# Patient Record
Sex: MALE | Race: WHITE | Employment: FULL TIME | URBAN - METROPOLITAN AREA
[De-identification: names, ages, dates, MRNs, and addresses within clinical notes are randomized per-mention and may not be internally consistent; named-entity substitution may affect disease eponyms.]

---

## 2017-01-27 ENCOUNTER — TELEPHONE (OUTPATIENT)
Dept: FAMILY MEDICINE | Facility: CLINIC | Age: 62
End: 2017-01-27

## 2017-02-07 ENCOUNTER — OFFICE VISIT (OUTPATIENT)
Dept: FAMILY MEDICINE | Facility: CLINIC | Age: 62
End: 2017-02-07
Payer: COMMERCIAL

## 2017-02-07 ENCOUNTER — RADIANT APPOINTMENT (OUTPATIENT)
Dept: GENERAL RADIOLOGY | Facility: CLINIC | Age: 62
End: 2017-02-07
Attending: FAMILY MEDICINE
Payer: COMMERCIAL

## 2017-02-07 VITALS
DIASTOLIC BLOOD PRESSURE: 73 MMHG | BODY MASS INDEX: 32.91 KG/M2 | OXYGEN SATURATION: 96 % | WEIGHT: 256.4 LBS | HEIGHT: 74 IN | SYSTOLIC BLOOD PRESSURE: 127 MMHG | TEMPERATURE: 99 F | HEART RATE: 98 BPM

## 2017-02-07 DIAGNOSIS — M25.562 CHRONIC PAIN OF LEFT KNEE: ICD-10-CM

## 2017-02-07 DIAGNOSIS — M54.50 BILATERAL LOW BACK PAIN WITHOUT SCIATICA, UNSPECIFIED CHRONICITY: Primary | ICD-10-CM

## 2017-02-07 DIAGNOSIS — G89.29 CHRONIC PAIN OF LEFT KNEE: ICD-10-CM

## 2017-02-07 DIAGNOSIS — R05.9 COUGH: ICD-10-CM

## 2017-02-07 PROCEDURE — 99214 OFFICE O/P EST MOD 30 MIN: CPT | Performed by: FAMILY MEDICINE

## 2017-02-07 PROCEDURE — 72100 X-RAY EXAM L-S SPINE 2/3 VWS: CPT

## 2017-02-07 RX ORDER — CYCLOBENZAPRINE HCL 10 MG
5-10 TABLET ORAL 3 TIMES DAILY PRN
Qty: 30 TABLET | Refills: 1 | Status: SHIPPED | OUTPATIENT
Start: 2017-02-07 | End: 2017-02-20

## 2017-02-07 RX ORDER — ALBUTEROL SULFATE 90 UG/1
2 AEROSOL, METERED RESPIRATORY (INHALATION) EVERY 6 HOURS PRN
Qty: 1 INHALER | Refills: 0 | Status: SHIPPED | OUTPATIENT
Start: 2017-02-07 | End: 2017-06-27

## 2017-02-07 RX ORDER — OXYCODONE AND ACETAMINOPHEN 5; 325 MG/1; MG/1
1 TABLET ORAL EVERY 8 HOURS PRN
Qty: 30 TABLET | Refills: 0 | Status: SHIPPED | OUTPATIENT
Start: 2017-02-07 | End: 2017-02-22

## 2017-02-07 ASSESSMENT — ANXIETY QUESTIONNAIRES
GAD7 TOTAL SCORE: 4
1. FEELING NERVOUS, ANXIOUS, OR ON EDGE: NOT AT ALL
5. BEING SO RESTLESS THAT IT IS HARD TO SIT STILL: NOT AT ALL
IF YOU CHECKED OFF ANY PROBLEMS ON THIS QUESTIONNAIRE, HOW DIFFICULT HAVE THESE PROBLEMS MADE IT FOR YOU TO DO YOUR WORK, TAKE CARE OF THINGS AT HOME, OR GET ALONG WITH OTHER PEOPLE: SOMEWHAT DIFFICULT
3. WORRYING TOO MUCH ABOUT DIFFERENT THINGS: NOT AT ALL
2. NOT BEING ABLE TO STOP OR CONTROL WORRYING: SEVERAL DAYS
6. BECOMING EASILY ANNOYED OR IRRITABLE: SEVERAL DAYS
7. FEELING AFRAID AS IF SOMETHING AWFUL MIGHT HAPPEN: MORE THAN HALF THE DAYS

## 2017-02-07 ASSESSMENT — PATIENT HEALTH QUESTIONNAIRE - PHQ9: 5. POOR APPETITE OR OVEREATING: NOT AT ALL

## 2017-02-07 NOTE — PROGRESS NOTES
SUBJECTIVE:                                                    Cedric Figueroa is a 61 year old male who presents to clinic today for the following health issues:      Back Pain      Duration: x1 month        Specific cause: none    Description:   Location of pain: low back - mid  Character of pain: tightness, constant ache   Pain radiation:lower spin into glut  New numbness or weakness in legs, not attributed to pain:  no     Intensity: Currently 5-6/10, At its worst 8-9/10    History:   Pain interferes with job: YES- needs to walk around  History of back problems: no prior back problems  Any previous MRI or X-rays: None  Sees a specialist for back pain:  No  Therapies tried without relief: acetaminophen (Tylenol), cold and heat    Alleviating factors:   Improved by: stretching it, walking around     Precipitating factors:    Worsened by: Sitting long periods     Functional and Psychosocial Screen (Flower STarT Back):      Most recent score:    FLOWER START BACK TOTAL SCORE 2/7/2017   Total Score (all 9) 4            Accompanying Signs & Symptoms:  Risk of Fracture:  None  Risk of Cauda Equina:  None  Risk of Infection:  None  Risk of Cancer:  None  Risk of Ankylosing Spondylitis:  Onset at age <35, male, AND morning back stiffness. no                      Requesting a refill on Albuterol. States that he uses it for an occasional cough at night that seems to work well for him. Denies having any underlying lung disease. Admits that he is a Cigar smoker.     Also requesting for a refill on Oxycodone. Takes it as needed for chronic knee pain.       Problem list and histories reviewed & adjusted, as indicated.  Additional history: as documented    Current Outpatient Prescriptions   Medication Sig Dispense Refill     cyclobenzaprine (FLEXERIL) 10 MG tablet Take 0.5-1 tablets (5-10 mg) by mouth 3 times daily as needed for muscle spasms 30 tablet 1     albuterol (PROAIR HFA/PROVENTIL HFA/VENTOLIN HFA) 108 (90 BASE) MCG/ACT  Inhaler Inhale 2 puffs into the lungs every 6 hours as needed for shortness of breath / dyspnea or wheezing 1 Inhaler 0     oxyCODONE-acetaminophen (PERCOCET) 5-325 MG per tablet Take 1 tablet by mouth every 8 hours as needed for moderate to severe pain 30 tablet 0     glipiZIDE (GLUCOTROL) 5 MG tablet Take 1 tablet (5 mg) by mouth 2 times daily (before meals) (Patient taking differently: Take 5 mg by mouth daily (with breakfast) ) 90 tablet 3     lisinopril-hydrochlorothiazide (PRINZIDE,ZESTORETIC) 20-12.5 MG per tablet Take 2 tablets by mouth daily 90 tablet 3     metFORMIN (GLUCOPHAGE) 1000 MG tablet Take 1 tablet (1,000mg) by mouth twice daily. 180 tablet 3     pramipexole (MIRAPEX) 0.25 MG tablet Take 1 tablet (0.25mg) in morning, 2 tablets (0.5mg) in the afternoon, and take 1 tablet before bed 240 tablet 3     PARoxetine (PAXIL) 20 MG tablet Take 1 tablet (20 mg) by mouth every morning 90 tablet 3     simvastatin (ZOCOR) 40 MG tablet Take 1 tablet (40 mg) by mouth At Bedtime 90 tablet 3     insulin glargine (LANTUS) 100 UNIT/ML vial 36 units SC qhs 1 Month 11     MELATONIN PO Take 10 mg by mouth At Bedtime        blood glucose monitoring (ACCU-CHEK PELON PLUS) meter device kit Use to test blood sugar 3 times daily or as directed. 1 kit 0     blood glucose (ACCU-CHEK PELON PLUS) test strip Use to test blood sugar 3 times daily or as directed. 3 Month 3     blood glucose monitoring (SOFTCLIX) lancets Use to test blood sugar 3 times daily or as directed. 3 Box 33     insulin pen needle (BD ULTRA-FINE) 29G X 12.7MM Use once daily or as directed. 100 each prn     Pediatric Multiple Vit-C-FA (ANIMAL CHEWABLE MULTIVITAMIN PO) Take by mouth 2 times daily       aspirin 81 MG tablet Take 1 tablet by mouth daily.       [DISCONTINUED] albuterol (PROAIR HFA, PROVENTIL HFA, VENTOLIN HFA) 108 (90 BASE) MCG/ACT inhaler Inhale 2 puffs into the lungs every 6 hours as needed for shortness of breath / dyspnea or wheezing 1  "Inhaler 0     No Known Allergies  Problem list, Medication list, Allergies, and Medical/Social/Surgical histories reviewed in [facility] and updated as appropriate.    ROS:  Constitutional, HEENT, cardiovascular, pulmonary, gi and gu systems are negative, except as otherwise noted.    OBJECTIVE:                                                    /73 mmHg  Pulse 98  Temp(Src) 99  F (37.2  C) (Tympanic)  Ht 6' 2\" (1.88 m)  Wt 256 lb 6.4 oz (116.302 kg)  BMI 32.91 kg/m2  SpO2 96%  Body mass index is 32.91 kg/(m^2).  GENERAL: healthy, alert and no distress  MS: no gross musculoskeletal defects noted, no edema  BACK EXAM: Bilateral paraspinal tenderness to palpation in the lumbar sacral area. Negative straight leg raising test. Normal strength in the bilateral lower extremities. DTR are present and equal bilaterally.     Diagnostic Test Results:  See orders below     ASSESSMENT/PLAN:                                                    Cedric was seen today for back pain.    Diagnoses and all orders for this visit:    Bilateral low back pain without sciatica, unspecified chronicity  -     XR Lumbar Spine 2/3 Views  -     cyclobenzaprine (FLEXERIL) 10 MG tablet; Take 0.5-1 tablets (5-10 mg) by mouth 3 times daily as needed for muscle spasms  -     PHYSICAL THERAPY REFERRAL    Cough  -     Refill: albuterol (PROAIR HFA/PROVENTIL HFA/VENTOLIN HFA) 108 (90 BASE) MCG/ACT Inhaler; Inhale 2 puffs into the lungs every 6 hours as needed for shortness of breath / dyspnea or wheezing    Chronic pain of left knee  -     Refill: oxyCODONE-acetaminophen (PERCOCET) 5-325 MG per tablet; Take 1 tablet by mouth every 8 hours as needed for moderate to severe pain  Consider signing a narcotic contract at the next office visit      Other orders  -     DEPRESSION ACTION PLAN (DAP)      Follow up if symptoms fail to improve or as previously scheduled. Patient verbalized understanding.      Molly Leiva MD  [facility]    "

## 2017-02-07 NOTE — PATIENT INSTRUCTIONS
Back Basics: A Healthy Spine  A healthy spine supports the body while letting it move freely. It does this with the help of three natural curves. Strong, flexible muscles help, too. They support the spine by keeping its curves properly aligned. The disks that cushion the bones of your spine also play a role in back fitness.    Three natural curves  The spine is made of bones (vertebrae) and pads of soft tissue (disks). These parts are arranged in three curves: cervical, thoracic, and lumbar. When properly aligned, these curves keep your body balanced. They also support your body when you move. By distributing your weight throughout your spine, the curves make back injuries less likely.  Strong, flexible muscles  Strong, flexible back muscles help support the three curves of the spine. They do so by holding the vertebrae and disks in proper alignment. Strong, flexible abdominal, hip, and leg muscles also reduce strain on the back.  The lumbar curve  The lumbar curve is the hardest-working part of the spine. It carries more weight and moves the most. Aligning this curve helps prevent damage to vertebrae, disks, and other parts of the spine.  Cushioning disks  Disks are the soft pads of tissue between the vertebrae. The disks absorb shock caused by movement. Each disk has a spongy center (nucleus) and a tougher outer ring (annulus). Movement within the nucleus allows the vertebrae to rock back and forth on the disks. This provides the flexibility needed to bend and move.         5640-7622 [employer]. [address] [place] [address] 7[address]. All rights reserved. This information is not intended as a substitute for professional medical care. Always follow your healthcare professional's instructions.        Back Care Tips    Caring for your back  These are things you can do to prevent a recurrence of acute back pain and to reduce symptoms from chronic back pain:    Maintain a healthy weight. If you are  overweight, losing weight will help most types of back pain.    Exercise is an important part of recovery from most types of back pain. The back is supported by the muscles behind and in front of the spine. This means both the back muscles and the abdominal muscles must be strengthened to provide better support for your spine.     Swimming and brisk walking are good overall exercises to improve your fitness level.    Practice safe lifting methods (below).    Practice good posture when sitting, standing and walking. Avoid prolonged sitting. This puts more stress on the lower back than standing or walking.    Wear quality shoes with sufficient arch support. Foot and ankle alignment can affect back symptoms. Women should avoid high heels.    Therapeutic massage can help  relax the back muscles without stretching them.    During the first 24 to 72 hours after an acute injury or flare-up of chronic back pain, apply an ice pack to the painful area for 20 minutes and then remove it for 20 minutes over a period of 60 to 90 minutes or several times a day. As a safety precaution, do not use a heating pad at bedtime. Sleeping on a heating pad can lead to skin burns or tissue damage.    Ice and heat therapies can be alternated.  Medications  Talk to your doctor before using medications, especially if you have other medical problems or are taking other medicines.    You may use acetaminophen or ibuprofen to control pain, unless other pain medicine was prescribed. If you have chronic conditions like diabetes, liver or kidney disease, stomach ulcers or gastrointestinal bleeding, or are taking blood thinners, talk with your doctor before taking any meidcations.    Be careful if you are given prescription pain medicines, narcotics, or medication for muscle spasm. They can cause drowsiness, affect your coordination, reflexes and judgment. Do not drive or operate heavy machinery.  Lumbar stretch  Here is a simple stretching exercise  that will help relax muscle spasm and keep your back more limber. If exercise makes your back pain worse, don t do it.    Lie on your back with your knees bent and both feet on the ground.    Slowly raise your left knee to your chest as you flatten your lower back against the floor. Hold for 5 seconds.    Relax and repeat the exercise with your right knee.    Do 10 of these exercises for each leg.  Safe lifting method    Don t bend over at the waist to lift an object off the floor.  Instead, bend your knees and hips in a squat.     Keep your back and head upright    Hold the object close to your body, directly in front of you.    Straighten your legs to lift the object.     Lower the object to the floor in the reverse fashion.    If you must slide something across the floor, push it.  Posture tips  Sitting  Sit in chairs with straight backs or low-back support. rKeep your k nees lower than your hip, with your feet flat on the floor.  When driving, sit up straight. Adjust the seat forward so you are not leaning toward the steering wheel.  A small pillow or rolled towel behind your lower back may help if you are driving long distances.   Standing  When standing for long periods, shift most of your weight to one leg at a time. Alternate legs every few minutes.   Sleeping  The best way to sleep is on your side with your knees bent. Put a low pillow under your head to support your neck in a neutral spine position. Avoid thick pillows that bend your neck to one side. Put a pillow between your legs to further relax your lower back. If you sleep on your back, put pillows under your knees to support your legs in a slightly flexed position. Use a firm mattress. If your mattress sags, replace it, or use a 1/2-inch plywood board under the mattress to add support.  Follow-up care  Follow up with your health care provider or as directed by our staff.  If X-rays, a CT scan or an MRI scan were taken, they will be reviewed by a  radiologist. You will be notified of any new findings that may affect your care.  Call 911  Seek emergency medical care if any of the following occur:    Trouble breathing    Confusion    Very drowsy or trouble breathing    Fainting or loss of consciousness    Rapid or very slow heart rate    Loss of  bwel or bladder control  When to seek medical care  Call your health care provider if any of the following occur:    Pain becomes worse or spreads to your arms or legs    Weakness or numbness in one or both arms or legs    Numbness in the groin area    6914-2459 [employer]. [address] [place] [id number]. All rights reserved. This information is not intended as a substitute for professional medical care. Always follow your healthcare professional's instructions.

## 2017-02-07 NOTE — Clinical Note
My Depression Action Plan  Name: Cedric Figueroa   Date of Birth 1955  Date: 2/7/2017    My doctor: Molly Leiva   My clinic: [place] [id number] [facility]  [place] [id number] [phone]          GREEN    ZONE   Good Control    What it looks like:     Things are going generally well. You have normal up s and down s. You may even feel depressed from time to time, but bad moods usually last less than a day.   What you need to do:  1. Continue to care for yourself (see self care plan)  2. Check your depression survival kit and update it as needed  3. Follow your physician s recommendations including any medication.  4. Do not stop taking medication unless you consult with your physician first.           YELLOW         ZONE Getting Worse    What it looks like:     Depression is starting to interfere with your life.     It may be hard to get out of bed; you may be starting to isolate yourself from others.    Symptoms of depression are starting to last most all day and this has happened for several days.     You may have suicidal thoughts but they are not constant.   What you need to do:     1. Call your care team, your response to treatment will improve if you keep your care team informed of your progress. Yellow periods are signs an adjustment may need to be made.     2. Continue your self-care, even if you have to fake it!    3. Talk to someone in your support network    4. Open up your depression survival kit           RED    ZONE Medical Alert - Get Help    What it looks like:     Depression is seriously interfering with your life.     You may experience these or other symptoms: You can t get out of bed most days, can t work or engage in other necessary activities, you have trouble taking care of basic hygiene, or basic responsibilities, thoughts of suicide or death that will not go away, self-injurious behavior.     What you need to do:  1. Call your care team  and request a same-day appointment. If they are not available (weekends or after hours) call your local crisis line, emergency room or 911.      Electronically signed by: Sandrita Zazueta, February 7, 2017    Depression Self Care Plan / Survival Kit    Self-Care for Depression  Here s the deal. Your body and mind are really not as separate as most people think.  What you do and think affects how you feel and how you feel influences what you do and think. This means if you do things that people who feel good do, it will help you feel better.  Sometimes this is all it takes.  There is also a place for medication and therapy depending on how severe your depression is, so be sure to consult with your medical provider and/ or Behavioral Health Consultant if your symptoms are worsening or not improving.     In order to better manage my stress, I will:    Exercise  Get some form of exercise, every day. This will help reduce pain and release endorphins, the  feel good  chemicals in your brain. This is almost as good as taking antidepressants!  This is not the same as joining a gym and then never going! (they count on that by the way ) It can be as simple as just going for a walk or doing some gardening, anything that will get you moving.      Hygiene   Maintain good hygiene (Get out of bed in the morning, Make your bed, Brush your teeth, Take a shower, and Get dressed like you were going to work, even if you are unemployed).  If your clothes don't fit try to get ones that do.    Diet  I will strive to eat foods that are good for me, drink plenty of water, and avoid excessive sugar, caffeine, alcohol, and other mood-altering substances.  Some foods that are helpful in depression are: complex carbohydrates, B vitamins, flaxseed, fish or fish oil, fresh fruits and vegetables.    Psychotherapy  I agree to participate in Individual Therapy (if recommended).    Medication  If prescribed medications, I agree to take them.   Missing doses can result in serious side effects.  I understand that drinking alcohol, or other illicit drug use, may cause potential side effects.  I will not stop my medication abruptly without first discussing it with my provider.    Staying Connected With Others  I will stay in touch with my friends, family members, and my primary care provider/team.    Use your imagination  Be creative.  We all have a creative side; it doesn t matter if it s oil painting, sand castles, or mud pies! This will also kick up the endorphins.    Witness Beauty  (AKA stop and smell the roses) Take a look outside, even in mid-winter. Notice colors, textures. Watch the squirrels and birds.     Service to others  Be of service to others.  There is always someone else in need.  By helping others we can  get out of ourselves  and remember the really important things.  This also provides opportunities for practicing all the other parts of the program.    Humor  Laugh and be silly!  Adjust your TV habits for less news and crime-drama and more comedy.    Control your stress  Try breathing deep, massage therapy, biofeedback, and meditation. Find time to relax each day.     My support system    Clinic Contact:  Phone number:    Contact 1:  Phone number:    Contact 2:  Phone number:    Evangel[employer]/:  Phone number:    Therapist:  Phone number:    Local crisis center:    Phone number:    Other community support:  Phone number:

## 2017-02-07 NOTE — MR AVS SNAPSHOT
After Visit Summary   2/7/2017    Cedric Figueroa    MRN: [id number]           Patient Information     Date Of Birth          1955        Visit Information        Provider Department      2/7/2017 2:00 PM Molly Leiva MD [facility] [place]        Today's Diagnoses     Bilateral low back pain without sciatica, unspecified chronicity    -  1     Cough         Chronic pain of left knee            Follow-ups after your visit        Additional Services     PHYSICAL THERAPY REFERRAL       *This therapy referral will be filtered to a centralized scheduling office at [facility] and the patient will receive a call to schedule an appointment at a [place] location most convenient for them. *     [facility] provides Physical Therapy evaluation and treatment and many specialty services across the [place] system.  If requesting a specialty program, please choose from the list below.    If you have not heard from the scheduling office within 2 business days, please call [phone] for all locations, with the exception of [place], please call [phone].  Treatment: Evaluation & Treatment  Special Instructions/Modalities: None  Special Programs: None    Please be aware that coverage of these services is subject to the terms and limitations of your health insurance plan.  Call member services at your health plan with any benefit or coverage questions.      **Note to Provider:  If you are referring outside of [place] for the therapy appointment, please list the name of the location in the  special instructions  above, print the referral and give to the patient to schedule the appointment.                  Your next 10 appointments already scheduled     Feb 20, 2017  3:30 PM   SHORT with Molly Leiva MD   [facility] [place] ([facility] [place])    [address]  [place] [address] [phone]              Who to contact      "Normal or non-critical lab and imaging results will be communicated to you by MyChart, letter or phone within 4 business days after the clinic has received the results. If you do not hear from us within 7 days, please contact the clinic through Theran[employer] or phone. If you have a critical or abnormal lab result, we will notify you by phone as soon as possible.  Submit refill requests through [employer] or call your pharmacy and they will forward the refill request to us. Please allow 3 business days for your refill to be completed.          If you need to speak with a  for additional information , please call: [phone]             Additional Information About Your Visit        Byliner Information     [employer] gives you secure access to your electronic health record. If you see a primary care provider, you can also send messages to your care team and make appointments. If you have questions, please call your primary care clinic.  If you do not have a primary care provider, please call [phone] and they will assist you.        Care EveryWhere ID     This is your Care EveryWhere ID. This could be used by other organizations to access your [facility] medical records  [id number]        Your Vitals Were     Pulse Temperature Height BMI (Body Mass Index) Pulse Oximetry       98 99  F (37.2  C) (Tympanic) 6' 2\" (1.88 m) 32.91 kg/m2 96%        Blood Pressure from Last 3 Encounters:   02/07/17 127/73   11/18/16 136/82   10/05/16 148/82    Weight from Last 3 Encounters:   02/07/17 256 lb 6.4 oz (116.302 kg)   11/18/16 264 lb (119.75 kg)   10/05/16 263 lb 3.2 oz (119.387 kg)              We Performed the Following     DEPRESSION ACTION PLAN (DAP)     PHYSICAL THERAPY REFERRAL     XR Lumbar Spine 2/3 Views          Today's Medication Changes          These changes are accurate as of: 2/7/17  2:25 PM.  If you have any questions, ask your nurse or doctor.               Start taking these medicines.        " Dose/Directions    cyclobenzaprine 10 MG tablet   Commonly known as:  FLEXERIL   Used for:  Bilateral low back pain without sciatica, unspecified chronicity   Started by:  Molly Leiva MD        Dose:  5-10 mg   Take 0.5-1 tablets (5-10 mg) by mouth 3 times daily as needed for muscle spasms   Quantity:  30 tablet   Refills:  1         These medicines have changed or have updated prescriptions.        Dose/Directions    glipiZIDE 5 MG tablet   Commonly known as:  GLUCOTROL   This may have changed:  when to take this   Used for:  Type 2 diabetes mellitus with hyperglycemia, with long-term current use of insulin (H)        Dose:  5 mg   Take 1 tablet (5 mg) by mouth 2 times daily (before meals)   Quantity:  90 tablet   Refills:  3            Where to get your medicines      These medications were sent to [facility] - [id number] [facility] [place] [address]     Phone:  [phone]    - albuterol 108 (90 BASE) MCG/ACT Inhaler  - cyclobenzaprine 10 MG tablet      Some of these will need a paper prescription and others can be bought over the counter.  Ask your nurse if you have questions.     Bring a paper prescription for each of these medications    - oxyCODONE-acetaminophen 5-325 MG per tablet             Primary Care Provider Office Phone # Fax #    Molly Leiva [phone]       [facility] [address]  [place] [address]        Thank you!     Thank you for choosing [facility]  for your care. Our goal is always to provide you with excellent care. Hearing back from our patients is one way we can continue to improve our services. Please take a few minutes to complete the written survey that you may receive in the mail after your visit with us. Thank you!             Your Updated Medication List - Protect others around you: Learn how to safely use, store and throw away your medicines at www.disposemymeds.org.           This list is accurate as of: 2/7/17  2:25 PM.  Always use your most recent med list.                   Brand Name Dispense Instructions for use    albuterol 108 (90 BASE) MCG/ACT Inhaler    PROAIR HFA/PROVENTIL HFA/VENTOLIN HFA    1 Inhaler    Inhale 2 puffs into the lungs every 6 hours as needed for shortness of breath / dyspnea or wheezing       ANIMAL CHEWABLE MULTIVITAMIN PO      Take by mouth 2 times daily       aspirin 81 MG tablet      Take 1 tablet by mouth daily.       blood glucose monitoring lancets     3 Box    Use to test blood sugar 3 times daily or as directed.       blood glucose monitoring meter device kit     1 kit    Use to test blood sugar 3 times daily or as directed.       blood glucose monitoring test strip    ACCU-CHEK PELON PLUS    3 Month    Use to test blood sugar 3 times daily or as directed.       cyclobenzaprine 10 MG tablet    FLEXERIL    30 tablet    Take 0.5-1 tablets (5-10 mg) by mouth 3 times daily as needed for muscle spasms       glipiZIDE 5 MG tablet    GLUCOTROL    90 tablet    Take 1 tablet (5 mg) by mouth 2 times daily (before meals)       insulin glargine 100 UNIT/ML injection    LANTUS    1 Month    36 units SC qhs       insulin pen needle 29G X 12.7MM    BD ULTRA-FINE    100 each    Use once daily or as directed.       lisinopril-hydrochlorothiazide 20-12.5 MG per tablet    PRINZIDE/ZESTORETIC    90 tablet    Take 2 tablets by mouth daily       MELATONIN PO      Take 10 mg by mouth At Bedtime       metFORMIN 1000 MG tablet    GLUCOPHAGE    180 tablet    Take 1 tablet (1,000mg) by mouth twice daily.       oxyCODONE-acetaminophen 5-325 MG per tablet    PERCOCET    30 tablet    Take 1 tablet by mouth every 8 hours as needed for moderate to severe pain       PARoxetine 20 MG tablet    PAXIL    90 tablet    Take 1 tablet (20 mg) by mouth every morning       pramipexole 0.25 MG tablet    MIRAPEX    240 tablet    Take 1 tablet (0.25mg) in morning, 2 tablets (0.5mg) in  the afternoon, and take 1 tablet before bed       simvastatin 40 MG tablet    ZOCOR    90 tablet    Take 1 tablet (40 mg) by mouth At Bedtime

## 2017-02-08 ASSESSMENT — PATIENT HEALTH QUESTIONNAIRE - PHQ9: SUM OF ALL RESPONSES TO PHQ QUESTIONS 1-9: 4

## 2017-02-08 ASSESSMENT — ANXIETY QUESTIONNAIRES: GAD7 TOTAL SCORE: 4

## 2017-02-20 ENCOUNTER — THERAPY VISIT (OUTPATIENT)
Dept: PHYSICAL THERAPY | Facility: CLINIC | Age: 62
End: 2017-02-20
Payer: COMMERCIAL

## 2017-02-20 ENCOUNTER — OFFICE VISIT (OUTPATIENT)
Dept: FAMILY MEDICINE | Facility: CLINIC | Age: 62
End: 2017-02-20
Payer: COMMERCIAL

## 2017-02-20 VITALS
SYSTOLIC BLOOD PRESSURE: 115 MMHG | HEART RATE: 76 BPM | WEIGHT: 256.8 LBS | HEIGHT: 74 IN | OXYGEN SATURATION: 97 % | TEMPERATURE: 97.1 F | BODY MASS INDEX: 32.96 KG/M2 | DIASTOLIC BLOOD PRESSURE: 69 MMHG

## 2017-02-20 DIAGNOSIS — G89.29 OTHER CHRONIC PAIN: ICD-10-CM

## 2017-02-20 DIAGNOSIS — M54.50 CHRONIC BILATERAL LOW BACK PAIN WITHOUT SCIATICA: Primary | ICD-10-CM

## 2017-02-20 DIAGNOSIS — Z79.4 TYPE 2 DIABETES MELLITUS WITH HYPERGLYCEMIA, WITH LONG-TERM CURRENT USE OF INSULIN (H): Primary | ICD-10-CM

## 2017-02-20 DIAGNOSIS — Z02.9 ENCOUNTER FOR NARCOTIC CONTRACT DISCUSSION: ICD-10-CM

## 2017-02-20 DIAGNOSIS — E11.65 TYPE 2 DIABETES MELLITUS WITH HYPERGLYCEMIA, WITH LONG-TERM CURRENT USE OF INSULIN (H): Primary | ICD-10-CM

## 2017-02-20 DIAGNOSIS — M54.50 BILATERAL LOW BACK PAIN WITHOUT SCIATICA, UNSPECIFIED CHRONICITY: ICD-10-CM

## 2017-02-20 DIAGNOSIS — G89.29 CHRONIC BILATERAL LOW BACK PAIN WITHOUT SCIATICA: Primary | ICD-10-CM

## 2017-02-20 LAB
HBA1C MFR BLD: 7.5 % (ref 4.3–6)
TSH SERPL DL<=0.005 MIU/L-ACNC: 2.23 MU/L (ref 0.4–4)

## 2017-02-20 PROCEDURE — 99214 OFFICE O/P EST MOD 30 MIN: CPT | Performed by: FAMILY MEDICINE

## 2017-02-20 PROCEDURE — 97110 THERAPEUTIC EXERCISES: CPT | Mod: GP | Performed by: PHYSICAL THERAPIST

## 2017-02-20 PROCEDURE — 97161 PT EVAL LOW COMPLEX 20 MIN: CPT | Mod: GP | Performed by: PHYSICAL THERAPIST

## 2017-02-20 PROCEDURE — 84443 ASSAY THYROID STIM HORMONE: CPT | Performed by: FAMILY MEDICINE

## 2017-02-20 PROCEDURE — 80307 DRUG TEST PRSMV CHEM ANLYZR: CPT | Performed by: FAMILY MEDICINE

## 2017-02-20 PROCEDURE — 40000358 ZZHCL STATISTIC DRUG SCREEN MULTIPLE (METRO): Performed by: FAMILY MEDICINE

## 2017-02-20 PROCEDURE — 99207 C FOOT EXAM  NO CHARGE: CPT | Performed by: FAMILY MEDICINE

## 2017-02-20 PROCEDURE — 36415 COLL VENOUS BLD VENIPUNCTURE: CPT | Performed by: FAMILY MEDICINE

## 2017-02-20 PROCEDURE — 83036 HEMOGLOBIN GLYCOSYLATED A1C: CPT | Performed by: FAMILY MEDICINE

## 2017-02-20 RX ORDER — INSULIN GLARGINE 100 [IU]/ML
38 INJECTION, SOLUTION SUBCUTANEOUS DAILY
Qty: 3 ML | Refills: 11 | Status: SHIPPED | OUTPATIENT
Start: 2017-02-20 | End: 2017-05-08

## 2017-02-20 RX ORDER — GLIPIZIDE 5 MG/1
5 TABLET ORAL
Qty: 90 TABLET | Refills: 3 | Status: SHIPPED | OUTPATIENT
Start: 2017-02-20 | End: 2017-06-27

## 2017-02-20 RX ORDER — CYCLOBENZAPRINE HCL 10 MG
5-10 TABLET ORAL 3 TIMES DAILY PRN
Qty: 30 TABLET | Refills: 1 | Status: SHIPPED | OUTPATIENT
Start: 2017-02-20 | End: 2017-06-27

## 2017-02-20 NOTE — LETTER
[facility]    02/20/17    Patient: Cedric Figueroa  YOB: 1955  Medical Record Number: [id number]                                                                  Controlled Substance Agreement  I understand that my care provider has prescribed controlled substances (narcotics, tranquilizers, and/or stimulants) to help manage my condition(s).  I am taking this medicine to help me function or work.  I know that this is strong medicine.  It could have serious side effects and even cause a dependency on the drug.  If I stop these medicines suddenly, I could have severe withdrawal symptoms.    The risks, benefits, and side effects of these medication(s) were explained to me.  I agree that:  1. I will take part in other treatments as advised by my provider.  This may be psychiatry or counseling, physical therapy, behavioral therapy, group treatment, or a referral to a pain clinic.  I will reduce or stop my medicine when my provider tells me to do so.   2. I will take my medicines as prescribed.  I will not change the dose or schedule unless my provider tells me to.  There will be no refills if I  run out early.   I may be contacted at any time without warning and asked to complete a drug test or pill count.   3. I will keep all my appointments at the clinic.  If I miss appointments or fail to follow instructions, my provider may stop my medicine.  4. I will not ask other providers to prescribe controlled substances. And I will not accept controlled substances from other people. If I need another prescribed controlled substance for a new reason, I will notify my provider within one business day.  5. If I enroll in the [place] Medical Marijuana program, I will tell my provider.  I will also sign an agreement to share my medical records with my provider.  6. I will use one pharmacy to fill all of my controlled substance prescriptions.  If my prescription is mailed to my pharmacy, it may take 5 to  7 days for my medicine to be ready.  7. I understand that my provider, clinic care team, and pharmacy can track controlled substance prescriptions from other providers through a central database (prescription monitoring program).  8. I will bring in my list of medications (or my medicine bottles) each time I come to the clinic.  REV-  04/2016                                                                                                                                            Page 1 of 2      [facility]    02/20/17    Patient: Cedric Figueroa  YOB: 1955  Medical Record Number: [id number]    9. Refills of controlled substances will be made only during office hours.  It is up to me to make sure that I do not run out of my medicines on weekends or holidays.    10. I am responsible for my prescriptions.  If the medicine is lost or stolen, it will not be replaced.   I also agree not to share these medicines with anyone.  11. I agree to not use ANY illegal or recreational drugs.  This includes marijuana, cocaine, bath salts or other drugs.  I agree not to use alcohol unless my provider says I may.  I agree to give urine samples whenever asked.  If I fail to give a urine sample, the provider may stop my medicine.     12. I will tell my nurse or provider right away if I become pregnant or have a new medical problem treated outside of [facility].  13. I understand that this medicine can affect my thinking and judgment.  It may be unsafe for me to drive, use machinery and do dangerous tasks.  I will not do any of these things until I know how the medicine affects me.  If my dose changes, I will wait to see how it affects me.  I will contact my provider if I have concerns about medicine side effects.  I understand that if I do not follow any of the conditions above, my prescriptions or treatment may be stopped.    I agree that my provider, clinic care team, and pharmacy may work with any city,  state or federal law enforcement agency that investigates the misuse, sale, or other diversion of my controlled medicine. I will allow my provider to discuss my care with or share a copy of this agreement with any other treating provider, pharmacy or emergency room where I receive care.  I agree to give up (waive) any right of privacy or confidentiality with respect to these authorizations.   I have read this agreement and have asked questions about anything I did not understand.   ___________________________________    ___________________________  Patient Signature                                                           Date and Time  ___________________________________     ____________________________  Witness                                                                            Date and Time  ___________________________________  Molly Leiva MD  REV-  04/2016                                                                                                                                                                 Page 2 of 2  Opioid Pain Medicines (also known as Narcotics)  What You Need to Know      What are opioids?   Opioids are pain medicines that must be prescribed by a doctor. Examples are:     morphine (MS Contin, Ruth)    oxycodone (Oxycontin)    oxycodone and acetaminophen (Percocet)    hydrocodone and acetaminophen (Vicodin, Norco)     fentanyl patch (Duragesic)     hydromorphone (Dilaudid)     methadone     What do opioids do well?   Opioids are best for short-term pain after a surgery or injury. They also work well for cancer pain. Unlike other pain medicines, they do not cause liver or kidney failure or ulcers. They may help some people with long-lasting (chronic) pain.     What do opioids NOT do well?   Opioids never get rid of pain entirely, and they do not work well for most patients with chronic pain. Opioids do not reduce swelling, one of the causes of pain. They also don t work  well for nerve pain.     Side effects  Talk to your doctor before you start or decide to keep taking one of these medicines. Side effects include:    Lowers your breathing rate enough that it could cause death    Death due to taking more than the prescribed dose    Serious lifelong opioid use      Dependence is not the same as addiction. Addiction is when people keep using a substance that harms their body, their mind or their relations with others. If you have a history of drug or alcohol abuse, taking opioids can cause a relapse.  Over time, opioids don t work as well. Most people will need higher and higher doses. The higher the dose, the more serious the side effects. We don t know the long-term effects of opioids.   People who have used opioids for a long time have a lower quality of life, worse depression, higher levels of pain and more visits to doctors.  Overdose from prescription drugs is the second leading cause of death in the [place] The risk of overdose rises when opioids are taken with other drugs such as:    Medicines used for anxiety and panic attacks (such as lorazepam, alprazolam, clonazepam    Other sedatives    Alcohol    Illegal drugs such as heroin  Never share your opioids with others. Be sure to store opioids in a secure place, locked if possible.Young children can easily swallow them and overdose.     Are there other ways to manage pain?  Ways to help reduce pain:    Exercise every day.    Treat health problems that may be causing pain.    Treat mental health problems like depression and anxiety.     Worse depression symptoms; Less pleasure in things you usually enjoy    Feeling tired or sluggish    Slower thoughts or cloudy thinking    Being more sensitive to pain over time; Pain is harder to control.    Trouble sleeping or restless sleep    Changes in hormone levels (for example, less testosterone).     Changes in sex drive or ability to have sex    Long lasting nausea and  constipation    Trouble breathing while asleep; This is worse with lung problems like COPD or sleep apnea.    Unsafe driving    Getting sick more often    Itching    Feeling dizzy    Dry mouth    Sweating    Trouble emptying the bladder (peeing). This is worse if you have an enlarged prostate or get urinary tract infections (UTIs).    What else should I know about opioids?  When someone takes opioids for too long or too often, they become dependent. This means that if you stop or reduce the medicine too quickly, you will have withdrawal symptoms.          Practice good sleep habits.  Try to go to bed and get up at the same time every day.    Stop smoking.  Tobacco use can make pain worse.    Do things that you enjoy.    Find a way to work through pain without drugs.  Try deep breathing, meditation, visual imagery and aromatherapy.    Ask your doctor to help you create a plan to manage your pain.

## 2017-02-20 NOTE — NURSING NOTE
"Chief Complaint   Patient presents with     Diabetes       Initial /69  Pulse 76  Temp 97.1  F (36.2  C) (Tympanic)  Ht 6' 2\" (1.88 m)  Wt 256 lb 12.8 oz (116.5 kg)  SpO2 97%  BMI 32.97 kg/m2 Estimated body mass index is 32.97 kg/(m^2) as calculated from the following:    Height as of this encounter: 6' 2\" (1.88 m).    Weight as of this encounter: 256 lb 12.8 oz (116.5 kg).  Medication Reconciliation: complete     Sandrita Zazueta MA      "

## 2017-02-20 NOTE — MR AVS SNAPSHOT
After Visit Summary   2/20/2017    Cedric Figueroa    MRN: [id number]           Patient Information     Date Of Birth          1955        Visit Information        Provider Department      2/20/2017 3:30 PM [facility] Leiva MD [facility]        Today's Diagnoses     Type 2 diabetes mellitus with hyperglycemia, with long-term current use of insulin (H)    -  1    Bilateral low back pain without sciatica, unspecified chronicity        Encounter for narcotic contract discussion          Care Instructions    Your target glucose levels are:     Pre-meal :      Post-meal:  < 180    Bedtime:     90 -150      Schedule Eye Exam        Follow-ups after your visit        Follow-up notes from your care team     Return for Diabetes follow Up every 3 months.      Your next 10 appointments already scheduled     Feb 20, 2017  4:30 PM CST   YOSI Spine with Arthur Walden PT   [facility] [place] [facility] ([facility] [place])    [address]  [address]  [facility] [id number] [phone]              Who to contact     Normal or non-critical lab and imaging results will be communicated to you by [employer], letter or phone within 4 business days after the clinic has received the results. If you do not hear from us within 7 days, please contact the clinic through [employer] or phone. If you have a critical or abnormal lab result, we will notify you by phone as soon as possible.  Submit refill requests through [employer] or call your pharmacy and they will forward the refill request to us. Please allow 3 business days for your refill to be completed.          If you need to speak with a  for additional information , please call: [phone]             Additional Information About Your Visit        [employer] Information     [employer] gives you secure access to your electronic health record. If you see a primary care provider, you can also send messages to your care  "team and make appointments. If you have questions, please call your primary care clinic.  If you do not have a primary care provider, please call [phone] and they will assist you.        Care EveryWhere ID     This is your Care EveryWhere ID. This could be used by other organizations to access your [facility] medical records  [id number]        Your Vitals Were     Pulse Temperature Height Pulse Oximetry BMI (Body Mass Index)       76 97.1  F (36.2  C) (Tympanic) 6' 2\" (1.88 m) 97% 32.97 kg/m2        Blood Pressure from Last 3 Encounters:   02/20/17 115/69   02/07/17 127/73   11/18/16 136/82    Weight from Last 3 Encounters:   02/20/17 256 lb 12.8 oz (116.5 kg)   02/07/17 256 lb 6.4 oz (116.3 kg)   11/18/16 264 lb (119.7 kg)              We Performed the Following     Drug screen urine     FOOT EXAM     Hemoglobin A1c     JUST IN CASE     TSH with free T4 reflex          Today's Medication Changes          These changes are accurate as of: 2/20/17  4:04 PM.  If you have any questions, ask your nurse or doctor.               Start taking these medicines.        Dose/Directions    insulin glargine 100 UNIT/ML injection   Used for:  Type 2 diabetes mellitus with hyperglycemia, with long-term current use of insulin (H)   Replaces:  insulin glargine 100 UNIT/ML injection   Started by:  Molly Leiva MD        Dose:  38 Units   Inject 38 Units Subcutaneous daily   Quantity:  3 mL   Refills:  11         These medicines have changed or have updated prescriptions.        Dose/Directions    glipiZIDE 5 MG tablet   Commonly known as:  GLUCOTROL   This may have changed:  when to take this   Used for:  Type 2 diabetes mellitus with hyperglycemia, with long-term current use of insulin (H)        Dose:  5 mg   Take 1 tablet (5 mg) by mouth 2 times daily (before meals)   Quantity:  90 tablet   Refills:  3         Stop taking these medicines if you haven't already. Please contact your care team if you have questions.     " insulin glargine 100 UNIT/ML injection   Commonly known as:  LANTUS   Replaced by:  insulin glargine 100 UNIT/ML injection   Stopped by:  Molly Leiva MD                Where to get your medicines      These medications were sent to [facility] - [address] [address] [facility]  [address] [facility] [place] [address]     Phone:  [phone]     cyclobenzaprine 10 MG tablet    glipiZIDE 5 MG tablet    insulin glargine 100 UNIT/ML injection                Primary Care Provider Office Phone # Fax #    Molly Leiva [phone]       [facility] [address]  [place] [address]        Thank you!     Thank you for choosing [facility]  for your care. Our goal is always to provide you with excellent care. Hearing back from our patients is one way we can continue to improve our services. Please take a few minutes to complete the written survey that you may receive in the mail after your visit with us. Thank you!             Your Updated Medication List - Protect others around you: Learn how to safely use, store and throw away your medicines at www.disposemymeds.org.          This list is accurate as of: 2/20/17  4:04 PM.  Always use your most recent med list.                   Brand Name Dispense Instructions for use    albuterol 108 (90 BASE) MCG/ACT Inhaler    PROAIR HFA/PROVENTIL HFA/VENTOLIN HFA    1 Inhaler    Inhale 2 puffs into the lungs every 6 hours as needed for shortness of breath / dyspnea or wheezing       ANIMAL CHEWABLE MULTIVITAMIN PO      Take by mouth 2 times daily       aspirin 81 MG tablet      Take 1 tablet by mouth daily.       blood glucose monitoring lancets     3 Box    Use to test blood sugar 3 times daily or as directed.       blood glucose monitoring meter device kit     1 kit    Use to test blood sugar 3 times daily or as directed.       blood glucose monitoring test strip    ACCU-CHEK PELON PLUS    3 Month    Use to  test blood sugar 3 times daily or as directed.       cyclobenzaprine 10 MG tablet    FLEXERIL    30 tablet    Take 0.5-1 tablets (5-10 mg) by mouth 3 times daily as needed for muscle spasms       glipiZIDE 5 MG tablet    GLUCOTROL    90 tablet    Take 1 tablet (5 mg) by mouth 2 times daily (before meals)       insulin glargine 100 UNIT/ML injection     3 mL    Inject 38 Units Subcutaneous daily       insulin pen needle 29G X 12.7MM    BD ULTRA-FINE    100 each    Use once daily or as directed.       lisinopril-hydrochlorothiazide 20-12.5 MG per tablet    PRINZIDE/ZESTORETIC    90 tablet    Take 2 tablets by mouth daily       MELATONIN PO      Take 10 mg by mouth At Bedtime       metFORMIN 1000 MG tablet    GLUCOPHAGE    180 tablet    Take 1 tablet (1,000mg) by mouth twice daily.       oxyCODONE-acetaminophen 5-325 MG per tablet    PERCOCET    30 tablet    Take 1 tablet by mouth every 8 hours as needed for moderate to severe pain       PARoxetine 20 MG tablet    PAXIL    90 tablet    Take 1 tablet (20 mg) by mouth every morning       pramipexole 0.25 MG tablet    MIRAPEX    240 tablet    Take 1 tablet (0.25mg) in morning, 2 tablets (0.5mg) in the afternoon, and take 1 tablet before bed       simvastatin 40 MG tablet    ZOCOR    90 tablet    Take 1 tablet (40 mg) by mouth At Bedtime

## 2017-02-20 NOTE — PROGRESS NOTES
Diabetes Follow-up        Patient is checking blood sugars: Rarely.  Results range from 80's while fasting and after lunch 80's     Diabetic concerns: None     Symptoms of hypoglycemia (low blood sugar): none     Paresthesias (numbness or burning in feet) or sores: No     Date of last diabetic eye exam (annually):  December 2015        Date of last Urine micro albumin screen, (annually):     Component      Latest Ref Rng & Units 5/23/2016   Creatinine Urine      mg/dL 99   Albumin Urine mg/L      mg/L 136   Albumin Urine mg/g Cr      0 - 17 mg/g Cr 137.37 (H)         Pneumococcal Vaccine:  Yes: 5/7/08    Influenza Vaccine (annually):   Yes: 9/6/16    Tobacco free:  Yes    Aspirin use:  Yes: 81 mg     Amount of exercise or physical activity: None    Problems taking medications regularly: No    Medication side effects: none    Diet: regular (no restrictions)      Patient states that he does not check his blood sugar very often.  Patient was last seen in the clinic with back pain. X-rays revealed multilevel degenerative changes.  States that the Flexeril helped, would like a refill. Planning to go for physical therapy today.   States that he did take a percocet for pain refill. Patient has a known history of chronic left knee pain despite previous knee replacement. Needs to take a percocet from time to time as needed for pain relief. Uses sparingly on average # 30 pills/month. Does not need any refills today.   Medications updated and reviewed.  Current Outpatient Prescriptions   Medication     glipiZIDE (GLUCOTROL) 5 MG tablet     insulin glargine (BASAGLAR KWIKPEN) 100 UNIT/ML injection     cyclobenzaprine (FLEXERIL) 10 MG tablet     albuterol (PROAIR HFA/PROVENTIL HFA/VENTOLIN HFA) 108 (90 BASE) MCG/ACT Inhaler     lisinopril-hydrochlorothiazide (PRINZIDE,ZESTORETIC) 20-12.5 MG per tablet     metFORMIN (GLUCOPHAGE) 1000 MG tablet     pramipexole (MIRAPEX) 0.25 MG tablet     PARoxetine (PAXIL) 20 MG tablet      simvastatin (ZOCOR) 40 MG tablet     MELATONIN PO     blood glucose monitoring (ACCU-CHEK PELON PLUS) meter device kit     blood glucose (ACCU-CHEK PELON PLUS) test strip     blood glucose monitoring (SOFTCLIX) lancets     insulin pen needle (BD ULTRA-FINE) 29G X 12.7MM     Pediatric Multiple Vit-C-FA (ANIMAL CHEWABLE MULTIVITAMIN PO)     aspirin 81 MG tablet     oxyCODONE-acetaminophen (PERCOCET) 5-325 MG per tablet     [DISCONTINUED] glipiZIDE (GLUCOTROL) 5 MG tablet     [DISCONTINUED] insulin glargine (LANTUS) 100 UNIT/ML vial     No current facility-administered medications for this visit.        Past, family and surgical history is updated and reviewed in the record.  ROS:  Other than noted above, general, HEENT, respiratory, cardiac and gastrointestinal systems are negative.  OBJECTIVE:  GENERAL:  Alert, no acute distress  EYES:  PERRL, EOM normal, conjunctiva and lids normal  HEENT:  Ears and TMs normal, oral mucosa and posterior oropharynx normal  RESP:  Lungs clear to auscultation.  CV: normal rate, regular rhythm, no murmur or gallop.  Diabetic foot exam: normal DP and PT pulses, no trophic changes or ulcerative lesions and sensation intact by monofilament bilaterally.        DATA  Reviewed and discussed with patient prior to discharge.  Results for orders placed or performed in visit on 02/20/17   Hemoglobin A1c   Result Value Ref Range    Hemoglobin A1C 7.5 (H) 4.3 - 6.0 %       Assessment/Plan:   Cedric was seen today for diabetes.    Diagnoses and all orders for this visit:    Type 2 diabetes mellitus with hyperglycemia, with long-term current use of insulin (H), goal A1C 7.0, improving  -     Hemoglobin A1c  -     TSH with free T4 reflex  -     FOOT EXAM  -     glipiZIDE (GLUCOTROL) 5 MG tablet; Take 1 tablet (5 mg) by mouth 2 times daily (before meals)  States that his insurance will no longer cover Lantus but will cover Basaglar  -     Increase: insulin glargine (BASAGLAR KWIKPEN) 100 UNIT/ML  injection; Inject 38 Units Subcutaneous daily  -     Encouraged to schedule an Eye exam.    Bilateral low back pain without sciatica, unspecified chronicity  -     Refill: cyclobenzaprine (FLEXERIL) 10 MG tablet; Take 0.5-1 tablets (5-10 mg) by mouth 3 times daily as needed for muscle spasms  Due for physical therapy today.    Encounter for narcotic contract discussion  -     Drug screen urine  Contract discussed and signed. See epic for details. Patient given a copy.      Diabetes and pain management follow up in 3 months.     Molly Leiva M.D    [facility]3]

## 2017-02-20 NOTE — MR AVS SNAPSHOT
After Visit Summary   2/20/2017    Cedric Figueroa    MRN: [id number]           Patient Information     Date Of Birth          1955        Visit Information        Provider Department      2/20/2017 4:30 PM Arthur Walden, PT [facility] [place] [facility]        Today's Diagnoses     Chronic bilateral low back pain without sciatica    -  1       Follow-ups after your visit        Your next 10 appointments already scheduled     Mar 02, 2017  4:10 PM CST   YOSI Extremity with Arthur Walden PT   [facility] [place] [facility] ([facility] [place])    [address]  [address]  [place] [address]   [phone]            Mar 09, 2017  4:50 PM CST   YOSI Extremity with Arthur Walden PT   [facility] [place] [facility] ([facility] [place])    [address]  [address]  [place] [address]   [phone]            Mar 16, 2017  3:30 PM CDT   YOSI Extremity with Arthur Walden PT   [facility] [place] [facility] ([facility] [place])    [address]  [address]  [place] [address]   [phone]              Who to contact     If you have questions or need follow up information about today's clinic visit or your schedule please contact [facility] [place] [facility] directly at [phone].  Normal or non-critical lab and imaging results will be communicated to you by [employer], letter or phone within 4 business days after the clinic has received the results. If you do not hear from us within 7 days, please contact the clinic through [employer] or phone. If you have a critical or abnormal lab result, we will notify you by phone as soon as possible.  Submit refill requests through [employer] or call your pharmacy and they will forward the refill request to us. Please allow 3 business days for your refill to be completed.          Additional Information About Your Visit        [employer] Information     [employer] gives you secure access  to your electronic health record. If you see a primary care provider, you can also send messages to your care team and make appointments. If you have questions, please call your primary care clinic.  If you do not have a primary care provider, please call [phone] and they will assist you.        Care EveryWhere ID     This is your Care EveryWhere ID. This could be used by other organizations to access your [facility] medical records  [id number]         Blood Pressure from Last 3 Encounters:   02/20/17 115/69   02/07/17 127/73   11/18/16 136/82    Weight from Last 3 Encounters:   02/20/17 116.5 kg (256 lb 12.8 oz)   02/07/17 116.3 kg (256 lb 6.4 oz)   11/18/16 119.7 kg (264 lb)              We Performed the Following     HC PT EVAL, LOW COMPLEXITY     YOSI INITIAL EVAL REPORT     THERAPEUTIC EXERCISES          Today's Medication Changes          These changes are accurate as of: 2/20/17  6:59 PM.  If you have any questions, ask your nurse or doctor.               Start taking these medicines.        Dose/Directions    insulin glargine 100 UNIT/ML injection   Used for:  Type 2 diabetes mellitus with hyperglycemia, with long-term current use of insulin (H)   Replaces:  insulin glargine 100 UNIT/ML injection   Started by:  Molly Leiva MD        Dose:  38 Units   Inject 38 Units Subcutaneous daily   Quantity:  3 mL   Refills:  11         These medicines have changed or have updated prescriptions.        Dose/Directions    glipiZIDE 5 MG tablet   Commonly known as:  GLUCOTROL   This may have changed:  when to take this   Used for:  Type 2 diabetes mellitus with hyperglycemia, with long-term current use of insulin (H)        Dose:  5 mg   Take 1 tablet (5 mg) by mouth 2 times daily (before meals)   Quantity:  90 tablet   Refills:  3         Stop taking these medicines if you haven't already. Please contact your care team if you have questions.     insulin glargine 100 UNIT/ML injection   Commonly known as:   LANTUS   Replaced by:  insulin glargine 100 UNIT/ML injection   Stopped by:  Molly Leiva MD                Where to get your medicines      These medications were sent to [facility] - [address] [facility] [address] [facility]  [address] [facility] [address]     Phone:  [phone]     cyclobenzaprine 10 MG tablet    glipiZIDE 5 MG tablet    insulin glargine 100 UNIT/ML injection                Primary Care Provider Office Phone # Fax #    Molly Leiva [phone]       [facility] [place] [address]  [place] [address]        Thank you!     Thank you for choosing [facility] [place] [facility]  for your care. Our goal is always to provide you with excellent care. Hearing back from our patients is one way we can continue to improve our services. Please take a few minutes to complete the written survey that you may receive in the mail after your visit with us. Thank you!             Your Updated Medication List - Protect others around you: Learn how to safely use, store and throw away your medicines at www.disposemymeds.org.          This list is accurate as of: 2/20/17  6:59 PM.  Always use your most recent med list.                   Brand Name Dispense Instructions for use    albuterol 108 (90 BASE) MCG/ACT Inhaler    PROAIR HFA/PROVENTIL HFA/VENTOLIN HFA    1 Inhaler    Inhale 2 puffs into the lungs every 6 hours as needed for shortness of breath / dyspnea or wheezing       ANIMAL CHEWABLE MULTIVITAMIN PO      Take by mouth 2 times daily       aspirin 81 MG tablet      Take 1 tablet by mouth daily.       blood glucose monitoring lancets     3 Box    Use to test blood sugar 3 times daily or as directed.       blood glucose monitoring meter device kit     1 kit    Use to test blood sugar 3 times daily or as directed.       blood glucose monitoring test strip    ACCU-CHEK PELON PLUS    3 Month    Use to test blood sugar 3 times daily or as  directed.       cyclobenzaprine 10 MG tablet    FLEXERIL    30 tablet    Take 0.5-1 tablets (5-10 mg) by mouth 3 times daily as needed for muscle spasms       glipiZIDE 5 MG tablet    GLUCOTROL    90 tablet    Take 1 tablet (5 mg) by mouth 2 times daily (before meals)       insulin glargine 100 UNIT/ML injection     3 mL    Inject 38 Units Subcutaneous daily       insulin pen needle 29G X 12.7MM    BD ULTRA-FINE    100 each    Use once daily or as directed.       lisinopril-hydrochlorothiazide 20-12.5 MG per tablet    PRINZIDE/ZESTORETIC    90 tablet    Take 2 tablets by mouth daily       MELATONIN PO      Take 10 mg by mouth At Bedtime       metFORMIN 1000 MG tablet    GLUCOPHAGE    180 tablet    Take 1 tablet (1,000mg) by mouth twice daily.       oxyCODONE-acetaminophen 5-325 MG per tablet    PERCOCET    30 tablet    Take 1 tablet by mouth every 8 hours as needed for moderate to severe pain       PARoxetine 20 MG tablet    PAXIL    90 tablet    Take 1 tablet (20 mg) by mouth every morning       pramipexole 0.25 MG tablet    MIRAPEX    240 tablet    Take 1 tablet (0.25mg) in morning, 2 tablets (0.5mg) in the afternoon, and take 1 tablet before bed       simvastatin 40 MG tablet    ZOCOR    90 tablet    Take 1 tablet (40 mg) by mouth At Bedtime

## 2017-02-20 NOTE — PROGRESS NOTES
[facility] Initial Evaluation    Subjective:    [facility] Initial Evaluation -- Lumbar    Date: February 20, 2017  Cedric Figueroa is a 61 year old male with a lumbar condition.   Referral: Dr. Palomo  Work mechanical stresses:   - desk work  Employment status:  Full time  Leisure mechanical stresses: canoeing  Functional disability score (MAHNAZ/STarT Back):  0 (low)  VAS score (0-10): 4/10    Patient's goal: know if he's got a chronic condition and alleviate pain if possible.     HISTORY:    Present symptoms: central low back pain   Pain quality (sharp/shooting/stabbing/aching/burning/cramping):  Aching   Paresthesia (yes/no):  no    Present since: December 2016.   Symptoms (improving/unchanging/worsening):  Worsening then slightly better since taking flexeril.   Symptoms commenced as a result of: fall down the stairs   Condition occurred in the following environment:   home     Symptoms at onset (back/thigh/leg): low back    Constant symptoms (back/thigh/leg): low back  Intermittent symptoms (back/thigh/leg): low back and bilateral buttocks    Symptoms are made worse with the following: Always sitting, Sometimes rising and Sometimes standing (prolonged)  Symptoms are made better with the following: Sometimes walking and Sometimes on the move, always laying down, always flexeril, sometimes percocet for the knee    Disturbed sleep (yes/no):  Sometimes - takes sleep medication for restless legs Sleeping postures (prone/sup/side R/L): sides    Previous episodes (0/1-5/6-10/11+): 0 Year of first episode: NA    Previous history: none  Previous treatments: none      Specific Questions:  Cough/Sneeze/Strain (pos/neg): neg  Bowel/Bladder (normal/abnormal): normal  Gait (normal/abnormal): normal except for stiff at first when he gets up  Medications (nil/NSAIDS/analg/steroids/anticoag/other):  Muscle relaxants and Other - High blood pressure, diabetes,  sleep  Medical allergies:  none  General health (excellent/good/fair/poor):  Good to excellent  Pertinent medical history:  Osteoarthritis, High blood pressure and Implanted device  Imaging (NA/Xray/MRI):  X-ray: multi-level degenerative changes  Recent or major surgery (yes/no):  no  Night pain (yes/no): no  Accidents (yes/no): yes - fall  Unexplained weight loss (yes/no): no  Barriers at home: none  Other red flags: none    EXAMINATION    Posture:   Sitting (good/fair/poor): fair  Standing (good/fair/poor):fair  Lordosis (red/acc/normal): red  Correction of posture (better/worse/no effect): better    Lateral Shift (right/left/nil): nil  Relevant (yes/no):  NA   Other Observations: none    Neurological:    Motor deficit:  none  Reflexes:  Normal  Sensory deficit:  none  Dural signs:  normal    Movement Loss:   Talib Mod Min Nil Pain   Flexion    X yes   Extension  X   no   Side Gliding R   X  yes   Side Gliding L   X  yes     Test Movements:   During: produces, abolishes, increases, decreases, no effect, centralizing, peripheralizing   After: better, worse, no better, no worse, no effect, centralized, peripheralized    Pretest symptoms standing: central LBP   Symptoms During Symptoms After ROM increased ROM decreased No Effect   FIS Increases No Worse   X   Rep FIS Increases Worse   X   EIS No Effect No Effect   X   Rep EIS No Effect and   No Effect   X   Pretest symptoms lying: central LBP    Symptoms During Symptoms After ROM increased ROM decreased No Effect   CARLITO        Rep CARLITO        EIL No Effect No Effect      Rep EIL No Effect No Worse X     If required, pretest symptoms: NT   Symptoms During Symptoms After ROM increased ROM decreased No Effect   SGIS - R        Rep SGIS - R        SGIS - L        Rep SGIS - L          Static Tests:  Sitting slouched:  NT  Sitting erect:  NT  Standing slouched NT  Standing erect:  NT  Lying prone in extension:  NE Long sitting:  NT    Other Tests: decreased lumbar spine  mobility, particularly L3-S1    Provisional Classification:  Derangement - Bilateral, symmetrical, symptoms above knee    Principle of Management:  Education:  Posture, avoidance of prolonged sitting   Equipment provided:  none  Mechanical therapy (Y/N):  yes   Extension principle:  yes  Lateral Principle:  no  Flexion principle:  no  Other:                                Objective:    System    Physical Exam    General     ROS    Assessment/Plan:      Patient is a 61 year old male with lumbar complaints.    Patient has the following significant findings with corresponding treatment plan.                Diagnosis 1:  Low back pain    Pain -  manual therapy, education, directional preference exercise and home program  Decreased ROM/flexibility - manual therapy, therapeutic exercise and home program  Decreased joint mobility - manual therapy, therapeutic exercise and home program  Impaired muscle performance - neuro re-education and home program  Impaired posture - neuro re-education and home program    Therapy Evaluation Codes:   1) History comprised of:   Personal factors that impact the plan of care:      None.    Comorbidity factors that impact the plan of care are:      None.     Medications impacting care: Muscle relaxant and Pain.  2) Examination of Body Systems comprised of:   Body structures and functions that impact the plan of care:      Lumbar spine.   Activity limitations that impact the plan of care are:      Sitting and Standing.  3) Clinical presentation characteristics are:   Stable/Uncomplicated.  4) Decision-Making    Low complexity using standardized patient assessment instrument and/or measureable assessment of functional outcome.  Cumulative Therapy Evaluation is: Low complexity.    Previous and current functional limitations:  (See Goal Flow Sheet for this information)    Short term and Long term goals: (See Goal Flow Sheet for this information)     Communication ability:  Patient appears to be  able to clearly communicate and understand verbal and written communication and follow directions correctly.  Treatment Explanation - The following has been discussed with the patient:   RX ordered/plan of care  Anticipated outcomes  Possible risks and side effects  This patient would benefit from PT intervention to resume normal activities.   Rehab potential is good.    Frequency:  1 X week, once daily  Duration:  for 4-6 weeks  Discharge Plan:  Achieve all LTG.  Independent in home treatment program.  Reach maximal therapeutic benefit.    Please refer to the daily flowsheet for treatment today, total treatment time and time spent performing 1:1 timed codes.

## 2017-02-20 NOTE — PATIENT INSTRUCTIONS
Your target glucose levels are:     Pre-meal :      Post-meal:  < 180    Bedtime:     90 -150      Schedule Eye Exam

## 2017-02-22 DIAGNOSIS — M25.562 CHRONIC PAIN OF LEFT KNEE: ICD-10-CM

## 2017-02-22 DIAGNOSIS — G89.29 CHRONIC PAIN OF LEFT KNEE: ICD-10-CM

## 2017-02-22 LAB
ACETAMINOPHEN QUAL: NEGATIVE
AMANTADINE: NEGATIVE
AMITRIPTYLINE QUAL: NEGATIVE
AMOXAPINE: NEGATIVE
AMPHETAMINES QUAL: NEGATIVE
ATROPINE: NEGATIVE
BENZODIAZ UR QL: ABNORMAL
CAFFEINE QUAL: POSITIVE
CANNABINOIDS UR QL SCN: ABNORMAL
CARBAMAZEPINE QUAL: NEGATIVE
CHLORPHENIRAMINE: NEGATIVE
CHLORPROMAZINE: NEGATIVE
CITALOPRAM QUAL: NEGATIVE
CLOMIPRAMINE QUAL: NEGATIVE
COCAINE QUAL: NEGATIVE
COCAINE UR QL: ABNORMAL
CODEINE QUAL: ABNORMAL
DESIPRAMINE QUAL: NEGATIVE
DEXTROMETHORPHAN: NEGATIVE
DIPHENHYDRAMINE: NEGATIVE
DOXEPIN/METABOLITE: NEGATIVE
DOXYLAMINE: NEGATIVE
EPHEDRINE OR PSEUDO: NEGATIVE
FENTANYL QUAL: NEGATIVE
FLUOXETINE AND METAB: NEGATIVE
HYDROCODONE QUAL: NEGATIVE
HYDROMORPHONE QUAL: NEGATIVE
IBUPROFEN QUAL: NEGATIVE
IMIPRAMINE QUAL: NEGATIVE
LAMOTRIGINE QUAL: NEGATIVE
LOXAPINE: NEGATIVE
MAPROTYLINE: NEGATIVE
MDMA QUAL: NEGATIVE
MEPERIDINE QUAL: NEGATIVE
METHAMPHETAMINE: NEGATIVE
METHODONE QUAL: NEGATIVE
MORPHINE QUAL: NEGATIVE
NICOTINE: POSITIVE
NORTRIPTYLINE QUAL: NEGATIVE
OLANZAPINE QUAL: NEGATIVE
OPIATES UR QL SCN: ABNORMAL
OXYCODONE QUAL: NEGATIVE
PENTAZOCINE: NEGATIVE
PHENCYCLIDINE QUAL: NEGATIVE
PHENMETRAZINE: NEGATIVE
PHENTERMINE: NEGATIVE
PHENYLBUTAZONE: NEGATIVE
PHENYLPROPANOLAMINE: NEGATIVE
PROPOXPHENE QUAL: NEGATIVE
PROPRANOLOL QUAL: NEGATIVE
PYRILAMINE: NEGATIVE
SALICYLATE QUAL: NEGATIVE
THEOBROMINE: POSITIVE
TOPIRAMATE QUAL: NEGATIVE
TRIMIPRAMINE QUAL: NEGATIVE
VENLAFAXINE QUAL: ABNORMAL

## 2017-02-22 RX ORDER — OXYCODONE AND ACETAMINOPHEN 5; 325 MG/1; MG/1
1 TABLET ORAL EVERY 8 HOURS PRN
Qty: 30 TABLET | Refills: 0 | Status: SHIPPED | OUTPATIENT
Start: 2017-02-22 | End: 2017-03-02

## 2017-02-22 NOTE — TELEPHONE ENCOUNTER
Patient uses [facility] pharmacy, hard copy of script brought to [facility] pharmacy

## 2017-02-22 NOTE — TELEPHONE ENCOUNTER
Reason for Call:  Medication or medication refill:    Do you use a [facility]?  Name of the pharmacy and phone number for the current request:  [facility] [phone]    Name of the medication requested: oxyCODONE-acetaminophen (PERCOCET) 5-325 MG per tablet    Other request: pt states needing refill please advise and contact pt in regards once completed or with any questions.    Can we leave a detailed message on this number? YES    Phone number patient can be reached at: Cell number on file:    Telephone Information:   Mobile [phone]       Best Time: ANY      Call taken on 2/22/2017 at 9:50 AM by Batsheva Jurado

## 2017-03-02 ENCOUNTER — THERAPY VISIT (OUTPATIENT)
Dept: PHYSICAL THERAPY | Facility: CLINIC | Age: 62
End: 2017-03-02
Payer: COMMERCIAL

## 2017-03-02 DIAGNOSIS — G89.29 CHRONIC BILATERAL LOW BACK PAIN WITHOUT SCIATICA: ICD-10-CM

## 2017-03-02 DIAGNOSIS — M54.50 CHRONIC BILATERAL LOW BACK PAIN WITHOUT SCIATICA: ICD-10-CM

## 2017-03-02 DIAGNOSIS — G89.29 CHRONIC PAIN OF LEFT KNEE: ICD-10-CM

## 2017-03-02 DIAGNOSIS — M25.562 CHRONIC PAIN OF LEFT KNEE: ICD-10-CM

## 2017-03-02 PROCEDURE — 97110 THERAPEUTIC EXERCISES: CPT | Mod: GP | Performed by: PHYSICAL THERAPIST

## 2017-03-02 NOTE — TELEPHONE ENCOUNTER
Patient requesting a refill for oxyCODONE-acetaminophen (PERCOCET) 5-325 MG per tablet  Thank you

## 2017-03-02 NOTE — MR AVS SNAPSHOT
After Visit Summary   3/2/2017    Cedric Figueroa    MRN: [id number]           Patient Information     Date Of Birth          1955        Visit Information        Provider Department      3/2/2017 4:10 PM Arthur Walden, PT [facility] [place] [facility]        Today's Diagnoses     Chronic bilateral low back pain without sciatica           Follow-ups after your visit        Your next 10 appointments already scheduled     Mar 16, 2017  3:30 PM CDT   YOSI Extremity with Arthur Walden PT   [facility] [place] [facility] ([facility] [place])    [id number]  [address]  [place] [id number] [phone]              Who to contact     If you have questions or need follow up information about today's clinic visit or your schedule please contact [facility] [place] [facility] directly at [phone].  Normal or non-critical lab and imaging results will be communicated to you by [employer], letter or phone within 4 business days after the clinic has received the results. If you do not hear from us within 7 days, please contact the clinic through [employer] or phone. If you have a critical or abnormal lab result, we will notify you by phone as soon as possible.  Submit refill requests through [employer] or call your pharmacy and they will forward the refill request to us. Please allow 3 business days for your refill to be completed.          Additional Information About Your Visit        MyChart Information     [employer] gives you secure access to your electronic health record. If you see a primary care provider, you can also send messages to your care team and make appointments. If you have questions, please call your primary care clinic.  If you do not have a primary care provider, please call [phone] and they will assist you.        Care EveryWhere ID     This is your Care EveryWhere ID. This could be used by other organizations to access your [facility]  records  [id number]         Blood Pressure from Last 3 Encounters:   02/20/17 115/69   02/07/17 127/73   11/18/16 136/82    Weight from Last 3 Encounters:   02/20/17 116.5 kg (256 lb 12.8 oz)   02/07/17 116.3 kg (256 lb 6.4 oz)   11/18/16 119.7 kg (264 lb)              We Performed the Following     THERAPEUTIC EXERCISES        Primary Care Provider Office Phone # Fax #    Molly Leiva [phone]       [facility] [place] [address]  [place] [address]        Thank you!     Thank you for choosing [facility] [place] [facility]  for your care. Our goal is always to provide you with excellent care. Hearing back from our patients is one way we can continue to improve our services. Please take a few minutes to complete the written survey that you may receive in the mail after your visit with us. Thank you!             Your Updated Medication List - Protect others around you: Learn how to safely use, store and throw away your medicines at www.disposemymeds.org.          This list is accurate as of: 3/2/17  5:03 PM.  Always use your most recent med list.                   Brand Name Dispense Instructions for use    albuterol 108 (90 BASE) MCG/ACT Inhaler    PROAIR HFA/PROVENTIL HFA/VENTOLIN HFA    1 Inhaler    Inhale 2 puffs into the lungs every 6 hours as needed for shortness of breath / dyspnea or wheezing       ANIMAL CHEWABLE MULTIVITAMIN PO      Take by mouth 2 times daily       aspirin 81 MG tablet      Take 1 tablet by mouth daily.       blood glucose monitoring lancets     3 Box    Use to test blood sugar 3 times daily or as directed.       blood glucose monitoring meter device kit     1 kit    Use to test blood sugar 3 times daily or as directed.       blood glucose monitoring test strip    ACCU-CHEK PELON PLUS    3 Month    Use to test blood sugar 3 times daily or as directed.       cyclobenzaprine 10 MG tablet    FLEXERIL    30 tablet    Take 0.5-1 tablets (5-10  mg) by mouth 3 times daily as needed for muscle spasms       glipiZIDE 5 MG tablet    GLUCOTROL    90 tablet    Take 1 tablet (5 mg) by mouth 2 times daily (before meals)       insulin glargine 100 UNIT/ML injection     3 mL    Inject 38 Units Subcutaneous daily       insulin pen needle 29G X 12.7MM    BD ULTRA-FINE    100 each    Use once daily or as directed.       lisinopril-hydrochlorothiazide 20-12.5 MG per tablet    PRINZIDE/ZESTORETIC    90 tablet    Take 2 tablets by mouth daily       MELATONIN PO      Take 10 mg by mouth At Bedtime       metFORMIN 1000 MG tablet    GLUCOPHAGE    180 tablet    Take 1 tablet (1,000mg) by mouth twice daily.       oxyCODONE-acetaminophen 5-325 MG per tablet    PERCOCET    30 tablet    Take 1 tablet by mouth every 8 hours as needed for moderate to severe pain       PARoxetine 20 MG tablet    PAXIL    90 tablet    Take 1 tablet (20 mg) by mouth every morning       pramipexole 0.25 MG tablet    MIRAPEX    240 tablet    Take 1 tablet (0.25mg) in morning, 2 tablets (0.5mg) in the afternoon, and take 1 tablet before bed       simvastatin 40 MG tablet    ZOCOR    90 tablet    Take 1 tablet (40 mg) by mouth At Bedtime

## 2017-03-02 NOTE — PROGRESS NOTES
Subjective:    HPI  Oswestry Score: 2 %                 Objective:    System    Physical Exam    General     ROS    Assessment/Plan:      SUBJECTIVE  Subjective changes as noted by pt: Patient reports that he has been feeling pretty good lately. He felt better after last visit and has been doing his exercises regularly - those feel good to do.    Current pain level: 0/10   Changes in function:  Yes (See Goal flowsheet attached for changes in current functional level)     Adverse reaction to treatment or activity:  None    OBJECTIVE  Changes in objective findings: Lumbar AROM flexion to toes, extension WNL, SG WNL bilat - no motion creates increased pain today. Patient tolerated return to function testing without increased pain.      ASSESSMENT  Cedric continues to require intervention to meet STG and LTG's: PT  Patient's symptoms are resolving.  Patient is progressing as expected.  Response to therapy has shown an improvement in  pain level and ROM   Progress made towards STG/LTG?  Yes (See Goal flowsheet attached for updates on achievement of STG and LTG)    PLAN  Current treatment program is being advanced to more complex exercises.  Frequency and/or duration have been changed to: 2 X month - patient will return in 2 weeks for further advancement of treatment     PTA/ATC plan:  N/A    Please refer to the daily flowsheet for treatment today, total treatment time and time spent performing 1:1 timed codes.

## 2017-03-03 RX ORDER — OXYCODONE AND ACETAMINOPHEN 5; 325 MG/1; MG/1
1 TABLET ORAL EVERY 8 HOURS PRN
Qty: 30 TABLET | Refills: 0 | Status: SHIPPED | OUTPATIENT
Start: 2017-03-03 | End: 2017-03-14

## 2017-03-03 NOTE — TELEPHONE ENCOUNTER
Patient is calling regarding refill request, he is out and would like to have this today.  Please call, okay to leave message.

## 2017-03-10 ENCOUNTER — TELEPHONE (OUTPATIENT)
Dept: FAMILY MEDICINE | Facility: CLINIC | Age: 62
End: 2017-03-10

## 2017-03-10 NOTE — TELEPHONE ENCOUNTER
Spoke with patient and he reports since last time he tried to have script refilled it took longer than he expected, he is calling earlier for this refill.  He reports he did  the #30 of percocet on 3/3/17.  He reports he takes 3 tabs daily and has now 8 tabs left.  Please review and advise on refill request.  Naty Kumar RN

## 2017-03-10 NOTE — TELEPHONE ENCOUNTER
Patient calling states is running very low on his Percocet would like to get script today please. Please call to advise.

## 2017-03-10 NOTE — TELEPHONE ENCOUNTER
Too early to refill.   Received narcotic refill a week ago. # 30  Needs to schedule a visit to re-address issue to avoid multiple narcotic refill requests/month.

## 2017-03-13 NOTE — TELEPHONE ENCOUNTER
Pt advised 3/10/17- walked into clinic to receive refill because he will run out by Monday and provider is out of office until Tuesday.  Was denied by provider.  Pt SXL'd appointment for 3/14 with Cali.

## 2017-03-14 ENCOUNTER — OFFICE VISIT (OUTPATIENT)
Dept: FAMILY MEDICINE | Facility: CLINIC | Age: 62
End: 2017-03-14
Payer: COMMERCIAL

## 2017-03-14 VITALS
SYSTOLIC BLOOD PRESSURE: 132 MMHG | HEIGHT: 74 IN | OXYGEN SATURATION: 97 % | TEMPERATURE: 98.1 F | WEIGHT: 251.6 LBS | HEART RATE: 118 BPM | DIASTOLIC BLOOD PRESSURE: 78 MMHG | BODY MASS INDEX: 32.29 KG/M2

## 2017-03-14 DIAGNOSIS — Z96.652 STATUS POST TOTAL LEFT KNEE REPLACEMENT: ICD-10-CM

## 2017-03-14 DIAGNOSIS — G89.29 CHRONIC PAIN OF LEFT KNEE: Primary | ICD-10-CM

## 2017-03-14 DIAGNOSIS — M25.562 CHRONIC PAIN OF LEFT KNEE: Primary | ICD-10-CM

## 2017-03-14 PROCEDURE — 99213 OFFICE O/P EST LOW 20 MIN: CPT | Performed by: FAMILY MEDICINE

## 2017-03-14 RX ORDER — OXYCODONE AND ACETAMINOPHEN 5; 325 MG/1; MG/1
1 TABLET ORAL 2 TIMES DAILY
Qty: 60 TABLET | Refills: 0 | Status: SHIPPED | OUTPATIENT
Start: 2017-03-14 | End: 2017-04-11

## 2017-03-14 NOTE — NURSING NOTE
"Chief Complaint   Patient presents with     Medication Request       Initial /82  Pulse 118  Temp 98.1  F (36.7  C) (Tympanic)  Ht 6' 2\" (1.88 m)  Wt 251 lb 9.6 oz (114.1 kg)  SpO2 97%  BMI 32.3 kg/m2 Estimated body mass index is 32.3 kg/(m^2) as calculated from the following:    Height as of this encounter: 6' 2\" (1.88 m).    Weight as of this encounter: 251 lb 9.6 oz (114.1 kg).  Medication Reconciliation: complete     Sandrita Zazueta MA      "

## 2017-03-14 NOTE — PROGRESS NOTES
SUBJECTIVE:                                                    Cedric Figueroa is a 61 year old male who presents to clinic today for the following health issues:      Medication Followup of Percocet 5-325 mg    Taking Medication as prescribed: yes    Side Effects:  None    Medication Helping Symptoms:  yes       Patient reports a history of chronic left knee pain despite previous total knee replacement in 4/2005.  States that he was following with [facility] and no further options have been made available to him.   Currently taking Percocet up to 2-3 x /day to enable him to do his routine activities. States that he does not take it during the week when planning to go to work.   Requesting a refill.     Apologizes for the misdemeanor last week about pressuring us with an early refill.     Signed a narcotic contract. Recent urine drug screen was appropriate.    Problem list and histories reviewed & adjusted, as indicated.  Additional history: as documented    Current Outpatient Prescriptions   Medication Sig Dispense Refill     oxyCODONE-acetaminophen (PERCOCET) 5-325 MG per tablet Take 1 tablet by mouth 2 times daily For chronic left knee pain. Max: 2 x day. Should last 1 month 60 tablet 0     diclofenac (VOLTAREN) 1 % GEL topical gel Apply 4 grams to knees  daily using enclosed dosing card. 100 g 1     glipiZIDE (GLUCOTROL) 5 MG tablet Take 1 tablet (5 mg) by mouth 2 times daily (before meals) 90 tablet 3     insulin glargine (BASAGLAR KWIKPEN) 100 UNIT/ML injection Inject 38 Units Subcutaneous daily 3 mL 11     albuterol (PROAIR HFA/PROVENTIL HFA/VENTOLIN HFA) 108 (90 BASE) MCG/ACT Inhaler Inhale 2 puffs into the lungs every 6 hours as needed for shortness of breath / dyspnea or wheezing 1 Inhaler 0     lisinopril-hydrochlorothiazide (PRINZIDE,ZESTORETIC) 20-12.5 MG per tablet Take 2 tablets by mouth daily 90 tablet 3     metFORMIN (GLUCOPHAGE) 1000 MG tablet Take 1 tablet (1,000mg) by mouth twice daily. 180  "tablet 3     pramipexole (MIRAPEX) 0.25 MG tablet Take 1 tablet (0.25mg) in morning, 2 tablets (0.5mg) in the afternoon, and take 1 tablet before bed 240 tablet 3     PARoxetine (PAXIL) 20 MG tablet Take 1 tablet (20 mg) by mouth every morning 90 tablet 3     simvastatin (ZOCOR) 40 MG tablet Take 1 tablet (40 mg) by mouth At Bedtime 90 tablet 3     MELATONIN PO Take 10 mg by mouth At Bedtime        blood glucose monitoring (ACCU-CHEK PELON PLUS) meter device kit Use to test blood sugar 3 times daily or as directed. 1 kit 0     blood glucose (ACCU-CHEK PELON PLUS) test strip Use to test blood sugar 3 times daily or as directed. 3 Month 3     blood glucose monitoring (SOFTCLIX) lancets Use to test blood sugar 3 times daily or as directed. 3 Box 33     insulin pen needle (BD ULTRA-FINE) 29G X 12.7MM Use once daily or as directed. 100 each prn     Pediatric Multiple Vit-C-FA (ANIMAL CHEWABLE MULTIVITAMIN PO) Take by mouth 2 times daily       aspirin 81 MG tablet Take 1 tablet by mouth daily.       cyclobenzaprine (FLEXERIL) 10 MG tablet Take 0.5-1 tablets (5-10 mg) by mouth 3 times daily as needed for muscle spasms (Patient not taking: Reported on 3/14/2017) 30 tablet 1     No Known Allergies    Reviewed and updated as needed this visit by clinical staff       Reviewed and updated as needed this visit by Provider         ROS:  Constitutional, HEENT, cardiovascular, pulmonary, gi and gu systems are negative, except as otherwise noted.    OBJECTIVE:                                                    /78  Pulse 118  Temp 98.1  F (36.7  C) (Tympanic)  Ht 6' 2\" (1.88 m)  Wt 251 lb 9.6 oz (114.1 kg)  SpO2 97%  BMI 32.3 kg/m2  Body mass index is 32.3 kg/(m^2).  GENERAL: healthy, alert and no distress  PSYCH: mentation appears normal, affect normal/bright    Diagnostic Test Results:  none      ASSESSMENT/PLAN:                                                    Cedric was seen today for medication request.    Diagnoses and " all orders for this visit:    Chronic pain of left knee  -     oxyCODONE-acetaminophen (PERCOCET) 5-325 MG per tablet; Take 1 tablet by mouth 2 times daily For chronic left knee pain. Max: 2 x day. # 60. Should last 1 month  -     diclofenac (VOLTAREN) 1 % GEL topical gel; Apply 4 grams to knees  daily using enclosed dosing card.    Status post total left knee replacement  Following with Ortho    Narcotic contract rules discussed with patient.   No early refills. No replacement for stolen/misplaced Rx.  Use one pharmacy. Expect random UDS.    Due for diabetes follow up in 2 months.    Molly Leiva MD  [facility]

## 2017-03-14 NOTE — MR AVS SNAPSHOT
After Visit Summary   3/14/2017    Cedric Figueroa    MRN: [id number]           Patient Information     Date Of Birth          1955        Visit Information        Provider Department      3/14/2017 2:30 PM Molly Leiva MD [facility] [place]        Today's Diagnoses     Chronic pain of left knee    -  1    Status post total left knee replacement           Follow-ups after your visit        Follow-up notes from your care team     Return in about 2 months (around 5/14/2017).      Your next 10 appointments already scheduled     Mar 16, 2017  3:30 PM CDT   YOSI Extremity with Arthur Walden PT   [facility] [place] [facility] ([facility] [place])    [address]  [address]  [facility] [address] [phone]              Who to contact     Normal or non-critical lab and imaging results will be communicated to you by MyChart, letter or phone within 4 business days after the clinic has received the results. If you do not hear from us within 7 days, please contact the clinic through MyChart or phone. If you have a critical or abnormal lab result, we will notify you by phone as soon as possible.  Submit refill requests through [employer] or call your pharmacy and they will forward the refill request to us. Please allow 3 business days for your refill to be completed.          If you need to speak with a  for additional information , please call: [phone]             Additional Information About Your Visit        CBA PHARMAhart Information     [employer] gives you secure access to your electronic health record. If you see a primary care provider, you can also send messages to your care team and make appointments. If you have questions, please call your primary care clinic.  If you do not have a primary care provider, please call [phone] and they will assist you.        Care EveryWhere ID     This is your Care EveryWhere ID. This could be used by other  "organizations to access your [place] medical records  [id number]        Your Vitals Were     Pulse Temperature Height Pulse Oximetry BMI (Body Mass Index)       118 98.1  F (36.7  C) (Tympanic) 6' 2\" (1.88 m) 97% 32.3 kg/m2        Blood Pressure from Last 3 Encounters:   03/14/17 155/82   02/20/17 115/69   02/07/17 127/73    Weight from Last 3 Encounters:   03/14/17 251 lb 9.6 oz (114.1 kg)   02/20/17 256 lb 12.8 oz (116.5 kg)   02/07/17 256 lb 6.4 oz (116.3 kg)              Today, you had the following     No orders found for display         Today's Medication Changes          These changes are accurate as of: 3/14/17  3:11 PM.  If you have any questions, ask your nurse or doctor.               Start taking these medicines.        Dose/Directions    diclofenac 1 % Gel topical gel   Commonly known as:  VOLTAREN   Used for:  Chronic pain of left knee   Started by:  Molly Leiva MD        Apply 4 grams to knees  daily using enclosed dosing card.   Quantity:  100 g   Refills:  1         These medicines have changed or have updated prescriptions.        Dose/Directions    oxyCODONE-acetaminophen 5-325 MG per tablet   Commonly known as:  PERCOCET   This may have changed:    - when to take this  - reasons to take this  - additional instructions   Used for:  Chronic pain of left knee   Changed by:  Molly Leiva MD        Dose:  1 tablet   Take 1 tablet by mouth 2 times daily For chronic left knee pain. Max: 2 x day. Should last 1 month   Quantity:  60 tablet   Refills:  0            Where to get your medicines      These medications were sent to [facility] - [address] [facility] [address] [facility]  [address] [facility] [place] [address]     Phone:  [phone]     diclofenac 1 % Gel topical gel         Some of these will need a paper prescription and others can be bought over the counter.  Ask your nurse if you have questions.     Bring a paper prescription for each of these medications     " oxyCODONE-acetaminophen 5-325 MG per tablet                Primary Care Provider Office Phone # Fax #    Molly Leiva [phone]       [facility]] [place] [address]  [place] [address]        Thank you!     [facility]. Our goal is always to provide you with excellent care. Hearing back from our patients is one way we can continue to improve our services. Please take a few minutes to complete the written survey that you may receive in the mail after your visit with us. Thank you!             Your Updated Medication List - Protect others around you: Learn how to safely use, store and throw away your medicines at www.disposemymeds.org.          This list is accurate as of: 3/14/17  3:11 PM.  Always use your most recent med list.                   Brand Name Dispense Instructions for use    albuterol 108 (90 BASE) MCG/ACT Inhaler    PROAIR HFA/PROVENTIL HFA/VENTOLIN HFA    1 Inhaler    Inhale 2 puffs into the lungs every 6 hours as needed for shortness of breath / dyspnea or wheezing       ANIMAL CHEWABLE MULTIVITAMIN PO      Take by mouth 2 times daily       aspirin 81 MG tablet      Take 1 tablet by mouth daily.       blood glucose monitoring lancets     3 Box    Use to test blood sugar 3 times daily or as directed.       blood glucose monitoring meter device kit     1 kit    Use to test blood sugar 3 times daily or as directed.       blood glucose monitoring test strip    ACCU-CHEK PELON PLUS    3 Month    Use to test blood sugar 3 times daily or as directed.       cyclobenzaprine 10 MG tablet    FLEXERIL    30 tablet    Take 0.5-1 tablets (5-10 mg) by mouth 3 times daily as needed for muscle spasms       diclofenac 1 % Gel topical gel    VOLTAREN    100 g    Apply 4 grams to knees  daily using enclosed dosing card.       glipiZIDE 5 MG tablet    GLUCOTROL    90 tablet    Take 1 tablet (5 mg) by mouth 2 times daily (before meals)        insulin glargine 100 UNIT/ML injection     3 mL    Inject 38 Units Subcutaneous daily       insulin pen needle 29G X 12.7MM    BD ULTRA-FINE    100 each    Use once daily or as directed.       lisinopril-hydrochlorothiazide 20-12.5 MG per tablet    PRINZIDE/ZESTORETIC    90 tablet    Take 2 tablets by mouth daily       MELATONIN PO      Take 10 mg by mouth At Bedtime       metFORMIN 1000 MG tablet    GLUCOPHAGE    180 tablet    Take 1 tablet (1,000mg) by mouth twice daily.       oxyCODONE-acetaminophen 5-325 MG per tablet    PERCOCET    60 tablet    Take 1 tablet by mouth 2 times daily For chronic left knee pain. Max: 2 x day. Should last 1 month       PARoxetine 20 MG tablet    PAXIL    90 tablet    Take 1 tablet (20 mg) by mouth every morning       pramipexole 0.25 MG tablet    MIRAPEX    240 tablet    Take 1 tablet (0.25mg) in morning, 2 tablets (0.5mg) in the afternoon, and take 1 tablet before bed       simvastatin 40 MG tablet    ZOCOR    90 tablet    Take 1 tablet (40 mg) by mouth At Bedtime

## 2017-03-31 ENCOUNTER — TRANSFERRED RECORDS (OUTPATIENT)
Dept: HEALTH INFORMATION MANAGEMENT | Facility: CLINIC | Age: 62
End: 2017-03-31

## 2017-04-11 DIAGNOSIS — G89.29 CHRONIC PAIN OF LEFT KNEE: ICD-10-CM

## 2017-04-11 DIAGNOSIS — M25.562 CHRONIC PAIN OF LEFT KNEE: ICD-10-CM

## 2017-04-12 RX ORDER — OXYCODONE AND ACETAMINOPHEN 5; 325 MG/1; MG/1
1 TABLET ORAL 2 TIMES DAILY
Qty: 60 TABLET | Refills: 0 | Status: SHIPPED | OUTPATIENT
Start: 2017-04-12 | End: 2017-05-08

## 2017-04-26 DIAGNOSIS — G25.81 RESTLESS LEG SYNDROME: ICD-10-CM

## 2017-04-26 RX ORDER — PRAMIPEXOLE DIHYDROCHLORIDE 0.25 MG/1
TABLET ORAL
Qty: 240 TABLET | Refills: 3 | Status: SHIPPED | OUTPATIENT
Start: 2017-04-26 | End: 2018-06-01

## 2017-04-26 NOTE — TELEPHONE ENCOUNTER
Drug: Pramiprexole 0.25mg  Last Fill Date: 1-27-17  Last Fill Quantity: 240  Last Office Visit: 3-14-17    Connie Melton  [facility] [place] [id number]  Phone :[phone]  Fax: [phone]

## 2017-04-27 ENCOUNTER — ALLIED HEALTH/NURSE VISIT (OUTPATIENT)
Dept: NURSING | Facility: CLINIC | Age: 62
End: 2017-04-27
Payer: COMMERCIAL

## 2017-04-27 DIAGNOSIS — Z23 ENCOUNTER FOR IMMUNIZATION: Primary | ICD-10-CM

## 2017-04-27 PROCEDURE — 90472 IMMUNIZATION ADMIN EACH ADD: CPT

## 2017-04-27 PROCEDURE — 90736 HZV VACCINE LIVE SUBQ: CPT

## 2017-04-27 PROCEDURE — 90471 IMMUNIZATION ADMIN: CPT

## 2017-04-27 PROCEDURE — 90670 PCV13 VACCINE IM: CPT

## 2017-04-27 PROCEDURE — 99207 ZZC NO CHARGE NURSE ONLY: CPT

## 2017-04-27 NOTE — NURSING NOTE
Screening Questionnaire for Adult Immunization    Are you sick today?   No   Do you have allergies to medications, food, a vaccine component or latex?   No   Have you ever had a serious reaction after receiving a vaccination?   No   Do you have a long-term health problem with heart disease, lung disease, asthma, kidney disease, metabolic disease (e.g. diabetes), anemia, or other blood disorder?   No   Do you have cancer, leukemia, HIV/AIDS, or any other immune system problem?   No   In the past 3 months, have you taken medications that affect  your immune system, such as prednisone, other steroids, or anticancer drugs; drugs for the treatment of rheumatoid arthritis, Crohn s disease, or psoriasis; or have you had radiation treatments?   No   Have you had a seizure, or a brain or other nervous system problem?   No   During the past year, have you received a transfusion of blood or blood     products, or been given immune (gamma) globulin or antiviral drug?   No   For women: Are you pregnant or is there a chance you could become        pregnant during the next month?   No   Have you received any vaccinations in the past 4 weeks?   No     Immunization questionnaire answers were all negative.      MNVFC doesn't apply on this patient    Per orders of Dr. Leiva, injection of PCV13 and Zostavax given by Verenice Jimenez. Patient instructed to remain in clinic for 20 minutes afterwards, and to report any adverse reaction to me immediately.       Screening performed by Verenice Jimenez on 4/27/2017 at 3:22 PM.

## 2017-04-27 NOTE — MR AVS SNAPSHOT
After Visit Summary   4/27/2017    Cedric Figueroa    MRN: [id number]           Patient Information     Date Of Birth          1955        Visit Information        Provider Department      4/27/2017 3:00 PM [facility] [place] [facility]        Today's Diagnoses     Encounter for immunization    -  1       Follow-ups after your visit        Who to contact     If you have questions or need follow up information about today's clinic visit or your schedule please contact [facility] [place] directly at [phone].  Normal or non-critical lab and imaging results will be communicated to you by MyChart, letter or phone within 4 business days after the clinic has received the results. If you do not hear from us within 7 days, please contact the clinic through Bl[employer] for iPhone and Androidhart or phone. If you have a critical or abnormal lab result, we will notify you by phone as soon as possible.  Submit refill requests through [employer] or call your pharmacy and they will forward the refill request to us. Please allow 3 business days for your refill to be completed.          Additional Information About Your Visit        MyChart Information     [employer] gives you secure access to your electronic health record. If you see a primary care provider, you can also send messages to your care team and make appointments. If you have questions, please call your primary care clinic.  If you do not have a primary care provider, please call [phone] and they will assist you.        Care EveryWhere ID     This is your Care EveryWhere ID. This could be used by other organizations to access your [place] medical records  [id number]         Blood Pressure from Last 3 Encounters:   03/14/17 132/78   02/20/17 115/69   02/07/17 127/73    Weight from Last 3 Encounters:   03/14/17 251 lb 9.6 oz (114.1 kg)   02/20/17 256 lb 12.8 oz (116.5 kg)   02/07/17 256 lb 6.4 oz (116.3 kg)              We Performed the Following     ADMIN 1st  VACCINE     EA ADD'L VACCINE     PNEUMOCOCCAL CONJ VACCINE 13 VALENT IM     ZOSTER VACC LIVE SUBQ NJX        Primary Care Provider Office Phone # Fax #    Molly Leiva [phone]       [facility] [address]  [place] [address]        Thank you!     Thank you for choosing [facility]  for your care. Our goal is always to provide you with excellent care. Hearing back from our patients is one way we can continue to improve our services. Please take a few minutes to complete the written survey that you may receive in the mail after your visit with us. Thank you!             Your Updated Medication List - Protect others around you: Learn how to safely use, store and throw away your medicines at www.disposemymeds.org.          This list is accurate as of: 4/27/17  3:25 PM.  Always use your most recent med list.                   Brand Name Dispense Instructions for use    albuterol 108 (90 BASE) MCG/ACT Inhaler    PROAIR HFA/PROVENTIL HFA/VENTOLIN HFA    1 Inhaler    Inhale 2 puffs into the lungs every 6 hours as needed for shortness of breath / dyspnea or wheezing       ANIMAL CHEWABLE MULTIVITAMIN PO      Take by mouth 2 times daily       aspirin 81 MG tablet      Take 1 tablet by mouth daily.       blood glucose monitoring lancets     3 Box    Use to test blood sugar 3 times daily or as directed.       blood glucose monitoring meter device kit     1 kit    Use to test blood sugar 3 times daily or as directed.       blood glucose monitoring test strip    ACCU-CHEK PELON PLUS    3 Month    Use to test blood sugar 3 times daily or as directed.       cyclobenzaprine 10 MG tablet    FLEXERIL    30 tablet    Take 0.5-1 tablets (5-10 mg) by mouth 3 times daily as needed for muscle spasms       diclofenac 1 % Gel topical gel    VOLTAREN    100 g    Apply 4 grams to knees  daily using enclosed dosing card.       glipiZIDE 5 MG tablet    GLUCOTROL    90 tablet    Take 1  tablet (5 mg) by mouth 2 times daily (before meals)       insulin glargine 100 UNIT/ML injection     3 mL    Inject 38 Units Subcutaneous daily       insulin pen needle 29G X 12.7MM    BD ULTRA-FINE    100 each    Use once daily or as directed.       lisinopril-hydrochlorothiazide 20-12.5 MG per tablet    PRINZIDE/ZESTORETIC    90 tablet    Take 2 tablets by mouth daily       MELATONIN PO      Take 10 mg by mouth At Bedtime       metFORMIN 1000 MG tablet    GLUCOPHAGE    180 tablet    Take 1 tablet (1,000mg) by mouth twice daily.       oxyCODONE-acetaminophen 5-325 MG per tablet    PERCOCET    60 tablet    Take 1 tablet by mouth 2 times daily For chronic left knee pain. Max: 2 x day. Should last 1 month       PARoxetine 20 MG tablet    PAXIL    90 tablet    Take 1 tablet (20 mg) by mouth every morning       pramipexole 0.25 MG tablet    MIRAPEX    240 tablet    Take 1 tablet (0.25mg) in morning, 2 tablets (0.5mg) in the afternoon, and take 1 tablet before bed       simvastatin 40 MG tablet    ZOCOR    90 tablet    Take 1 tablet (40 mg) by mouth At Bedtime

## 2017-05-05 DIAGNOSIS — E53.8 VITAMIN B 12 DEFICIENCY: ICD-10-CM

## 2017-05-05 RX ORDER — CYANOCOBALAMIN 1000 UG/ML
INJECTION, SOLUTION INTRAMUSCULAR; SUBCUTANEOUS
Qty: 3 ML | Refills: 6 | Status: SHIPPED | OUTPATIENT
Start: 2017-05-05 | End: 2018-07-05

## 2017-05-05 NOTE — TELEPHONE ENCOUNTER
cyanocobalamin        Last Written Prescription Date: 2/3/17  Last Fill Quantity: 3ml,    # refills: 6  Last Office Visit with G, UMP or  Health prescribing provider:  3/14/17   Next 5 appointments (look out 90 days)     May 08, 2017  2:30 PM CDT   [facility] Visit with Molly Leiva MD   [facility]    [address]  [place] [address]   [phone]                   Lab Results   Component Value Date    WBC 4.9 12/31/2014     Lab Results   Component Value Date    RBC 5.04 12/31/2014     Lab Results   Component Value Date    HGB 14.0 12/31/2014     Lab Results   Component Value Date    HCT 43.1 12/31/2014     No components found for: MCT  Lab Results   Component Value Date    MCV 86 12/31/2014     Lab Results   Component Value Date    MCH 27.8 12/31/2014     Lab Results   Component Value Date    MCHC 32.5 12/31/2014     Lab Results   Component Value Date    RDW 19.1 12/31/2014     Lab Results   Component Value Date     12/31/2014     Lab Results   Component Value Date    AST 22 03/24/2015     Lab Results   Component Value Date    ALT 34 03/24/2015     Creatinine   Date Value Ref Range Status   05/23/2016 0.80 0.66 - 1.25 mg/dL Final

## 2017-05-05 NOTE — TELEPHONE ENCOUNTER
Routing refill request to provider for review/approval because:  Labs not current:  HGB    Medication discontinued 8/3/16

## 2017-05-08 ENCOUNTER — OFFICE VISIT (OUTPATIENT)
Dept: FAMILY MEDICINE | Facility: CLINIC | Age: 62
End: 2017-05-08
Payer: COMMERCIAL

## 2017-05-08 VITALS
TEMPERATURE: 98.1 F | WEIGHT: 255 LBS | SYSTOLIC BLOOD PRESSURE: 132 MMHG | HEART RATE: 95 BPM | BODY MASS INDEX: 32.73 KG/M2 | HEIGHT: 74 IN | DIASTOLIC BLOOD PRESSURE: 71 MMHG | OXYGEN SATURATION: 96 %

## 2017-05-08 DIAGNOSIS — F33.0 MILD RECURRENT MAJOR DEPRESSION (H): ICD-10-CM

## 2017-05-08 DIAGNOSIS — Z79.4 TYPE 2 DIABETES MELLITUS WITH MICROALBUMINURIA, WITH LONG-TERM CURRENT USE OF INSULIN (H): Primary | ICD-10-CM

## 2017-05-08 DIAGNOSIS — R80.9 TYPE 2 DIABETES MELLITUS WITH MICROALBUMINURIA, WITH LONG-TERM CURRENT USE OF INSULIN (H): Primary | ICD-10-CM

## 2017-05-08 DIAGNOSIS — G89.29 CHRONIC PAIN OF LEFT KNEE: ICD-10-CM

## 2017-05-08 DIAGNOSIS — E78.5 HYPERLIPIDEMIA LDL GOAL <100: ICD-10-CM

## 2017-05-08 DIAGNOSIS — M25.562 CHRONIC PAIN OF LEFT KNEE: ICD-10-CM

## 2017-05-08 DIAGNOSIS — E11.29 TYPE 2 DIABETES MELLITUS WITH MICROALBUMINURIA, WITH LONG-TERM CURRENT USE OF INSULIN (H): Primary | ICD-10-CM

## 2017-05-08 LAB
ANION GAP SERPL CALCULATED.3IONS-SCNC: 11 MMOL/L (ref 3–14)
BUN SERPL-MCNC: 23 MG/DL (ref 7–30)
CALCIUM SERPL-MCNC: 8.7 MG/DL (ref 8.5–10.1)
CHLORIDE SERPL-SCNC: 110 MMOL/L (ref 94–109)
CO2 SERPL-SCNC: 21 MMOL/L (ref 20–32)
CREAT SERPL-MCNC: 1.19 MG/DL (ref 0.66–1.25)
CREAT UR-MCNC: 106 MG/DL
GFR SERPL CREATININE-BSD FRML MDRD: 62 ML/MIN/1.7M2
GLUCOSE SERPL-MCNC: 151 MG/DL (ref 70–99)
HBA1C MFR BLD: 6.9 % (ref 4.3–6)
MICROALBUMIN UR-MCNC: 23 MG/L
MICROALBUMIN/CREAT UR: 21.79 MG/G CR (ref 0–17)
POTASSIUM SERPL-SCNC: 4.2 MMOL/L (ref 3.4–5.3)
SODIUM SERPL-SCNC: 142 MMOL/L (ref 133–144)

## 2017-05-08 PROCEDURE — 40000358 ZZHCL STATISTIC DRUG SCREEN MULTIPLE (METRO): Performed by: FAMILY MEDICINE

## 2017-05-08 PROCEDURE — 83036 HEMOGLOBIN GLYCOSYLATED A1C: CPT | Performed by: FAMILY MEDICINE

## 2017-05-08 PROCEDURE — 82043 UR ALBUMIN QUANTITATIVE: CPT | Performed by: FAMILY MEDICINE

## 2017-05-08 PROCEDURE — 80307 DRUG TEST PRSMV CHEM ANLYZR: CPT | Performed by: FAMILY MEDICINE

## 2017-05-08 PROCEDURE — 36415 COLL VENOUS BLD VENIPUNCTURE: CPT | Performed by: FAMILY MEDICINE

## 2017-05-08 PROCEDURE — 99214 OFFICE O/P EST MOD 30 MIN: CPT | Performed by: FAMILY MEDICINE

## 2017-05-08 PROCEDURE — 80048 BASIC METABOLIC PNL TOTAL CA: CPT | Performed by: FAMILY MEDICINE

## 2017-05-08 RX ORDER — INSULIN GLARGINE 100 [IU]/ML
40 INJECTION, SOLUTION SUBCUTANEOUS DAILY
Qty: 3 ML | Refills: 11 | Status: SHIPPED | OUTPATIENT
Start: 2017-05-08 | End: 2018-02-14

## 2017-05-08 RX ORDER — PAROXETINE 20 MG/1
20 TABLET, FILM COATED ORAL EVERY MORNING
Qty: 90 TABLET | Refills: 3 | Status: SHIPPED | OUTPATIENT
Start: 2017-05-08 | End: 2018-07-16

## 2017-05-08 RX ORDER — OXYCODONE AND ACETAMINOPHEN 5; 325 MG/1; MG/1
1 TABLET ORAL 2 TIMES DAILY
Qty: 80 TABLET | Refills: 0 | Status: SHIPPED | OUTPATIENT
Start: 2017-05-08 | End: 2017-06-05

## 2017-05-08 RX ORDER — SIMVASTATIN 40 MG
40 TABLET ORAL AT BEDTIME
Qty: 90 TABLET | Refills: 3 | Status: SHIPPED | OUTPATIENT
Start: 2017-05-08 | End: 2018-05-21

## 2017-05-08 NOTE — PROGRESS NOTES
Diabetes Follow-up      Patient is checking blood sugars: twice daily.    Blood sugar testing frequency justification: routine   Results are as follows:         am -          lunchtime - 111-170    Diabetic concerns: None     Symptoms of hypoglycemia (low blood sugar): Yes, when he misses meals like breakfast     Paresthesias (numbness or burning in feet) or sores: No     Date of last diabetic eye exam (annually):  3/31/17      Date of last Urine micro albumin screen, (annually):     Component      Latest Ref Rng & Units 5/23/2016   Creatinine Urine      mg/dL 99   Albumin Urine mg/L      mg/L 136   Albumin Urine mg/g Cr      0 - 17 mg/g Cr 137.37 (H)       Pneumococcal Vaccine:  Yes: 4/27/17, 5/7/08    Influenza Vaccine (annually):   Yes: 9/6/16    Tobacco free:  No    Aspirin use:  Yes: 81 mg     Amount of exercise or physical activity: walking 1-2 miles 4x/ week    Problems taking medications regularly: No    Medication side effects: none    Diet: regular (no restrictions)      Medication Request:  Requesting refill on Percocet- will be going out of town 5/23/17 for x1 week.     Medications updated and reviewed.  Current Outpatient Prescriptions   Medication     insulin glargine (BASAGLAR KWIKPEN) 100 UNIT/ML injection     oxyCODONE-acetaminophen (PERCOCET) 5-325 MG per tablet     simvastatin (ZOCOR) 40 MG tablet     PARoxetine (PAXIL) 20 MG tablet     cyanocobalamin (VITAMIN B12) 1000 MCG/ML injection     pramipexole (MIRAPEX) 0.25 MG tablet     diclofenac (VOLTAREN) 1 % GEL topical gel     glipiZIDE (GLUCOTROL) 5 MG tablet     cyclobenzaprine (FLEXERIL) 10 MG tablet     lisinopril-hydrochlorothiazide (PRINZIDE,ZESTORETIC) 20-12.5 MG per tablet     metFORMIN (GLUCOPHAGE) 1000 MG tablet     MELATONIN PO     blood glucose monitoring (ACCU-CHEK PELON PLUS) meter device kit     blood glucose (ACCU-CHEK PELON PLUS) test strip     blood glucose monitoring (SOFTCLIX) lancets     insulin pen needle (BD  "ULTRA-FINE) 29G X 12.7MM     Pediatric Multiple Vit-C-FA (ANIMAL CHEWABLE MULTIVITAMIN PO)     aspirin 81 MG tablet     albuterol (PROAIR HFA/PROVENTIL HFA/VENTOLIN HFA) 108 (90 BASE) MCG/ACT Inhaler     No current facility-administered medications for this visit.        Past, family and surgical history is updated and reviewed in the record.    ROS:  Other than noted above, general, HEENT, respiratory, cardiac and gastrointestinal systems are negative.    OBJECTIVE:  /71  Pulse 95  Temp 98.1  F (36.7  C) (Tympanic)  Ht 6' 2\" (1.88 m)  Wt 255 lb (115.7 kg)  SpO2 96%  BMI 32.74 kg/m2  GENERAL:  Alert, no acute distress  EYES:  PERRL, EOM normal, conjunctiva and lids normal  HEENT:  Ears and TMs normal, oral mucosa and posterior oropharynx normal  RESP:  Lungs clear to auscultation.  CV: normal rate, regular rhythm, no murmur or gallop.    DATA  Reviewed and discussed with patient prior to discharge.  Results for orders placed or performed in visit on 05/08/17   Hemoglobin A1c   Result Value Ref Range    Hemoglobin A1C 6.9 (H) 4.3 - 6.0 %   Basic metabolic panel  (Ca, Cl, CO2, Creat, Gluc, K, Na, BUN)   Result Value Ref Range    Sodium 142 133 - 144 mmol/L    Potassium 4.2 3.4 - 5.3 mmol/L    Chloride 110 (H) 94 - 109 mmol/L    Carbon Dioxide 21 20 - 32 mmol/L    Anion Gap 11 3 - 14 mmol/L    Glucose 151 (H) 70 - 99 mg/dL    Urea Nitrogen 23 7 - 30 mg/dL    Creatinine 1.19 0.66 - 1.25 mg/dL    GFR Estimate 62 >60 mL/min/1.7m2    GFR Estimate If Black 75 >60 mL/min/1.7m2    Calcium 8.7 8.5 - 10.1 mg/dL   Albumin Random Urine Quantitative   Result Value Ref Range    Creatinine Urine 106 mg/dL    Albumin Urine mg/L 23 mg/L    Albumin Urine mg/g Cr 21.79 (H) 0 - 17 mg/g Cr   Drug screen urine   Result Value Ref Range    Benzodiazepine Qual Urine  NEG     Negative   Cutoff for a negative benzodiazepine is 200 ng/mL or less.      Cannabinoids Qual Urine  NEG     Negative   Cutoff for a negative cannabinoid is 50 " ng/mL or less.      Cocaine Qual Urine  NEG     Negative   Cutoff for a negative cocaine is 300 ng/mL or less.      Opiates Qualitative Urine  NEG     Negative   Cutoff for a negative opiate is 300 ng/mL or less.      Acetaminophen Qual Positive (A) NEG    Amantadine Qual Negative NEG    Amitriptyline Qual Negative NEG    Amoxapine Qual Negative NEG    Amphetamines Qual Negative NEG    Atropine Qual Negative NEG    Caffeine Qual Positive (A) NEG    Carbamazepine Qual Negative NEG    Chlorpheniramine Qual Negative NEG    Chlorpromazine Qual Negative NEG    Citalopram Qual Negative NEG    Clomipramine Qual Negative NEG    Cocaine Qual Negative NEG    Codeine Qual Negative NEG    Desipramine Qual Negative NEG    Dextromethorphan Qual Negative NEG    Diphenhydramine Qual Negative NEG    Doxepin/metabolite Qual Negative NEG    Doxylamine Qual Negative NEG    Ephedrine or pseudo Qual Negative NEG    Fentanyl Qual Negative NEG    Fluoxetine and metab Qual Negative NEG    Hydrocodone Qual Negative NEG    Hydromorphone Qual Negative NEG    Ibuprofen Qual Negative NEG    Imipramine Qual Negative NEG    Lamotrigine Qual Negative NEG    Loxapine Qual Negative NEG    Maprotiline Qual Negative NEG    MDMA Qual Negative NEG    Meperidine Qual Negative NEG    Methadone Qual Negative NEG    Methamphetamine Qual Negative NEG    Morphine Qual Negative NEG    Nicotine Qual Positive (A) NEG    Nortriptyline Qual Negative NEG    Olanzapine Qual Negative NEG    Oxycodone Qual Negative NEG    Pentazocine Qual Negative NEG    Phencyclidine Qual Negative NEG    Phenmetrazine Qual Negative NEG    Phentermine Qual Negative NEG    Phenylbutazone Qual Negative NEG    Phenylpropanolamine Qual Negative NEG    Propoxyphene Qual Negative NEG    Propranolol Qual Negative NEG    Pyrilamine Qual Negative NEG    Salicylate Qual Negative NEG    Theobromine Qual Positive (A) NEG    Trimipramine Qual Negative NEG    Topiramate Qual Negative NEG     Venlafaxine Qual  NEG     Negative   This test was developed and its performance characteristics determined by the   [facility],  Special Chemistry Laboratory. It has   not been cleared or approved by the FDA. The laboratory is regulated under CLIA   as qualified to perform high-complexity testing. This test is used for clinical   purposes. It should not be regarded as investigational or for research.         Assessment/Plan:    Cedric was seen today for diabetes and medication request.    Diagnoses and all orders for this visit:    Type 2 diabetes mellitus with microalbuminuria, with long-term current use of insulin (H), improving. HgbA1C 6.9 today  -     Hemoglobin A1c  -     Increase:  insulin glargine (BASAGLAR KWIKPEN) 100 UNIT/ML injection; Inject 40 Units Subcutaneous daily  -     Basic metabolic panel  (Ca, Cl, CO2, Creat, Gluc, K, Na, BUN)  -     Albumin Random Urine Quantitative    Chronic pain of left knee  Patient is planning to go on vacation, would like a temporary increase in amount of Oxycodone to avoid pain limiting increase in activity.  -     Drug screen urine  -     Refill: oxyCODONE-acetaminophen (PERCOCET) 5-325 MG per tablet; Take 1 tablet by mouth 2 times daily For chronic left knee pain. Max: 2 x day. Should last 1 month    Hyperlipidemia LDL goal <100        -     Patient to schedule Lipid panel at his earliest convenience.   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     Molly Leiva M.D    [facility]

## 2017-05-08 NOTE — MR AVS SNAPSHOT
After Visit Summary   5/8/2017    Cedric Figueroa    MRN: [id number]           Patient Information     Date Of Birth          1955        Visit Information        Provider Department      5/8/2017 2:30 PM Molly Leiva MD [facility]        Today's Diagnoses     Type 2 diabetes mellitus with hyperglycemia, with long-term current use of insulin (H)    -  1    Chronic pain of left knee        Hyperlipidemia LDL goal <100        Mild recurrent major depression (H)          Care Instructions    Your target glucose levels are:     Pre-meal :      Post-meal ( 1-2 hours after) :  < 180    Bedtime:     90 -150            Follow-ups after your visit        Follow-up notes from your care team     Return for Diabetes follow Up every 3 months.      Who to contact     Normal or non-critical lab and imaging results will be communicated to you by [employer], letter or phone within 4 business days after the clinic has received the results. If you do not hear from us within 7 days, please contact the clinic through [employer] or phone. If you have a critical or abnormal lab result, we will notify you by phone as soon as possible.  Submit refill requests through [employer]Way or call your pharmacy and they will forward the refill request to us. Please allow 3 business days for your refill to be completed.          If you need to speak with a  for additional information , please call: [phone]             Additional Information About Your Visit        MilkyWay Information     MilkyWay gives you secure access to your electronic health record. If you see a primary care provider, you can also send messages to your care team and make appointments. If you have questions, please call your primary care clinic.  If you do not have a primary care provider, please call [phone] and they will assist you.        Care EveryWhere ID     This is your Care EveryWhere ID. This could be used by other  "organizations to access your [facility] medical records  [id number]        Your Vitals Were     Pulse Temperature Height Pulse Oximetry BMI (Body Mass Index)       95 98.1  F (36.7  C) (Tympanic) 6' 2\" (1.88 m) 96% 32.74 kg/m2        Blood Pressure from Last 3 Encounters:   05/08/17 132/71   03/14/17 132/78   02/20/17 115/69    Weight from Last 3 Encounters:   05/08/17 255 lb (115.7 kg)   03/14/17 251 lb 9.6 oz (114.1 kg)   02/20/17 256 lb 12.8 oz (116.5 kg)              We Performed the Following     Albumin Random Urine Quantitative     Basic metabolic panel  (Ca, Cl, CO2, Creat, Gluc, K, Na, BUN)     Drug screen urine     Hemoglobin A1c     JUST IN CASE          Today's Medication Changes          These changes are accurate as of: 5/8/17  3:11 PM.  If you have any questions, ask your nurse or doctor.               These medicines have changed or have updated prescriptions.        Dose/Directions    insulin glargine 100 UNIT/ML injection   This may have changed:  how much to take   Used for:  Type 2 diabetes mellitus with hyperglycemia, with long-term current use of insulin (H)   Changed by:  Molly Leiva MD        Dose:  40 Units   Inject 40 Units Subcutaneous daily   Quantity:  3 mL   Refills:  11            Where to get your medicines      These medications were sent to [facility] [place] [address] [address] [address]     Phone:  [phone]     insulin glargine 100 UNIT/ML injection    PARoxetine 20 MG tablet    simvastatin 40 MG tablet         Some of these will need a paper prescription and others can be bought over the counter.  Ask your nurse if you have questions.     Bring a paper prescription for each of these medications     oxyCODONE-acetaminophen 5-325 MG per tablet                Primary Care Provider Office Phone # Fax #    Molly Leiva [phone]       [facility] [address] [place] [id number]        Thank you!     Thank you for choosing [facility]  for your care. Our goal is always to provide you with excellent care. Hearing back from our patients is one way we can continue to improve our services. Please take a few minutes to complete the written survey that you may receive in the mail after your visit with us. Thank you!             Your Updated Medication List - Protect others around you: Learn how to safely use, store and throw away your medicines at www.disposemymeds.org.          This list is accurate as of: 5/8/17  3:11 PM.  Always use your most recent med list.                   Brand Name Dispense Instructions for use    albuterol 108 (90 BASE) MCG/ACT Inhaler    PROAIR HFA/PROVENTIL HFA/VENTOLIN HFA    1 Inhaler    Inhale 2 puffs into the lungs every 6 hours as needed for shortness of breath / dyspnea or wheezing       ANIMAL CHEWABLE MULTIVITAMIN PO      Take by mouth 2 times daily       aspirin 81 MG tablet      Take 1 tablet by mouth daily.       blood glucose monitoring lancets     3 Box    Use to test blood sugar 3 times daily or as directed.       blood glucose monitoring meter device kit     1 kit    Use to test blood sugar 3 times daily or as directed.       blood glucose monitoring test strip    ACCU-CHEK PELON PLUS    3 Month    Use to test blood sugar 3 times daily or as directed.       cyanocobalamin 1000 MCG/ML injection    VITAMIN B12    3 mL    INJECT 1ML INTO THE MUSCLE EVERY 3O DAYS       cyclobenzaprine 10 MG tablet    FLEXERIL    30 tablet    Take 0.5-1 tablets (5-10 mg) by mouth 3 times daily as needed for muscle spasms       diclofenac 1 % Gel topical gel    VOLTAREN    100 g    Apply 4 grams to knees  daily using enclosed dosing card.       glipiZIDE 5 MG tablet    GLUCOTROL    90 tablet    Take 1 tablet (5 mg) by mouth 2 times daily (before meals)       insulin glargine 100 UNIT/ML injection     3 mL    Inject 40 Units Subcutaneous daily       insulin  pen needle 29G X 12.7MM    BD ULTRA-FINE    100 each    Use once daily or as directed.       lisinopril-hydrochlorothiazide 20-12.5 MG per tablet    PRINZIDE/ZESTORETIC    90 tablet    Take 2 tablets by mouth daily       MELATONIN PO      Take 10 mg by mouth At Bedtime       metFORMIN 1000 MG tablet    GLUCOPHAGE    180 tablet    Take 1 tablet (1,000mg) by mouth twice daily.       oxyCODONE-acetaminophen 5-325 MG per tablet    PERCOCET    80 tablet    Take 1 tablet by mouth 2 times daily For chronic left knee pain. Max: 2 x day. Should last 1 month       PARoxetine 20 MG tablet    PAXIL    90 tablet    Take 1 tablet (20 mg) by mouth every morning       pramipexole 0.25 MG tablet    MIRAPEX    240 tablet    Take 1 tablet (0.25mg) in morning, 2 tablets (0.5mg) in the afternoon, and take 1 tablet before bed       simvastatin 40 MG tablet    ZOCOR    90 tablet    Take 1 tablet (40 mg) by mouth At Bedtime

## 2017-05-08 NOTE — PATIENT INSTRUCTIONS
Your target glucose levels are:     Pre-meal :      Post-meal ( 1-2 hours after) :  < 180    Bedtime:     90 -150

## 2017-05-08 NOTE — NURSING NOTE
"Chief Complaint   Patient presents with     Diabetes     Medication Request       Initial /71  Pulse 95  Temp 98.1  F (36.7  C) (Tympanic)  Ht 6' 2\" (1.88 m)  Wt 255 lb (115.7 kg)  SpO2 96%  BMI 32.74 kg/m2 Estimated body mass index is 32.74 kg/(m^2) as calculated from the following:    Height as of this encounter: 6' 2\" (1.88 m).    Weight as of this encounter: 255 lb (115.7 kg).  Medication Reconciliation: complete       Sandrita Zazueta MA      "

## 2017-05-09 LAB
ACETAMINOPHEN QUAL: POSITIVE
AMANTADINE: NEGATIVE
AMITRIPTYLINE QUAL: NEGATIVE
AMOXAPINE: NEGATIVE
AMPHETAMINES QUAL: NEGATIVE
ATROPINE: NEGATIVE
BENZODIAZ UR QL: ABNORMAL
CAFFEINE QUAL: POSITIVE
CANNABINOIDS UR QL SCN: ABNORMAL
CARBAMAZEPINE QUAL: NEGATIVE
CHLORPHENIRAMINE: NEGATIVE
CHLORPROMAZINE: NEGATIVE
CITALOPRAM QUAL: NEGATIVE
CLOMIPRAMINE QUAL: NEGATIVE
COCAINE QUAL: NEGATIVE
COCAINE UR QL: ABNORMAL
CODEINE QUAL: NEGATIVE
DESIPRAMINE QUAL: NEGATIVE
DEXTROMETHORPHAN: NEGATIVE
DIPHENHYDRAMINE: NEGATIVE
DOXEPIN/METABOLITE: NEGATIVE
DOXYLAMINE: NEGATIVE
EPHEDRINE OR PSEUDO: NEGATIVE
FENTANYL QUAL: NEGATIVE
FLUOXETINE AND METAB: NEGATIVE
HYDROCODONE QUAL: NEGATIVE
HYDROMORPHONE QUAL: NEGATIVE
IBUPROFEN QUAL: NEGATIVE
IMIPRAMINE QUAL: NEGATIVE
LAMOTRIGINE QUAL: NEGATIVE
LOXAPINE: NEGATIVE
MAPROTYLINE: NEGATIVE
MDMA QUAL: NEGATIVE
MEPERIDINE QUAL: NEGATIVE
METHAMPHETAMINE: NEGATIVE
METHODONE QUAL: NEGATIVE
MORPHINE QUAL: NEGATIVE
NICOTINE: POSITIVE
NORTRIPTYLINE QUAL: NEGATIVE
OLANZAPINE QUAL: NEGATIVE
OPIATES UR QL SCN: ABNORMAL
OXYCODONE QUAL: NEGATIVE
PENTAZOCINE: NEGATIVE
PHENCYCLIDINE QUAL: NEGATIVE
PHENMETRAZINE: NEGATIVE
PHENTERMINE: NEGATIVE
PHENYLBUTAZONE: NEGATIVE
PHENYLPROPANOLAMINE: NEGATIVE
PROPOXPHENE QUAL: NEGATIVE
PROPRANOLOL QUAL: NEGATIVE
PYRILAMINE: NEGATIVE
SALICYLATE QUAL: NEGATIVE
THEOBROMINE: POSITIVE
TOPIRAMATE QUAL: NEGATIVE
TRIMIPRAMINE QUAL: NEGATIVE
VENLAFAXINE QUAL: ABNORMAL

## 2017-05-10 PROBLEM — R80.9 TYPE 2 DIABETES MELLITUS WITH MICROALBUMINURIA, WITH LONG-TERM CURRENT USE OF INSULIN (H): Status: ACTIVE | Noted: 2017-05-10

## 2017-05-10 PROBLEM — E11.29 TYPE 2 DIABETES MELLITUS WITH MICROALBUMINURIA, WITH LONG-TERM CURRENT USE OF INSULIN (H): Status: ACTIVE | Noted: 2017-05-10

## 2017-05-10 PROBLEM — Z79.4 TYPE 2 DIABETES MELLITUS WITH MICROALBUMINURIA, WITH LONG-TERM CURRENT USE OF INSULIN (H): Status: ACTIVE | Noted: 2017-05-10

## 2017-05-23 DIAGNOSIS — E78.5 HYPERLIPIDEMIA LDL GOAL <100: ICD-10-CM

## 2017-05-23 DIAGNOSIS — G25.81 RESTLESS LEG SYNDROME: ICD-10-CM

## 2017-05-23 LAB
CHOLEST SERPL-MCNC: 149 MG/DL
FERRITIN SERPL-MCNC: 22 NG/ML (ref 26–388)
HDLC SERPL-MCNC: 50 MG/DL
LDLC SERPL CALC-MCNC: 70 MG/DL
NONHDLC SERPL-MCNC: 99 MG/DL
TRIGL SERPL-MCNC: 143 MG/DL

## 2017-05-23 PROCEDURE — 82728 ASSAY OF FERRITIN: CPT | Performed by: FAMILY MEDICINE

## 2017-05-23 PROCEDURE — 36415 COLL VENOUS BLD VENIPUNCTURE: CPT | Performed by: FAMILY MEDICINE

## 2017-05-23 PROCEDURE — 80061 LIPID PANEL: CPT | Performed by: FAMILY MEDICINE

## 2017-05-30 NOTE — PROGRESS NOTES
This patient did not return to Physical Therapy to complete their plan of care, thus, full DC status is unknown. Please refer to the SOAP note dated 3/2/17 for discharge information.   This bout of care ranged from 2/20/17 to 3/2/17.  Discharge patient from PT at this time.

## 2017-06-01 DIAGNOSIS — I10 ESSENTIAL HYPERTENSION WITH GOAL BLOOD PRESSURE LESS THAN 140/90: ICD-10-CM

## 2017-06-01 RX ORDER — LISINOPRIL AND HYDROCHLOROTHIAZIDE 12.5; 2 MG/1; MG/1
TABLET ORAL
Qty: 90 TABLET | Refills: 3 | Status: SHIPPED | OUTPATIENT
Start: 2017-06-01 | End: 2017-06-27

## 2017-06-01 NOTE — TELEPHONE ENCOUNTER
Lisinopril-hctz      Last Written Prescription Date: 4/26/17  Last Fill Quantity: 90, # refills: 3  Last Office Visit with G, P or [facility] prescribing provider: 5/8/17  Next 5 appointments (look out 90 days)     Aug 07, 2017  2:30 PM CDT   SHORT with Molly Leiva MD   [facility] [place] ([facility] [place])    [address]  [place] [id number] [phone]                   Potassium   Date Value Ref Range Status   05/08/2017 4.2 3.4 - 5.3 mmol/L Final     Creatinine   Date Value Ref Range Status   05/08/2017 1.19 0.66 - 1.25 mg/dL Final     BP Readings from Last 3 Encounters:   05/08/17 132/71   03/14/17 132/78   02/20/17 115/69

## 2017-06-05 ENCOUNTER — TELEPHONE (OUTPATIENT)
Dept: FAMILY MEDICINE | Facility: CLINIC | Age: 62
End: 2017-06-05

## 2017-06-05 DIAGNOSIS — M25.562 CHRONIC PAIN OF LEFT KNEE: ICD-10-CM

## 2017-06-05 DIAGNOSIS — G89.29 CHRONIC PAIN OF LEFT KNEE: ICD-10-CM

## 2017-06-05 NOTE — TELEPHONE ENCOUNTER
Patient states he would like a refill on his Percocet.  Please call when ready to  the the [facility] pharmacy.    Thank you.

## 2017-06-06 ENCOUNTER — TELEPHONE (OUTPATIENT)
Dept: FAMILY MEDICINE | Facility: CLINIC | Age: 62
End: 2017-06-06

## 2017-06-06 RX ORDER — OXYCODONE AND ACETAMINOPHEN 5; 325 MG/1; MG/1
1 TABLET ORAL 2 TIMES DAILY PRN
Qty: 60 TABLET | Refills: 0 | Status: SHIPPED | OUTPATIENT
Start: 2017-06-06 | End: 2017-06-30

## 2017-06-06 NOTE — TELEPHONE ENCOUNTER
Patient states his refill is too soon to be filled and would like Dr. Leiva to contact him so he can speak with her about this.    Thank you.

## 2017-06-06 NOTE — TELEPHONE ENCOUNTER
Patient stating he could not  new RX for pain med.  Per patient he was told he was given the extra 20 pills for additional 2 weeks on vacation.  Patient states he used more due to travel and activities.  Spoke with our pharmacy and per them they said insurance may not cover due to last quantity increase and also he is 2 days early for just original monthly supply.  Will send to PCP.  Okay to go ahead and let patient know he may have to pay more if insurance does not cover?        oxyCODONE-acetaminophen (PERCOCET) 5-325 MG per tablet (Discontinued) 80 tablet 0 5/8/2017 6/5/2017 No      Sig: Take 1 tablet by mouth 2 times daily For chronic left knee pain. Max: 2 x day. Should last 1 month     Class: Local Print     Notes to Pharmacy: Patient is on vacation x 2 weeks, added # 20 to current regimen. Please dispense # 80 this month only.     Route: Oral     Reason for Discontinue: Reorder     Order: [id number]

## 2017-06-06 NOTE — TELEPHONE ENCOUNTER
Per Dr Leiva, I gave patient an additional  20 pills of pain med last month due to his traveling and being more active.  Okay to refill pain med as ordered today and inform patient of possible insurance charges.

## 2017-06-06 NOTE — TELEPHONE ENCOUNTER
Hard copy of script for Percocet walked to [facility]

## 2017-06-16 ENCOUNTER — OFFICE VISIT (OUTPATIENT)
Dept: FAMILY MEDICINE | Facility: CLINIC | Age: 62
End: 2017-06-16
Payer: COMMERCIAL

## 2017-06-16 ENCOUNTER — RADIANT APPOINTMENT (OUTPATIENT)
Dept: GENERAL RADIOLOGY | Facility: CLINIC | Age: 62
End: 2017-06-16
Attending: FAMILY MEDICINE
Payer: COMMERCIAL

## 2017-06-16 VITALS
OXYGEN SATURATION: 97 % | SYSTOLIC BLOOD PRESSURE: 108 MMHG | HEART RATE: 92 BPM | DIASTOLIC BLOOD PRESSURE: 61 MMHG | BODY MASS INDEX: 30.72 KG/M2 | HEIGHT: 74 IN | TEMPERATURE: 98.5 F | WEIGHT: 239.4 LBS

## 2017-06-16 DIAGNOSIS — R53.83 FATIGUE, UNSPECIFIED TYPE: ICD-10-CM

## 2017-06-16 DIAGNOSIS — R07.89 ATYPICAL CHEST PAIN: Primary | ICD-10-CM

## 2017-06-16 DIAGNOSIS — R63.4 WEIGHT LOSS: ICD-10-CM

## 2017-06-16 DIAGNOSIS — R13.10 DYSPHAGIA, UNSPECIFIED TYPE: ICD-10-CM

## 2017-06-16 DIAGNOSIS — Z63.79 STRESSFUL LIFE EVENTS AFFECTING FAMILY AND HOUSEHOLD: ICD-10-CM

## 2017-06-16 DIAGNOSIS — D72.829 LEUKOCYTOSIS, UNSPECIFIED TYPE: ICD-10-CM

## 2017-06-16 DIAGNOSIS — K59.00 CONSTIPATION, UNSPECIFIED CONSTIPATION TYPE: ICD-10-CM

## 2017-06-16 LAB
ALBUMIN SERPL-MCNC: 3.7 G/DL (ref 3.4–5)
ALP SERPL-CCNC: 121 U/L (ref 40–150)
ALT SERPL W P-5'-P-CCNC: 35 U/L (ref 0–70)
ANION GAP SERPL CALCULATED.3IONS-SCNC: 9 MMOL/L (ref 3–14)
AST SERPL W P-5'-P-CCNC: 24 U/L (ref 0–45)
BASOPHILS # BLD AUTO: 0 10E9/L (ref 0–0.2)
BASOPHILS NFR BLD AUTO: 0.2 %
BILIRUB SERPL-MCNC: 0.3 MG/DL (ref 0.2–1.3)
BUN SERPL-MCNC: 53 MG/DL (ref 7–30)
CALCIUM SERPL-MCNC: 9.2 MG/DL (ref 8.5–10.1)
CHLORIDE SERPL-SCNC: 112 MMOL/L (ref 94–109)
CO2 SERPL-SCNC: 20 MMOL/L (ref 20–32)
CREAT SERPL-MCNC: 2.59 MG/DL (ref 0.66–1.25)
DIFFERENTIAL METHOD BLD: ABNORMAL
EOSINOPHIL # BLD AUTO: 0.3 10E9/L (ref 0–0.7)
EOSINOPHIL NFR BLD AUTO: 2.2 %
ERYTHROCYTE [DISTWIDTH] IN BLOOD BY AUTOMATED COUNT: 12.1 % (ref 10–15)
GFR SERPL CREATININE-BSD FRML MDRD: 25 ML/MIN/1.7M2
GLUCOSE BLD-MCNC: 81 MG/DL (ref 70–99)
GLUCOSE SERPL-MCNC: 71 MG/DL (ref 70–99)
HCT VFR BLD AUTO: 38.7 % (ref 40–53)
HGB BLD-MCNC: 12.6 G/DL (ref 13.3–17.7)
LYMPHOCYTES # BLD AUTO: 2.4 10E9/L (ref 0.8–5.3)
LYMPHOCYTES NFR BLD AUTO: 19.7 %
MCH RBC QN AUTO: 31.6 PG (ref 26.5–33)
MCHC RBC AUTO-ENTMCNC: 32.6 G/DL (ref 31.5–36.5)
MCV RBC AUTO: 97 FL (ref 78–100)
MONOCYTES # BLD AUTO: 0.8 10E9/L (ref 0–1.3)
MONOCYTES NFR BLD AUTO: 6.8 %
NEUTROPHILS # BLD AUTO: 8.6 10E9/L (ref 1.6–8.3)
NEUTROPHILS NFR BLD AUTO: 71.1 %
PLATELET # BLD AUTO: 259 10E9/L (ref 150–450)
POTASSIUM SERPL-SCNC: 5.6 MMOL/L (ref 3.4–5.3)
PROT SERPL-MCNC: 7.7 G/DL (ref 6.8–8.8)
RBC # BLD AUTO: 3.99 10E12/L (ref 4.4–5.9)
SODIUM SERPL-SCNC: 141 MMOL/L (ref 133–144)
TROPONIN I SERPL-MCNC: NORMAL UG/L (ref 0–0.04)
TSH SERPL DL<=0.005 MIU/L-ACNC: 1.86 MU/L (ref 0.4–4)
WBC # BLD AUTO: 12.1 10E9/L (ref 4–11)

## 2017-06-16 PROCEDURE — 80050 GENERAL HEALTH PANEL: CPT | Performed by: FAMILY MEDICINE

## 2017-06-16 PROCEDURE — 93000 ELECTROCARDIOGRAM COMPLETE: CPT | Performed by: FAMILY MEDICINE

## 2017-06-16 PROCEDURE — 71020 XR CHEST 2 VW: CPT

## 2017-06-16 PROCEDURE — 82947 ASSAY GLUCOSE BLOOD QUANT: CPT | Performed by: FAMILY MEDICINE

## 2017-06-16 PROCEDURE — 84484 ASSAY OF TROPONIN QUANT: CPT | Performed by: FAMILY MEDICINE

## 2017-06-16 PROCEDURE — 99214 OFFICE O/P EST MOD 30 MIN: CPT | Performed by: FAMILY MEDICINE

## 2017-06-16 PROCEDURE — 36415 COLL VENOUS BLD VENIPUNCTURE: CPT | Performed by: FAMILY MEDICINE

## 2017-06-16 RX ORDER — OMEPRAZOLE 40 MG/1
40 CAPSULE, DELAYED RELEASE ORAL DAILY
Qty: 30 CAPSULE | Refills: 0 | Status: SHIPPED | OUTPATIENT
Start: 2017-06-16 | End: 2017-07-25

## 2017-06-16 RX ORDER — POLYETHYLENE GLYCOL 3350 17 G/17G
1 POWDER, FOR SOLUTION ORAL DAILY
Qty: 510 G | Refills: 0 | Status: SHIPPED | OUTPATIENT
Start: 2017-06-16 | End: 2017-07-25

## 2017-06-16 NOTE — MR AVS SNAPSHOT
After Visit Summary   6/16/2017    Cedric Figueroa    MRN: [id number]           Patient Information     Date Of Birth          1955        Visit Information        Provider Department      6/16/2017 3:00 PM Molly Leiva MD [facility] [place]        Today's Diagnoses     Atypical chest pain    -  1    Dysphagia, unspecified type        Fatigue, unspecified type        Weight loss        Leukocytosis, unspecified type        Constipation, unspecified constipation type        Stressful life events affecting family and household           Follow-ups after your visit        Additional Services     GASTROENTEROLOGY ADULT REF PROCEDURE ONLY       Last Lab Result: Creatinine (mg/dL)       Date                     Value                 05/08/2017               1.19             ----------  Body mass index is 30.74 kg/(m^2).     Needed:  No  Language:  English    Patient will be contacted to schedule procedure.     Please be aware that coverage of these services is subject to the terms and limitations of your health insurance plan.  Call member services at your health plan with any benefit or coverage questions.  Any procedures must be performed at a [facility] facility OR coordinated by your clinic's referral office.    Please bring the following with you to your appointment:    (1) Any X-Rays, CTs or MRIs which have been performed.  Contact the facility where they were done to arrange for  prior to your scheduled appointment.    (2) List of current medications   (3) This referral request   (4) Any documents/labs given to you for this referral            MENTAL HEALTH REFERRAL       Your provider has referred you to: [facility]: [facility] - Counseling (Individual/Couples/Family) - [facility] [place] [phone]   [email or web address]   *Patient will be contacted by [facility]'s scheduling partner, [facility]), to schedule an appointment.  Patients may also call [facility] to schedule.    All scheduling is subject to the client's specific insurance plan & benefits, provider/location availability, and provider clinical specialities.  Please arrive 15 minutes early for your first appointment and bring your completed paperwork.    Please be aware that coverage of these services is subject to the terms and limitations of your health insurance plan.  Call member services at your health plan with any benefit or coverage questions.                  Follow-up notes from your care team     Return in about 2 weeks (around 6/30/2017) for Follow up.      Your next 10 appointments already scheduled     Aug 07, 2017  2:30 PM CDT   [facility] MD   [facility]    [address] [address] [phone]              Future tests that were ordered for you today     Open Future Orders        Priority Expected Expires Ordered    Exercise Stress Echocardiogram Routine  6/16/2018 6/16/2017    H Pylori antigen stool Routine  7/16/2017 6/16/2017            Who to contact     Normal or non-critical lab and imaging results will be communicated to you by [employer], letter or phone within 4 business days after the clinic has received the results. If you do not hear from us within 7 days, please contact the clinic through [employer] or phone. If you have a critical or abnormal lab result, we will notify you by phone as soon as possible.  Submit refill requests through That[employer]company or call your pharmacy and they will forward the refill request to us. Please allow 3 business days for your refill to be completed.          If you need to speak with a  for additional information , please call: [phone]             Additional Information About Your Visit        Thatgamecompany Information     [employer] gives you secure access to your electronic health  "record. If you see a primary care provider, you can also send messages to your care team and make appointments. If you have questions, please call your primary care clinic.  If you do not have a primary care provider, please call [phone] and they will assist you.        Care EveryWhere ID     This is your Care EveryWhere ID. This could be used by other organizations to access your [facility] medical records  [id number]        Your Vitals Were     Pulse Temperature Height Pulse Oximetry BMI (Body Mass Index)       92 98.5  F (36.9  C) (Tympanic) 6' 2\" (1.88 m) 97% 30.74 kg/m2        Blood Pressure from Last 3 Encounters:   06/16/17 108/61   05/08/17 132/71   03/14/17 132/78    Weight from Last 3 Encounters:   06/16/17 239 lb 6.4 oz (108.6 kg)   05/08/17 255 lb (115.7 kg)   03/14/17 251 lb 9.6 oz (114.1 kg)              We Performed the Following     CBC with platelets and differential     Comprehensive metabolic panel     EKG 12-lead complete w/read - Clinics     GASTROENTEROLOGY ADULT REF PROCEDURE ONLY     Glucose, whole blood     MENTAL HEALTH REFERRAL     Troponin I     TSH with free T4 reflex     XR Chest 2 Views          Today's Medication Changes          These changes are accurate as of: 6/16/17  4:30 PM.  If you have any questions, ask your nurse or doctor.               Start taking these medicines.        Dose/Directions    omeprazole 40 MG capsule   Commonly known as:  priLOSEC   Used for:  Dysphagia, unspecified type   Started by:  Molly Leiva MD        Dose:  40 mg   Take 1 capsule (40 mg) by mouth daily Take 30-60 minutes before a meal.   Quantity:  30 capsule   Refills:  0       polyethylene glycol powder   Commonly known as:  MIRALAX   Used for:  Constipation, unspecified constipation type   Started by:  Molly Leiva MD        Dose:  1 capful   Take 17 g (1 capful) by mouth daily   Quantity:  510 g   Refills:  0            Where to get your medicines      These medications were sent " to [facility] [place] - [address] [address]  [address] [address]     Phone:  [phone]     omeprazole 40 MG capsule    polyethylene glycol powder                Primary Care Provider Office Phone # Fax #    Molly Leiva [phone]       [facility] [address]  [place] [address]        Thank you!     Thank you for choosing [facility]  for your care. Our goal is always to provide you with excellent care. Hearing back from our patients is one way we can continue to improve our services. Please take a few minutes to complete the written survey that you may receive in the mail after your visit with us. Thank you!             Your Updated Medication List - Protect others around you: Learn how to safely use, store and throw away your medicines at www.disposemymeds.org.          This list is accurate as of: 6/16/17  4:30 PM.  Always use your most recent med list.                   Brand Name Dispense Instructions for use    albuterol 108 (90 BASE) MCG/ACT Inhaler    PROAIR HFA/PROVENTIL HFA/VENTOLIN HFA    1 Inhaler    Inhale 2 puffs into the lungs every 6 hours as needed for shortness of breath / dyspnea or wheezing       ANIMAL CHEWABLE MULTIVITAMIN PO      Take by mouth 2 times daily       aspirin 81 MG tablet      Take 1 tablet by mouth daily.       blood glucose monitoring lancets     3 Box    Use to test blood sugar 3 times daily or as directed.       blood glucose monitoring meter device kit     1 kit    Use to test blood sugar 3 times daily or as directed.       blood glucose monitoring test strip    ACCU-CHEK PELON PLUS    3 Month    Use to test blood sugar 3 times daily or as directed.       cyanocobalamin 1000 MCG/ML injection    VITAMIN B12    3 mL    INJECT 1ML INTO THE MUSCLE EVERY 3O DAYS       cyclobenzaprine 10 MG tablet    FLEXERIL    30 tablet    Take 0.5-1 tablets (5-10 mg) by mouth 3 times daily as  needed for muscle spasms       diclofenac 1 % Gel topical gel    VOLTAREN    100 g    Apply 4 grams to knees  daily using enclosed dosing card.       glipiZIDE 5 MG tablet    GLUCOTROL    90 tablet    Take 1 tablet (5 mg) by mouth 2 times daily (before meals)       insulin glargine 100 UNIT/ML injection     3 mL    Inject 40 Units Subcutaneous daily       insulin pen needle 29G X 12.7MM    BD ULTRA-FINE    100 each    Use once daily or as directed.       lisinopril-hydrochlorothiazide 20-12.5 MG per tablet    PRINZIDE/ZESTORETIC    90 tablet    TAKE TWO TABLETS BY MOUTH EVERY DAY       MELATONIN PO      Take 10 mg by mouth At Bedtime       metFORMIN 1000 MG tablet    GLUCOPHAGE    180 tablet    Take 1 tablet (1,000mg) by mouth twice daily.       omeprazole 40 MG capsule    priLOSEC    30 capsule    Take 1 capsule (40 mg) by mouth daily Take 30-60 minutes before a meal.       oxyCODONE-acetaminophen 5-325 MG per tablet    PERCOCET    60 tablet    Take 1 tablet by mouth 2 times daily as needed for moderate to severe pain For chronic left knee pain. Max: 2 x day. Should last 1 month       PARoxetine 20 MG tablet    PAXIL    90 tablet    Take 1 tablet (20 mg) by mouth every morning       polyethylene glycol powder    MIRALAX    510 g    Take 17 g (1 capful) by mouth daily       pramipexole 0.25 MG tablet    MIRAPEX    240 tablet    Take 1 tablet (0.25mg) in morning, 2 tablets (0.5mg) in the afternoon, and take 1 tablet before bed       simvastatin 40 MG tablet    ZOCOR    90 tablet    Take 1 tablet (40 mg) by mouth At Bedtime
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
## 2017-06-16 NOTE — NURSING NOTE
"Chief Complaint   Patient presents with     Abdominal Pain       Initial /61  Pulse 92  Temp 98.5  F (36.9  C) (Tympanic)  Ht 6' 2\" (1.88 m)  Wt 239 lb 6.4 oz (108.6 kg)  SpO2 97%  BMI 30.74 kg/m2 Estimated body mass index is 30.74 kg/(m^2) as calculated from the following:    Height as of this encounter: 6' 2\" (1.88 m).    Weight as of this encounter: 239 lb 6.4 oz (108.6 kg).  Medication Reconciliation: complete       Sandrita Zazueta MA      "

## 2017-06-19 DIAGNOSIS — N17.9 ACUTE RENAL FAILURE, UNSPECIFIED ACUTE RENAL FAILURE TYPE (H): ICD-10-CM

## 2017-06-19 DIAGNOSIS — E87.5 HYPERKALEMIA: ICD-10-CM

## 2017-06-19 DIAGNOSIS — N17.9 ACUTE RENAL FAILURE, UNSPECIFIED ACUTE RENAL FAILURE TYPE (H): Primary | ICD-10-CM

## 2017-06-19 LAB
ANION GAP SERPL CALCULATED.3IONS-SCNC: 8 MMOL/L (ref 3–14)
BUN SERPL-MCNC: 61 MG/DL (ref 7–30)
CALCIUM SERPL-MCNC: 8.6 MG/DL (ref 8.5–10.1)
CHLORIDE SERPL-SCNC: 112 MMOL/L (ref 94–109)
CO2 SERPL-SCNC: 19 MMOL/L (ref 20–32)
CREAT SERPL-MCNC: 2.83 MG/DL (ref 0.66–1.25)
GFR SERPL CREATININE-BSD FRML MDRD: 23 ML/MIN/1.7M2
GLUCOSE SERPL-MCNC: 130 MG/DL (ref 70–99)
POTASSIUM SERPL-SCNC: 5.4 MMOL/L (ref 3.4–5.3)
SODIUM SERPL-SCNC: 139 MMOL/L (ref 133–144)

## 2017-06-19 PROCEDURE — 36415 COLL VENOUS BLD VENIPUNCTURE: CPT | Performed by: FAMILY MEDICINE

## 2017-06-19 PROCEDURE — 80048 BASIC METABOLIC PNL TOTAL CA: CPT | Performed by: FAMILY MEDICINE

## 2017-06-20 ENCOUNTER — TRANSFERRED RECORDS (OUTPATIENT)
Dept: HEALTH INFORMATION MANAGEMENT | Facility: CLINIC | Age: 62
End: 2017-06-20

## 2017-06-20 LAB
ALT SERPL-CCNC: 26 IU/L (ref 8–45)
AST SERPL-CCNC: 23 IU/L (ref 2–40)
CREAT SERPL-MCNC: 3.04 MG/DL (ref 0.72–1.25)
GFR SERPL CREATININE-BSD FRML MDRD: 21 ML/MIN/1.73M2
GLUCOSE SERPL-MCNC: 59 MG/DL (ref 65–100)
POTASSIUM SERPL-SCNC: 5.2 MMOL/L (ref 3.5–5)

## 2017-06-27 ENCOUNTER — OFFICE VISIT (OUTPATIENT)
Dept: FAMILY MEDICINE | Facility: CLINIC | Age: 62
End: 2017-06-27
Payer: COMMERCIAL

## 2017-06-27 VITALS
OXYGEN SATURATION: 96 % | BODY MASS INDEX: 31.71 KG/M2 | RESPIRATION RATE: 18 BRPM | DIASTOLIC BLOOD PRESSURE: 69 MMHG | WEIGHT: 247 LBS | SYSTOLIC BLOOD PRESSURE: 130 MMHG | TEMPERATURE: 98 F | HEART RATE: 75 BPM

## 2017-06-27 DIAGNOSIS — I10 BENIGN ESSENTIAL HYPERTENSION: ICD-10-CM

## 2017-06-27 DIAGNOSIS — E11.29 TYPE 2 DIABETES MELLITUS WITH MICROALBUMINURIA, WITH LONG-TERM CURRENT USE OF INSULIN (H): Primary | ICD-10-CM

## 2017-06-27 DIAGNOSIS — N17.9 ACUTE RENAL FAILURE, UNSPECIFIED ACUTE RENAL FAILURE TYPE (H): ICD-10-CM

## 2017-06-27 DIAGNOSIS — Z79.4 TYPE 2 DIABETES MELLITUS WITH MICROALBUMINURIA, WITH LONG-TERM CURRENT USE OF INSULIN (H): Primary | ICD-10-CM

## 2017-06-27 DIAGNOSIS — R80.9 TYPE 2 DIABETES MELLITUS WITH MICROALBUMINURIA, WITH LONG-TERM CURRENT USE OF INSULIN (H): Primary | ICD-10-CM

## 2017-06-27 LAB
ANION GAP SERPL CALCULATED.3IONS-SCNC: 7 MMOL/L (ref 3–14)
BUN SERPL-MCNC: 31 MG/DL (ref 7–30)
CALCIUM SERPL-MCNC: 8.5 MG/DL (ref 8.5–10.1)
CHLORIDE SERPL-SCNC: 110 MMOL/L (ref 94–109)
CK SERPL-CCNC: 97 U/L (ref 30–300)
CO2 SERPL-SCNC: 24 MMOL/L (ref 20–32)
CREAT SERPL-MCNC: 2.14 MG/DL (ref 0.66–1.25)
GFR SERPL CREATININE-BSD FRML MDRD: 32 ML/MIN/1.7M2
GLUCOSE SERPL-MCNC: 84 MG/DL (ref 70–99)
POTASSIUM SERPL-SCNC: 4.7 MMOL/L (ref 3.4–5.3)
SODIUM SERPL-SCNC: 141 MMOL/L (ref 133–144)

## 2017-06-27 PROCEDURE — 99214 OFFICE O/P EST MOD 30 MIN: CPT | Performed by: PHYSICIAN ASSISTANT

## 2017-06-27 PROCEDURE — 82550 ASSAY OF CK (CPK): CPT | Performed by: PHYSICIAN ASSISTANT

## 2017-06-27 PROCEDURE — 80048 BASIC METABOLIC PNL TOTAL CA: CPT | Performed by: PHYSICIAN ASSISTANT

## 2017-06-27 PROCEDURE — 36415 COLL VENOUS BLD VENIPUNCTURE: CPT | Performed by: PHYSICIAN ASSISTANT

## 2017-06-27 NOTE — PROGRESS NOTES
SUBJECTIVE:                                                    Cedric Figueroa is a 61 year old male who presents to clinic today for the following health issues:          Hospital Follow-up Visit:    [facility]Nursing [facility]  Date of Admission: 06/20/17  Date of Discharge: 06/23/17  Reason(s) for Admission: acute renal failure            Problems taking medications regularly:  None       Medication changes since discharge: noted in patient's chart       Problems adhering to non-medication therapy:  None  Still noting some fatigue, but feeling some better.   Summary of hospitalization:  [facility] information obtained and reviewed  Diagnostic Tests/Treatments reviewed.  Follow up needed:   Other Healthcare Providers Involved in Patient s Care:         kidney specialist in august  Update since discharge: improved.     Post Discharge Medication Reconciliation: discharge medications reconciled and changed, per note/orders (see AVS).  Plan of care communicated with patient     Coding guidelines for this visit:  Type of Medical   Decision Making Face-to-Face Visit       within 7 Days of discharge Face-to-Face Visit        within 14 days of discharge   Moderate Complexity [id number]   High Complexity [id number]                Problem list and histories reviewed & adjusted, as indicated.  Additional history: as documented        Reviewed and updated as needed this visit by clinical staff  Tobacco  Allergies  Meds       Reviewed and updated as needed this visit by Provider         All other systems negative except as outline above  OBJECTIVE:  Eye exam - right eye normal lid, conjunctiva, cornea, pupil and fundus, left eye normal lid, conjunctiva, cornea, pupil and fundus.  Thyroid not palpable, not enlarged, no nodules detected.  CHEST:chest clear to IPPA, no tachypnea, retractions or cyanosis and S1, S2 normal, no murmur, no gallop, rate regular.  The abdomen is soft without tenderness,  guarding, mass, rebound or organomegaly. Bowel sounds are normal. No CVA tenderness or inguinal adenopathy noted.  No edema  Cedric was seen today for hospital f/u.    Diagnoses and all orders for this visit:    Hypertension goal BP (blood pressure) < 130/80    Type 2 diabetes mellitus with microalbuminuria, with long-term current use of insulin (H)    Acute renal failure, unspecified acute renal failure type (H)  -     Basic metabolic panel  (Ca, Cl, CO2, Creat, Gluc, K, Na, BUN)  -     CK total      Remain off of metformin and glipizide and prinzide for now. F/u with nephrology per referral.   May consider restarting low dose ace-i and lower dose metformin as/if creatinine returns to normal.

## 2017-06-27 NOTE — MR AVS SNAPSHOT
After Visit Summary   6/27/2017    Cedric Figueroa    MRN: [id number]           Patient Information     Date Of Birth          1955        Visit Information        Provider Department      6/27/2017 10:20 AM Jeffery Laguerre PA-C [facility]        Today's Diagnoses     Type 2 diabetes mellitus with microalbuminuria, with long-term current use of insulin (H)    -  1    Acute renal failure, unspecified acute renal failure type (H)        Benign essential hypertension           Follow-ups after your visit        Your next 10 appointments already scheduled     Aug 07, 2017  2:30 PM CDT   SHORT with Molly Leiva MD   [facility]    [address] [address] [phone]              Who to contact     Normal or non-critical lab and imaging results will be communicated to you by [employer], letter or phone within 4 business days after the clinic has received the results. If you do not hear from us within 7 days, please contact the clinic through MyChart or phone. If you have a critical or abnormal lab result, we will notify you by phone as soon as possible.  Submit refill requests through [employer] or call your pharmacy and they will forward the refill request to us. Please allow 3 business days for your refill to be completed.          If you need to speak with a  for additional information , please call: [phone]             Additional Information About Your Visit        [employer] Information     [employer] gives you secure access to your electronic health record. If you see a primary care provider, you can also send messages to your care team and make appointments. If you have questions, please call your primary care clinic.  If you do not have a primary care provider, please call [phone] and they will assist you.        Care EveryWhere ID     This is your Care EveryWhere ID. This could be used by other  organizations to access your [place] medical records  [id number]        Your Vitals Were     Pulse Temperature Respirations Pulse Oximetry BMI (Body Mass Index)       75 98  F (36.7  C) (Tympanic) 18 96% 31.71 kg/m2        Blood Pressure from Last 3 Encounters:   06/27/17 130/69   06/16/17 108/61   05/08/17 132/71    Weight from Last 3 Encounters:   06/27/17 247 lb (112 kg)   06/16/17 239 lb 6.4 oz (108.6 kg)   05/08/17 255 lb (115.7 kg)              We Performed the Following     Basic metabolic panel  (Ca, Cl, CO2, Creat, Gluc, K, Na, BUN)     CK total          Today's Medication Changes          These changes are accurate as of: 6/27/17 11:02 AM.  If you have any questions, ask your nurse or doctor.               Stop taking these medicines if you haven't already. Please contact your care team if you have questions.     albuterol 108 (90 BASE) MCG/ACT Inhaler   Commonly known as:  PROAIR HFA/PROVENTIL HFA/VENTOLIN HFA   Stopped by:  Jeffery Laguerre PA-C           cyclobenzaprine 10 MG tablet   Commonly known as:  FLEXERIL   Stopped by:  Jeffery Laguerre PA-C           glipiZIDE 5 MG tablet   Commonly known as:  GLUCOTROL   Stopped by:  Jeffery Laguerre PA-C           lisinopril-hydrochlorothiazide 20-12.5 MG per tablet   Commonly known as:  PRINZIDE/ZESTORETIC   Stopped by:  Jeffery Laguerre PA-C           metFORMIN 1000 MG tablet   Commonly known as:  GLUCOPHAGE   Stopped by:  Jeffery Laguerre PA-C                    Primary Care Provider Office Phone # Fax #    Molly Leiva [phone]       [facility] [place] [address] [place] [address]        Equal Access to Services     SOPHIA BRANDT : Abelardo sainio Sosean, waaxda luqadaha, qaybta kaalmada adefabien, khushbu rivera. [facility] [phone].    ATENCIÓN: Si habla español, tiene a kim disposición servicios gratuitos de asistencia lingüística. Llame al [phone].    We comply  with applicable federal civil rights laws and [place] laws. We do not discriminate on the basis of race, color, national origin, age, disability sex, sexual orientation or gender identity.            Thank you!     Thank you for choosing [facility]  for your care. Our goal is always to provide you with excellent care. Hearing back from our patients is one way we can continue to improve our services. Please take a few minutes to complete the written survey that you may receive in the mail after your visit with us. Thank you!             Your Updated Medication List - Protect others around you: Learn how to safely use, store and throw away your medicines at www.disposemymeds.org.          This list is accurate as of: 6/27/17 11:02 AM.  Always use your most recent med list.                   Brand Name Dispense Instructions for use Diagnosis    ANIMAL CHEWABLE MULTIVITAMIN PO      Take by mouth 2 times daily        aspirin 81 MG tablet      Take 1 tablet by mouth daily.        blood glucose monitoring lancets     3 Box    Use to test blood sugar 3 times daily or as directed.    Type 2 diabetes, HbA1c goal < 7% (H)       blood glucose monitoring meter device kit     1 kit    Use to test blood sugar 3 times daily or as directed.    Type 2 diabetes, HbA1c goal < 7% (H)       blood glucose monitoring test strip    ACCU-CHEK PELON PLUS    3 Month    Use to test blood sugar 3 times daily or as directed.    Type 2 diabetes, HbA1c goal < 7% (H)       cyanocobalamin 1000 MCG/ML injection    VITAMIN B12    3 mL    INJECT 1ML INTO THE MUSCLE EVERY 3O DAYS    Vitamin B 12 deficiency       diclofenac 1 % Gel topical gel    VOLTAREN    100 g    Apply 4 grams to knees  daily using enclosed dosing card.    Chronic pain of left knee       insulin glargine 100 UNIT/ML injection     3 mL    Inject 40 Units Subcutaneous daily    Type 2 diabetes mellitus with microalbuminuria, with long-term current use of insulin (H)        insulin pen needle 29G X 12.7MM    BD ULTRA-FINE    100 each    Use once daily or as directed.    Type 2 diabetes, HbA1c goal < 7% (H)       MELATONIN PO      Take 10 mg by mouth At Bedtime        omeprazole 40 MG capsule    priLOSEC    30 capsule    Take 1 capsule (40 mg) by mouth daily Take 30-60 minutes before a meal.    Dysphagia, unspecified type       oxyCODONE-acetaminophen 5-325 MG per tablet    PERCOCET    60 tablet    Take 1 tablet by mouth 2 times daily as needed for moderate to severe pain For chronic left knee pain. Max: 2 x day. Should last 1 month    Chronic pain of left knee       PARoxetine 20 MG tablet    PAXIL    90 tablet    Take 1 tablet (20 mg) by mouth every morning    Mild recurrent major depression (H)       polyethylene glycol powder    MIRALAX    510 g    Take 17 g (1 capful) by mouth daily    Constipation, unspecified constipation type       pramipexole 0.25 MG tablet    MIRAPEX    240 tablet    Take 1 tablet (0.25mg) in morning, 2 tablets (0.5mg) in the afternoon, and take 1 tablet before bed    Restless leg syndrome       simvastatin 40 MG tablet    ZOCOR    90 tablet    Take 1 tablet (40 mg) by mouth At Bedtime    Hyperlipidemia LDL goal <100

## 2017-06-29 ENCOUNTER — MYC MEDICAL ADVICE (OUTPATIENT)
Dept: FAMILY MEDICINE | Facility: CLINIC | Age: 62
End: 2017-06-29

## 2017-06-29 ENCOUNTER — TELEPHONE (OUTPATIENT)
Dept: PHARMACY | Facility: CLINIC | Age: 62
End: 2017-06-29

## 2017-06-29 NOTE — TELEPHONE ENCOUNTER
We have been unable to reach this patient for MTM follow-up after several attempts. We will stop reaching out to the patient at this time. Please let us know if we can assist in this patient's care in the future.    Routing to PCP as JAC Douglass, Pharm.D., BCACP  Medication Therapy Management Pharmacist  [phone]

## 2017-06-29 NOTE — TELEPHONE ENCOUNTER
Noted.  Recent hospital f/u with Jeffery.    Will forward to address lab results and hospital med changes.

## 2017-06-29 NOTE — TELEPHONE ENCOUNTER
Noted - will reach out again early July.    Marcia Douglass, Pharm.D., [facility]  Medication Therapy Management Pharmacist  [phone]

## 2017-06-29 NOTE — TELEPHONE ENCOUNTER
Please call and touch base with patient. Recent hospitalization.   Needs a hospital f/u visit.   Ok to use up 2 same day slots tomorrow.

## 2017-06-30 ENCOUNTER — TELEPHONE (OUTPATIENT)
Dept: FAMILY MEDICINE | Facility: CLINIC | Age: 62
End: 2017-06-30

## 2017-06-30 DIAGNOSIS — G89.29 CHRONIC PAIN OF LEFT KNEE: ICD-10-CM

## 2017-06-30 DIAGNOSIS — M25.562 CHRONIC PAIN OF LEFT KNEE: ICD-10-CM

## 2017-06-30 RX ORDER — OXYCODONE AND ACETAMINOPHEN 5; 325 MG/1; MG/1
1 TABLET ORAL 2 TIMES DAILY PRN
Qty: 60 TABLET | Refills: 0 | Status: SHIPPED | OUTPATIENT
Start: 2017-06-30 | End: 2017-08-02

## 2017-06-30 NOTE — TELEPHONE ENCOUNTER
Reason for Call:  Medication or medication refill:    Do you use a [facility]?  Name of the pharmacy and phone number for the current request:  [facility] [phone]    Name of the medication requested: oxyCODONE-acetaminophen (PERCOCET) 5-325 MG per tablet    Other request:     Can we leave a detailed message on this number? YES    Phone number patient can be reached at: Home number on file [phone] (home) or Cell number on file:    Telephone Information:   Mobile [phone]       Best Time: anytime    Call taken on 6/30/2017 at 11:24 AM by Enedina Mcdaniels

## 2017-07-03 ENCOUNTER — TELEPHONE (OUTPATIENT)
Dept: FAMILY MEDICINE | Facility: CLINIC | Age: 62
End: 2017-07-03

## 2017-07-03 NOTE — TELEPHONE ENCOUNTER
Patient had to take some extra pain meds due to knee pain he was having. He would like to pick his pain med up from the pharmacy today as he is out. Please call to notify. Ok to leave a message.

## 2017-07-03 NOTE — TELEPHONE ENCOUNTER
Noted.   Narcotic contact on file. Agreed to monthly prescriptions without any early refill requests.   Will keep as is.   In the future if there is need to take more narcotic pain meds than prescribed for any reason, need to inform me then.

## 2017-07-14 NOTE — TELEPHONE ENCOUNTER
Appointment scheduled 7/25/2017.    Marcia Douglass, Pharm.D., [facility]  Medication Therapy Management Pharmacist  [phone]

## 2017-07-25 ENCOUNTER — ALLIED HEALTH/NURSE VISIT (OUTPATIENT)
Dept: PHARMACY | Facility: CLINIC | Age: 62
End: 2017-07-25
Payer: COMMERCIAL

## 2017-07-25 DIAGNOSIS — I10 ESSENTIAL HYPERTENSION: ICD-10-CM

## 2017-07-25 DIAGNOSIS — G47.00 INSOMNIA, UNSPECIFIED TYPE: ICD-10-CM

## 2017-07-25 DIAGNOSIS — M17.0 PRIMARY OSTEOARTHRITIS OF BOTH KNEES: ICD-10-CM

## 2017-07-25 DIAGNOSIS — E11.29 TYPE 2 DIABETES MELLITUS WITH MICROALBUMINURIA, WITH LONG-TERM CURRENT USE OF INSULIN (H): Primary | ICD-10-CM

## 2017-07-25 DIAGNOSIS — E56.9 VITAMIN DEFICIENCY: ICD-10-CM

## 2017-07-25 DIAGNOSIS — G25.81 RESTLESS LEG SYNDROME: ICD-10-CM

## 2017-07-25 DIAGNOSIS — R80.9 TYPE 2 DIABETES MELLITUS WITH MICROALBUMINURIA, WITH LONG-TERM CURRENT USE OF INSULIN (H): Primary | ICD-10-CM

## 2017-07-25 DIAGNOSIS — F32.0 MILD MAJOR DEPRESSION (H): ICD-10-CM

## 2017-07-25 DIAGNOSIS — Z79.4 TYPE 2 DIABETES MELLITUS WITH MICROALBUMINURIA, WITH LONG-TERM CURRENT USE OF INSULIN (H): Primary | ICD-10-CM

## 2017-07-25 PROCEDURE — 99607 MTMS BY PHARM ADDL 15 MIN: CPT | Mod: U4 | Performed by: PHARMACIST

## 2017-07-25 PROCEDURE — 99605 MTMS BY PHARM NP 15 MIN: CPT | Mod: U4 | Performed by: PHARMACIST

## 2017-07-25 NOTE — MR AVS SNAPSHOT
After Visit Summary   7/25/2017    Cedric Figueroa    MRN: [id number]           Patient Information     Date Of Birth          1955        Visit Information        Provider Department      7/25/2017 3:30 PM Marcia Douglass [facility]        Today's Diagnoses     Type 2 diabetes mellitus with microalbuminuria, with long-term current use of insulin (H)    -  1    Primary osteoarthritis of both knees        Restless leg syndrome        Mild major depression (H)        Essential hypertension        Insomnia, unspecified type        Vitamin deficiency          Care Instructions    See MyChart Message          Follow-ups after your visit        Your next 10 appointments already scheduled     Aug 07, 2017  2:30 PM CDT   SHORT with Molly Leiva MD   [facility]    [address] [facility] [address] [phone]              Future tests that were ordered for you today     Open Future Orders        Priority Expected Expires Ordered    Hemoglobin A1c Routine 8/7/2017 7/25/2018 7/25/2017    Basic metabolic panel Routine 8/7/2017 7/25/2018 7/25/2017    Vitamin D Deficiency Routine 8/7/2017 7/25/2018 7/25/2017    Vitamin B12 Routine 8/7/2017 7/25/2018 7/25/2017    CBC with platelets differential Routine 8/7/2017 7/25/2018 7/25/2017    Ferritin Routine 8/7/2017 7/25/2018 7/25/2017            Who to contact     If you have questions or need follow up information about today's clinic visit or your schedule please contact [facility] directly at [phone].  Normal or non-critical lab and imaging results will be communicated to you by MyChart, letter or phone within 4 business days after the clinic has received the results. If you do not hear from us within 7 days, please contact the clinic through MyChart or phone. If you have a critical or abnormal lab result, we will notify you by phone as soon as  possible.  Submit refill requests through [email or web address] or call your pharmacy and they will forward the refill request to us. Please allow 3 business days for your refill to be completed.          Additional Information About Your Visit        MetatomixharYadio Information     [email or web address] gives you secure access to your electronic health record. If you see a primary care provider, you can also send messages to your care team and make appointments. If you have questions, please call your primary care clinic.  If you do not have a primary care provider, please call [phone] and they will assist you.        Care EveryWhere ID     This is your Care EveryWhere ID. This could be used by other organizations to access your [place] medical records  [id number]         Blood Pressure from Last 3 Encounters:   06/27/17 130/69   06/16/17 108/61   05/08/17 132/71    Weight from Last 3 Encounters:   06/27/17 247 lb (112 kg)   06/16/17 239 lb 6.4 oz (108.6 kg)   05/08/17 255 lb (115.7 kg)               Primary Care Provider Office Phone # Fax #    Molly Leiva [phone]       [facility] [place] [address]  [place] [address]        Equal Access to Services     SOPHIA BRANDT : Hadii aad ku hadasho Soomaali, waaxda luqadaha, qaybta kaalmada adeegyada, waxay idiin hayaan carlitoeg joanna lacarson rivera. So [facility] [phone].    ATENCIÓN: Si habla español, tiene a kim disposición servicios gratuitos de asistencia lingüística. Llame al [phone].    We comply with applicable federal civil rights laws and [place] laws. We do not discriminate on the basis of race, color, national origin, age, disability sex, sexual orientation or gender identity.            Thank you!     Thank you for choosing [facility] [place]  for your care. Our goal is always to provide you with excellent care. Hearing back from our patients is one way we can continue to improve our services. Please take a few minutes to complete the  written survey that you may receive in the mail after your visit with us. Thank you!             Your Updated Medication List - Protect others around you: Learn how to safely use, store and throw away your medicines at www.disposemymeds.org.          This list is accurate as of: 7/25/17  5:01 PM.  Always use your most recent med list.                   Brand Name Dispense Instructions for use Diagnosis    ANIMAL CHEWABLE MULTIVITAMIN PO      Take by mouth 2 times daily        aspirin 81 MG tablet      Take 1 tablet by mouth daily.        blood glucose monitoring lancets     3 Box    Use to test blood sugar 3 times daily or as directed.    Type 2 diabetes, HbA1c goal < 7% (H)       blood glucose monitoring meter device kit     1 kit    Use to test blood sugar 3 times daily or as directed.    Type 2 diabetes, HbA1c goal < 7% (H)       blood glucose monitoring test strip    ACCU-CHEK PELON PLUS    3 Month    Use to test blood sugar 3 times daily or as directed.    Type 2 diabetes, HbA1c goal < 7% (H)       cyanocobalamin 1000 MCG/ML injection    VITAMIN B12    3 mL    INJECT 1ML INTO THE MUSCLE EVERY 3O DAYS    Vitamin B 12 deficiency       diclofenac 1 % Gel topical gel    VOLTAREN    100 g    Apply 4 grams to knees  daily using enclosed dosing card.    Chronic pain of left knee       insulin glargine 100 UNIT/ML injection     3 mL    Inject 40 Units Subcutaneous daily    Type 2 diabetes mellitus with microalbuminuria, with long-term current use of insulin (H)       insulin pen needle 29G X 12.7MM    BD ULTRA-FINE    100 each    Use once daily or as directed.    Type 2 diabetes, HbA1c goal < 7% (H)       MELATONIN PO      Take 10 mg by mouth At Bedtime        oxyCODONE-acetaminophen 5-325 MG per tablet    PERCOCET    60 tablet    Take 1 tablet by mouth 2 times daily as needed for moderate to severe pain For chronic left knee pain. Max: 2 x day. Should last 1 month    Chronic pain of left knee       PARoxetine 20 MG  tablet    PAXIL    90 tablet    Take 1 tablet (20 mg) by mouth every morning    Mild recurrent major depression (H)       pramipexole 0.25 MG tablet    MIRAPEX    240 tablet    Take 1 tablet (0.25mg) in morning, 2 tablets (0.5mg) in the afternoon, and take 1 tablet before bed    Restless leg syndrome       simvastatin 40 MG tablet    ZOCOR    90 tablet    Take 1 tablet (40 mg) by mouth At Bedtime    Hyperlipidemia LDL goal <100

## 2017-07-25 NOTE — PROGRESS NOTES
SUBJECTIVE/OBJECTIVE:                           Cedric Figueroa is a 61 year old male called for an initial visit for Medication Therapy Management.  He was referred to me from his insurance with [employer].       Chief Complaint: Med Review. Hospitalized in June for CONSTANTINE.    Allergies/ADRs: None  Tobacco: No tobacco use   Alcohol: none  Caffeine: 2 cups/day of coffee  PMH: Reviewed in Epic    Medication Adherence: no issues reported    Diabetes:  Pt currently taking Basaglar 38units QPM. Metformin and glipizide on hold since hospitalization d/t CONSTANTINE, labs at hospital follow-up visit had not yet returned to normal. He admits he has taken metformin on 2 occasions when he had a high carb meal.  SMBG Ranges (patient reported): FBG 70-80's; 160-180mg/dL later in the day.  Symptoms of low blood sugar? None.   Symptoms of high blood sugar? None.  Eye exam: up to date  Foot exam: up to date  ACEi/ARB: No. On hold.  Microalbumin is < 30 mg/g.   Aspirin: Taking 81mg daily and denies side effects  Statin: Yes: simvastatin 40mg QHS and denies side effects  Diet/Exercise: Has cut out pop - just sticking to water and coffee.  Breakfast: Eggs  Lunch: Leftovers - chicken today  Dinner: varies - protein    Hypertension: Current medications include none - lisinopril/HCTZ stopped inpatient.  Patient does self-monitor BP. Home BP monitoring in range of 140-150's systolic over 70's diastolic.    Knee pain:  Current medications include: Percocet 5/325mg 2 tabs QHS, Voltaren gel PRN and feels it is helpful when used. How is your pain? Comfortably manageable.  Are you able to do your normal activities? Can do most things, but pain gets in the way some. Notes some mornings he is in more pain and wishes he could take a percocet at that time. Are you able to sleep? Normal sleep. Patient reports the following side effects:  Denies Side Effects.     RLS: Takes pramipexole 0.25mg TID (and often skips AM dose). He feels this works well for  him, even on days he takes only 2 tablets. He had his ferritin level checked earlier this year and it was low - he started on ferrous sulfate 325mg BID, but stopped at some point. Isn't sure if he should be taking it?    Insomnia: Current medications include: melatonin 10mg QHS. Pt reports trouble falling asleep. He feels the melatonin works really well for him.    Depression:  Current medications include: Paroxetine 20mg once daily. Pt reports that depression symptoms are stable.  PHQ-9 SCORE 12/28/2015 8/3/2016 2/7/2017   Total Score - - -   Total Score 4 2 4       Supplements: Hx of Neda-en-Y gastric bypass about 15 yrs ago. Continues on chewable multivitamin with iron without problems, monthly B12 injection. He is not taking any other supplements at this time.        Current labs include:  BP Readings from Last 3 Encounters:   06/27/17 130/69   06/16/17 108/61   05/08/17 132/71     Today's Vitals: There were no vitals taken for this visit.  Phone Visit.    Lab Results   Component Value Date    A1C 6.9 05/08/2017    A1C 7.5 02/20/2017    A1C 7.7 11/18/2016    A1C 9.7 05/23/2016    A1C 7.4 03/24/2015       Lab Results   Component Value Date    CHOL 149 05/23/2017     Lab Results   Component Value Date    TRIG 143 05/23/2017     Lab Results   Component Value Date    HDL 50 05/23/2017     Lab Results   Component Value Date    LDL 70 05/23/2017       Liver Function Studies -   Recent Labs   Lab Test 06/20/17 06/16/17   1552  07/14/10   PROTTOTAL   --   7.7   < >  6.8   ALBUMIN   --   3.7   < >  4.1   BILITOTAL   --   0.3   < >  0.6   ALKPHOS   --   121   < >  178   AST  23  24   < >  33   ALT  26  35   < >  36   BILIDIRECT   --    --    --   0.3    < > = values in this interval not displayed.       Lab Results   Component Value Date    UCRR 106 05/08/2017    MICROL 23 05/08/2017    UMALCR 21.79 (H) 05/08/2017     Vitamin D Deficiency Screening Results:  Lab Results   Component Value Date    VITDT 23 (L) 03/24/2015          CBC RESULTS:   Recent Labs   Lab Test  06/16/17   1552   WBC  12.1*   RBC  3.99*   HGB  12.6*   HCT  38.7*   MCV  97   MCH  31.6   MCHC  32.6   RDW  12.1   PLT  259         Last Basic Metabolic Panel:  Lab Results   Component Value Date     06/27/2017      Lab Results   Component Value Date    POTASSIUM 4.7 06/27/2017     Lab Results   Component Value Date    CHLORIDE 110 06/27/2017     Lab Results   Component Value Date    BUN 31 06/27/2017     Lab Results   Component Value Date    CR 2.14 06/27/2017     GFR Estimate   Date Value Ref Range Status   06/27/2017 32 (L) >60 mL/min/1.7m2 Final     Comment:     Non  GFR Calc   06/20/2017 21 >60 ml/min/1.73m2 Final   06/19/2017 23 (L) >60 mL/min/1.7m2 Final     Comment:     Non  GFR Calc     GFR Estimate If Black   Date Value Ref Range Status   06/27/2017 38 (L) >60 mL/min/1.7m2 Final     Comment:      GFR Calc   06/20/2017 26 >60 ml/min/1.73m2 Final   06/19/2017 28 (L) >60 mL/min/1.7m2 Final     Comment:      GFR Calc     TSH   Date Value Ref Range Status   06/16/2017 1.86 0.40 - 4.00 mU/L Final   ]    Most Recent Immunizations   Administered Date(s) Administered     Influenza (H1N1) 12/15/2009     Influenza (IIV3) 11/22/2012     Influenza Vaccine IM 3yrs+ 4 Valent IIV4 09/06/2016     Pneumococcal (PCV 13) 04/27/2017     Pneumococcal 23 valent 05/07/2008     TD (ADULT, 7+) 02/01/2002     TDAP Vaccine (Adacel) 02/25/2009     Zoster vaccine, live 04/27/2017       ASSESSMENT:                             Current medications were reviewed today.     Medication Adherence: no issues identified    Diabetes: Needs further monitoring. Patient is meeting A1c goal of < 7% - due for updated lab in next 2 weeks. Self monitoring of blood glucose is at goal of fasting  mg/dL and post prandial < 180 mg/dL per patient report. Needs updated BMP/SCr to see if kidney function has returned to normal and if  metformin and/or glipizide safe to restart. Given recent SMBG, if oral meds are restarted, he may need to reduce dose of insulin.     Hypertension: Needs further monitoring. Patient is meeting BP goal of < 140/90mmHg at most recent clinic visit, but home readings have been above goal. As above, if kidney function has returned to WNL, could restart lisinopril -- continue off of HCTZ at this time.     Knee Pain: Needs improvement. He may benefit from using the Voltaren gel more often in the AM's when wakes up with more pain/stiffness.    RLS: Needs further monitoring. Symptoms appear well controlled at this time, he is not needing full dose of pramipexole. However, he may benefit from restarting ferrous sulfate d/t low ferritin level and Hgb earlier this year and recheck of labs.    Insomnia: Stable.     Depression: Stable.    Supplements: Needs further monitoring. Generally after Neda-en-Y Vitamin D, B12, Iron (as above), and calcium citrate should be continued to avoid deficiency and labs should be monitored every 6-12 months. He would benefit from further lab monitoring to check current status. Most recent B12 level was WNL, Vitamin D was low.    PLAN:                            1. Future A1c, BMP ordered. May consider restarting oral diabetes meds and ACE-i if kidney function back to normal. May need to reduce insulin to prevent hypoglycemia.  2. Pt will use Voltaren gel when needed in the AM.  3. Future CBC, ferritin, B12, Vitamin D ordered. Patient to restart ferrous sulfate 325mg BID. May need to consider calcium citrate+Vitamin D supplement and B12 supplement depending on labs.      I spent 60 minutes with this patient today.  All changes were made via collaborative practice agreement with Molly Leiva. A copy of the visit note was provided to the patient's primary care provider.    Will follow up in 6 months, sooner if needed. He is seeing PCP in 2 weeks.    The patient was sent via [employer] a summary of  these recommendations as an after visit summary.     Marcia Douglass, Pharm.D., [facility]  Medication Therapy Management Pharmacist  [phone]

## 2017-08-02 ENCOUNTER — TELEPHONE (OUTPATIENT)
Dept: FAMILY MEDICINE | Facility: CLINIC | Age: 62
End: 2017-08-02

## 2017-08-02 DIAGNOSIS — G89.29 CHRONIC PAIN OF LEFT KNEE: ICD-10-CM

## 2017-08-02 DIAGNOSIS — M25.562 CHRONIC PAIN OF LEFT KNEE: ICD-10-CM

## 2017-08-02 RX ORDER — OXYCODONE AND ACETAMINOPHEN 5; 325 MG/1; MG/1
1 TABLET ORAL 2 TIMES DAILY PRN
Qty: 60 TABLET | Refills: 0 | Status: SHIPPED | OUTPATIENT
Start: 2017-08-02 | End: 2017-08-29

## 2017-08-02 NOTE — TELEPHONE ENCOUNTER
Patient calling to request a refill on his percocet would like to  script to fill on Friday as pharmacy is closed on Saturday and Sunday and will be out of meds on Saturday.

## 2017-08-02 NOTE — TELEPHONE ENCOUNTER
Routing refill request to provider for review/approval because:  Drug not on the [facility] refill protocol.  Last filled 6/30/17, LOV 6/27.  Naty Kumar RN

## 2017-08-07 ENCOUNTER — OFFICE VISIT (OUTPATIENT)
Dept: FAMILY MEDICINE | Facility: CLINIC | Age: 62
End: 2017-08-07
Payer: COMMERCIAL

## 2017-08-07 VITALS
SYSTOLIC BLOOD PRESSURE: 154 MMHG | HEIGHT: 74 IN | TEMPERATURE: 98.8 F | DIASTOLIC BLOOD PRESSURE: 74 MMHG | WEIGHT: 251 LBS | OXYGEN SATURATION: 96 % | HEART RATE: 88 BPM | BODY MASS INDEX: 32.21 KG/M2

## 2017-08-07 DIAGNOSIS — I10 HYPERTENSION GOAL BP (BLOOD PRESSURE) < 140/90: ICD-10-CM

## 2017-08-07 DIAGNOSIS — N17.9 ACUTE RENAL FAILURE, UNSPECIFIED ACUTE RENAL FAILURE TYPE (H): ICD-10-CM

## 2017-08-07 DIAGNOSIS — F17.290 CIGAR SMOKER MOTIVATED TO QUIT: ICD-10-CM

## 2017-08-07 DIAGNOSIS — D50.9 IRON DEFICIENCY ANEMIA, UNSPECIFIED IRON DEFICIENCY ANEMIA TYPE: ICD-10-CM

## 2017-08-07 DIAGNOSIS — R80.9 TYPE 2 DIABETES MELLITUS WITH MICROALBUMINURIA, WITH LONG-TERM CURRENT USE OF INSULIN (H): Primary | ICD-10-CM

## 2017-08-07 DIAGNOSIS — E11.29 TYPE 2 DIABETES MELLITUS WITH MICROALBUMINURIA, WITH LONG-TERM CURRENT USE OF INSULIN (H): Primary | ICD-10-CM

## 2017-08-07 DIAGNOSIS — Z98.84 S/P GASTRIC BYPASS: ICD-10-CM

## 2017-08-07 DIAGNOSIS — E53.8 VITAMIN B 12 DEFICIENCY: ICD-10-CM

## 2017-08-07 DIAGNOSIS — Z79.4 TYPE 2 DIABETES MELLITUS WITH MICROALBUMINURIA, WITH LONG-TERM CURRENT USE OF INSULIN (H): Primary | ICD-10-CM

## 2017-08-07 LAB
ANION GAP SERPL CALCULATED.3IONS-SCNC: 7 MMOL/L (ref 3–14)
BASOPHILS # BLD AUTO: 0.1 10E9/L (ref 0–0.2)
BASOPHILS NFR BLD AUTO: 0.8 %
BUN SERPL-MCNC: 30 MG/DL (ref 7–30)
CALCIUM SERPL-MCNC: 8.5 MG/DL (ref 8.5–10.1)
CHLORIDE SERPL-SCNC: 111 MMOL/L (ref 94–109)
CO2 SERPL-SCNC: 20 MMOL/L (ref 20–32)
CREAT SERPL-MCNC: 1.65 MG/DL (ref 0.66–1.25)
DIFFERENTIAL METHOD BLD: ABNORMAL
EOSINOPHIL # BLD AUTO: 0.6 10E9/L (ref 0–0.7)
EOSINOPHIL NFR BLD AUTO: 7.2 %
ERYTHROCYTE [DISTWIDTH] IN BLOOD BY AUTOMATED COUNT: 12.8 % (ref 10–15)
FERRITIN SERPL-MCNC: 42 NG/ML (ref 26–388)
GFR SERPL CREATININE-BSD FRML MDRD: 43 ML/MIN/1.7M2
GLUCOSE SERPL-MCNC: 134 MG/DL (ref 70–99)
HBA1C MFR BLD: 6.2 % (ref 4.3–6)
HCT VFR BLD AUTO: 33.1 % (ref 40–53)
HGB BLD-MCNC: 11 G/DL (ref 13.3–17.7)
LYMPHOCYTES # BLD AUTO: 1.7 10E9/L (ref 0.8–5.3)
LYMPHOCYTES NFR BLD AUTO: 21.6 %
MCH RBC QN AUTO: 31.9 PG (ref 26.5–33)
MCHC RBC AUTO-ENTMCNC: 33.2 G/DL (ref 31.5–36.5)
MCV RBC AUTO: 96 FL (ref 78–100)
MONOCYTES # BLD AUTO: 0.6 10E9/L (ref 0–1.3)
MONOCYTES NFR BLD AUTO: 7.5 %
NEUTROPHILS # BLD AUTO: 5 10E9/L (ref 1.6–8.3)
NEUTROPHILS NFR BLD AUTO: 62.9 %
PLATELET # BLD AUTO: 186 10E9/L (ref 150–450)
POTASSIUM SERPL-SCNC: 4 MMOL/L (ref 3.4–5.3)
RBC # BLD AUTO: 3.45 10E12/L (ref 4.4–5.9)
SODIUM SERPL-SCNC: 138 MMOL/L (ref 133–144)
VIT B12 SERPL-MCNC: 432 PG/ML (ref 193–986)
WBC # BLD AUTO: 8 10E9/L (ref 4–11)

## 2017-08-07 PROCEDURE — 99207 C FOOT EXAM  NO CHARGE: CPT | Performed by: FAMILY MEDICINE

## 2017-08-07 PROCEDURE — 82306 VITAMIN D 25 HYDROXY: CPT | Performed by: FAMILY MEDICINE

## 2017-08-07 PROCEDURE — 83036 HEMOGLOBIN GLYCOSYLATED A1C: CPT | Performed by: FAMILY MEDICINE

## 2017-08-07 PROCEDURE — 82607 VITAMIN B-12: CPT | Performed by: FAMILY MEDICINE

## 2017-08-07 PROCEDURE — 85025 COMPLETE CBC W/AUTO DIFF WBC: CPT | Performed by: FAMILY MEDICINE

## 2017-08-07 PROCEDURE — 36415 COLL VENOUS BLD VENIPUNCTURE: CPT | Performed by: FAMILY MEDICINE

## 2017-08-07 PROCEDURE — 80048 BASIC METABOLIC PNL TOTAL CA: CPT | Performed by: FAMILY MEDICINE

## 2017-08-07 PROCEDURE — 82728 ASSAY OF FERRITIN: CPT | Performed by: FAMILY MEDICINE

## 2017-08-07 PROCEDURE — 99214 OFFICE O/P EST MOD 30 MIN: CPT | Performed by: FAMILY MEDICINE

## 2017-08-07 RX ORDER — LOSARTAN POTASSIUM 50 MG/1
50 TABLET ORAL DAILY
Qty: 90 TABLET | Refills: 1 | Status: SHIPPED | OUTPATIENT
Start: 2017-08-07 | End: 2017-09-13

## 2017-08-07 NOTE — NURSING NOTE
"Chief Complaint   Patient presents with     Diabetes       Initial /74  Pulse 88  Temp 98.8  F (37.1  C) (Tympanic)  Ht 6' 2\" (1.88 m)  Wt 251 lb (113.9 kg)  SpO2 96%  BMI 32.23 kg/m2 Estimated body mass index is 32.23 kg/(m^2) as calculated from the following:    Height as of this encounter: 6' 2\" (1.88 m).    Weight as of this encounter: 251 lb (113.9 kg).  Medication Reconciliation: complete       Sandrita Zazueta MA      "

## 2017-08-07 NOTE — MR AVS SNAPSHOT
After Visit Summary   8/7/2017    Cedric Figueroa    MRN: [id number]           Patient Information     Date Of Birth          1955        Visit Information        Provider Department      8/7/2017 2:30 PM Molly Leiva MD [facility]        Today's Diagnoses     Type 2 diabetes mellitus with microalbuminuria, with long-term current use of insulin (H)    -  1    Acute renal insufficiency        Hypertension goal BP (blood pressure) < 140/90        Iron deficiency anemia, unspecified iron deficiency anemia type        Vitamin B 12 deficiency        S/P gastric bypass        Tobacco use          Care Instructions    Your target glucose levels are:     Pre-meal :      Post-meal:  < 180    Bedtime:     90 -150              Follow-ups after your visit        Follow-up notes from your care team     Return in about 3 months (around 11/7/2017) for Diabetes follow Up every 3 months.      Who to contact     Normal or non-critical lab and imaging results will be communicated to you by Gertrudehart, letter or phone within 4 business days after the clinic has received the results. If you do not hear from us within 7 days, please contact the clinic through Gertrudehart or phone. If you have a critical or abnormal lab result, we will notify you by phone as soon as possible.  Submit refill requests through [email or web address] or call your pharmacy and they will forward the refill request to us. Please allow 3 business days for your refill to be completed.          If you need to speak with a  for additional information , please call: [phone]             Additional Information About Your Visit        [email or web address] Information     [email or web address] gives you secure access to your electronic health record. If you see a primary care provider, you can also send messages to your care team and make appointments. If you have questions, please call your primary care clinic.  If you do not have a primary care  "provider, please call [phone] and they will assist you.        Care EveryWhere ID     This is your Care EveryWhere ID. This could be used by other organizations to access your [place] medical records  [id number]        Your Vitals Were     Pulse Temperature Height Pulse Oximetry BMI (Body Mass Index)       88 98.8  F (37.1  C) (Tympanic) 6' 2\" (1.88 m) 96% 32.23 kg/m2        Blood Pressure from Last 3 Encounters:   08/07/17 154/74   06/27/17 130/69   06/16/17 108/61    Weight from Last 3 Encounters:   08/07/17 251 lb (113.9 kg)   06/27/17 247 lb (112 kg)   06/16/17 239 lb 6.4 oz (108.6 kg)              We Performed the Following     Basic metabolic panel     CBC with platelets differential     Ferritin     FOOT EXAM     Hemoglobin A1c     JUST IN CASE     Vitamin B12     Vitamin D Deficiency          Today's Medication Changes          These changes are accurate as of: 8/7/17  3:27 PM.  If you have any questions, ask your nurse or doctor.               Start taking these medicines.        Dose/Directions    losartan 50 MG tablet   Commonly known as:  COZAAR   Used for:  Hypertension goal BP (blood pressure) < 140/90   Started by:  Molly Leiva MD        Dose:  50 mg   Take 1 tablet (50 mg) by mouth daily   Quantity:  90 tablet   Refills:  1            Where to get your medicines      These medications were sent to [facility] [address] [address] [address]     Phone:  [phone]     losartan 50 MG tablet                Primary Care Provider Office Phone # Fax #    Molly Leiva [phone]       [facility] [address]  [place] [address]        Equal Access to Services     SOPHIA BRANDT AH: Abelardo Luong, waaxda luqadaha, qaybta kaalmada tam, khushbu rivera. So [facility] [phone].    ATENCIÓN: Si habla español, tiene a kim disposición servicios gratuitos " de asistencia lingüística. Sarah braun [phone].    We comply with applicable federal civil rights laws and [place] laws. We do not discriminate on the basis of race, color, national origin, age, disability sex, sexual orientation or gender identity.            Thank you!     Thank you for choosing [facility]  for your care. Our goal is always to provide you with excellent care. Hearing back from our patients is one way we can continue to improve our services. Please take a few minutes to complete the written survey that you may receive in the mail after your visit with us. Thank you!             Your Updated Medication List - Protect others around you: Learn how to safely use, store and throw away your medicines at www.disposemymeds.org.          This list is accurate as of: 8/7/17  3:27 PM.  Always use your most recent med list.                   Brand Name Dispense Instructions for use Diagnosis    ANIMAL CHEWABLE MULTIVITAMIN PO      Take by mouth 2 times daily        aspirin 81 MG tablet      Take 1 tablet by mouth daily.        blood glucose monitoring lancets     3 Box    Use to test blood sugar 3 times daily or as directed.    Type 2 diabetes, HbA1c goal < 7% (H)       blood glucose monitoring meter device kit     1 kit    Use to test blood sugar 3 times daily or as directed.    Type 2 diabetes, HbA1c goal < 7% (H)       blood glucose monitoring test strip    ACCU-CHEK PELON PLUS    3 Month    Use to test blood sugar 3 times daily or as directed.    Type 2 diabetes, HbA1c goal < 7% (H)       cyanocobalamin 1000 MCG/ML injection    VITAMIN B12    3 mL    INJECT 1ML INTO THE MUSCLE EVERY 3O DAYS    Vitamin B 12 deficiency       diclofenac 1 % Gel topical gel    VOLTAREN    100 g    Apply 4 grams to knees  daily using enclosed dosing card.    Chronic pain of left knee       insulin glargine 100 UNIT/ML injection     3 mL    Inject 40 Units Subcutaneous daily    Type 2 diabetes mellitus with  microalbuminuria, with long-term current use of insulin (H)       insulin pen needle 29G X 12.7MM    BD ULTRA-FINE    100 each    Use once daily or as directed.    Type 2 diabetes, HbA1c goal < 7% (H)       losartan 50 MG tablet    COZAAR    90 tablet    Take 1 tablet (50 mg) by mouth daily    Hypertension goal BP (blood pressure) < 140/90       MELATONIN PO      Take 10 mg by mouth At Bedtime        oxyCODONE-acetaminophen 5-325 MG per tablet    PERCOCET    60 tablet    Take 1 tablet by mouth 2 times daily as needed for moderate to severe pain For chronic left knee pain. Max: 2 x day. Should last 1 month    Chronic pain of left knee       PARoxetine 20 MG tablet    PAXIL    90 tablet    Take 1 tablet (20 mg) by mouth every morning    Mild recurrent major depression (H)       pramipexole 0.25 MG tablet    MIRAPEX    240 tablet    Take 1 tablet (0.25mg) in morning, 2 tablets (0.5mg) in the afternoon, and take 1 tablet before bed    Restless leg syndrome       simvastatin 40 MG tablet    ZOCOR    90 tablet    Take 1 tablet (40 mg) by mouth At Bedtime    Hyperlipidemia LDL goal <100

## 2017-08-07 NOTE — PROGRESS NOTES
SUBJECTIVE:                                                    Cedric Figueroa is a 61 year old male who presents to clinic today for the following health issues:        Diabetes Follow-up      Patient is checking blood sugars: twice daily.    Blood sugar testing frequency justification: Risk of hypoglycemia with medication(s)  Results are as follows:       am -        lunchtime - 180    Diabetic concerns: None     Symptoms of hypoglycemia (low blood sugar): none     Paresthesias (numbness or burning in feet) or sores: No     Date of last diabetic eye exam (annually):  3/31/17      Date of last Urine micro albumin screen, (annually):     Component      Latest Ref Rng & Units 5/8/2017   Creatinine Urine      mg/dL 106   Albumin Urine mg/L      mg/L 23   Albumin Urine mg/g Cr      0 - 17 mg/g Cr 21.79 (H)         Pneumococcal Vaccine:  Yes: 4/27/17    Influenza Vaccine (annually):   Yes: 9/6/17    Tobacco free:  YES    Aspirin use:  Yes: 81 mg     Amount of exercise or physical activity: walking 1-2 miles 3-4x/ week    Problems taking medications regularly: No    Medication side effects: none  Diet: regular (no restrictions)      Recent hospitalization with acute renal failure. Had his Lisinopril-Hydrochlorothiazide, Metformin and Glipizide discontinued.   Has an upcoming appointment with a Nephrologist at the end of the month.   Has been off BP meds, BP now elevated.     BP Readings from Last 3 Encounters:   08/07/17 154/74   06/27/17 130/69   06/16/17 108/61         Problem list and histories reviewed & adjusted, as indicated.  Additional history: as documented    Patient Active Problem List   Diagnosis     Hyperlipidemia LDL goal <100     Mild major depression (H)     Restless leg syndrome     Sleep apnea     Knee pain     Vitamin B 12 deficiency     Advanced directives, counseling/discussion     Iron deficiency anemia     OA (osteoarthritis) of knee     Perforated bowel (H)     Chronic pain     Bilateral  low back pain without sciatica     Type 2 diabetes mellitus with microalbuminuria, with long-term current use of insulin (H)     Benign essential hypertension     Past Surgical History:   Procedure Laterality Date     ABDOMEN SURGERY  2015    peritonitis, bowel perforation, bowel resection     AS TOTAL KNEE ARTHROPLASTY      Right and Left knee x3( 2 partial knee replacement and 1 total Left knee replacement     BARIATRIC SURGERY      at age 45     COLONOSCOPY  2/1/2006    [facility]     COLONOSCOPY WITH CO2 INSUFFLATION N/A 6/7/2016    Procedure: COLONOSCOPY WITH CO2 INSUFFLATION;  Surgeon: Cedric Schneider MD;  Location:  OR       Social History   Substance Use Topics     Smoking status: Former Smoker     Types: Cigarettes     Quit date: 1/1/2012     Smokeless tobacco: Never Used     Alcohol use No     Family History   Problem Relation Age of Onset     DIABETES Mother      DIABETES Paternal Grandmother      Coronary Artery Disease Paternal Grandmother      CEREBROVASCULAR DISEASE Paternal Grandmother          Current Outpatient Prescriptions   Medication Sig Dispense Refill     losartan (COZAAR) 50 MG tablet Take 1 tablet (50 mg) by mouth daily 90 tablet 1     oxyCODONE-acetaminophen (PERCOCET) 5-325 MG per tablet Take 1 tablet by mouth 2 times daily as needed for moderate to severe pain For chronic left knee pain. Max: 2 x day. Should last 1 month 60 tablet 0     insulin glargine (BASAGLAR KWIKPEN) 100 UNIT/ML injection Inject 40 Units Subcutaneous daily 3 mL 11     simvastatin (ZOCOR) 40 MG tablet Take 1 tablet (40 mg) by mouth At Bedtime 90 tablet 3     PARoxetine (PAXIL) 20 MG tablet Take 1 tablet (20 mg) by mouth every morning 90 tablet 3     cyanocobalamin (VITAMIN B12) 1000 MCG/ML injection INJECT 1ML INTO THE MUSCLE EVERY 3O DAYS 3 mL 6     pramipexole (MIRAPEX) 0.25 MG tablet Take 1 tablet (0.25mg) in morning, 2 tablets (0.5mg) in the afternoon, and take 1 tablet before bed 240 tablet 3      "diclofenac (VOLTAREN) 1 % GEL topical gel Apply 4 grams to knees  daily using enclosed dosing card. 100 g 1     MELATONIN PO Take 10 mg by mouth At Bedtime        blood glucose monitoring (ACCU-CHEK PELON PLUS) meter device kit Use to test blood sugar 3 times daily or as directed. 1 kit 0     blood glucose (ACCU-CHEK PELON PLUS) test strip Use to test blood sugar 3 times daily or as directed. 3 Month 3     blood glucose monitoring (SOFTCLIX) lancets Use to test blood sugar 3 times daily or as directed. 3 Box 33     insulin pen needle (BD ULTRA-FINE) 29G X 12.7MM Use once daily or as directed. 100 each prn     Pediatric Multiple Vit-C-FA (ANIMAL CHEWABLE MULTIVITAMIN PO) Take by mouth 2 times daily       aspirin 81 MG tablet Take 1 tablet by mouth daily.       No Known Allergies      Reviewed and updated as needed this visit by clinical staff  Tobacco  Allergies  Meds       Reviewed and updated as needed this visit by Provider         ROS:  Constitutional, HEENT, cardiovascular, pulmonary, gi and gu systems are negative, except as otherwise noted.      OBJECTIVE:   /74  Pulse 88  Temp 98.8  F (37.1  C) (Tympanic)  Ht 6' 2\" (1.88 m)  Wt 251 lb (113.9 kg)  SpO2 96%  BMI 32.23 kg/m2  Body mass index is 32.23 kg/(m^2).  GENERAL: healthy, alert and no distress  NECK: no adenopathy, no asymmetry, masses, or scars and thyroid normal to palpation  RESP: lungs clear to auscultation - no rales, rhonchi or wheezes  CV: regular rate and rhythm, normal S1 S2, no S3 or S4, no murmur, click or rub, no peripheral edema and peripheral pulses strong  Diabetic foot exam: normal DP and PT pulses, no trophic changes or ulcerative lesions, normal sensory exam and normal monofilament exam    Diagnostic Test Results:  Reviewed and discussed with patient prior to discharge.  Results for orders placed or performed in visit on 08/07/17   Hemoglobin A1c   Result Value Ref Range    Hemoglobin A1C 6.2 (H) 4.3 - 6.0 %   CBC with " platelets differential   Result Value Ref Range    WBC 8.0 4.0 - 11.0 10e9/L    RBC Count 3.45 (L) 4.4 - 5.9 10e12/L    Hemoglobin 11.0 (L) 13.3 - 17.7 g/dL    Hematocrit 33.1 (L) 40.0 - 53.0 %    MCV 96 78 - 100 fl    MCH 31.9 26.5 - 33.0 pg    MCHC 33.2 31.5 - 36.5 g/dL    RDW 12.8 10.0 - 15.0 %    Platelet Count 186 150 - 450 10e9/L    Diff Method Automated Method     % Neutrophils 62.9 %    % Lymphocytes 21.6 %    % Monocytes 7.5 %    % Eosinophils 7.2 %    % Basophils 0.8 %    Absolute Neutrophil 5.0 1.6 - 8.3 10e9/L    Absolute Lymphocytes 1.7 0.8 - 5.3 10e9/L    Absolute Monocytes 0.6 0.0 - 1.3 10e9/L    Absolute Eosinophils 0.6 0.0 - 0.7 10e9/L    Absolute Basophils 0.1 0.0 - 0.2 10e9/L         ASSESSMENT/PLAN:     Cedric was seen today for diabetes.    Diagnoses and all orders for this visit:    Type 2 diabetes mellitus with microalbuminuria, with long-term current use of insulin (H), controlled. HgbA1C 6.2 today  -     Hemoglobin A1c  -     FOOT EXAM  -     Continue current management.      Acute renal failure, unspecified acute renal failure type (H)  -     Basic metabolic panel  Follow up with Nephrology    Hypertension goal BP (blood pressure) < 140/90, uncontrolled. Off meds  -     Start: losartan (COZAAR) 50 MG tablet; Take 1 tablet (50 mg) by mouth daily    Iron deficiency anemia, unspecified iron deficiency anemia type  -     Ferritin  -     CBC with platelets differential    Vitamin B 12 deficiency, on B12 shots   -     Vitamin B12    S/P gastric bypass  -     Vitamin D Deficiency    Cigar smoker motivated to quit  Encouraged to set a quit date.   Consider Nicotine replacement at that time      BP recheck in 2 weeks (ancillary visit)    Follow up with me in 3 months, Diabetes follow up with an A1C prior to OV.    Molly Leiva MD  [facility]

## 2017-08-08 LAB — DEPRECATED CALCIDIOL+CALCIFEROL SERPL-MC: 19 UG/L (ref 20–75)

## 2017-08-08 ASSESSMENT — ANXIETY QUESTIONNAIRES
6. BECOMING EASILY ANNOYED OR IRRITABLE: SEVERAL DAYS
5. BEING SO RESTLESS THAT IT IS HARD TO SIT STILL: NOT AT ALL
GAD7 TOTAL SCORE: 3
IF YOU CHECKED OFF ANY PROBLEMS ON THIS QUESTIONNAIRE, HOW DIFFICULT HAVE THESE PROBLEMS MADE IT FOR YOU TO DO YOUR WORK, TAKE CARE OF THINGS AT HOME, OR GET ALONG WITH OTHER PEOPLE: SOMEWHAT DIFFICULT
2. NOT BEING ABLE TO STOP OR CONTROL WORRYING: SEVERAL DAYS
1. FEELING NERVOUS, ANXIOUS, OR ON EDGE: NOT AT ALL
7. FEELING AFRAID AS IF SOMETHING AWFUL MIGHT HAPPEN: NOT AT ALL
3. WORRYING TOO MUCH ABOUT DIFFERENT THINGS: NOT AT ALL

## 2017-08-08 ASSESSMENT — PATIENT HEALTH QUESTIONNAIRE - PHQ9
SUM OF ALL RESPONSES TO PHQ QUESTIONS 1-9: 8
5. POOR APPETITE OR OVEREATING: SEVERAL DAYS

## 2017-08-09 ASSESSMENT — ANXIETY QUESTIONNAIRES: GAD7 TOTAL SCORE: 3

## 2017-08-15 DIAGNOSIS — E55.9 VITAMIN D DEFICIENCY: Primary | ICD-10-CM

## 2017-08-15 RX ORDER — ERGOCALCIFEROL 1.25 MG/1
50000 CAPSULE, LIQUID FILLED ORAL
Qty: 8 CAPSULE | Refills: 0 | Status: SHIPPED | OUTPATIENT
Start: 2017-08-15 | End: 2017-10-04

## 2017-08-29 ENCOUNTER — TELEPHONE (OUTPATIENT)
Dept: FAMILY MEDICINE | Facility: CLINIC | Age: 62
End: 2017-08-29

## 2017-08-29 DIAGNOSIS — G89.29 CHRONIC PAIN OF LEFT KNEE: ICD-10-CM

## 2017-08-29 DIAGNOSIS — M25.562 CHRONIC PAIN OF LEFT KNEE: ICD-10-CM

## 2017-08-29 RX ORDER — OXYCODONE AND ACETAMINOPHEN 5; 325 MG/1; MG/1
1 TABLET ORAL 2 TIMES DAILY PRN
Qty: 60 TABLET | Refills: 0 | Status: SHIPPED | OUTPATIENT
Start: 2017-08-29 | End: 2017-09-13

## 2017-08-29 NOTE — TELEPHONE ENCOUNTER
Patient states that he would like a 31 day refill of his Percocet and it is due on Saturday but he would like to pick it up on Friday.  Please call when ready for .    Thank you.

## 2017-09-10 ENCOUNTER — TRANSFERRED RECORDS (OUTPATIENT)
Dept: HEALTH INFORMATION MANAGEMENT | Facility: CLINIC | Age: 62
End: 2017-09-10

## 2017-09-11 ENCOUNTER — NURSE TRIAGE (OUTPATIENT)
Dept: NURSING | Facility: CLINIC | Age: 62
End: 2017-09-11

## 2017-09-11 ENCOUNTER — TELEPHONE (OUTPATIENT)
Dept: FAMILY MEDICINE | Facility: CLINIC | Age: 62
End: 2017-09-11

## 2017-09-11 ENCOUNTER — TRANSFERRED RECORDS (OUTPATIENT)
Dept: HEALTH INFORMATION MANAGEMENT | Facility: CLINIC | Age: 62
End: 2017-09-11

## 2017-09-11 NOTE — TELEPHONE ENCOUNTER
Reason for Call:  Medication or medication refill:    Do you use a [facility]?  Name of the pharmacy and phone number for the current request:  written script    Name of the medication requested: pain medication for the knee injury    Other request:     Can we leave a detailed message on this number? YES    Phone number patient can be reached at: Cell number on file:    Telephone Information:   Mobile [phone]       Best Time:     Call taken on 9/11/2017 at 10:41 AM by Adrianna Richardson

## 2017-09-11 NOTE — TELEPHONE ENCOUNTER
Cedric had 2  falls this weekend and landed on left knee and is currently on pain medication.  Cedric went to [facility] yesterday and a ct was scheduled and was given   A prescription for more percocet.  Pharmacy cannot fill as he is on a pain management program.  Cedric is requesting MD Leiva to ok medication with pharmacy or to issue a   Prescription for percocet.  Please phone Cedric at [phone].   Cedric states that there is possible fragmentation in knee.

## 2017-09-11 NOTE — TELEPHONE ENCOUNTER
"Call to pt who states he fell on knee 2 times this past weekend , he states knee is swollen up size of grapefruit , states he was in so much pain he went to [facility] they gave him rx for percocet to take 2 q 4 hours , however he cant get it filled because it is not from you and you have a pain contract. He would like a new rx . Explained that his last rx was to last him a month , he states he has to use more due to his situation .\"I need to sleep and be somewhat mobile,I am in a lot of pain and cant do that with just 2 tabs \"   "

## 2017-09-11 NOTE — TELEPHONE ENCOUNTER
Clinic Action Needed:  Yes, callback  [facility] Triage Call  Presenting Problem:  Cedric had 2  falls this weekend and landed on left knee and is currently on pain medication.  Cedric went to [facility] yesterday and a ct was scheduled and was given   A prescription for more percocet.  Pharmacy cannot fill as he is on a pain management program.  Cedric is requesting MD Leiva to ok medication with pharmacy or to issue a   Prescription for percocet.  Please phone Cedric at [phone].   Cedric states that there is possible fragmentation in knee.      Routed to:  RN Pool  Please be sure to close this encounter once this patient's issue/question has been addressed.    Iza Caceres RN/[facility]

## 2017-09-11 NOTE — TELEPHONE ENCOUNTER
Noted.   Do we have records from [facility] about the visit?   Please have them fax records ASAP and will consider refill. Thanks

## 2017-09-13 ENCOUNTER — OFFICE VISIT (OUTPATIENT)
Dept: FAMILY MEDICINE | Facility: CLINIC | Age: 62
End: 2017-09-13
Payer: COMMERCIAL

## 2017-09-13 VITALS
DIASTOLIC BLOOD PRESSURE: 82 MMHG | BODY MASS INDEX: 31.85 KG/M2 | HEIGHT: 74 IN | TEMPERATURE: 97.8 F | SYSTOLIC BLOOD PRESSURE: 140 MMHG | WEIGHT: 248.2 LBS | OXYGEN SATURATION: 97 % | HEART RATE: 92 BPM

## 2017-09-13 DIAGNOSIS — M25.562 PAIN OF LEFT KNEE AFTER INJURY: Primary | ICD-10-CM

## 2017-09-13 DIAGNOSIS — I10 HYPERTENSION GOAL BP (BLOOD PRESSURE) < 140/90: ICD-10-CM

## 2017-09-13 DIAGNOSIS — G89.29 CHRONIC PAIN OF LEFT KNEE: ICD-10-CM

## 2017-09-13 DIAGNOSIS — M25.562 CHRONIC PAIN OF LEFT KNEE: ICD-10-CM

## 2017-09-13 DIAGNOSIS — Z96.652 S/P TOTAL KNEE REPLACEMENT, LEFT: ICD-10-CM

## 2017-09-13 LAB
ANION GAP SERPL CALCULATED.3IONS-SCNC: 8 MMOL/L (ref 3–14)
BUN SERPL-MCNC: 28 MG/DL (ref 7–30)
CALCIUM SERPL-MCNC: 8.8 MG/DL (ref 8.5–10.1)
CHLORIDE SERPL-SCNC: 111 MMOL/L (ref 94–109)
CO2 SERPL-SCNC: 21 MMOL/L (ref 20–32)
CREAT SERPL-MCNC: 1.64 MG/DL (ref 0.66–1.25)
GFR SERPL CREATININE-BSD FRML MDRD: 43 ML/MIN/1.7M2
GLUCOSE SERPL-MCNC: 105 MG/DL (ref 70–99)
POTASSIUM SERPL-SCNC: 4.5 MMOL/L (ref 3.4–5.3)
SODIUM SERPL-SCNC: 140 MMOL/L (ref 133–144)

## 2017-09-13 PROCEDURE — 36415 COLL VENOUS BLD VENIPUNCTURE: CPT | Performed by: FAMILY MEDICINE

## 2017-09-13 PROCEDURE — 99214 OFFICE O/P EST MOD 30 MIN: CPT | Performed by: FAMILY MEDICINE

## 2017-09-13 PROCEDURE — 80048 BASIC METABOLIC PNL TOTAL CA: CPT | Performed by: FAMILY MEDICINE

## 2017-09-13 RX ORDER — LOSARTAN POTASSIUM 100 MG/1
100 TABLET ORAL DAILY
Qty: 90 TABLET | Refills: 3 | Status: SHIPPED | OUTPATIENT
Start: 2017-09-13 | End: 2017-11-08

## 2017-09-13 RX ORDER — OXYCODONE AND ACETAMINOPHEN 5; 325 MG/1; MG/1
1 TABLET ORAL EVERY 6 HOURS PRN
Qty: 120 TABLET | Refills: 0 | Status: SHIPPED | OUTPATIENT
Start: 2017-09-13 | End: 2017-10-17

## 2017-09-13 NOTE — PATIENT INSTRUCTIONS
RICE     Rest an injury, elevate it, and use ice and compression as directed.   RICE stands for rest, ice, compression, and elevation. These can limit pain and swelling after an injury. RICE may be recommended to help treat fractures, sprains, strains, and bruises or bumps.   Home care  The following explain the details of RICE:    Rest. Limit the use of the injured body part. This helps prevent further damage to the body part and gives it time to heal. In some cases, you may need a sling, brace, splint, or cast to help keep the body part still until it has healed.    Ice. Applying ice right after an injury helps relieve pain and swelling. Wrap a bag of ice in a thin towel. Then, place it over the injured area. Do this for 10 to 15 minutes every 3 to 4 hours. Continue for the next 1 to 3 days or until your symptoms improve. Never put ice directly on your skin or ice an area longer than 15 minutes at a time.    Compression. Putting pressure on an injury helps reduce swelling and provides support. Wrap the injured area firmly with an elastic bandage/wrap. Make sure not to wrap the bandage too tightly or you will cut off blood flow to the injured area. If your bandage loosens, rewrap it.    Elevation. Keeping an injury raised above the level of your heart reduces swelling, pain, and throbbing. For instance, if you have a broken leg, it may help to rest your leg on several pillows when sitting or lying down. Try to keep the injured area elevated for at least 2 to 3 hours per day.  Follow-up care  Follow up with your healthcare provider, or as advised.  When to seek medical advice  Call your healthcare provider right away if any of these occur:    Fever of 100.4 F (38 C) or higher, or as directed by your healthcare provider    Increased pain or swelling in the injured body part    Injured body part becomes cold, blue, numb, or tingly    Signs of infection. These include warmth in the skin, redness, drainage, or bad  smell coming from the injured body part.  Date Last Reviewed: 1/18/2016 2000-2017 [employer]. [address] [place] [address], [place] [address]. All rights reserved. This information is not intended as a substitute for professional medical care. Always follow your healthcare professional's instructions.

## 2017-09-13 NOTE — PROGRESS NOTES
SUBJECTIVE:   Cedric Figueroa is a 61 year old male who presents to clinic today for the following health issues:      Joint Pain; Left Knee    Onset: x 4 days ago.     Description:   Location: left knee  Character: constant ache    Intensity: severe    Progression of Symptoms: same    Accompanying Signs & Symptoms:  Other symptoms: swelling    History:   Previous similar pain: YES- pt already had chronic pain in left knee prior to falls over the weekend       Precipitating factors:   Trauma or overuse: YES- fell and landed on left knee 2x within the same day    Alleviating factors:  Improved by: Percocet; but has been taking 4-5/ day since incident but is only prescribed to take 2 tab/ day     Therapies Tried and outcome: Was seen at [facility] 9/10/17.  CT Scan was done x3 days ago through [facility].       Patient has a known history of chronic left knee pain s/p Left TKA  in the past. States that knee gave out and fell on it twice.   Was seen and evaluated by [facility] on 9/10.     Records available for review.     Was given Rx for Oxycodone but did not refill because he has a contract. He is currently taking up 4-5/day to relief the pain.     Has a follow up Ortho appt tomorrow.    Blood pressure is elevated in the clinic today, 171/80.   Patient has a known history of hypertension.   Has been measuring BP at home and is concerned that his BP has been elevated consistently at > 140/90 despite taking Losartan 50 mg daily    Problem list and histories reviewed & adjusted, as indicated.  Additional history: as documented    Patient Active Problem List   Diagnosis     Hyperlipidemia LDL goal <100     Mild major depression (H)     Restless leg syndrome     Sleep apnea     Knee pain     Vitamin B 12 deficiency     Advanced directives, counseling/discussion     Iron deficiency anemia     OA (osteoarthritis) of knee     Perforated bowel (H)     Chronic pain     Bilateral low back pain without  sciatica     Type 2 diabetes mellitus with microalbuminuria, with long-term current use of insulin (H)     Benign essential hypertension     Past Surgical History:   Procedure Laterality Date     ABDOMEN SURGERY  2015    peritonitis, bowel perforation, bowel resection     AS TOTAL KNEE ARTHROPLASTY      Right and Left knee x3( 2 partial knee replacement and 1 total Left knee replacement     BARIATRIC SURGERY      at age 45     COLONOSCOPY  2/1/2006    [facility]     COLONOSCOPY WITH CO2 INSUFFLATION N/A 6/7/2016    Procedure: COLONOSCOPY WITH CO2 INSUFFLATION;  Surgeon: Cedric Schneider MD;  Location:  OR       Social History   Substance Use Topics     Smoking status: Former Smoker     Types: Cigarettes     Quit date: 1/1/2012     Smokeless tobacco: Never Used     Alcohol use No     Family History   Problem Relation Age of Onset     DIABETES Mother      DIABETES Paternal Grandmother      Coronary Artery Disease Paternal Grandmother      CEREBROVASCULAR DISEASE Paternal Grandmother          Current Outpatient Prescriptions   Medication Sig Dispense Refill     losartan (COZAAR) 100 MG tablet Take 1 tablet (100 mg) by mouth daily 90 tablet 3     oxyCODONE-acetaminophen (PERCOCET) 5-325 MG per tablet Take 1 tablet by mouth every 6 hours as needed for moderate to severe pain Max: 4/day 120 tablet 0     vitamin D (ERGOCALCIFEROL) 18169 UNIT capsule Take 1 capsule (50,000 Units) by mouth every 7 days for 8 doses .Then take OTC Vit D 1000 units/day thereafter for maintenance. 8 capsule 0     insulin glargine (BASAGLAR KWIKPEN) 100 UNIT/ML injection Inject 40 Units Subcutaneous daily 3 mL 11     simvastatin (ZOCOR) 40 MG tablet Take 1 tablet (40 mg) by mouth At Bedtime 90 tablet 3     PARoxetine (PAXIL) 20 MG tablet Take 1 tablet (20 mg) by mouth every morning 90 tablet 3     cyanocobalamin (VITAMIN B12) 1000 MCG/ML injection INJECT 1ML INTO THE MUSCLE EVERY 3O DAYS 3 mL 6     pramipexole (MIRAPEX) 0.25 MG  "tablet Take 1 tablet (0.25mg) in morning, 2 tablets (0.5mg) in the afternoon, and take 1 tablet before bed 240 tablet 3     diclofenac (VOLTAREN) 1 % GEL topical gel Apply 4 grams to knees  daily using enclosed dosing card. 100 g 1     MELATONIN PO Take 10 mg by mouth At Bedtime        blood glucose monitoring (ACCU-CHEK PELON PLUS) meter device kit Use to test blood sugar 3 times daily or as directed. 1 kit 0     blood glucose (ACCU-CHEK PELON PLUS) test strip Use to test blood sugar 3 times daily or as directed. 3 Month 3     blood glucose monitoring (SOFTCLIX) lancets Use to test blood sugar 3 times daily or as directed. 3 Box 33     insulin pen needle (BD ULTRA-FINE) 29G X 12.7MM Use once daily or as directed. 100 each prn     Pediatric Multiple Vit-C-FA (ANIMAL CHEWABLE MULTIVITAMIN PO) Take by mouth 2 times daily       aspirin 81 MG tablet Take 1 tablet by mouth daily.       [DISCONTINUED] losartan (COZAAR) 50 MG tablet Take 1 tablet (50 mg) by mouth daily 90 tablet 1     No Known Allergies      Reviewed and updated as needed this visit by clinical staffMeds       Reviewed and updated as needed this visit by Provider         ROS:  Constitutional, HEENT, cardiovascular, pulmonary, gi and gu systems are negative, except as otherwise noted.      OBJECTIVE:   /82  Pulse 92  Temp 97.8  F (36.6  C) (Tympanic)  Ht 6' 2\" (1.88 m)  Wt 248 lb 3.2 oz (112.6 kg)  SpO2 97%  BMI 31.87 kg/m2  Body mass index is 31.87 kg/(m^2).  GENERAL: healthy, alert and no distress  MS: LLE exam shows no overlying skin changes of the left knee;  swelling with effusion. TTP over the anterior knee and medial joint line. Decreased ROM due to painnormal strength and muscle mass, No evidence of joint instability  GAIT: Antalgic with use of an assistant device (    Diagnostic Test Results:  CT knee left w/o contrast done on 9/11/17 reviewed.  Revealed TKA with patella resurfacing  Sequela from prior injury adjacent to the medial " femoral condyle  Moderate-sized knee joint effusion    ASSESSMENT/PLAN:     Cedric was seen today for knee pain.    Diagnoses and all orders for this visit:    Pain of left knee after injury  -     oxyCODONE-acetaminophen (PERCOCET) 5-325 MG per tablet; Take 1 tablet by mouth every 6 hours as needed for moderate to severe pain Max: 4/day  Comment: Will allow early refill this time; reassess quantity after 10/20/2017. Patient still has a week worth of pills left   - Has a follow up appointment with Ortho, tomorrow    Chronic pain of left knee S/P total knee replacement, left  -     oxyCODONE-acetaminophen (PERCOCET) 5-325 MG per tablet; Take 1 tablet by mouth every 6 hours as needed for moderate to severe pain Max: 4/day  Comment: Will allow early refill this time; reassess quantity after 10/20/2017.  - Has a follow up appointment with Ortho, tomorrow    Hypertension goal BP (blood pressure) < 140/90, uncontrolled  -     Increase dose: losartan (COZAAR) 100 MG tablet; Take 1 tablet (100 mg) by mouth daily  -     Basic metabolic panel  (Ca, Cl, CO2, Creat, Gluc, K, Na, BUN)    Follow up in a month BP recheck; in 2 months for Diabetes follow up, sooner if needed    Molly Leiva MD  [facility]

## 2017-09-13 NOTE — MR AVS SNAPSHOT
After Visit Summary   9/13/2017    Cedric Figueroa    MRN: [id number]           Patient Information     Date Of Birth          1955        Visit Information        Provider Department      9/13/2017 2:30 PM Molly Leiva MD [facility]        Today's Diagnoses     Pain of left knee after injury    -  1    Chronic pain of left knee        S/P total knee replacement, left        Hypertension goal BP (blood pressure) < 140/90          Care Instructions      RICE     Rest an injury, elevate it, and use ice and compression as directed.   RICE stands for rest, ice, compression, and elevation. These can limit pain and swelling after an injury. RICE may be recommended to help treat fractures, sprains, strains, and bruises or bumps.   Home care  The following explain the details of RICE:    Rest. Limit the use of the injured body part. This helps prevent further damage to the body part and gives it time to heal. In some cases, you may need a sling, brace, splint, or cast to help keep the body part still until it has healed.    Ice. Applying ice right after an injury helps relieve pain and swelling. Wrap a bag of ice in a thin towel. Then, place it over the injured area. Do this for 10 to 15 minutes every 3 to 4 hours. Continue for the next 1 to 3 days or until your symptoms improve. Never put ice directly on your skin or ice an area longer than 15 minutes at a time.    Compression. Putting pressure on an injury helps reduce swelling and provides support. Wrap the injured area firmly with an elastic bandage/wrap. Make sure not to wrap the bandage too tightly or you will cut off blood flow to the injured area. If your bandage loosens, rewrap it.    Elevation. Keeping an injury raised above the level of your heart reduces swelling, pain, and throbbing. For instance, if you have a broken leg, it may help to rest your leg on several pillows when sitting or lying down. Try to keep the injured  area elevated for at least 2 to 3 hours per day.  Follow-up care  Follow up with your healthcare provider, or as advised.  When to seek medical advice  Call your healthcare provider right away if any of these occur:    Fever of 100.4 F (38 C) or higher, or as directed by your healthcare provider    Increased pain or swelling in the injured body part    Injured body part becomes cold, blue, numb, or tingly    Signs of infection. These include warmth in the skin, redness, drainage, or bad smell coming from the injured body part.  Date Last Reviewed: 1/18/2016 2000-2017 [employer]. [address] [place], [place] [address]. All rights reserved. This information is not intended as a substitute for professional medical care. Always follow your healthcare professional's instructions.                Follow-ups after your visit        Follow-up notes from your care team     Return in about 2 months (around 11/13/2017) for Follow up- Diabetes.      Your next 10 appointments already scheduled     Nov 08, 2017  2:30 PM CST   [facility]    [address] [address]   [phone]            Feb 16, 2018  1:00 PM CST   LAB with [facility] LAB   [facility]    [address] [address]   [phone]           Patient must bring picture ID. Patient should be prepared to give a urine specimen  Please do not eat 10-12 hours before your appointment if you are coming in fasting for labs on lipids, cholesterol, or glucose (sugar). Pregnant women should follow their Care Team instructions. Water with medications is okay. Do not drink coffee or other fluids. If you have concerns about taking  your medications, please ask at office or if scheduling via [employer], send a message by clicking on Secure Messaging, Message Your Care Team.              Who to contact     Normal or  "non-critical lab and imaging results will be communicated to you by MyChart, letter or phone within 4 business days after the clinic has received the results. If you do not hear from us within 7 days, please contact the clinic through [employer] or phone. If you have a critical or abnormal lab result, we will notify you by phone as soon as possible.  Submit refill requests through [employer] or call your pharmacy and they will forward the refill request to us. Please allow 3 business days for your refill to be completed.          If you need to speak with a  for additional information , please call: [phone]             Additional Information About Your Visit        [employer] Information     [employer] gives you secure access to your electronic health record. If you see a primary care provider, you can also send messages to your care team and make appointments. If you have questions, please call your primary care clinic.  If you do not have a primary care provider, please call [phone] and they will assist you.        Care EveryWhere ID     This is your Care EveryWhere ID. This could be used by other organizations to access your [place] medical records  [id number]        Your Vitals Were     Pulse Temperature Height Pulse Oximetry BMI (Body Mass Index)       92 97.8  F (36.6  C) (Tympanic) 6' 2\" (1.88 m) 97% 31.87 kg/m2        Blood Pressure from Last 3 Encounters:   09/13/17 140/82   08/07/17 154/74   06/27/17 130/69    Weight from Last 3 Encounters:   09/13/17 248 lb 3.2 oz (112.6 kg)   08/07/17 251 lb (113.9 kg)   06/27/17 247 lb (112 kg)              We Performed the Following     Basic metabolic panel  (Ca, Cl, CO2, Creat, Gluc, K, Na, BUN)          Today's Medication Changes          These changes are accurate as of: 9/13/17  3:09 PM.  If you have any questions, ask your nurse or doctor.               These medicines have changed or have updated prescriptions.        Dose/Directions    " losartan 100 MG tablet   Commonly known as:  COZAAR   This may have changed:    - medication strength  - how much to take   Used for:  Hypertension goal BP (blood pressure) < 140/90        Dose:  100 mg   Take 1 tablet (100 mg) by mouth daily   Quantity:  90 tablet   Refills:  3       oxyCODONE-acetaminophen 5-325 MG per tablet   Commonly known as:  PERCOCET   This may have changed:    - when to take this  - additional instructions   Used for:  Chronic pain of left knee        Dose:  1 tablet   Take 1 tablet by mouth every 6 hours as needed for moderate to severe pain Max: 4/day   Quantity:  120 tablet   Refills:  0            Where to get your medicines      These medications were sent to [facility] - [address]  [address] [facility] [address]     Phone:  [phone]     losartan 100 MG tablet         Some of these will need a paper prescription and others can be bought over the counter.  Ask your nurse if you have questions.     Bring a paper prescription for each of these medications     oxyCODONE-acetaminophen 5-325 MG per tablet                Primary Care Provider Office Phone # Fax #    Molly Leiva [phone] [address]  [place] [address]        Equal Access to Services     ABNER BRANDT AH: Hadii kiran montgomery hadasho Soomaali, waaxda luqadaha, qaybta kaalmada adeegyada, khushbu rivera. So [facility] [phone].    ATENCIÓN: Si habla español, tiene a kim disposición servicios gratuitos de asistencia lingüística. Llame al [phone].    We comply with applicable federal civil rights laws and [place] laws. We do not discriminate on the basis of race, color, national origin, age, disability sex, sexual orientation or gender identity.            Thank you!     Thank you for choosing [facility] [place]  for your care. Our goal is always to provide you with excellent care. Hearing back from our patients is  one way we can continue to improve our services. Please take a few minutes to complete the written survey that you may receive in the mail after your visit with us. Thank you!             Your Updated Medication List - Protect others around you: Learn how to safely use, store and throw away your medicines at www.disposemymeds.org.          This list is accurate as of: 9/13/17  3:09 PM.  Always use your most recent med list.                   Brand Name Dispense Instructions for use Diagnosis    ANIMAL CHEWABLE MULTIVITAMIN PO      Take by mouth 2 times daily        aspirin 81 MG tablet      Take 1 tablet by mouth daily.        blood glucose monitoring lancets     3 Box    Use to test blood sugar 3 times daily or as directed.    Type 2 diabetes, HbA1c goal < 7% (H)       blood glucose monitoring meter device kit     1 kit    Use to test blood sugar 3 times daily or as directed.    Type 2 diabetes, HbA1c goal < 7% (H)       blood glucose monitoring test strip    ACCU-CHEK PELON PLUS    3 Month    Use to test blood sugar 3 times daily or as directed.    Type 2 diabetes, HbA1c goal < 7% (H)       cyanocobalamin 1000 MCG/ML injection    VITAMIN B12    3 mL    INJECT 1ML INTO THE MUSCLE EVERY 3O DAYS    Vitamin B 12 deficiency       diclofenac 1 % Gel topical gel    VOLTAREN    100 g    Apply 4 grams to knees  daily using enclosed dosing card.    Chronic pain of left knee       insulin glargine 100 UNIT/ML injection     3 mL    Inject 40 Units Subcutaneous daily    Type 2 diabetes mellitus with microalbuminuria, with long-term current use of insulin (H)       insulin pen needle 29G X 12.7MM    BD ULTRA-FINE    100 each    Use once daily or as directed.    Type 2 diabetes, HbA1c goal < 7% (H)       losartan 100 MG tablet    COZAAR    90 tablet    Take 1 tablet (100 mg) by mouth daily    Hypertension goal BP (blood pressure) < 140/90       MELATONIN PO      Take 10 mg by mouth At Bedtime        oxyCODONE-acetaminophen 5-325  MG per tablet    PERCOCET    120 tablet    Take 1 tablet by mouth every 6 hours as needed for moderate to severe pain Max: 4/day    Chronic pain of left knee       PARoxetine 20 MG tablet    PAXIL    90 tablet    Take 1 tablet (20 mg) by mouth every morning    Mild recurrent major depression (H)       pramipexole 0.25 MG tablet    MIRAPEX    240 tablet    Take 1 tablet (0.25mg) in morning, 2 tablets (0.5mg) in the afternoon, and take 1 tablet before bed    Restless leg syndrome       simvastatin 40 MG tablet    ZOCOR    90 tablet    Take 1 tablet (40 mg) by mouth At Bedtime    Hyperlipidemia LDL goal <100       vitamin D 59277 UNIT capsule    ERGOCALCIFEROL    8 capsule    Take 1 capsule (50,000 Units) by mouth every 7 days for 8 doses .Then take OTC Vit D 1000 units/day thereafter for maintenance.    Vitamin D deficiency

## 2017-09-13 NOTE — NURSING NOTE
"Chief Complaint   Patient presents with     Knee Pain       Initial /80  Pulse 92  Temp 97.8  F (36.6  C) (Tympanic)  Ht 6' 2\" (1.88 m)  Wt 248 lb 3.2 oz (112.6 kg)  SpO2 97%  BMI 31.87 kg/m2 Estimated body mass index is 31.87 kg/(m^2) as calculated from the following:    Height as of this encounter: 6' 2\" (1.88 m).    Weight as of this encounter: 248 lb 3.2 oz (112.6 kg).  Medication Reconciliation: complete       Sandrita Zazueta MA      "

## 2017-09-14 ENCOUNTER — TRANSFERRED RECORDS (OUTPATIENT)
Dept: HEALTH INFORMATION MANAGEMENT | Facility: CLINIC | Age: 62
End: 2017-09-14

## 2017-10-17 ENCOUNTER — MYC MEDICAL ADVICE (OUTPATIENT)
Dept: FAMILY MEDICINE | Facility: CLINIC | Age: 62
End: 2017-10-17

## 2017-10-17 DIAGNOSIS — M25.562 CHRONIC PAIN OF LEFT KNEE: ICD-10-CM

## 2017-10-17 DIAGNOSIS — G89.29 CHRONIC PAIN OF LEFT KNEE: ICD-10-CM

## 2017-10-17 DIAGNOSIS — M25.562 PAIN OF LEFT KNEE AFTER INJURY: ICD-10-CM

## 2017-10-18 RX ORDER — OXYCODONE AND ACETAMINOPHEN 5; 325 MG/1; MG/1
1 TABLET ORAL EVERY 6 HOURS PRN
Qty: 120 TABLET | Refills: 0 | Status: SHIPPED | OUTPATIENT
Start: 2017-10-18 | End: 2017-11-15

## 2017-10-19 DIAGNOSIS — M25.562 CHRONIC PAIN OF LEFT KNEE: ICD-10-CM

## 2017-10-19 DIAGNOSIS — G89.29 CHRONIC PAIN OF LEFT KNEE: ICD-10-CM

## 2017-10-20 NOTE — TELEPHONE ENCOUNTER
Routing refill request to provider for review/approval because:  Labs out of range:  alex Kumar RN    Next 5 appointments (look out 90 days)     Nov 08, 2017  2:30 PM CST   SHORT with Molly Leiva MD   [facility] [place] [facility]    [address]  [place] [address]   [phone]                   Creatinine   Date Value Ref Range Status   09/13/2017 1.64 (H) 0.66 - 1.25 mg/dL Final     Lab Results   Component Value Date    AST 23 06/20/2017     Lab Results   Component Value Date    ALT 26 06/20/2017     BP Readings from Last 3 Encounters:   09/13/17 140/82   08/07/17 154/74   06/27/17 130/69

## 2017-11-08 ENCOUNTER — OFFICE VISIT (OUTPATIENT)
Dept: FAMILY MEDICINE | Facility: CLINIC | Age: 62
End: 2017-11-08
Payer: COMMERCIAL

## 2017-11-08 VITALS
HEIGHT: 74 IN | HEART RATE: 84 BPM | BODY MASS INDEX: 31.6 KG/M2 | SYSTOLIC BLOOD PRESSURE: 170 MMHG | DIASTOLIC BLOOD PRESSURE: 77 MMHG | TEMPERATURE: 97.7 F | WEIGHT: 246.2 LBS

## 2017-11-08 DIAGNOSIS — N18.30 TYPE 2 DIABETES MELLITUS WITH STAGE 3 CHRONIC KIDNEY DISEASE, WITH LONG-TERM CURRENT USE OF INSULIN (H): Primary | ICD-10-CM

## 2017-11-08 DIAGNOSIS — Z79.4 TYPE 2 DIABETES MELLITUS WITH STAGE 3 CHRONIC KIDNEY DISEASE, WITH LONG-TERM CURRENT USE OF INSULIN (H): Primary | ICD-10-CM

## 2017-11-08 DIAGNOSIS — E11.22 TYPE 2 DIABETES MELLITUS WITH STAGE 3 CHRONIC KIDNEY DISEASE, WITH LONG-TERM CURRENT USE OF INSULIN (H): Primary | ICD-10-CM

## 2017-11-08 DIAGNOSIS — I10 HYPERTENSION GOAL BP (BLOOD PRESSURE) < 140/90: ICD-10-CM

## 2017-11-08 DIAGNOSIS — F11.90 CHRONIC NARCOTIC USE: ICD-10-CM

## 2017-11-08 LAB
AMPHETAMINES UR QL: NOT DETECTED NG/ML
BARBITURATES UR QL SCN: NOT DETECTED NG/ML
BENZODIAZ UR QL SCN: NOT DETECTED NG/ML
BUPRENORPHINE UR QL: NOT DETECTED NG/ML
CANNABINOIDS UR QL: NOT DETECTED NG/ML
COCAINE UR QL SCN: NOT DETECTED NG/ML
CREAT SERPL-MCNC: 1.49 MG/DL (ref 0.66–1.25)
D-METHAMPHET UR QL: NOT DETECTED NG/ML
GFR SERPL CREATININE-BSD FRML MDRD: 48 ML/MIN/1.7M2
HBA1C MFR BLD: 6.1 % (ref 4.3–6)
METHADONE UR QL SCN: NOT DETECTED NG/ML
OPIATES UR QL SCN: NOT DETECTED NG/ML
OXYCODONE UR QL SCN: ABNORMAL NG/ML
PCP UR QL SCN: NOT DETECTED NG/ML
PROPOXYPH UR QL: NOT DETECTED NG/ML
TRICYCLICS UR QL SCN: NOT DETECTED NG/ML

## 2017-11-08 PROCEDURE — 80306 DRUG TEST PRSMV INSTRMNT: CPT | Performed by: FAMILY MEDICINE

## 2017-11-08 PROCEDURE — 83036 HEMOGLOBIN GLYCOSYLATED A1C: CPT | Performed by: FAMILY MEDICINE

## 2017-11-08 PROCEDURE — 36415 COLL VENOUS BLD VENIPUNCTURE: CPT | Performed by: FAMILY MEDICINE

## 2017-11-08 PROCEDURE — 99214 OFFICE O/P EST MOD 30 MIN: CPT | Performed by: FAMILY MEDICINE

## 2017-11-08 PROCEDURE — 82565 ASSAY OF CREATININE: CPT | Performed by: FAMILY MEDICINE

## 2017-11-08 RX ORDER — LOSARTAN POTASSIUM AND HYDROCHLOROTHIAZIDE 25; 100 MG/1; MG/1
1 TABLET ORAL DAILY
Qty: 90 TABLET | Refills: 3 | Status: SHIPPED | OUTPATIENT
Start: 2017-11-08 | End: 2018-09-21

## 2017-11-08 NOTE — MR AVS SNAPSHOT
After Visit Summary   11/8/2017    Cedric Figueroa    MRN: [id number]           Patient Information     Date Of Birth          1955        Visit Information        Provider Department      11/8/2017 2:30 PM Molly Leiva MD [facility]        Today's Diagnoses     Type 2 diabetes mellitus with stage 3 chronic kidney disease, with long-term current use of insulin (H)    -  1    Hypertension goal BP (blood pressure) < 140/90        Chronic narcotic use           Follow-ups after your visit        Follow-up notes from your care team     Return in about 3 months (around 2/8/2018) for Diabetes Follow Up with a HgbA1C prior to visit.      Your next 10 appointments already scheduled     Feb 16, 2018  1:00 PM CST   LAB with BE LAB   [facility] [place] [facility] [place]    [address] [address] [phone]           Please do not eat 10-12 hours before your appointment if you are coming in fasting for labs on lipids, cholesterol, or glucose (sugar). This does not apply to pregnant women. Water, hot tea and black coffee (with nothing added) are okay. Do not drink other fluids, diet soda or chew gum.              Who to contact     Normal or non-critical lab and imaging results will be communicated to you by MyChart, letter or phone within 4 business days after the clinic has received the results. If you do not hear from us within 7 days, please contact the clinic through [employer] or phone. If you have a critical or abnormal lab result, we will notify you by phone as soon as possible.  Submit refill requests through [employer] or call your pharmacy and they will forward the refill request to us. Please allow 3 business days for your refill to be completed.          If you need to speak with a  for additional information , please call: [phone]             Additional Information About Your Visit        MyChart Information     [employer]  "gives you secure access to your electronic health record. If you see a primary care provider, you can also send messages to your care team and make appointments. If you have questions, please call your primary care clinic.  If you do not have a primary care provider, please call [phone] and they will assist you.        Care EveryWhere ID     This is your Care EveryWhere ID. This could be used by other organizations to access your [place] medical records  [id number]        Your Vitals Were     Pulse Temperature Height BMI (Body Mass Index)          84 97.7  F (36.5  C) (Tympanic) 6' 2\" (1.88 m) 31.61 kg/m2         Blood Pressure from Last 3 Encounters:   11/08/17 170/77   09/13/17 140/82   08/07/17 154/74    Weight from Last 3 Encounters:   11/08/17 246 lb 3.2 oz (111.7 kg)   09/13/17 248 lb 3.2 oz (112.6 kg)   08/07/17 251 lb (113.9 kg)              We Performed the Following     Creatinine     Drug Abuse Screen Panel 13, Urine (Pain Care Package)     Hemoglobin A1c     JUST IN CASE          Today's Medication Changes          These changes are accurate as of: 11/8/17  3:07 PM.  If you have any questions, ask your nurse or doctor.               Start taking these medicines.        Dose/Directions    losartan-hydrochlorothiazide 100-25 MG per tablet   Commonly known as:  HYZAAR   Used for:  Hypertension goal BP (blood pressure) < 140/90   Started by:  Molly Leiva MD        Dose:  1 tablet   Take 1 tablet by mouth daily   Quantity:  90 tablet   Refills:  3         Stop taking these medicines if you haven't already. Please contact your care team if you have questions.     losartan 100 MG tablet   Commonly known as:  COZAAR   Stopped by:  Molly Leiva MD                Where to get your medicines      These medications were sent to [facility] - [address]  [address] [place] [address]     Phone:  [phone]     losartan-hydrochlorothiazide 100-25 " MG per tablet                Primary Care Provider Office Phone # Fax #    Molly Leiva [phone]       [address]  [place] [address]        Equal Access to Services     SOPHIA BRANDT : Hadii kiran ku hadsandrao Sosowmyaali, waaxda luqadaha, qaybta kaalmada adesarahyada, khushbu marshall laMineravedison rivera. So [facility] [phone].    ATENCIÓN: Si habla español, tiene a kim disposición servicios gratuitos de asistencia lingüística. Llame al [phone].    We comply with applicable federal civil rights laws and [place] laws. We do not discriminate on the basis of race, color, national origin, age, disability, sex, sexual orientation, or gender identity.            Thank you!     Thank you for choosing [facility]  for your care. Our goal is always to provide you with excellent care. Hearing back from our patients is one way we can continue to improve our services. Please take a few minutes to complete the written survey that you may receive in the mail after your visit with us. Thank you!             Your Updated Medication List - Protect others around you: Learn how to safely use, store and throw away your medicines at www.disposemymeds.org.          This list is accurate as of: 11/8/17  3:07 PM.  Always use your most recent med list.                   Brand Name Dispense Instructions for use Diagnosis    ANIMAL CHEWABLE MULTIVITAMIN PO      Take by mouth 2 times daily        aspirin 81 MG tablet      Take 1 tablet by mouth daily.        BASAGLAR 100 UNIT/ML injection     3 mL    Inject 40 Units Subcutaneous daily    Type 2 diabetes mellitus with microalbuminuria, with long-term current use of insulin (H)       blood glucose monitoring lancets     3 Box    Use to test blood sugar 3 times daily or as directed.    Type 2 diabetes, HbA1c goal < 7% (H)       blood glucose monitoring meter device kit     1 kit    Use to test blood sugar 3 times daily or as directed.    Type 2  diabetes, HbA1c goal < 7% (H)       blood glucose monitoring test strip    ACCU-CHEK PELON PLUS    3 Month    Use to test blood sugar 3 times daily or as directed.    Type 2 diabetes, HbA1c goal < 7% (H)       cyanocobalamin 1000 MCG/ML injection    VITAMIN B12    3 mL    INJECT 1ML INTO THE MUSCLE EVERY 3O DAYS    Vitamin B 12 deficiency       diclofenac 1 % Gel topical gel    VOLTAREN    100 g    APPLY 4 GRAMS TO KNEES DAILY USING THE ENCLOSED DOSING CARD.    Chronic pain of left knee       insulin pen needle 29G X 12.7MM    BD ULTRA-FINE    100 each    Use once daily or as directed.    Type 2 diabetes, HbA1c goal < 7% (H)       losartan-hydrochlorothiazide 100-25 MG per tablet    HYZAAR    90 tablet    Take 1 tablet by mouth daily    Hypertension goal BP (blood pressure) < 140/90       MELATONIN PO      Take 10 mg by mouth At Bedtime        oxyCODONE-acetaminophen 5-325 MG per tablet    PERCOCET    120 tablet    Take 1 tablet by mouth every 6 hours as needed for moderate to severe pain Max: 4/day    Chronic pain of left knee, Pain of left knee after injury       PARoxetine 20 MG tablet    PAXIL    90 tablet    Take 1 tablet (20 mg) by mouth every morning    Mild recurrent major depression (H)       pramipexole 0.25 MG tablet    MIRAPEX    240 tablet    Take 1 tablet (0.25mg) in morning, 2 tablets (0.5mg) in the afternoon, and take 1 tablet before bed    Restless leg syndrome       simvastatin 40 MG tablet    ZOCOR    90 tablet    Take 1 tablet (40 mg) by mouth At Bedtime    Hyperlipidemia LDL goal <100

## 2017-11-08 NOTE — PROGRESS NOTES
SUBJECTIVE:   Cedric Figueroa is a 61 year old male who presents to clinic today for the following health issues:      Hypertension Follow-up      Outpatient blood pressures are being checked at home.  Results are 155-165.    Low Salt Diet: low salt      Diabetes Follow-up        Patient is checking blood sugars: not at all    Diabetic concerns: None     Symptoms of hypoglycemia (low blood sugar): none     Paresthesias (numbness or burning in feet) or sores: No     Date of last diabetic eye exam (annually):  3/31/17      Date of last Urine micro albumin screen, (annually):     Component      Latest Ref Rng & Units 5/8/2017   Creatinine Urine      mg/dL 106   Albumin Urine mg/L      mg/L 23   Albumin Urine mg/g Cr      0 - 17 mg/g Cr 21.79 (H)         Pneumococcal Vaccine:  Yes: 5/7/08, 4/27/17    Influenza Vaccine (annually):   Yes: 8/29/17    Tobacco free:  No    Aspirin use:  Yes: 81 mg       Amount of exercise or physical activity: walking 2-3x/ week.  1-2 miles / time    Problems taking medications regularly: No    Medication side effects: none    Diet: regular (no restrictions)        Chronic Kidney disease. Due to follow with nephrology in a couple of months.    Creatinine   Date Value Ref Range Status   11/08/2017 1.49 (H) 0.66 - 1.25 mg/dL Final   ]      Problem list and histories reviewed & adjusted, as indicated.  Additional history: as documented    Patient Active Problem List   Diagnosis     Hyperlipidemia LDL goal <100     Mild major depression (H)     Restless leg syndrome     Sleep apnea     Knee pain     Vitamin B 12 deficiency     Advanced directives, counseling/discussion     Iron deficiency anemia     OA (osteoarthritis) of knee     Perforated bowel (H)     Chronic pain     Bilateral low back pain without sciatica     Type 2 diabetes mellitus with microalbuminuria, with long-term current use of insulin (H)     Hypertension goal BP (blood pressure) < 140/90     Past Surgical History:   Procedure  Laterality Date     ABDOMEN SURGERY  2015    peritonitis, bowel perforation, bowel resection     AS TOTAL KNEE ARTHROPLASTY      Right and Left knee x3( 2 partial knee replacement and 1 total Left knee replacement     BARIATRIC SURGERY      at age 45     COLONOSCOPY  2/1/2006    [facility]     COLONOSCOPY WITH CO2 INSUFFLATION N/A 6/7/2016    Procedure: COLONOSCOPY WITH CO2 INSUFFLATION;  Surgeon: Cedric Schneider MD;  Location:  OR       Social History   Substance Use Topics     Smoking status: Former Smoker     Types: Cigarettes     Quit date: 1/1/2012     Smokeless tobacco: Never Used     Alcohol use No     Family History   Problem Relation Age of Onset     DIABETES Mother      DIABETES Paternal Grandmother      Coronary Artery Disease Paternal Grandmother      CEREBROVASCULAR DISEASE Paternal Grandmother          Current Outpatient Prescriptions   Medication Sig Dispense Refill     losartan-hydrochlorothiazide (HYZAAR) 100-25 MG per tablet Take 1 tablet by mouth daily 90 tablet 3     diclofenac (VOLTAREN) 1 % GEL topical gel APPLY 4 GRAMS TO KNEES DAILY USING THE ENCLOSED DOSING CARD. 100 g 1     oxyCODONE-acetaminophen (PERCOCET) 5-325 MG per tablet Take 1 tablet by mouth every 6 hours as needed for moderate to severe pain Max: 4/day 120 tablet 0     insulin glargine (BASAGLAR KWIKPEN) 100 UNIT/ML injection Inject 40 Units Subcutaneous daily 3 mL 11     simvastatin (ZOCOR) 40 MG tablet Take 1 tablet (40 mg) by mouth At Bedtime 90 tablet 3     PARoxetine (PAXIL) 20 MG tablet Take 1 tablet (20 mg) by mouth every morning 90 tablet 3     cyanocobalamin (VITAMIN B12) 1000 MCG/ML injection INJECT 1ML INTO THE MUSCLE EVERY 3O DAYS 3 mL 6     pramipexole (MIRAPEX) 0.25 MG tablet Take 1 tablet (0.25mg) in morning, 2 tablets (0.5mg) in the afternoon, and take 1 tablet before bed 240 tablet 3     MELATONIN PO Take 10 mg by mouth At Bedtime        blood glucose monitoring (ACCU-CHEK PELON PLUS) meter device  "kit Use to test blood sugar 3 times daily or as directed. 1 kit 0     blood glucose (ACCU-CHEK PELON PLUS) test strip Use to test blood sugar 3 times daily or as directed. 3 Month 3     blood glucose monitoring (SOFTCLIX) lancets Use to test blood sugar 3 times daily or as directed. 3 Box 33     insulin pen needle (BD ULTRA-FINE) 29G X 12.7MM Use once daily or as directed. 100 each prn     Pediatric Multiple Vit-C-FA (ANIMAL CHEWABLE MULTIVITAMIN PO) Take by mouth 2 times daily       aspirin 81 MG tablet Take 1 tablet by mouth daily.       No Known Allergies      Reviewed and updated as needed this visit by clinical staffTobacco  Allergies  Meds       Reviewed and updated as needed this visit by Provider         ROS:  Constitutional, HEENT, cardiovascular, pulmonary, gi and gu systems are negative, except as otherwise noted.      OBJECTIVE:   /77  Pulse 84  Temp 97.7  F (36.5  C) (Tympanic)  Ht 6' 2\" (1.88 m)  Wt 246 lb 3.2 oz (111.7 kg)  BMI 31.61 kg/m2  Body mass index is 31.61 kg/(m^2).  GENERAL: healthy, alert and no distress  RESP: lungs clear to auscultation - no rales, rhonchi or wheezes  CV: regular rate and rhythm, normal S1 S2, no S3 or S4, no murmur, click or rub, no peripheral edema and peripheral pulses strong  Diabetic foot exam: normal DP and PT pulses, no trophic changes or ulcerative lesions, normal sensory exam and normal monofilament exam    Diagnostic Test Results:  Reviewed and discussed with patient prior to discharge.  Results for orders placed or performed in visit on 11/08/17   Hemoglobin A1c   Result Value Ref Range    Hemoglobin A1C 6.1 (H) 4.3 - 6.0 %   Drug Abuse Screen Panel 13, Urine (Pain Care Package)   Result Value Ref Range    Cannabinoids (55-bbr-4-carboxy-9-THC) Not Detected NDET^Not Detected ng/mL    Phencyclidine (Phencyclidine) Not Detected NDET^Not Detected ng/mL    Cocaine (Benzoylecgonine) Not Detected NDET^Not Detected ng/mL    Methamphetamine " (d-Methamphetamine) Not Detected NDET^Not Detected ng/mL    Opiates (Morphine) Not Detected NDET^Not Detected ng/mL    Amphetamine (d-Amphetamine) Not Detected NDET^Not Detected ng/mL    Benzodiazepines (Nordiazepam) Not Detected NDET^Not Detected ng/mL    Tricyclic Antidepressants (Desipramine) Not Detected NDET^Not Detected ng/mL    Methadone (Methadone) Not Detected NDET^Not Detected ng/mL    Barbiturates (Butalbital) Not Detected NDET^Not Detected ng/mL    Oxycodone (Oxycodone) Detected, Abnormal Result (A) NDET^Not Detected ng/mL    Propoxyphene (Norpropoxyphene) Not Detected NDET^Not Detected ng/mL    Buprenorphine (Buprenorphine) Not Detected NDET^Not Detected ng/mL   Creatinine   Result Value Ref Range    Creatinine 1.49 (H) 0.66 - 1.25 mg/dL    GFR Estimate 48 (L) >60 mL/min/1.7m2    GFR Estimate If Black 58 (L) >60 mL/min/1.7m2         ASSESSMENT/PLAN:   Cedric was seen today for hypertension and diabetes.    Diagnoses and all orders for this visit:    Type 2 diabetes mellitus with stage 3 chronic kidney disease, with long-term current use of insulin (H), controlled  -     Hemoglobin A1c  -     Creatinine  Due to follow with Neurology at the beginning of next year    Hypertension goal BP (blood pressure) < 140/90, uncontrolled        -     Discontinue Losartan  -     Start: losartan-hydrochlorothiazide (HYZAAR) 100-25 MG per tablet; Take 1 tablet by mouth daily  -    Weekly BP checks. Patient will communicate BP readings via [employer]    Chronic narcotic use  -     Drug Abuse Screen Panel 13, Urine (Pain Care Package)  [place] reviewed. No concerns.   DIRE score:20      Diabetes follow up in 3 months.         Molly Leiva MD  [facility]

## 2017-11-14 ENCOUNTER — MYC MEDICAL ADVICE (OUTPATIENT)
Dept: FAMILY MEDICINE | Facility: CLINIC | Age: 62
End: 2017-11-14

## 2017-11-14 DIAGNOSIS — M25.562 PAIN OF LEFT KNEE AFTER INJURY: ICD-10-CM

## 2017-11-14 DIAGNOSIS — G89.29 CHRONIC PAIN OF LEFT KNEE: ICD-10-CM

## 2017-11-14 DIAGNOSIS — M25.562 CHRONIC PAIN OF LEFT KNEE: ICD-10-CM

## 2017-11-15 RX ORDER — OXYCODONE AND ACETAMINOPHEN 5; 325 MG/1; MG/1
1 TABLET ORAL EVERY 6 HOURS PRN
Qty: 120 TABLET | Refills: 0 | Status: SHIPPED | OUTPATIENT
Start: 2017-11-15 | End: 2017-12-13

## 2017-12-06 DIAGNOSIS — I10 ESSENTIAL (PRIMARY) HYPERTENSION: ICD-10-CM

## 2017-12-06 DIAGNOSIS — E87.20 METABOLIC ACIDOSIS: ICD-10-CM

## 2017-12-06 DIAGNOSIS — E11.9 DIABETES MELLITUS (H): ICD-10-CM

## 2017-12-06 DIAGNOSIS — N17.9 ACUTE KIDNEY INJURY (H): ICD-10-CM

## 2017-12-06 DIAGNOSIS — N17.9 ACUTE KIDNEY INJURY (H): Primary | ICD-10-CM

## 2017-12-06 LAB
ANION GAP SERPL CALCULATED.3IONS-SCNC: 10 MMOL/L (ref 3–14)
BUN SERPL-MCNC: 35 MG/DL (ref 7–30)
CALCIUM SERPL-MCNC: 8.6 MG/DL (ref 8.5–10.1)
CHLORIDE SERPL-SCNC: 107 MMOL/L (ref 94–109)
CO2 SERPL-SCNC: 21 MMOL/L (ref 20–32)
CREAT SERPL-MCNC: 1.73 MG/DL (ref 0.66–1.25)
GFR SERPL CREATININE-BSD FRML MDRD: 40 ML/MIN/1.7M2
GLUCOSE SERPL-MCNC: 195 MG/DL (ref 70–99)
POTASSIUM SERPL-SCNC: 3.7 MMOL/L (ref 3.4–5.3)
SODIUM SERPL-SCNC: 138 MMOL/L (ref 133–144)

## 2017-12-06 PROCEDURE — 36415 COLL VENOUS BLD VENIPUNCTURE: CPT | Performed by: INTERNAL MEDICINE

## 2017-12-06 PROCEDURE — 80048 BASIC METABOLIC PNL TOTAL CA: CPT | Performed by: INTERNAL MEDICINE

## 2017-12-12 ENCOUNTER — MYC MEDICAL ADVICE (OUTPATIENT)
Dept: FAMILY MEDICINE | Facility: CLINIC | Age: 62
End: 2017-12-12

## 2017-12-12 DIAGNOSIS — M25.562 PAIN OF LEFT KNEE AFTER INJURY: ICD-10-CM

## 2017-12-12 DIAGNOSIS — G89.29 CHRONIC PAIN OF LEFT KNEE: ICD-10-CM

## 2017-12-12 DIAGNOSIS — M25.562 CHRONIC PAIN OF LEFT KNEE: ICD-10-CM

## 2017-12-13 RX ORDER — OXYCODONE AND ACETAMINOPHEN 5; 325 MG/1; MG/1
1 TABLET ORAL EVERY 6 HOURS PRN
Qty: 120 TABLET | Refills: 0 | Status: SHIPPED | OUTPATIENT
Start: 2017-12-13 | End: 2018-01-10

## 2017-12-14 ENCOUNTER — TRANSFERRED RECORDS (OUTPATIENT)
Dept: HEALTH INFORMATION MANAGEMENT | Facility: CLINIC | Age: 62
End: 2017-12-14

## 2018-01-09 ENCOUNTER — MYC MEDICAL ADVICE (OUTPATIENT)
Dept: FAMILY MEDICINE | Facility: CLINIC | Age: 63
End: 2018-01-09

## 2018-01-09 DIAGNOSIS — M25.562 PAIN OF LEFT KNEE AFTER INJURY: ICD-10-CM

## 2018-01-09 DIAGNOSIS — M25.562 CHRONIC PAIN OF LEFT KNEE: ICD-10-CM

## 2018-01-09 DIAGNOSIS — G89.29 CHRONIC PAIN OF LEFT KNEE: ICD-10-CM

## 2018-01-10 RX ORDER — OXYCODONE AND ACETAMINOPHEN 5; 325 MG/1; MG/1
1 TABLET ORAL EVERY 6 HOURS PRN
Qty: 120 TABLET | Refills: 0 | Status: SHIPPED | OUTPATIENT
Start: 2018-01-10 | End: 2018-02-06

## 2018-02-06 ENCOUNTER — MYC MEDICAL ADVICE (OUTPATIENT)
Dept: FAMILY MEDICINE | Facility: CLINIC | Age: 63
End: 2018-02-06

## 2018-02-06 DIAGNOSIS — G89.29 CHRONIC PAIN OF LEFT KNEE: ICD-10-CM

## 2018-02-06 DIAGNOSIS — M25.562 CHRONIC PAIN OF LEFT KNEE: ICD-10-CM

## 2018-02-06 DIAGNOSIS — M25.562 PAIN OF LEFT KNEE AFTER INJURY: ICD-10-CM

## 2018-02-08 RX ORDER — OXYCODONE AND ACETAMINOPHEN 5; 325 MG/1; MG/1
1 TABLET ORAL EVERY 6 HOURS PRN
Qty: 120 TABLET | Refills: 0 | Status: SHIPPED | OUTPATIENT
Start: 2018-02-08 | End: 2018-03-06

## 2018-02-14 ENCOUNTER — OFFICE VISIT (OUTPATIENT)
Dept: FAMILY MEDICINE | Facility: CLINIC | Age: 63
End: 2018-02-14
Payer: COMMERCIAL

## 2018-02-14 VITALS
WEIGHT: 246.8 LBS | TEMPERATURE: 97.4 F | HEIGHT: 74 IN | DIASTOLIC BLOOD PRESSURE: 74 MMHG | SYSTOLIC BLOOD PRESSURE: 134 MMHG | BODY MASS INDEX: 31.67 KG/M2 | HEART RATE: 91 BPM

## 2018-02-14 DIAGNOSIS — I10 HYPERTENSION GOAL BP (BLOOD PRESSURE) < 130/80: ICD-10-CM

## 2018-02-14 DIAGNOSIS — N18.30 CKD (CHRONIC KIDNEY DISEASE) STAGE 3, GFR 30-59 ML/MIN (H): ICD-10-CM

## 2018-02-14 DIAGNOSIS — F32.0 MILD MAJOR DEPRESSION (H): ICD-10-CM

## 2018-02-14 DIAGNOSIS — R80.9 TYPE 2 DIABETES MELLITUS WITH MICROALBUMINURIA, WITH LONG-TERM CURRENT USE OF INSULIN (H): Primary | ICD-10-CM

## 2018-02-14 DIAGNOSIS — E55.9 VITAMIN D DEFICIENCY: ICD-10-CM

## 2018-02-14 DIAGNOSIS — E11.29 TYPE 2 DIABETES MELLITUS WITH MICROALBUMINURIA, WITH LONG-TERM CURRENT USE OF INSULIN (H): Primary | ICD-10-CM

## 2018-02-14 DIAGNOSIS — Z79.4 TYPE 2 DIABETES MELLITUS WITH MICROALBUMINURIA, WITH LONG-TERM CURRENT USE OF INSULIN (H): Primary | ICD-10-CM

## 2018-02-14 PROBLEM — Z98.84 BARIATRIC SURGERY STATUS: Status: ACTIVE | Noted: 2018-02-14

## 2018-02-14 LAB
CREAT SERPL-MCNC: 1.51 MG/DL (ref 0.66–1.25)
GFR SERPL CREATININE-BSD FRML MDRD: 47 ML/MIN/1.7M2
HBA1C MFR BLD: 6.6 % (ref 4.3–6)

## 2018-02-14 PROCEDURE — 82306 VITAMIN D 25 HYDROXY: CPT | Performed by: FAMILY MEDICINE

## 2018-02-14 PROCEDURE — 83036 HEMOGLOBIN GLYCOSYLATED A1C: CPT | Performed by: FAMILY MEDICINE

## 2018-02-14 PROCEDURE — 82565 ASSAY OF CREATININE: CPT | Performed by: FAMILY MEDICINE

## 2018-02-14 PROCEDURE — 36415 COLL VENOUS BLD VENIPUNCTURE: CPT | Performed by: FAMILY MEDICINE

## 2018-02-14 PROCEDURE — 99214 OFFICE O/P EST MOD 30 MIN: CPT | Performed by: FAMILY MEDICINE

## 2018-02-14 RX ORDER — INSULIN GLARGINE 100 [IU]/ML
40 INJECTION, SOLUTION SUBCUTANEOUS DAILY
Qty: 3 ML | Refills: 11 | Status: SHIPPED | OUTPATIENT
Start: 2018-02-14 | End: 2018-03-27

## 2018-02-14 RX ORDER — LANCETS
EACH MISCELLANEOUS
Qty: 100 EACH | Refills: 3 | Status: SHIPPED | OUTPATIENT
Start: 2018-02-14 | End: 2019-04-30

## 2018-02-14 NOTE — PROGRESS NOTES
SUBJECTIVE:   Cedric Figueroa is a 62 year old male who presents to clinic today for the following health issues:      Depression Followup    Status since last visit: Stable on medications (Paxil)    See PHQ-9 for current symptoms.  Other associated symptoms: None    Complicating factors:   Significant life event:  No   Current substance abuse:  None  Anxiety or Panic symptoms:  No    PHQ-9 8/3/2016 2/7/2017 8/8/2017   Total Score 2 4 8   Q9: Suicide Ideation Not at all Not at all Not at all     In the past two weeks have you had thoughts of suicide or self-harm?  No.    Do you have concerns about your personal safety or the safety of others?   No  PHQ-9  English  PHQ-9   Any Language  Suicide Assessment Five-step Evaluation and Treatment (SAFE-T)        Diabetes Follow-up        Patient is checking blood sugars: 1 times a week; after meals= 160-200    Diabetic concerns: None     Symptoms of hypoglycemia (low blood sugar): none     Paresthesias (numbness or burning in feet) or sores: No     Date of last diabetic eye exam (annually):  3/31/17      Date of last Urine micro albumin screen, (annually):     Component      Latest Ref Rng & Units 5/8/2017   Creatinine Urine      mg/dL 106   Albumin Urine mg/L      mg/L 23   Albumin Urine mg/g Cr      0 - 17 mg/g Cr 21.79 (H)         Pneumococcal Vaccine:  Yes: 4/27/17    Influenza Vaccine (annually):   Yes: 8/29/17    Tobacco free:  Yes    Aspirin use:  Yes: 81 mg daily       Amount of exercise or physical activity: walking 1-2 miles QOD    Problems taking medications regularly: No    Medication side effects: none. Currently on Basaglar 40 units daily. Off Metformin due to CKD    Diet: regular (no restrictions)        Vitamin D deficiency, completed high dose replacement therapy. Now on supplementation for maintenance. Due for follow up Vitamin D check.     Problem list and histories reviewed & adjusted, as indicated.  Additional history: as documented    Patient Active  Problem List   Diagnosis     Hyperlipidemia LDL goal <100     Mild major depression (H)     Restless leg syndrome     Sleep apnea     Knee pain     Vitamin B 12 deficiency     Advanced directives, counseling/discussion     Iron deficiency anemia     OA (osteoarthritis) of knee     Perforated bowel (H)     Chronic pain     Bilateral low back pain without sciatica     Type 2 diabetes mellitus with microalbuminuria, with long-term current use of insulin (H)     Hypertension goal BP (blood pressure) < 130/80     Bariatric surgery status     Past Surgical History:   Procedure Laterality Date     ABDOMEN SURGERY  2015    peritonitis, bowel perforation, bowel resection     AS TOTAL KNEE ARTHROPLASTY      Right and Left knee x3( 2 partial knee replacement and 1 total Left knee replacement     BARIATRIC SURGERY      at age 45     COLONOSCOPY  2/1/2006    [facility]     COLONOSCOPY WITH CO2 INSUFFLATION N/A 6/7/2016    Procedure: COLONOSCOPY WITH CO2 INSUFFLATION;  Surgeon: Cedric Schneider MD;  Location:  OR       Social History   Substance Use Topics     Smoking status: Former Smoker     Types: Cigarettes     Quit date: 1/1/2012     Smokeless tobacco: Never Used     Alcohol use No     Family History   Problem Relation Age of Onset     DIABETES Mother      DIABETES Paternal Grandmother      Coronary Artery Disease Paternal Grandmother      CEREBROVASCULAR DISEASE Paternal Grandmother          Current Outpatient Prescriptions   Medication Sig Dispense Refill     BASAGLAR 100 UNIT/ML injection Inject 40 Units Subcutaneous daily 3 mL 11     blood glucose monitoring (ACCU-CHEK PELON PLUS) test strip Use to test blood sugar 3 times daily or as directed. 100 each 11     blood glucose monitoring (SOFTCLIX) lancets Use to test blood sugar 3 times daily or as directed. 100 each 3     oxyCODONE-acetaminophen (PERCOCET) 5-325 MG per tablet Take 1 tablet by mouth every 6 hours as needed for moderate to severe pain Max:  "4/day 120 tablet 0     losartan-hydrochlorothiazide (HYZAAR) 100-25 MG per tablet Take 1 tablet by mouth daily 90 tablet 3     diclofenac (VOLTAREN) 1 % GEL topical gel APPLY 4 GRAMS TO KNEES DAILY USING THE ENCLOSED DOSING CARD. 100 g 1     simvastatin (ZOCOR) 40 MG tablet Take 1 tablet (40 mg) by mouth At Bedtime 90 tablet 3     PARoxetine (PAXIL) 20 MG tablet Take 1 tablet (20 mg) by mouth every morning 90 tablet 3     cyanocobalamin (VITAMIN B12) 1000 MCG/ML injection INJECT 1ML INTO THE MUSCLE EVERY 3O DAYS 3 mL 6     pramipexole (MIRAPEX) 0.25 MG tablet Take 1 tablet (0.25mg) in morning, 2 tablets (0.5mg) in the afternoon, and take 1 tablet before bed 240 tablet 3     MELATONIN PO Take 10 mg by mouth At Bedtime        blood glucose monitoring (ACCU-CHEK PELON PLUS) meter device kit Use to test blood sugar 3 times daily or as directed. 1 kit 0     aspirin 81 MG tablet Take 1 tablet by mouth daily.       [DISCONTINUED] insulin glargine (BASAGLAR KWIKPEN) 100 UNIT/ML injection Inject 40 Units Subcutaneous daily 3 mL 11     [DISCONTINUED] blood glucose (ACCU-CHEK PELON PLUS) test strip Use to test blood sugar 3 times daily or as directed. 3 Month 3     [DISCONTINUED] blood glucose monitoring (SOFTCLIX) lancets Use to test blood sugar 3 times daily or as directed. 3 Box 33     insulin pen needle (BD ULTRA-FINE) 29G X 12.7MM Use once daily or as directed. (Patient not taking: Reported on 2/14/2018) 100 each prn     No Known Allergies    Reviewed and updated as needed this visit by clinical staff  Tobacco  Allergies  Meds  Med Hx  Soc Hx      Reviewed and updated as needed this visit by Provider         ROS:  Constitutional, HEENT, cardiovascular, pulmonary, gi and gu systems are negative, except as otherwise noted.    OBJECTIVE:     /74  Pulse 91  Temp 97.4  F (36.3  C) (Tympanic)  Ht 6' 2\" (1.88 m)  Wt 246 lb 12.8 oz (111.9 kg)  BMI 31.69 kg/m2  Body mass index is 31.69 kg/(m^2).  GENERAL: healthy, " alert and no distress  RESP: lungs clear to auscultation - no rales, rhonchi or wheezes  CV: regular rate and rhythm, normal S1 S2, no S3 or S4, no murmur, click or rub, no peripheral edema and peripheral pulses strong  Diabetic foot exam: normal DP and PT pulses, no trophic changes or ulcerative lesions, normal sensory exam and normal monofilament exam    Diagnostic Test Results:  Reviewed and discussed with patient prior to discharge.  Results for orders placed or performed in visit on 02/14/18   Hemoglobin A1c   Result Value Ref Range    Hemoglobin A1C 6.6 (H) 4.3 - 6.0 %         ASSESSMENT/PLAN:     Cedric was seen today for diabetes and depression.    Diagnoses and all orders for this visit:    Type 2 diabetes mellitus with microalbuminuria, with long-term current use of insulin (H), controlled. A1C 6.6 today  -     Hemoglobin A1c  -     Refill: BASAGLAR 100 UNIT/ML injection; Inject 40 Units Subcutaneous daily  -     Refill: blood glucose monitoring (ACCU-CHEK PELON PLUS) test strip; Use to test blood sugar 3 times daily or as directed.  -     Refill: blood glucose monitoring (SOFTCLIX) lancets; Use to test blood sugar 3 times daily or as directed.    Vitamin D deficiency  -     Vitamin D Deficiency    CKD (chronic kidney disease) stage 3, GFR 30-59 ml/min  -     Creatinine    Hypertension goal BP (blood pressure) < 130/80, fairly controlled  Continue current management.      Mild major depression (H). stable  -     DEPRESSION ACTION PLAN (DAP)  Continue current management.        Diabetes follow up in 3 months, sooner if needed.     Molly Leiva MD  [facility] (formerly [facility])

## 2018-02-14 NOTE — LETTER
My Depression Action Plan  Name: eCdric Figueroa   Date of Birth 1955  Date: 2/14/2018    My doctor: Molly Leiva   My clinic: [place] [id number] [facility]  [place] [id number] [phone]          GREEN    ZONE   Good Control    What it looks like:     Things are going generally well. You have normal up s and down s. You may even feel depressed from time to time, but bad moods usually last less than a day.   What you need to do:  1. Continue to care for yourself (see self care plan)  2. Check your depression survival kit and update it as needed  3. Follow your physician s recommendations including any medication.  4. Do not stop taking medication unless you consult with your physician first.           YELLOW         ZONE Getting Worse    What it looks like:     Depression is starting to interfere with your life.     It may be hard to get out of bed; you may be starting to isolate yourself from others.    Symptoms of depression are starting to last most all day and this has happened for several days.     You may have suicidal thoughts but they are not constant.   What you need to do:     1. Call your care team, your response to treatment will improve if you keep your care team informed of your progress. Yellow periods are signs an adjustment may need to be made.     2. Continue your self-care, even if you have to fake it!    3. Talk to someone in your support network    4. Open up your depression survival kit           RED    ZONE Medical Alert - Get Help    What it looks like:     Depression is seriously interfering with your life.     You may experience these or other symptoms: You can t get out of bed most days, can t work or engage in other necessary activities, you have trouble taking care of basic hygiene, or basic responsibilities, thoughts of suicide or death that will not go away, self-injurious behavior.     What you need to do:  1. Call your care team and  request a same-day appointment. If they are not available (weekends or after hours) call your local crisis line, emergency room or 911.      Electronically signed by: Sandrita Zazueta, February 14, 2018    Depression Self Care Plan / Survival Kit    Self-Care for Depression  Here s the deal. Your body and mind are really not as separate as most people think.  What you do and think affects how you feel and how you feel influences what you do and think. This means if you do things that people who feel good do, it will help you feel better.  Sometimes this is all it takes.  There is also a place for medication and therapy depending on how severe your depression is, so be sure to consult with your medical provider and/ or Behavioral Health Consultant if your symptoms are worsening or not improving.     In order to better manage my stress, I will:    Exercise  Get some form of exercise, every day. This will help reduce pain and release endorphins, the  feel good  chemicals in your brain. This is almost as good as taking antidepressants!  This is not the same as joining a gym and then never going! (they count on that by the way ) It can be as simple as just going for a walk or doing some gardening, anything that will get you moving.      Hygiene   Maintain good hygiene (Get out of bed in the morning, Make your bed, Brush your teeth, Take a shower, and Get dressed like you were going to work, even if you are unemployed).  If your clothes don't fit try to get ones that do.    Diet  I will strive to eat foods that are good for me, drink plenty of water, and avoid excessive sugar, caffeine, alcohol, and other mood-altering substances.  Some foods that are helpful in depression are: complex carbohydrates, B vitamins, flaxseed, fish or fish oil, fresh fruits and vegetables.    Psychotherapy  I agree to participate in Individual Therapy (if recommended).    Medication  If prescribed medications, I agree to take them.   Missing doses can result in serious side effects.  I understand that drinking alcohol, or other illicit drug use, may cause potential side effects.  I will not stop my medication abruptly without first discussing it with my provider.    Staying Connected With Others  I will stay in touch with my friends, family members, and my primary care provider/team.    Use your imagination  Be creative.  We all have a creative side; it doesn t matter if it s oil painting, sand castles, or mud pies! This will also kick up the endorphins.    Witness Beauty  (AKA stop and smell the roses) Take a look outside, even in mid-winter. Notice colors, textures. Watch the squirrels and birds.     Service to others  Be of service to others.  There is always someone else in need.  By helping others we can  get out of ourselves  and remember the really important things.  This also provides opportunities for practicing all the other parts of the program.    Humor  Laugh and be silly!  Adjust your TV habits for less news and crime-drama and more comedy.    Control your stress  Try breathing deep, massage therapy, biofeedback, and meditation. Find time to relax each day.     My support system    Clinic Contact:  Phone number:    Contact 1:  Phone number:    Contact 2:  Phone number:    Congregational/:  Phone number:    Therapist:  Phone number:    Local crisis center:    Phone number:    Other community support:  Phone number:

## 2018-02-14 NOTE — MR AVS SNAPSHOT
After Visit Summary   2/14/2018    Cedric Figueroa    MRN: [id number]           Patient Information     Date Of Birth          1955        Visit Information        Provider Department      2/14/2018 2:30 PM Molly Leiva MD [facility]        Today's Diagnoses     Type 2 diabetes mellitus with microalbuminuria, with long-term current use of insulin (H)    -  1    Vitamin D deficiency        CKD (chronic kidney disease) stage 3, GFR 30-59 ml/min        Hypertension goal BP (blood pressure) < 130/80        Mild major depression (H)           Follow-ups after your visit        Follow-up notes from your care team     Return for Diabetes Follow Up with a HgbA1C prior to visit.      Your next 10 appointments already scheduled     Feb 16, 2018  1:00 PM CST   LAB with BE LAB   [facility] ([facility] [place])    [address] [facility] [id number] [phone]           Please do not eat 10-12 hours before your appointment if you are coming in fasting for labs on lipids, cholesterol, or glucose (sugar). This does not apply to pregnant women. Water, hot tea and black coffee (with nothing added) are okay. Do not drink other fluids, diet soda or chew gum.              Who to contact     Normal or non-critical lab and imaging results will be communicated to you by MyChart, letter or phone within 4 business days after the clinic has received the results. If you do not hear from us within 7 days, please contact the clinic through MyChart or phone. If you have a critical or abnormal lab result, we will notify you by phone as soon as possible.  Submit refill requests through [employer] or call your pharmacy and they will forward the refill request to us. Please allow 3 business days for your refill to be completed.          If you need to speak with a  for additional information , please call: [phone]             Additional Information About Your  "Visit        MyChart Information     Daily [employer] gives you secure access to your electronic health record. If you see a primary care provider, you can also send messages to your care team and make appointments. If you have questions, please call your primary care clinic.  If you do not have a primary care provider, please call [phone] and they will assist you.        Care EveryWhere ID     This is your Care EveryWhere ID. This could be used by other organizations to access your [place] medical records  [id number]        Your Vitals Were     Pulse Temperature Height BMI (Body Mass Index)          91 97.4  F (36.3  C) (Tympanic) 6' 2\" (1.88 m) 31.69 kg/m2         Blood Pressure from Last 3 Encounters:   02/14/18 134/74   11/08/17 170/77   09/13/17 140/82    Weight from Last 3 Encounters:   02/14/18 246 lb 12.8 oz (111.9 kg)   11/08/17 246 lb 3.2 oz (111.7 kg)   09/13/17 248 lb 3.2 oz (112.6 kg)              We Performed the Following     Creatinine     DEPRESSION ACTION PLAN (DAP)     Hemoglobin A1c     JUST IN CASE     Vitamin D Deficiency          Where to get your medicines      These medications were sent to [facility] [address] [address]  [address] [address]     Phone:  [phone]     BASAGLAR 100 UNIT/ML injection    blood glucose monitoring lancets    blood glucose monitoring test strip          Primary Care Provider Office Phone # Fax #    Molly Leiva [phone]       [address]  [place] [address]        Equal Access to Services     [facility]: Hadii aad ku hadasho Soomaali, waaxda luqadaha, qaybta kaalmada khushbu turcios. So [facility] [phone].    ATENCIÓN: Si habla español, tiene a kim disposición servicios gratuitos de asistencia lingüística. Llame al [phone].    We comply with applicable federal civil rights laws and [place] laws. We do not discriminate on the basis of " race, color, national origin, age, disability, sex, sexual orientation, or gender identity.            Thank you!     Thank you for choosing [facility]  for your care. Our goal is always to provide you with excellent care. Hearing back from our patients is one way we can continue to improve our services. Please take a few minutes to complete the written survey that you may receive in the mail after your visit with us. Thank you!             Your Updated Medication List - Protect others around you: Learn how to safely use, store and throw away your medicines at www.disposemymeds.org.          This list is accurate as of 2/14/18  3:04 PM.  Always use your most recent med list.                   Brand Name Dispense Instructions for use Diagnosis    aspirin 81 MG tablet      Take 1 tablet by mouth daily.        BASAGLAR 100 UNIT/ML injection     3 mL    Inject 40 Units Subcutaneous daily    Type 2 diabetes mellitus with microalbuminuria, with long-term current use of insulin (H)       blood glucose monitoring lancets     100 each    Use to test blood sugar 3 times daily or as directed.    Type 2 diabetes mellitus with microalbuminuria, with long-term current use of insulin (H)       blood glucose monitoring meter device kit     1 kit    Use to test blood sugar 3 times daily or as directed.    Type 2 diabetes, HbA1c goal < 7% (H)       blood glucose monitoring test strip    ACCU-CHEK PELON PLUS    100 each    Use to test blood sugar 3 times daily or as directed.    Type 2 diabetes mellitus with microalbuminuria, with long-term current use of insulin (H)       cyanocobalamin 1000 MCG/ML injection    VITAMIN B12    3 mL    INJECT 1ML INTO THE MUSCLE EVERY 3O DAYS    Vitamin B 12 deficiency       diclofenac 1 % Gel topical gel    VOLTAREN    100 g    APPLY 4 GRAMS TO KNEES DAILY USING THE ENCLOSED DOSING CARD.    Chronic pain of left knee       insulin pen needle 29G X 12.7MM    BD ULTRA-FINE    100 each    Use  once daily or as directed.    Type 2 diabetes, HbA1c goal < 7% (H)       losartan-hydrochlorothiazide 100-25 MG per tablet    HYZAAR    90 tablet    Take 1 tablet by mouth daily    Hypertension goal BP (blood pressure) < 140/90       MELATONIN PO      Take 10 mg by mouth At Bedtime        oxyCODONE-acetaminophen 5-325 MG per tablet    PERCOCET    120 tablet    Take 1 tablet by mouth every 6 hours as needed for moderate to severe pain Max: 4/day    Chronic pain of left knee, Pain of left knee after injury       PARoxetine 20 MG tablet    PAXIL    90 tablet    Take 1 tablet (20 mg) by mouth every morning    Mild recurrent major depression (H)       pramipexole 0.25 MG tablet    MIRAPEX    240 tablet    Take 1 tablet (0.25mg) in morning, 2 tablets (0.5mg) in the afternoon, and take 1 tablet before bed    Restless leg syndrome       simvastatin 40 MG tablet    ZOCOR    90 tablet    Take 1 tablet (40 mg) by mouth At Bedtime    Hyperlipidemia LDL goal <100

## 2018-02-15 LAB — DEPRECATED CALCIDIOL+CALCIFEROL SERPL-MC: 28 UG/L (ref 20–75)

## 2018-02-16 ASSESSMENT — ANXIETY QUESTIONNAIRES
7. FEELING AFRAID AS IF SOMETHING AWFUL MIGHT HAPPEN: NOT AT ALL
IF YOU CHECKED OFF ANY PROBLEMS ON THIS QUESTIONNAIRE, HOW DIFFICULT HAVE THESE PROBLEMS MADE IT FOR YOU TO DO YOUR WORK, TAKE CARE OF THINGS AT HOME, OR GET ALONG WITH OTHER PEOPLE: SOMEWHAT DIFFICULT
5. BEING SO RESTLESS THAT IT IS HARD TO SIT STILL: NOT AT ALL
2. NOT BEING ABLE TO STOP OR CONTROL WORRYING: NOT AT ALL
1. FEELING NERVOUS, ANXIOUS, OR ON EDGE: NOT AT ALL
6. BECOMING EASILY ANNOYED OR IRRITABLE: SEVERAL DAYS
3. WORRYING TOO MUCH ABOUT DIFFERENT THINGS: SEVERAL DAYS
GAD7 TOTAL SCORE: 2

## 2018-02-16 ASSESSMENT — PATIENT HEALTH QUESTIONNAIRE - PHQ9: 5. POOR APPETITE OR OVEREATING: NOT AT ALL

## 2018-02-17 ASSESSMENT — PATIENT HEALTH QUESTIONNAIRE - PHQ9: SUM OF ALL RESPONSES TO PHQ QUESTIONS 1-9: 3

## 2018-02-17 ASSESSMENT — ANXIETY QUESTIONNAIRES: GAD7 TOTAL SCORE: 2

## 2018-03-06 ENCOUNTER — MYC MEDICAL ADVICE (OUTPATIENT)
Dept: FAMILY MEDICINE | Facility: CLINIC | Age: 63
End: 2018-03-06

## 2018-03-06 DIAGNOSIS — M25.562 PAIN OF LEFT KNEE AFTER INJURY: ICD-10-CM

## 2018-03-06 DIAGNOSIS — G89.29 CHRONIC PAIN OF LEFT KNEE: ICD-10-CM

## 2018-03-06 DIAGNOSIS — M25.562 CHRONIC PAIN OF LEFT KNEE: ICD-10-CM

## 2018-03-08 RX ORDER — OXYCODONE AND ACETAMINOPHEN 5; 325 MG/1; MG/1
1 TABLET ORAL EVERY 6 HOURS PRN
Qty: 120 TABLET | Refills: 0 | Status: SHIPPED | OUTPATIENT
Start: 2018-04-08 | End: 2018-03-27

## 2018-03-08 RX ORDER — OXYCODONE AND ACETAMINOPHEN 5; 325 MG/1; MG/1
1 TABLET ORAL EVERY 6 HOURS PRN
Qty: 120 TABLET | Refills: 0 | Status: SHIPPED | OUTPATIENT
Start: 2018-05-08 | End: 2018-05-29

## 2018-03-08 RX ORDER — OXYCODONE AND ACETAMINOPHEN 5; 325 MG/1; MG/1
1 TABLET ORAL EVERY 6 HOURS PRN
Qty: 120 TABLET | Refills: 0 | Status: SHIPPED | OUTPATIENT
Start: 2018-03-08 | End: 2018-09-19

## 2018-03-08 NOTE — TELEPHONE ENCOUNTER
Hard copy of script for Percocet dated 3/8/18, 4/8/18 and 5/8/18 walked to [facility]. Patient informed

## 2018-03-23 ENCOUNTER — OFFICE VISIT (OUTPATIENT)
Dept: FAMILY MEDICINE | Facility: CLINIC | Age: 63
End: 2018-03-23
Payer: COMMERCIAL

## 2018-03-23 VITALS
HEART RATE: 103 BPM | BODY MASS INDEX: 31.4 KG/M2 | RESPIRATION RATE: 16 BRPM | DIASTOLIC BLOOD PRESSURE: 76 MMHG | OXYGEN SATURATION: 97 % | TEMPERATURE: 98.2 F | WEIGHT: 244.6 LBS | SYSTOLIC BLOOD PRESSURE: 102 MMHG

## 2018-03-23 DIAGNOSIS — Z79.4 TYPE 2 DIABETES MELLITUS WITH HYPERGLYCEMIA, WITH LONG-TERM CURRENT USE OF INSULIN (H): ICD-10-CM

## 2018-03-23 DIAGNOSIS — E11.65 TYPE 2 DIABETES MELLITUS WITH HYPERGLYCEMIA, WITH LONG-TERM CURRENT USE OF INSULIN (H): ICD-10-CM

## 2018-03-23 DIAGNOSIS — L02.31 ABSCESS OF BUTTOCK, LEFT: Primary | ICD-10-CM

## 2018-03-23 PROCEDURE — 99213 OFFICE O/P EST LOW 20 MIN: CPT | Performed by: FAMILY MEDICINE

## 2018-03-23 RX ORDER — CEPHALEXIN 500 MG/1
500 CAPSULE ORAL 2 TIMES DAILY
Qty: 20 CAPSULE | Refills: 0 | Status: SHIPPED | OUTPATIENT
Start: 2018-03-23 | End: 2018-04-02

## 2018-03-23 RX ORDER — INSULIN GLARGINE 100 [IU]/ML
INJECTION, SOLUTION SUBCUTANEOUS
Qty: 15 ML | Refills: 11 | OUTPATIENT
Start: 2018-03-23

## 2018-03-23 NOTE — MR AVS SNAPSHOT
After Visit Summary   3/23/2018    Cedric Figueroa    MRN: [id number]           Patient Information     Date Of Birth          1955        Visit Information        Provider Department      3/23/2018 3:00 PM Molly Leiva MD [facility]        Today's Diagnoses     Abscess of buttock, left    -  1      Care Instructions      Schedule an appointment with the Gen Surgeon, if abscess does not respond to antibiotics in 2 days.     * ABSCESS [Antibiotic treatment only]  An abscess (sometimes called a  boil ) occurs when bacteria get trapped under the skin and begin to grow. Pus forms inside the abscess as the body responds to the bacteria. An abscess can occur with an insect bite, ingrown hair, blocked oil gland, pimple, cyst, or puncture wound.  In the early stages, redness and tenderness are the only symptoms. Sometimes, this stage can be treated with antibiotics alone. If the abscess does not respond to antibiotic treatment, it will need to be drained with a small cut, under local anesthesia.  HOME CARE:    Soak the wound in hot water or apply hot packs (small towel soaked in hot water) to the area for 20 minutes at a time. Do this three to four times a day.    Apply antibiotic cream or ointment such as Bacitracin or Polysporin onto the skin 3-4 times a day, unless something else was prescribed.  Neosporin Plus  includes an antibiotic plus a local pain reliever.    Take all of the antibiotics until they are gone.    You may use acetaminophen (Tylenol) or ibuprofen (Motrin, Advil) to control pain, unless another pain medicine was prescribed. [ NOTE : If you have chronic liver or kidney disease or ever had a stomach ulcer or GI bleeding, talk with your doctor before using these any of these.]  FOLLOW UP as advised by our staff. Look at your wound each day for the signs of worsening infection listed below.  GET PROMPT MEDICAL ATTENTION if any of the following occur:    An increase in  redness or swelling    Red streaks in the skin leading away from the abscess    An increase in local pain or swelling    Fever of 100.4 F (38 C) or higher, or as directed by your healthcare provider    Pus or fluid coming from the abscess    6182-0820 [employer]. [address] [place] [address]. All rights reserved. This information is not intended as a substitute for professional medical care. Always follow your healthcare professional's instructions.  This information has been modified by your health care provider with permission from the publisher.            Follow-ups after your visit        Additional Services     GENERAL SURG ADULT REFERRAL       Your provider has referred you to: [facility]: [facility] [phone]   [email or web address]    Please be aware that coverage of these services is subject to the terms and limitations of your health insurance plan.  Call member services at your health plan with any benefit or coverage questions.      Please bring the following with you to your appointment:    (1) Any X-Rays, CTs or MRIs which have been performed.  Contact the facility where they were done to arrange for  prior to your scheduled appointment.   (2) List of current medications   (3) This referral request   (4) Any documents/labs given to you for this referral                  Follow-up notes from your care team     Return if symptoms worsen or fail to improve.      Who to contact     Normal or non-critical lab and imaging results will be communicated to you by MyChart, letter or phone within 4 business days after the clinic has received the results. If you do not hear from us within 7 days, please contact the clinic through MyChart or phone. If you have a critical or abnormal lab result, we will notify you by phone as soon as possible.  Submit refill requests through [employer] or call your pharmacy and they will forward the refill  request to us. Please allow 3 business days for your refill to be completed.          If you need to speak with a  for additional information , please call: [phone]             Additional Information About Your Visit        Par[employer]ine[employer] Information     [employer] gives you secure access to your electronic health record. If you see a primary care provider, you can also send messages to your care team and make appointments. If you have questions, please call your primary care clinic.  If you do not have a primary care provider, please call [phone] and they will assist you.        Care EveryWhere ID     This is your Care EveryWhere ID. This could be used by other organizations to access your [place] medical records  [id number]        Your Vitals Were     Pulse Temperature Respirations Pulse Oximetry BMI (Body Mass Index)       103 98.2  F (36.8  C) (Oral) 16 97% 31.4 kg/m2        Blood Pressure from Last 3 Encounters:   03/23/18 154/76   02/14/18 134/74   11/08/17 170/77    Weight from Last 3 Encounters:   03/23/18 244 lb 9.6 oz (110.9 kg)   02/14/18 246 lb 12.8 oz (111.9 kg)   11/08/17 246 lb 3.2 oz (111.7 kg)              We Performed the Following     GENERAL SURG ADULT REFERRAL          Today's Medication Changes          These changes are accurate as of 3/23/18  3:15 PM.  If you have any questions, ask your nurse or doctor.               Start taking these medicines.        Dose/Directions    cephALEXin 500 MG capsule   Commonly known as:  KEFLEX   Used for:  Abscess of buttock, left   Started by:  Molly Leiva MD        Dose:  500 mg   Take 1 capsule (500 mg) by mouth 2 times daily for 10 days   Quantity:  20 capsule   Refills:  0            Where to get your medicines      These medications were sent to [facility] - [address] [address]  [address] [address] [place] [address]     Phone:  [phone]     cephALEXin 500 MG capsule                Primary  Care Provider Office Phone # Fax #    Molly Leiva [phone] [address] [facility] [address]        Equal Access to Services     ABNER KARLY : Hadii aad ku hadsandrao Sosowmyaali, waaxda luqadaha, qaybta kaalmada adefabien, khushbu hakay miguel. So [facility] [phone].    ATENCIÓN: Si habla español, tiene a kim disposición servicios gratuitos de asistencia lingüística. Llame al [phone].    We comply with applicable federal civil rights laws and [place] laws. We do not discriminate on the basis of race, color, national origin, age, disability, sex, sexual orientation, or gender identity.            Thank you!     Thank you for choosing [facility]  for your care. Our goal is always to provide you with excellent care. Hearing back from our patients is one way we can continue to improve our services. Please take a few minutes to complete the written survey that you may receive in the mail after your visit with us. Thank you!             Your Updated Medication List - Protect others around you: Learn how to safely use, store and throw away your medicines at www.disposemymeds.org.          This list is accurate as of 3/23/18  3:15 PM.  Always use your most recent med list.                   Brand Name Dispense Instructions for use Diagnosis    aspirin 81 MG tablet      Take 1 tablet by mouth daily.        BASAGLAR 100 UNIT/ML injection     3 mL    Inject 40 Units Subcutaneous daily    Type 2 diabetes mellitus with microalbuminuria, with long-term current use of insulin (H)       blood glucose monitoring lancets     100 each    Use to test blood sugar 3 times daily or as directed.    Type 2 diabetes mellitus with microalbuminuria, with long-term current use of insulin (H)       blood glucose monitoring meter device kit     1 kit    Use to test blood sugar 3 times daily or as directed.    Type 2 diabetes, HbA1c goal < 7% (H)       blood glucose monitoring  test strip    ACCU-CHEK PELON PLUS    100 each    Use to test blood sugar 3 times daily or as directed.    Type 2 diabetes mellitus with microalbuminuria, with long-term current use of insulin (H)       cephALEXin 500 MG capsule    KEFLEX    20 capsule    Take 1 capsule (500 mg) by mouth 2 times daily for 10 days    Abscess of buttock, left       cyanocobalamin 1000 MCG/ML injection    VITAMIN B12    3 mL    INJECT 1ML INTO THE MUSCLE EVERY 3O DAYS    Vitamin B 12 deficiency       diclofenac 1 % Gel topical gel    VOLTAREN    100 g    APPLY 4 GRAMS TO KNEES DAILY USING THE ENCLOSED DOSING CARD.    Chronic pain of left knee       insulin pen needle 29G X 12.7MM    BD ULTRA-FINE    100 each    Use once daily or as directed.    Type 2 diabetes, HbA1c goal < 7% (H)       losartan-hydrochlorothiazide 100-25 MG per tablet    HYZAAR    90 tablet    Take 1 tablet by mouth daily    Hypertension goal BP (blood pressure) < 140/90       MELATONIN PO      Take 10 mg by mouth At Bedtime        * oxyCODONE-acetaminophen 5-325 MG per tablet    PERCOCET    120 tablet    Take 1 tablet by mouth every 6 hours as needed for moderate to severe pain Max: 4/day    Chronic pain of left knee, Pain of left knee after injury       * oxyCODONE-acetaminophen 5-325 MG per tablet   Start taking on:  4/8/2018    PERCOCET    120 tablet    Take 1 tablet by mouth every 6 hours as needed for moderate to severe pain maximum 4 tablet (s) per day    Chronic pain of left knee, Pain of left knee after injury       * oxyCODONE-acetaminophen 5-325 MG per tablet   Start taking on:  5/8/2018    PERCOCET    120 tablet    Take 1 tablet by mouth every 6 hours as needed for moderate to severe pain or pain maximum 4 tablet(s) per day    Chronic pain of left knee, Pain of left knee after injury       PARoxetine 20 MG tablet    PAXIL    90 tablet    Take 1 tablet (20 mg) by mouth every morning    Mild recurrent major depression (H)       pramipexole 0.25 MG tablet     MIRAPEX    240 tablet    Take 1 tablet (0.25mg) in morning, 2 tablets (0.5mg) in the afternoon, and take 1 tablet before bed    Restless leg syndrome       simvastatin 40 MG tablet    ZOCOR    90 tablet    Take 1 tablet (40 mg) by mouth At Bedtime    Hyperlipidemia LDL goal <100       * Notice:  This list has 3 medication(s) that are the same as other medications prescribed for you. Read the directions carefully, and ask your doctor or other care provider to review them with you.

## 2018-03-23 NOTE — PROGRESS NOTES
SUBJECTIVE:   Cedric Figueroa is a 62 year old male who presents to clinic today for the following health issues:        Cyst    First noticed: 2 months-went away and came back 1.5 weeks ago    Description : cyst on buttocks-left cheek    Number of: 1    Accompanying signs and symptoms: red, pain, swollen    History (similar episodes/previous evaluation): None    Therapies tried and outcome: hot pack, toothpaste        Denies having any fever or chills.  Active drainage.    Problem list and histories reviewed & adjusted, as indicated.  Additional history: as documented    Patient Active Problem List   Diagnosis     Hyperlipidemia LDL goal <100     Mild major depression (H)     Restless leg syndrome     Sleep apnea     Knee pain     Vitamin B 12 deficiency     Advanced directives, counseling/discussion     Iron deficiency anemia     OA (osteoarthritis) of knee     Perforated bowel (H)     Chronic pain     Bilateral low back pain without sciatica     Type 2 diabetes mellitus with microalbuminuria, with long-term current use of insulin (H)     Hypertension goal BP (blood pressure) < 130/80     Bariatric surgery status     Past Surgical History:   Procedure Laterality Date     ABDOMEN SURGERY  2015    peritonitis, bowel perforation, bowel resection     AS TOTAL KNEE ARTHROPLASTY      Right and Left knee x3( 2 partial knee replacement and 1 total Left knee replacement     BARIATRIC SURGERY      at age 45     COLONOSCOPY  2/1/2006    [facility]     COLONOSCOPY WITH CO2 INSUFFLATION N/A 6/7/2016    Procedure: COLONOSCOPY WITH CO2 INSUFFLATION;  Surgeon: Cedric Schneider MD;  Location:  OR       Social History   Substance Use Topics     Smoking status: Former Smoker     Types: Cigarettes     Quit date: 1/1/2012     Smokeless tobacco: Never Used     Alcohol use No     Family History   Problem Relation Age of Onset     DIABETES Mother      DIABETES Paternal Grandmother      Coronary Artery Disease Paternal  Grandmother      CEREBROVASCULAR DISEASE Paternal Grandmother          Current Outpatient Prescriptions   Medication Sig Dispense Refill     cephALEXin (KEFLEX) 500 MG capsule Take 1 capsule (500 mg) by mouth 2 times daily for 10 days 20 capsule 0     oxyCODONE-acetaminophen (PERCOCET) 5-325 MG per tablet Take 1 tablet by mouth every 6 hours as needed for moderate to severe pain Max: 4/day 120 tablet 0     BASAGLAR 100 UNIT/ML injection Inject 40 Units Subcutaneous daily 3 mL 11     blood glucose monitoring (ACCU-CHEK PELON PLUS) test strip Use to test blood sugar 3 times daily or as directed. 100 each 11     blood glucose monitoring (SOFTCLIX) lancets Use to test blood sugar 3 times daily or as directed. 100 each 3     losartan-hydrochlorothiazide (HYZAAR) 100-25 MG per tablet Take 1 tablet by mouth daily 90 tablet 3     diclofenac (VOLTAREN) 1 % GEL topical gel APPLY 4 GRAMS TO KNEES DAILY USING THE ENCLOSED DOSING CARD. 100 g 1     simvastatin (ZOCOR) 40 MG tablet Take 1 tablet (40 mg) by mouth At Bedtime 90 tablet 3     PARoxetine (PAXIL) 20 MG tablet Take 1 tablet (20 mg) by mouth every morning 90 tablet 3     cyanocobalamin (VITAMIN B12) 1000 MCG/ML injection INJECT 1ML INTO THE MUSCLE EVERY 3O DAYS 3 mL 6     pramipexole (MIRAPEX) 0.25 MG tablet Take 1 tablet (0.25mg) in morning, 2 tablets (0.5mg) in the afternoon, and take 1 tablet before bed 240 tablet 3     MELATONIN PO Take 10 mg by mouth At Bedtime        blood glucose monitoring (ACCU-CHEK PELON PLUS) meter device kit Use to test blood sugar 3 times daily or as directed. 1 kit 0     insulin pen needle (BD ULTRA-FINE) 29G X 12.7MM Use once daily or as directed. 100 each prn     aspirin 81 MG tablet Take 1 tablet by mouth daily.       [START ON 4/8/2018] oxyCODONE-acetaminophen (PERCOCET) 5-325 MG per tablet Take 1 tablet by mouth every 6 hours as needed for moderate to severe pain maximum 4 tablet (s) per day 120 tablet 0     [START ON 5/8/2018]  oxyCODONE-acetaminophen (PERCOCET) 5-325 MG per tablet Take 1 tablet by mouth every 6 hours as needed for moderate to severe pain or pain maximum 4 tablet(s) per day 120 tablet 0     No Known Allergies    Reviewed and updated as needed this visit by clinical staff       Reviewed and updated as needed this visit by Provider         ROS:  Constitutional, HEENT, cardiovascular, pulmonary, gi and gu systems are negative, except as otherwise noted.    OBJECTIVE:     /76  Pulse 103  Temp 98.2  F (36.8  C) (Oral)  Resp 16  Wt 244 lb 9.6 oz (110.9 kg)  SpO2 97%  BMI 31.4 kg/m2  Body mass index is 31.4 kg/(m^2).  GENERAL: healthy, alert and no distress  SKIN: left cheek of the buttock close to the crease is an erythematous, firm, indurated, warm tender area measuring about 5 x 6 cm. Non-fluctuant. No active drainage.      Diagnostic Test Results:  none     ASSESSMENT/PLAN:     Cedric was seen today for mass.    Diagnoses and all orders for this visit:    Abscess of buttock, left  -   No I + D attempted, area is firm and non-fluctuant.   Recommended to keep area clean and dry.  Apply warm compresses.  -Start; cephALEXin (KEFLEX) 500 MG capsule; Take 1 capsule (500 mg) by mouth 2 times daily for 10 days  If no improvement after 2 days of antibiotic therapy, follow up with Surgeon for I + D. Patient verbalized understanding.  -     GENERAL SURG ADULT REFERRAL      Patient education and Handout with home care instructions given.       Follow up if symptoms fail to improve or worsen.      The patient was in agreement with the plan today and had no questions or concerns prior to leaving the clinic.          Molly Leiva MD  [facility]

## 2018-03-23 NOTE — TELEPHONE ENCOUNTER
"Requested Prescriptions   Pending Prescriptions Disp Refills     BASAGLAR 100 UNIT/ML injection [Pharmacy Med Name: BASAGLAR KWIKPEN 100UNIT/ML SOPN] 15 mL 11    Last Written Prescription Date:  2/9/18  Last Fill Quantity: 15ml,  # refills: 11   Last office visit: 3/23/2018 with prescribing provider:  3/23/18 [place]   Future Office Visit:   Sig: INJECT 38 UNITS UNDER THE SKIN DAILY    Long Acting Insulin Protocol Passed    3/23/2018  4:33 PM       Passed - Blood pressure less than 140/90 in past 6 months    BP Readings from Last 3 Encounters:   03/23/18 102/76   02/14/18 134/74   11/08/17 170/77                Passed - LDL on file in past 12 months    Recent Labs   Lab Test  05/23/17   0725   LDL  70            Passed - Microalbumin on file in past 12 months    Recent Labs   Lab Test  05/08/17   1514   MICROL  23   UMALCR  21.79*            Passed - Serum creatinine on file in past 12 months    Recent Labs   Lab Test  02/14/18   1423   CR  1.51*            Passed - HgbA1C in past 3 or 6 months    Recent Labs   Lab Test  02/14/18   1423   A1C  6.6*            Passed - Patient is age 18 or older       Passed - Recent (6 mo) or future (30 days) visit within the authorizing provider's specialty    Patient had office visit in the last 6 months or has a visit in the next 30 days with authorizing provider or within the authorizing provider's specialty.  See \"Patient Info\" tab in inbasket, or \"Choose Columns\" in Meds & Orders section of the refill encounter.            "

## 2018-03-23 NOTE — NURSING NOTE
"Chief Complaint   Patient presents with     Mass       Initial /76  Pulse 103  Temp 98.2  F (36.8  C) (Oral)  Resp 16  Wt 244 lb 9.6 oz (110.9 kg)  SpO2 97%  BMI 31.4 kg/m2 Estimated body mass index is 31.4 kg/(m^2) as calculated from the following:    Height as of 2/14/18: 6' 2\" (1.88 m).    Weight as of this encounter: 244 lb 9.6 oz (110.9 kg).  Medication Reconciliation: complete     Quita Rhodes MA  "

## 2018-03-23 NOTE — PATIENT INSTRUCTIONS
Schedule an appointment with the Gen Surgeon, if abscess does not respond to antibiotics in 2 days.     * ABSCESS [Antibiotic treatment only]  An abscess (sometimes called a  boil ) occurs when bacteria get trapped under the skin and begin to grow. Pus forms inside the abscess as the body responds to the bacteria. An abscess can occur with an insect bite, ingrown hair, blocked oil gland, pimple, cyst, or puncture wound.  In the early stages, redness and tenderness are the only symptoms. Sometimes, this stage can be treated with antibiotics alone. If the abscess does not respond to antibiotic treatment, it will need to be drained with a small cut, under local anesthesia.  HOME CARE:    Soak the wound in hot water or apply hot packs (small towel soaked in hot water) to the area for 20 minutes at a time. Do this three to four times a day.    Apply antibiotic cream or ointment such as Bacitracin or Polysporin onto the skin 3-4 times a day, unless something else was prescribed.  Neosporin Plus  includes an antibiotic plus a local pain reliever.    Take all of the antibiotics until they are gone.    You may use acetaminophen (Tylenol) or ibuprofen (Motrin, Advil) to control pain, unless another pain medicine was prescribed. [ NOTE : If you have chronic liver or kidney disease or ever had a stomach ulcer or GI bleeding, talk with your doctor before using these any of these.]  FOLLOW UP as advised by our staff. Look at your wound each day for the signs of worsening infection listed below.  GET PROMPT MEDICAL ATTENTION if any of the following occur:    An increase in redness or swelling    Red streaks in the skin leading away from the abscess    An increase in local pain or swelling    Fever of 100.4 F (38 C) or higher, or as directed by your healthcare provider    Pus or fluid coming from the abscess    9654-1346 [employer] [address] [place] [address], [place] [address]. All rights reserved. This information is  not intended as a substitute for professional medical care. Always follow your healthcare professional's instructions.  This information has been modified by your health care provider with permission from the publisher.

## 2018-03-27 ENCOUNTER — TELEPHONE (OUTPATIENT)
Dept: SURGERY | Facility: CLINIC | Age: 63
End: 2018-03-27

## 2018-03-27 ENCOUNTER — ANESTHESIA EVENT (OUTPATIENT)
Dept: SURGERY | Facility: AMBULATORY SURGERY CENTER | Age: 63
End: 2018-03-27

## 2018-03-27 ENCOUNTER — OFFICE VISIT (OUTPATIENT)
Dept: FAMILY MEDICINE | Facility: CLINIC | Age: 63
End: 2018-03-27
Payer: COMMERCIAL

## 2018-03-27 ENCOUNTER — OFFICE VISIT (OUTPATIENT)
Dept: SURGERY | Facility: CLINIC | Age: 63
End: 2018-03-27
Payer: COMMERCIAL

## 2018-03-27 VITALS
WEIGHT: 243 LBS | DIASTOLIC BLOOD PRESSURE: 71 MMHG | BODY MASS INDEX: 31.2 KG/M2 | TEMPERATURE: 98.3 F | HEART RATE: 94 BPM | SYSTOLIC BLOOD PRESSURE: 118 MMHG | OXYGEN SATURATION: 99 %

## 2018-03-27 VITALS
SYSTOLIC BLOOD PRESSURE: 134 MMHG | BODY MASS INDEX: 31.06 KG/M2 | HEIGHT: 74 IN | WEIGHT: 242 LBS | HEART RATE: 74 BPM | DIASTOLIC BLOOD PRESSURE: 65 MMHG

## 2018-03-27 DIAGNOSIS — K61.1 PERIRECTAL ABSCESS: Primary | ICD-10-CM

## 2018-03-27 DIAGNOSIS — K61.1 PERIRECTAL ABSCESS: ICD-10-CM

## 2018-03-27 DIAGNOSIS — Z79.4 TYPE 2 DIABETES MELLITUS WITH HYPERGLYCEMIA, WITH LONG-TERM CURRENT USE OF INSULIN (H): ICD-10-CM

## 2018-03-27 DIAGNOSIS — E11.65 TYPE 2 DIABETES MELLITUS WITH HYPERGLYCEMIA, WITH LONG-TERM CURRENT USE OF INSULIN (H): ICD-10-CM

## 2018-03-27 DIAGNOSIS — Z01.818 PREOP GENERAL PHYSICAL EXAM: Primary | ICD-10-CM

## 2018-03-27 LAB
ANION GAP SERPL CALCULATED.3IONS-SCNC: 10 MMOL/L (ref 3–14)
BUN SERPL-MCNC: 25 MG/DL (ref 7–30)
CALCIUM SERPL-MCNC: 8.8 MG/DL (ref 8.5–10.1)
CHLORIDE SERPL-SCNC: 106 MMOL/L (ref 94–109)
CO2 SERPL-SCNC: 22 MMOL/L (ref 20–32)
CREAT SERPL-MCNC: 1.53 MG/DL (ref 0.66–1.25)
ERYTHROCYTE [DISTWIDTH] IN BLOOD BY AUTOMATED COUNT: 12 % (ref 10–15)
GFR SERPL CREATININE-BSD FRML MDRD: 46 ML/MIN/1.7M2
GLUCOSE SERPL-MCNC: 177 MG/DL (ref 70–99)
HBA1C MFR BLD: 6.6 % (ref 4.3–6)
HCT VFR BLD AUTO: 37.6 % (ref 40–53)
HGB BLD-MCNC: 12.6 G/DL (ref 13.3–17.7)
MCH RBC QN AUTO: 32.1 PG (ref 26.5–33)
MCHC RBC AUTO-ENTMCNC: 33.5 G/DL (ref 31.5–36.5)
MCV RBC AUTO: 96 FL (ref 78–100)
PLATELET # BLD AUTO: 225 10E9/L (ref 150–450)
POTASSIUM SERPL-SCNC: 4.1 MMOL/L (ref 3.4–5.3)
RBC # BLD AUTO: 3.93 10E12/L (ref 4.4–5.9)
SODIUM SERPL-SCNC: 138 MMOL/L (ref 133–144)
WBC # BLD AUTO: 6.7 10E9/L (ref 4–11)

## 2018-03-27 PROCEDURE — 99214 OFFICE O/P EST MOD 30 MIN: CPT | Performed by: FAMILY MEDICINE

## 2018-03-27 PROCEDURE — 83036 HEMOGLOBIN GLYCOSYLATED A1C: CPT | Performed by: FAMILY MEDICINE

## 2018-03-27 PROCEDURE — 80048 BASIC METABOLIC PNL TOTAL CA: CPT | Performed by: FAMILY MEDICINE

## 2018-03-27 PROCEDURE — 99214 OFFICE O/P EST MOD 30 MIN: CPT | Performed by: SURGERY

## 2018-03-27 PROCEDURE — 85027 COMPLETE CBC AUTOMATED: CPT | Performed by: FAMILY MEDICINE

## 2018-03-27 PROCEDURE — 36415 COLL VENOUS BLD VENIPUNCTURE: CPT | Performed by: FAMILY MEDICINE

## 2018-03-27 RX ORDER — INSULIN GLARGINE 100 [IU]/ML
42 INJECTION, SOLUTION SUBCUTANEOUS DAILY
Qty: 3 ML | Refills: 11 | Status: SHIPPED | OUTPATIENT
Start: 2018-03-27 | End: 2018-09-21

## 2018-03-27 NOTE — NURSING NOTE
"Chief Complaint   Patient presents with     Derm Problem     buttocks       Initial /65  Pulse 74  Ht 1.88 m (6' 2\")  Wt 109.8 kg (242 lb)  BMI 31.07 kg/m2 Estimated body mass index is 31.07 kg/(m^2) as calculated from the following:    Height as of this encounter: 1.88 m (6' 2\").    Weight as of this encounter: 109.8 kg (242 lb).  Medication Reconciliation: brooke Hernandez Cma      "

## 2018-03-27 NOTE — TELEPHONE ENCOUNTER
Type of surgery: INCISION AND DRAINAGE OF BUTTOCK ABSCESS AND LOOK FOR POSSIBLE ANAL FISTULA / CPT code 34404  Perirectal abscess [K61.1]  - Primary   Location of surgery: [facility]  Date and time of surgery: 03/28/2018   Surgeon: SHERLY WILDE  Pre-Op Appt Date: 03/27/2018  Post-Op Appt Date: 04/10/2018   Packet sent out: No /GIVEN AT APT   Pre-cert/Authorization completed:  No prior auth required.   Date: 03/27/2018

## 2018-03-27 NOTE — PROGRESS NOTES
"Dear Molly Emerson  I was asked to see this patient by Molly Leiva for please see below.  I have seen Cedric Figueroa and as you know his chief complaint is cyst on the left butt cheek.   Was there for a month then came back. Started on antibiotics and is a little better.     HPI:  Patient is a 62 year old male  with complaints see above  The patient noticed the symptoms about 1 month ago.  Really was swollen about 10 days ago  Patient has not family history of infected cysts problems  antbioitcs makes the episode better.      Review Of Systems  Respiratory: No shortness of breath, dyspnea on exertion, cough, or hemoptysis  Cardiovascular: negative  Gastrointestinal: negative  Endocrine: diabetes  :  negative  /65  Pulse 74  Ht 1.88 m (6' 2\")  Wt 109.8 kg (242 lb)  BMI 31.07 kg/m2    Past Medical History:   Diagnosis Date     Hyperlipidemia LDL goal <100 2/28/2012     Hypertension goal BP (blood pressure) < 130/80 2/28/2012     Mild major depression (H) 2/28/2012     Restless leg syndrome     controlled on Mirapex     Sleep apnea      Tobacco use     cigar use     Type 2 diabetes, HbA1c goal < 7% (H) 2/28/2012       Past Surgical History:   Procedure Laterality Date     ABDOMEN SURGERY  2015    peritonitis, bowel perforation, bowel resection     AS TOTAL KNEE ARTHROPLASTY      Right and Left knee x3( 2 partial knee replacement and 1 total Left knee replacement     BARIATRIC SURGERY      at age 45     COLONOSCOPY  2/1/2006    [facility]     COLONOSCOPY WITH CO2 INSUFFLATION N/A 6/7/2016    Procedure: COLONOSCOPY WITH CO2 INSUFFLATION;  Surgeon: Cedric Schneider MD;  Location:  OR       Social History     Social History     Marital status:      Spouse name: N/A     Number of children: N/A     Years of education: N/A     Occupational History     Not on file.     Social History Main Topics     Smoking status: Former Smoker     Types: Cigarettes     Quit date: 1/1/2012     " Smokeless tobacco: Never Used     Alcohol use No     Drug use: No     Sexual activity: No     Other Topics Concern     Not on file     Social History Narrative       Current Outpatient Prescriptions   Medication Sig Dispense Refill     cephALEXin (KEFLEX) 500 MG capsule Take 1 capsule (500 mg) by mouth 2 times daily for 10 days 20 capsule 0     oxyCODONE-acetaminophen (PERCOCET) 5-325 MG per tablet Take 1 tablet by mouth every 6 hours as needed for moderate to severe pain Max: 4/day 120 tablet 0     [START ON 4/8/2018] oxyCODONE-acetaminophen (PERCOCET) 5-325 MG per tablet Take 1 tablet by mouth every 6 hours as needed for moderate to severe pain maximum 4 tablet (s) per day 120 tablet 0     [START ON 5/8/2018] oxyCODONE-acetaminophen (PERCOCET) 5-325 MG per tablet Take 1 tablet by mouth every 6 hours as needed for moderate to severe pain or pain maximum 4 tablet(s) per day 120 tablet 0     BASAGLAR 100 UNIT/ML injection Inject 40 Units Subcutaneous daily 3 mL 11     blood glucose monitoring (ACCU-CHEK PELON PLUS) test strip Use to test blood sugar 3 times daily or as directed. 100 each 11     blood glucose monitoring (SOFTCLIX) lancets Use to test blood sugar 3 times daily or as directed. 100 each 3     losartan-hydrochlorothiazide (HYZAAR) 100-25 MG per tablet Take 1 tablet by mouth daily 90 tablet 3     diclofenac (VOLTAREN) 1 % GEL topical gel APPLY 4 GRAMS TO KNEES DAILY USING THE ENCLOSED DOSING CARD. 100 g 1     simvastatin (ZOCOR) 40 MG tablet Take 1 tablet (40 mg) by mouth At Bedtime 90 tablet 3     PARoxetine (PAXIL) 20 MG tablet Take 1 tablet (20 mg) by mouth every morning 90 tablet 3     cyanocobalamin (VITAMIN B12) 1000 MCG/ML injection INJECT 1ML INTO THE MUSCLE EVERY 3O DAYS 3 mL 6     pramipexole (MIRAPEX) 0.25 MG tablet Take 1 tablet (0.25mg) in morning, 2 tablets (0.5mg) in the afternoon, and take 1 tablet before bed 240 tablet 3     MELATONIN PO Take 10 mg by mouth At Bedtime        blood glucose  "monitoring (ACCU-CHEK PELON PLUS) meter device kit Use to test blood sugar 3 times daily or as directed. 1 kit 0     insulin pen needle (BD ULTRA-FINE) 29G X 12.7MM Use once daily or as directed. 100 each prn     aspirin 81 MG tablet Take 1 tablet by mouth daily.         10 Point review of systems all others are negative.   Above was reviewed  Physical exam: /65  Pulse 74  Ht 1.88 m (6' 2\")  Wt 109.8 kg (242 lb)  BMI 31.07 kg/m2   Patient able to get up on table without difficulty.   Patient is alert and orientated.   Head eyes, nose and mouth within normal limits.  No supraclavicular or cervical adenopathy palpated.  Thyroid within normal limits.  No carotid bruits auscultated.  Lungs are clear to auscultation  Heart is regular rate and rhythm with no murmur or thrills noted.  No costal vertebral angle tenderness noted.  Abdomen is abdomen is soft without significant tenderness,   Skin was warm and pink  Normal Affect      Lower extremity edema is mildly present.    At buttocks has a 4 by 6 cm firm area. Left side.  Has some acne in the area.  No obvious connection to the anus and no pain with bowel movement.   Assessment: At buttocks has a 4 by 6 cm firm area. Left side.  Has some acne in the area.  No obvious connection to the anus and no pain with bowel movement.    Plan to do will examine anus and area for possible fistula and then if none will open the abscess open and will leave it open. Will try to place hydrogen peroxide to look for a fistula still.   If has fistula will open the fistula and may need seton.      Risks of surgery include damage to nerves, bleeding, infection, damage to  Vessels, recurrence.  Being Anal incontinent.     Time spent with the patient with greater that 50% of the time in discussion was 30 minutes.  In discussing the cyst and plan.      Bala Layton MD    "

## 2018-03-27 NOTE — PROGRESS NOTES
[facility]  [address]  [place] [address]  [phone]  Dept: [phone]    PRE-OP EVALUATION:  Today's date: 3/27/2018    Cedric Figueroa (: 1955) presents for pre-operative evaluation assessment as requested by Dr. Layton.  He requires evaluation and anesthesia risk assessment prior to undergoing surgery/procedure for treatment of perirectal abcess .    Proposed Surgery/ Procedure: Incision and drainage of left buttock abscess and look for possible anal fistula.  Date of Surgery/ Procedure: 3/28/18  Time of Surgery/ Procedure: 10:30 AM  Hospital/Surgical Facility: [facility]  Fax number for surgical facility: N/a  Primary Physician: Molly Leiva  Type of Anesthesia Anticipated: General    Patient has a Health Care Directive or Living Will:  NO    1. NO - Do you have a history of heart attack, stroke, stent, bypass or surgery on an artery in the head, neck, heart or legs?  2. NO - Do you ever have any pain or discomfort in your chest?  3. NO - Do you have a history of  Heart Failure?  4. NO - Are you troubled by shortness of breath when: walking on the level, up a slight hill or at night?  5. NO - Do you currently have a cold, bronchitis or other respiratory infection?  6. NO - Do you have a cough, shortness of breath or wheezing?  7. NO - Do you sometimes get pains in the calves of your legs when you walk?  8. NO - Do you or anyone in your family have previous history of blood clots?  9. NO - Do you or does anyone in your family have a serious bleeding problem such as prolonged bleeding following surgeries or cuts?  10. YES - HAVE YOU EVER HAD PROBLEMS WITH ANEMIA OR BEEN TOLD TO TAKE IRON PILLS?   11. NO - Have you had any abnormal blood loss such as black, tarry or bloody stools, or abnormal vaginal bleeding?  12. NO - Have you ever had a blood transfusion?  13. NO - Have you or any of your relatives ever had problems with anesthesia?  14. YES - DO  YOU HAVE SLEEP APNEA, EXCESSIVE SNORING OR DAYTIME DROWSINESS?   15. NO - Do you have any prosthetic heart valves?  16. YES - DO YOU HAVE PROSTHETIC JOINTS? knees  17. NO - Is there any chance that you may be pregnant?      HPI:     HPI related to upcoming procedure:     62 year old pleasant male patient of mine here for a pre-op exam.   Was recently seen for an abscess of the left buttocks.   Was started on antibiotics, reports feeling a little better. Less painful.   Was seen and evaluated by Gen Surgery today and recommended to undergo I&D of the left buttock abscess and look for possible anal fistula.  Denies having any fever or chills.  Patient has a known history of type 2 diabetes with hyperglycemia; chronic kidney disease stage III currently on long-term insulin use.Last A1c done about a month ago was 6.6.    States that he understands the risks and benefits of the procedure and wishes to proceed.  Denies having any other concerns today.    See problem list for active medical problems.  Problems all longstanding and stable, except as noted/documented.  See ROS for pertinent symptoms related to these conditions.                                                                                                  .    MEDICAL HISTORY:     Patient Active Problem List    Diagnosis Date Noted     Hypertension goal BP (blood pressure) < 130/80 02/14/2018     Priority: Medium     Bariatric surgery status 02/14/2018     Priority: Medium     Type 2 diabetes mellitus with microalbuminuria, with long-term current use of insulin (H) 05/10/2017     Priority: Medium     Chronic pain 02/20/2017     Priority: Medium       Patient is followed by Molly Leiva MD for ongoing prescription of pain medication.  All refills should only be approved by this provider, or covering partner.    Medication(s): Percocet 5-325 mg 4 x /day  Maximum quantity per month: 120 tab  Clinic visit frequency required: every 3 months     Controlled  substance agreement:  Encounter-Level CSA - 02/20/2017:          Controlled Substance Agreement - Scan on 2/24/2017  3:27 PM : CONTROLLED SUBSTANCE AGREEMENT (below)              Pain Clinic evaluation in the past: Yes       Date/Location:  [facility] Total Score(s):          D.I.R.E Score: Patient Selection for Chronic Opioid Analgesia    For each factor, rate the patient's score from 1 - 3 based on the explanations on the right.       Diagnosis             2         1 = Benign chronic condition with minimal objective findings or no definite medical diagnosis.  Examples:  fibromyalgia, migraine, headaches, non-specific back pain.  2 = Slowly progressive condition concordant with moderate pain, or fixed condition with moderate objective findings.  Examples: failed back surgery syndrome, back pain with moderate degenerative changes, neuropathic pain.  3 = Advanced condition concordant with severe pain with objective findings.  Examples: severe ischemic vascular disease, advanced neuropathy, severe spinal stenosis.    Intractability             3         1 = Few therapies have been tried and the patient takes a passive role in his/her pain management process.   2 = Most costomary treatments have been tried but the patient is not fully engaged in the pain management process, or barriers prevent (insurance, transportation, medical illness)  3 = Patient fully engaged in a spectrum of appropriate treatments but with inadequate response.    Risk   (Risk = Total of P+C+R+S below)       Psychological             3         1 = Serious personality dysfunction or mental illness interfering with care.  Examples: personality disorder, severe affective disorder, significant personality issues.  2 = Personality or mental health interferes moderately.  Example: depression or anxiety disorder.  3 = Good communication with the clinic.  No significant personality dysfunction or mental illness.       Chemical      Health              3         1 = Active or very recent use of illicit drugs, excessive alcohol, or prescription drug abuse.  2 = Chemical coper (uses medications to cope with stress) or history of chemical dependency in remission.  3 = No CD history.  Not drug-focused or chemically reliant       Reliability             3 1 = History of numerous problems: medication misuse, missed appointments, rarely follows through.  2 = Occasional difficulties with compliance, but generally reliable.  3 = Highly reliable patient with medications, appointments and treatment.       Social      Support             3 1= Life in chaos.  Little family support and few close relationships.  Loss of most normal life roles.  2 = Reduction in some relationships and life roles.  3 = Supportive family/close relationships.  Involved in work or school and no social isolation.    Efficacy score             3 1 = Poor function or minimal pain relief despite moderate to high doses.  2 = Moderate benefit with function improved in a number of ways (or insufficient info - hasn't tried opioid yet or very low doses or too short a trial.  3 = Good improvement in pain and function and quality of life with stable doses over time.                                    20    Total score = D + I + R + E    Score 7-13: Not a suitable candidate for long-term opioid analgesia  Score 14-21: May be a good candidate for long-term opioid analgesia    Copyright 2013 Brayden Oliva MD, The DIRE Score: Predicting Outcomes of Opioid Prescribing for Chronic Pain. The Journal of Pain. 7(9) (September), 2006:671-681        Last [facility] website verification: Completed 11/8/2017. No concerns   [email or web address]          -------------------------------------------------------------------------------------------------      Controlled Substance Refill Request for 2/7/2017    Last refill: 2/7/2017    Last clinic visit:2/20/2017    Clinic visit frequency required: Q 3 months  Next  appt: 3 months    Controlled substance agreement on file: Yes:  Date 2/20/2017.    Documentation in problem list reviewed:  Yes    Processing:  Patient will  in clinic    RX [place] reviewed:  not reviewed  [place]:  [email or web address]         Bilateral low back pain without sciatica 02/20/2017     Priority: Medium     Perforated bowel (H) 08/04/2015     Priority: Medium     Pt had a spontaneously perforated bowel- treated with emergency surgery- required inpt rehab for recovery.  Done at [facility]       OA (osteoarthritis) of knee 03/24/2015     Priority: Medium     Right PKA- 9/2013- good result  Left PKA- 8/2014- continued pain and problems  Done by [facility], and still following them, especially as there has been continued swelling, pain, heat at the left knee.  They are wondering if this could be gout/pseudogout, though work-up per pt has included r/o infection, gout per pt    He has been using indocin for daytime, but his nighttime pain is worse, so he's been taking Percocet and hydroxyzine (gets itchy with Percocet).    He is here because the Ortho folks have encouraged him to get his PCP involved, and he would like to make sure nothing is being missed.      We developed the following plan:   1.  Await MRI planned for Tomorrow  2.  If normal results, return to clinic this week and we will draw the fluid out, for analysis (gout, pseudogout, cell counts, culture, etc)  3.  May need to be fitted with a soft knee brace to improve swelling to allow for healing (possible seroma post-operatively)  4.  If persistent knee pain, and no further surgical intervention planned then would offer Geniculate nerve blocks to improve pain       Iron deficiency anemia 10/24/2014     Priority: Medium     Vitamin B 12 deficiency 04/04/2012     Priority: Medium     Hyperlipidemia LDL goal <100 02/28/2012     Priority: Medium     Mild major depression (H) 02/28/2012      Priority: Medium     Knee pain 02/28/2012     Priority: Medium     Pt has undergone TKA, which is a revision from before, as the partial joint had to be removed due to concerns with infection.  This was 4/2015 and despite good rehab with full ROM of the left knee he continues to have knee pain, with intermittent redness, swelling and pain worsened with exercise.         Restless leg syndrome      Priority: Medium     9/1/2015  Pt now takes Mirapex BID, script had been for only 1 tab/day, but pt reports using 1 in AM, 2 in afternoon and 1 at night.             Sleep apnea      Priority: Medium     Advanced directives, counseling/discussion 01/22/2013     Priority: Low       3/24/2015  Wife Marixa would be HEATHER.  Alternate would be Son Floyd.  Would like to be Full code.  He feels he would not want to be on life-support indefinitely, but would defer to his wife and family for decisions at end of life.          Past Medical History:   Diagnosis Date     Hyperlipidemia LDL goal <100 2/28/2012     Hypertension goal BP (blood pressure) < 130/80 2/28/2012     Mild major depression (H) 2/28/2012     Restless leg syndrome     controlled on Mirapex     Sleep apnea      Tobacco use     cigar use     Type 2 diabetes, HbA1c goal < 7% (H) 2/28/2012     Past Surgical History:   Procedure Laterality Date     ABDOMEN SURGERY  2015    peritonitis, bowel perforation, bowel resection     AS TOTAL KNEE ARTHROPLASTY      Right and Left knee x3( 2 partial knee replacement and 1 total Left knee replacement     BARIATRIC SURGERY      at age 45     COLONOSCOPY  2/1/2006    [facility]     COLONOSCOPY WITH CO2 INSUFFLATION N/A 6/7/2016    Procedure: COLONOSCOPY WITH CO2 INSUFFLATION;  Surgeon: Cedric Schneider MD;  Location: [facility] OR     Current Outpatient Prescriptions   Medication Sig Dispense Refill     BASAGLAR 100 UNIT/ML injection Inject 42 Units Subcutaneous daily 3 mL 11     cephALEXin (KEFLEX) 500 MG capsule Take 1 capsule  (500 mg) by mouth 2 times daily for 10 days 20 capsule 0     oxyCODONE-acetaminophen (PERCOCET) 5-325 MG per tablet Take 1 tablet by mouth every 6 hours as needed for moderate to severe pain Max: 4/day 120 tablet 0     [START ON 5/8/2018] oxyCODONE-acetaminophen (PERCOCET) 5-325 MG per tablet Take 1 tablet by mouth every 6 hours as needed for moderate to severe pain or pain maximum 4 tablet(s) per day 120 tablet 0     blood glucose monitoring (ACCU-CHEK PELON PLUS) test strip Use to test blood sugar 3 times daily or as directed. 100 each 11     blood glucose monitoring (SOFTCLIX) lancets Use to test blood sugar 3 times daily or as directed. 100 each 3     losartan-hydrochlorothiazide (HYZAAR) 100-25 MG per tablet Take 1 tablet by mouth daily 90 tablet 3     diclofenac (VOLTAREN) 1 % GEL topical gel APPLY 4 GRAMS TO KNEES DAILY USING THE ENCLOSED DOSING CARD. 100 g 1     simvastatin (ZOCOR) 40 MG tablet Take 1 tablet (40 mg) by mouth At Bedtime 90 tablet 3     PARoxetine (PAXIL) 20 MG tablet Take 1 tablet (20 mg) by mouth every morning 90 tablet 3     cyanocobalamin (VITAMIN B12) 1000 MCG/ML injection INJECT 1ML INTO THE MUSCLE EVERY 3O DAYS 3 mL 6     pramipexole (MIRAPEX) 0.25 MG tablet Take 1 tablet (0.25mg) in morning, 2 tablets (0.5mg) in the afternoon, and take 1 tablet before bed 240 tablet 3     MELATONIN PO Take 10 mg by mouth At Bedtime        blood glucose monitoring (ACCU-CHEK PELON PLUS) meter device kit Use to test blood sugar 3 times daily or as directed. 1 kit 0     insulin pen needle (BD ULTRA-FINE) 29G X 12.7MM Use once daily or as directed. 100 each prn     aspirin 81 MG tablet Take 1 tablet by mouth daily.       [DISCONTINUED] BASAGLAR 100 UNIT/ML injection Inject 40 Units Subcutaneous daily 3 mL 11     OTC products: None, except as noted above    No Known Allergies   Latex Allergy: NO    Social History   Substance Use Topics     Smoking status: Current Every Day Smoker     Packs/day: 7.00      Types: Cigars     Last attempt to quit: 1/1/2012     Smokeless tobacco: Never Used     Alcohol use No     History   Drug Use No       REVIEW OF SYSTEMS:   Constitutional, neuro, ENT, endocrine, pulmonary, cardiac, gastrointestinal, genitourinary, musculoskeletal, integument and psychiatric systems are negative, except as otherwise noted.    EXAM:   /71  Pulse 94  Temp 98.3  F (36.8  C) (Tympanic)  Wt 243 lb (110.2 kg)  SpO2 99%  BMI 31.2 kg/m2    GENERAL APPEARANCE: healthy, alert and no distress     EYES: EOMI,  PERRL     HENT: ear canals and TM's normal and nose and mouth without ulcers or lesions     NECK: no adenopathy, no asymmetry, masses, or scars and thyroid normal to palpation     RESP: lungs clear to auscultation - no rales, rhonchi or wheezes     CV: regular rates and rhythm, normal S1 S2, no S3 or S4 and no murmur, click or rub     ABDOMEN:  soft, nontender, no HSM or masses and bowel sounds normal     MS: extremities normal- no gross deformities noted, no evidence of inflammation in joints, FROM in all extremities.     SKIN: the abscessed area on the left buttocks is less erythematous, less tender to touch  but more fluctuant.  No active drainage noted.     NEURO: Normal strength and tone, sensory exam grossly normal, mentation intact and speech normal     PSYCH: mentation appears normal. and affect normal/bright     LYMPHATICS: No cervical adenopathy    DIAGNOSTICS:     Labs Drawn and in Process:   Unresulted Labs Ordered in the Past 30 Days of this Admission     Date and Time Order Name Status Description    3/27/2018 1545 BASIC METABOLIC PANEL In process     3/27/2018 1545 CBC WITH PLATELETS In process           Recent Labs   Lab Test  02/14/18   1423  12/06/17   1548  11/08/17   1425  09/13/17   1512  08/07/17   1420   06/16/17   1552   08/08/14   1305  09/16/13   HGB   --    --    --    --   11.0*   --   12.6*   < >  13.1*   < >   --    PLT   --    --    --    --   186   --   259   < >   200   --    --    INR   --    --    --    --    --    --    --    --   0.95   --   2.7*   NA   --   138   --   140  138   < >  141   < >  141   < >   --    POTASSIUM   --   3.7   --   4.5  4.0   < >  5.6*   < >  3.9   < >   --    CR  1.51*  1.73*  1.49*  1.64*  1.65*   < >  2.59*   < >  0.88   < >   --    A1C  6.6*   --   6.1*   --    --    < >   --    < >   --    < >   --     < > = values in this interval not displayed.        IMPRESSION:   Reason for surgery/procedure:  Incision and drainage of left buttock abscess and look for possible anal fistula.  Diagnosis/reason for consult: Perirectal abscess    The proposed surgical procedure is considered LOW risk.    REVISED CARDIAC RISK INDEX  The patient has the following serious cardiovascular risks for perioperative complications such as (MI, PE, VFib and 3  AV Block):  No serious cardiac risks  INTERPRETATION: 0 risks: Class I (very low risk - 0.4% complication rate)    The patient has the following additional risks for perioperative complications:  No identified additional risks      ICD-10-CM    1. Preop general physical exam Z01.818 Hemoglobin A1c     CBC with platelets     Basic metabolic panel   2. Perirectal abscess K61.1 CBC with platelets     Basic metabolic panel   3. Type 2 diabetes mellitus with hyperglycemia, with long-term current use of insulin (H) E11.65 Hemoglobin A1c    Z79.4 Basic metabolic panel     BASAGLAR 100 UNIT/ML injection       RECOMMENDATIONS:       Cardiovascular Risk  None identified      Pulmonary Risk  None identified      --Patient is to take all scheduled medications on the day of surgery EXCEPT for modifications listed below.  Losartan/ Hydrochlorothiazide  May take Paxil with small sips of water.     APPROVAL GIVEN to proceed with proposed procedure, without further diagnostic evaluation       Signed Electronically by: Molly Leiva MD    Copy of this evaluation report is provided to requesting physician.    Rick Preop  Guidelines

## 2018-03-27 NOTE — MR AVS SNAPSHOT
After Visit Summary   3/27/2018    Cedric Figueroa    MRN: [id number]           Patient Information     Date Of Birth          1955        Visit Information        Provider Department      3/27/2018 10:30 AM Bala Layton MD [facility]        Today's Diagnoses     Perirectal abscess    -  1      Care Instructions    Lower extremity edema is mildly present.    At buttocks has a 4 by 6 cm firm area. Left side.  Has some acne in the area.  No obvious connection to the anus and no pain with bowel movement.   Assessment: At buttocks has a 4 by 6 cm firm area. Left side.  Has some acne in the area.  No obvious connection to the anus and no pain with bowel movement.    Plan to do will examine anus and area for possible fistula and then if none will open the abscess open and will leave it open. Will try to place hydrogen peroxide to look for a fistula still.   If has fistula will open the fistula and may need seton.      Risks of surgery include damage to nerves, bleeding, infection, damage to  Vessels, recurrence.  Being Anal incontinent.           Follow-ups after your visit        Who to contact     If you have questions or need follow up information about today's clinic visit or your schedule please contact [facility] directly at [phone].  Normal or non-critical lab and imaging results will be communicated to you by MyChart, letter or phone within 4 business days after the clinic has received the results. If you do not hear from us within 7 days, please contact the clinic through Truvisohart or phone. If you have a critical or abnormal lab result, we will notify you by phone as soon as possible.  Submit refill requests through Wooshii or call your pharmacy and they will forward the refill request to us. Please allow 3 business days for your refill to be completed.          Additional Information About Your Visit        TruvisoharPOKKT Information     Wooshii gives you  "secure access to your electronic health record. If you see a primary care provider, you can also send messages to your care team and make appointments. If you have questions, please call your primary care clinic.  If you do not have a primary care provider, please call [phone] and they will assist you.        Care EveryWhere ID     This is your Care EveryWhere ID. This could be used by other organizations to access your [place] medical records  [id number]        Your Vitals Were     Pulse Height BMI (Body Mass Index)             74 1.88 m (6' 2\") 31.07 kg/m2          Blood Pressure from Last 3 Encounters:   03/27/18 134/65   03/23/18 102/76   02/14/18 134/74    Weight from Last 3 Encounters:   03/27/18 109.8 kg (242 lb)   03/23/18 110.9 kg (244 lb 9.6 oz)   02/14/18 111.9 kg (246 lb 12.8 oz)              We Performed the Following     [facility]ative Worksheet        Primary Care Provider Office Phone # Fax #    Molly Leiva [phone] [address]  [place] [address]        Equal Access to Services     [facility]: Hadii aad ku hadasho Sosowmyaali, waaxda luqadaha, qaybta kaalmada adeegyada, khushbu baileyn william diaz . So [facility] [phone].    ATENCIÓN: Si habla español, tiene a kim disposición servicios gratuitos de asistencia lingüística. [facility] [phone].    We comply with applicable federal civil rights laws and [place] laws. We do not discriminate on the basis of race, color, national origin, age, disability, sex, sexual orientation, or gender identity.            Thank you!     [facility] you for [facility]  [facility] your [facility]. Our goal is always to provide you with excellent care. Hearing back from our patients is one way we can continue to improve our services. Please take a few minutes to complete the written survey that you may receive in the mail after your visit with us. Thank you!             Your Updated Medication List - " Protect others around you: Learn how to safely use, store and throw away your medicines at www.disposemymeds.org.          This list is accurate as of 3/27/18 10:39 AM.  Always use your most recent med list.                   Brand Name Dispense Instructions for use Diagnosis    aspirin 81 MG tablet      Take 1 tablet by mouth daily.        BASAGLAR 100 UNIT/ML injection     3 mL    Inject 40 Units Subcutaneous daily    Type 2 diabetes mellitus with microalbuminuria, with long-term current use of insulin (H)       blood glucose monitoring lancets     100 each    Use to test blood sugar 3 times daily or as directed.    Type 2 diabetes mellitus with microalbuminuria, with long-term current use of insulin (H)       blood glucose monitoring meter device kit     1 kit    Use to test blood sugar 3 times daily or as directed.    Type 2 diabetes, HbA1c goal < 7% (H)       blood glucose monitoring test strip    ACCU-CHEK PELON PLUS    100 each    Use to test blood sugar 3 times daily or as directed.    Type 2 diabetes mellitus with microalbuminuria, with long-term current use of insulin (H)       cephALEXin 500 MG capsule    KEFLEX    20 capsule    Take 1 capsule (500 mg) by mouth 2 times daily for 10 days    Abscess of buttock, left       cyanocobalamin 1000 MCG/ML injection    VITAMIN B12    3 mL    INJECT 1ML INTO THE MUSCLE EVERY 3O DAYS    Vitamin B 12 deficiency       diclofenac 1 % Gel topical gel    VOLTAREN    100 g    APPLY 4 GRAMS TO KNEES DAILY USING THE ENCLOSED DOSING CARD.    Chronic pain of left knee       insulin pen needle 29G X 12.7MM    BD ULTRA-FINE    100 each    Use once daily or as directed.    Type 2 diabetes, HbA1c goal < 7% (H)       losartan-hydrochlorothiazide 100-25 MG per tablet    HYZAAR    90 tablet    Take 1 tablet by mouth daily    Hypertension goal BP (blood pressure) < 140/90       MELATONIN PO      Take 10 mg by mouth At Bedtime        * oxyCODONE-acetaminophen 5-325 MG per tablet     PERCOCET    120 tablet    Take 1 tablet by mouth every 6 hours as needed for moderate to severe pain Max: 4/day    Chronic pain of left knee, Pain of left knee after injury       * oxyCODONE-acetaminophen 5-325 MG per tablet   Start taking on:  4/8/2018    PERCOCET    120 tablet    Take 1 tablet by mouth every 6 hours as needed for moderate to severe pain maximum 4 tablet (s) per day    Chronic pain of left knee, Pain of left knee after injury       * oxyCODONE-acetaminophen 5-325 MG per tablet   Start taking on:  5/8/2018    PERCOCET    120 tablet    Take 1 tablet by mouth every 6 hours as needed for moderate to severe pain or pain maximum 4 tablet(s) per day    Chronic pain of left knee, Pain of left knee after injury       PARoxetine 20 MG tablet    PAXIL    90 tablet    Take 1 tablet (20 mg) by mouth every morning    Mild recurrent major depression (H)       pramipexole 0.25 MG tablet    MIRAPEX    240 tablet    Take 1 tablet (0.25mg) in morning, 2 tablets (0.5mg) in the afternoon, and take 1 tablet before bed    Restless leg syndrome       simvastatin 40 MG tablet    ZOCOR    90 tablet    Take 1 tablet (40 mg) by mouth At Bedtime    Hyperlipidemia LDL goal <100       * Notice:  This list has 3 medication(s) that are the same as other medications prescribed for you. Read the directions carefully, and ask your doctor or other care provider to review them with you.

## 2018-03-27 NOTE — PATIENT INSTRUCTIONS
Before Your Surgery      Call your surgeon if there is any change in your health. This includes signs of a cold or flu (such as a sore throat, runny nose, cough, rash or fever).    Do not smoke, drink alcohol or take over the counter medicine (unless your surgeon or primary care doctor tells you to) for the 24 hours before and after surgery.    If you take prescribed drugs: Follow your doctor s orders about which medicines to take and which to stop until after surgery.    Eating and drinking prior to surgery: follow the instructions from your surgeon    Take a shower or bath the night before surgery. Use the soap your surgeon gave you to gently clean your skin. If you do not have soap from your surgeon, use your regular soap. Do not shave or scrub the surgery site.  Wear clean pajamas and have clean sheets on your bed.     Pre-operative medication management   1. Stop over the counter vitamins and Fish Oils  2. Stop Asprin/NSAIDSprior to surgery.  3. Do not take Losartan/HCTZ the morning of surgery  4. May take Paxil in the morning of surgery with small sips of water.

## 2018-03-27 NOTE — PATIENT INSTRUCTIONS
Lower extremity edema is mildly present.    At buttocks has a 4 by 6 cm firm area. Left side.  Has some acne in the area.  No obvious connection to the anus and no pain with bowel movement.   Assessment: At buttocks has a 4 by 6 cm firm area. Left side.  Has some acne in the area.  No obvious connection to the anus and no pain with bowel movement.    Plan to do will examine anus and area for possible fistula and then if none will open the abscess open and will leave it open. Will try to place hydrogen peroxide to look for a fistula still.   If has fistula will open the fistula and may need seton.      Risks of surgery include damage to nerves, bleeding, infection, damage to  Vessels, recurrence.  Being Anal incontinent.

## 2018-03-27 NOTE — LETTER
"    3/27/2018         RE: Cedric Figueroa  [address]  [place] [address]        Dear Colleague,    Thank you for referring your patient, Cedric Figueroa, to the [facility]. Please see a copy of my visit note below.    Dear Molly Emerson  I was asked to see this patient by Molly Leiva for please see below.  I have seen Cedric Figueroa and as you know his chief complaint is cyst on the left butt cheek.   Was there for a month then came back. Started on antibiotics and is a little better.     HPI:  Patient is a 62 year old male  with complaints see above  The patient noticed the symptoms about 1 month ago.  Really was swollen about 10 days ago  Patient has not family history of infected cysts problems  antbioitcs makes the episode better.      Review Of Systems  Respiratory: No shortness of breath, dyspnea on exertion, cough, or hemoptysis  Cardiovascular: negative  Gastrointestinal: negative  Endocrine: diabetes  :  negative  /65  Pulse 74  Ht 1.88 m (6' 2\")  Wt 109.8 kg (242 lb)  BMI 31.07 kg/m2    Past Medical History:   Diagnosis Date     Hyperlipidemia LDL goal <100 2/28/2012     Hypertension goal BP (blood pressure) < 130/80 2/28/2012     Mild major depression (H) 2/28/2012     Restless leg syndrome     controlled on Mirapex     Sleep apnea      Tobacco use     cigar use     Type 2 diabetes, HbA1c goal < 7% (H) 2/28/2012       Past Surgical History:   Procedure Laterality Date     ABDOMEN SURGERY  2015    peritonitis, bowel perforation, bowel resection     AS TOTAL KNEE ARTHROPLASTY      Right and Left knee x3( 2 partial knee replacement and 1 total Left knee replacement     BARIATRIC SURGERY      at age 45     COLONOSCOPY  2/1/2006    [facility]     COLONOSCOPY WITH CO2 INSUFFLATION N/A 6/7/2016    Procedure: COLONOSCOPY WITH CO2 INSUFFLATION;  Surgeon: Cedric Schneider MD;  Location:  OR       Social History     Social History     Marital " status:      Spouse name: N/A     Number of children: N/A     Years of education: N/A     Occupational History     Not on file.     Social History Main Topics     Smoking status: Former Smoker     Types: Cigarettes     Quit date: 1/1/2012     Smokeless tobacco: Never Used     Alcohol use No     Drug use: No     Sexual activity: No     Other Topics Concern     Not on file     Social History Narrative       Current Outpatient Prescriptions   Medication Sig Dispense Refill     cephALEXin (KEFLEX) 500 MG capsule Take 1 capsule (500 mg) by mouth 2 times daily for 10 days 20 capsule 0     oxyCODONE-acetaminophen (PERCOCET) 5-325 MG per tablet Take 1 tablet by mouth every 6 hours as needed for moderate to severe pain Max: 4/day 120 tablet 0     [START ON 4/8/2018] oxyCODONE-acetaminophen (PERCOCET) 5-325 MG per tablet Take 1 tablet by mouth every 6 hours as needed for moderate to severe pain maximum 4 tablet (s) per day 120 tablet 0     [START ON 5/8/2018] oxyCODONE-acetaminophen (PERCOCET) 5-325 MG per tablet Take 1 tablet by mouth every 6 hours as needed for moderate to severe pain or pain maximum 4 tablet(s) per day 120 tablet 0     BASAGLAR 100 UNIT/ML injection Inject 40 Units Subcutaneous daily 3 mL 11     blood glucose monitoring (ACCU-CHEK PELON PLUS) test strip Use to test blood sugar 3 times daily or as directed. 100 each 11     blood glucose monitoring (SOFTCLIX) lancets Use to test blood sugar 3 times daily or as directed. 100 each 3     losartan-hydrochlorothiazide (HYZAAR) 100-25 MG per tablet Take 1 tablet by mouth daily 90 tablet 3     diclofenac (VOLTAREN) 1 % GEL topical gel APPLY 4 GRAMS TO KNEES DAILY USING THE ENCLOSED DOSING CARD. 100 g 1     simvastatin (ZOCOR) 40 MG tablet Take 1 tablet (40 mg) by mouth At Bedtime 90 tablet 3     PARoxetine (PAXIL) 20 MG tablet Take 1 tablet (20 mg) by mouth every morning 90 tablet 3     cyanocobalamin (VITAMIN B12) 1000 MCG/ML injection INJECT 1ML INTO THE  "MUSCLE EVERY 3O DAYS 3 mL 6     pramipexole (MIRAPEX) 0.25 MG tablet Take 1 tablet (0.25mg) in morning, 2 tablets (0.5mg) in the afternoon, and take 1 tablet before bed 240 tablet 3     MELATONIN PO Take 10 mg by mouth At Bedtime        blood glucose monitoring (ACCU-CHEK PELON PLUS) meter device kit Use to test blood sugar 3 times daily or as directed. 1 kit 0     insulin pen needle (BD ULTRA-FINE) 29G X 12.7MM Use once daily or as directed. 100 each prn     aspirin 81 MG tablet Take 1 tablet by mouth daily.         10 Point review of systems all others are negative.   Above was reviewed  Physical exam: /65  Pulse 74  Ht 1.88 m (6' 2\")  Wt 109.8 kg (242 lb)  BMI 31.07 kg/m2   Patient able to get up on table without difficulty.   Patient is alert and orientated.   Head eyes, nose and mouth within normal limits.  No supraclavicular or cervical adenopathy palpated.  Thyroid within normal limits.  No carotid bruits auscultated.  Lungs are clear to auscultation  Heart is regular rate and rhythm with no murmur or thrills noted.  No costal vertebral angle tenderness noted.  Abdomen is abdomen is soft without significant tenderness,   Skin was warm and pink  Normal Affect      Lower extremity edema is mildly present.    At buttocks has a 4 by 6 cm firm area. Left side.  Has some acne in the area.  No obvious connection to the anus and no pain with bowel movement.   Assessment: At buttocks has a 4 by 6 cm firm area. Left side.  Has some acne in the area.  No obvious connection to the anus and no pain with bowel movement.    Plan to do will examine anus and area for possible fistula and then if none will open the abscess open and will leave it open. Will try to place hydrogen peroxide to look for a fistula still.   If has fistula will open the fistula and may need seton.      Risks of surgery include damage to nerves, bleeding, infection, damage to  Vessels, recurrence.  Being Anal incontinent.     Time spent with " the patient with greater that 50% of the time in discussion was 30 minutes.  In discussing the cyst and plan.      Bala Layton MD      Again, thank you for allowing me to participate in the care of your patient.        Sincerely,        Bala Layton MD

## 2018-03-27 NOTE — MR AVS SNAPSHOT
After Visit Summary   3/27/2018    Cedric Figueroa    MRN: [id number]           Patient Information     Date Of Birth          1955        Visit Information        Provider Department      3/27/2018 3:00 PM Molly Leiva MD [facility]        Today's Diagnoses     Preop general physical exam    -  1    Perirectal abscess        Type 2 diabetes mellitus with hyperglycemia, with long-term current use of insulin (H)          Care Instructions      Before Your Surgery      Call your surgeon if there is any change in your health. This includes signs of a cold or flu (such as a sore throat, runny nose, cough, rash or fever).    Do not smoke, drink alcohol or take over the counter medicine (unless your surgeon or primary care doctor tells you to) for the 24 hours before and after surgery.    If you take prescribed drugs: Follow your doctor s orders about which medicines to take and which to stop until after surgery.    Eating and drinking prior to surgery: follow the instructions from your surgeon    Take a shower or bath the night before surgery. Use the soap your surgeon gave you to gently clean your skin. If you do not have soap from your surgeon, use your regular soap. Do not shave or scrub the surgery site.  Wear clean pajamas and have clean sheets on your bed.     Pre-operative medication management   1. Stop over the counter vitamins and Fish Oils  2. Stop Asprin/NSAIDSprior to surgery.  3. Do not take Losartan/HCTZ the morning of surgery  4. May take Paxil in the morning of surgery with small sips of water.            Follow-ups after your visit        Follow-up notes from your care team     Return in about 3 months (around 6/27/2018) for Diabetes Follow Up with a HgbA1C prior to visit.      Your next 10 appointments already scheduled     Mar 28, 2018   Procedure with Bala Layton MD   [facility] (--)    [address] [place] [address]    [phone]            Apr 10, 2018  8:30 AM CDT   [facility] Op [facility]ala [facility]ton MD   [facility]    [address] [facility] [address] [phone]              Who to contact     Normal or non-critical lab and imaging results will be communicated to you by PlusBlue Solutionshart, letter or phone within 4 business days after the clinic has received the results. If you do not hear from us within 7 days, please contact the clinic through PlusBlue Solutionshart or phone. If you have a critical or abnormal lab result, we will notify you by phone as soon as possible.  Submit refill requests through Proxima Cancion or call your pharmacy and they will forward the refill request to us. Please allow 3 business days for your refill to be completed.          If you need to speak with a  for additional information , please call: [phone]             Additional Information About Your Visit        Proxima Cancion Information     Pro[employer] Cancion gives you secure access to your electronic health record. If you see a primary care provider, you can also send messages to your care team and make appointments. If you have questions, please call your primary care clinic.  If you do not have a primary care provider, please call [phone] and they will assist you.        Care EveryWhere ID     This is your Care EveryWhere ID. This could be used by other organizations to access your [facility] medical records  [id number]        Your Vitals Were     Pulse Temperature Pulse Oximetry BMI (Body Mass Index)          94 98.3  F (36.8  C) (Tympanic) 99% 31.2 kg/m2         Blood Pressure from Last 3 Encounters:   03/27/18 118/71   03/27/18 134/65   03/23/18 102/76    Weight from Last 3 Encounters:   03/27/18 243 lb (110.2 kg)   03/27/18 242 lb (109.8 kg)   03/23/18 244 lb 9.6 oz (110.9 kg)              We Performed the Following     Basic metabolic panel     CBC with platelets     Hemoglobin A1c        Primary Care  Provider Office Phone # Fax #    Molly Leiva [phone]       [address] [facility] [address]        Equal Access to Services     [facility] : Hadii kiran ku parveeno Sosean, waaxda luqadaha, qaybta kaalmada tam, khushbu hakay miguel. So [facility] [phone].    ATENCIÓN: Si habla español, tiene a kim disposición servicios gratuitos de asistencia lingüística. Llame al [phone].    We comply with applicable federal civil rights laws and [place] laws. We do not discriminate on the basis of race, color, national origin, age, disability, sex, sexual orientation, or gender identity.            Thank you!     Thank you for choosing [facility]  for your care. Our goal is always to provide you with excellent care. Hearing back from our patients is one way we can continue to improve our services. Please take a few minutes to complete the written survey that you may receive in the mail after your visit with us. Thank you!             Your Updated Medication List - Protect others around you: Learn how to safely use, store and throw away your medicines at www.disposemymeds.org.          This list is accurate as of 3/27/18  3:49 PM.  Always use your most recent med list.                   Brand Name Dispense Instructions for use Diagnosis    aspirin 81 MG tablet      Take 1 tablet by mouth daily.        BASAGLAR 100 UNIT/ML injection     3 mL    Inject 40 Units Subcutaneous daily    Type 2 diabetes mellitus with microalbuminuria, with long-term current use of insulin (H)       blood glucose monitoring lancets     100 each    Use to test blood sugar 3 times daily or as directed.    Type 2 diabetes mellitus with microalbuminuria, with long-term current use of insulin (H)       blood glucose monitoring meter device kit     1 kit    Use to test blood sugar 3 times daily or as directed.    Type 2 diabetes, HbA1c goal < 7% (H)       blood glucose monitoring test  strip    ACCU-CHEK PELON PLUS    100 each    Use to test blood sugar 3 times daily or as directed.    Type 2 diabetes mellitus with microalbuminuria, with long-term current use of insulin (H)       cephALEXin 500 MG capsule    KEFLEX    20 capsule    Take 1 capsule (500 mg) by mouth 2 times daily for 10 days    Abscess of buttock, left       cyanocobalamin 1000 MCG/ML injection    VITAMIN B12    3 mL    INJECT 1ML INTO THE MUSCLE EVERY 3O DAYS    Vitamin B 12 deficiency       diclofenac 1 % Gel topical gel    VOLTAREN    100 g    APPLY 4 GRAMS TO KNEES DAILY USING THE ENCLOSED DOSING CARD.    Chronic pain of left knee       insulin pen needle 29G X 12.7MM    BD ULTRA-FINE    100 each    Use once daily or as directed.    Type 2 diabetes, HbA1c goal < 7% (H)       losartan-hydrochlorothiazide 100-25 MG per tablet    HYZAAR    90 tablet    Take 1 tablet by mouth daily    Hypertension goal BP (blood pressure) < 140/90       MELATONIN PO      Take 10 mg by mouth At Bedtime        * oxyCODONE-acetaminophen 5-325 MG per tablet    PERCOCET    120 tablet    Take 1 tablet by mouth every 6 hours as needed for moderate to severe pain Max: 4/day    Chronic pain of left knee, Pain of left knee after injury       * oxyCODONE-acetaminophen 5-325 MG per tablet   Start taking on:  5/8/2018    PERCOCET    120 tablet    Take 1 tablet by mouth every 6 hours as needed for moderate to severe pain or pain maximum 4 tablet(s) per day    Chronic pain of left knee, Pain of left knee after injury       PARoxetine 20 MG tablet    PAXIL    90 tablet    Take 1 tablet (20 mg) by mouth every morning    Mild recurrent major depression (H)       pramipexole 0.25 MG tablet    MIRAPEX    240 tablet    Take 1 tablet (0.25mg) in morning, 2 tablets (0.5mg) in the afternoon, and take 1 tablet before bed    Restless leg syndrome       simvastatin 40 MG tablet    ZOCOR    90 tablet    Take 1 tablet (40 mg) by mouth At Bedtime    Hyperlipidemia LDL goal <100        * Notice:  This list has 2 medication(s) that are the same as other medications prescribed for you. Read the directions carefully, and ask your doctor or other care provider to review them with you.

## 2018-03-28 ENCOUNTER — ANESTHESIA (OUTPATIENT)
Dept: SURGERY | Facility: AMBULATORY SURGERY CENTER | Age: 63
End: 2018-03-28
Payer: COMMERCIAL

## 2018-03-28 ENCOUNTER — HOSPITAL ENCOUNTER (OUTPATIENT)
Facility: AMBULATORY SURGERY CENTER | Age: 63
Discharge: HOME | End: 2018-03-28
Attending: SURGERY | Admitting: SURGERY
Payer: COMMERCIAL

## 2018-03-28 ENCOUNTER — SURGERY (OUTPATIENT)
Age: 63
End: 2018-03-28

## 2018-03-28 VITALS
DIASTOLIC BLOOD PRESSURE: 68 MMHG | SYSTOLIC BLOOD PRESSURE: 110 MMHG | RESPIRATION RATE: 16 BRPM | TEMPERATURE: 98.3 F | OXYGEN SATURATION: 97 %

## 2018-03-28 DIAGNOSIS — K61.1 PERIRECTAL ABSCESS: Primary | ICD-10-CM

## 2018-03-28 DIAGNOSIS — M25.562 PAIN OF LEFT KNEE AFTER INJURY: ICD-10-CM

## 2018-03-28 DIAGNOSIS — M25.562 CHRONIC PAIN OF LEFT KNEE: ICD-10-CM

## 2018-03-28 DIAGNOSIS — G89.29 CHRONIC PAIN OF LEFT KNEE: ICD-10-CM

## 2018-03-28 LAB — GLUCOSE BLDC GLUCOMTR-MCNC: 102 MG/DL (ref 70–99)

## 2018-03-28 PROCEDURE — 46270 REMOVE ANAL FIST SUBQ: CPT

## 2018-03-28 PROCEDURE — 10060 I&D ABSCESS SIMPLE/SINGLE: CPT | Performed by: SURGERY

## 2018-03-28 PROCEDURE — 10060 I&D ABSCESS SIMPLE/SINGLE: CPT

## 2018-03-28 PROCEDURE — G8916 PT W IV AB GIVEN ON TIME: HCPCS

## 2018-03-28 PROCEDURE — G8907 PT DOC NO EVENTS ON DISCHARG: HCPCS

## 2018-03-28 RX ORDER — FENTANYL CITRATE 50 UG/ML
INJECTION, SOLUTION INTRAMUSCULAR; INTRAVENOUS PRN
Status: DISCONTINUED | OUTPATIENT
Start: 2018-03-28 | End: 2018-03-28

## 2018-03-28 RX ORDER — FENTANYL CITRATE 50 UG/ML
25-50 INJECTION, SOLUTION INTRAMUSCULAR; INTRAVENOUS
Status: DISCONTINUED | OUTPATIENT
Start: 2018-03-28 | End: 2018-03-29 | Stop reason: HOSPADM

## 2018-03-28 RX ORDER — NALOXONE HYDROCHLORIDE 0.4 MG/ML
.1-.4 INJECTION, SOLUTION INTRAMUSCULAR; INTRAVENOUS; SUBCUTANEOUS
Status: DISCONTINUED | OUTPATIENT
Start: 2018-03-28 | End: 2018-03-29 | Stop reason: HOSPADM

## 2018-03-28 RX ORDER — CEFAZOLIN SODIUM 1 G/3ML
1 INJECTION, POWDER, FOR SOLUTION INTRAMUSCULAR; INTRAVENOUS SEE ADMIN INSTRUCTIONS
Status: DISCONTINUED | OUTPATIENT
Start: 2018-03-28 | End: 2018-03-29 | Stop reason: HOSPADM

## 2018-03-28 RX ORDER — ALBUTEROL SULFATE 90 UG/1
AEROSOL, METERED RESPIRATORY (INHALATION) PRN
Status: DISCONTINUED | OUTPATIENT
Start: 2018-03-28 | End: 2018-03-28

## 2018-03-28 RX ORDER — HYDROMORPHONE HYDROCHLORIDE 1 MG/ML
.3-.5 INJECTION, SOLUTION INTRAMUSCULAR; INTRAVENOUS; SUBCUTANEOUS EVERY 10 MIN PRN
Status: DISCONTINUED | OUTPATIENT
Start: 2018-03-28 | End: 2018-03-29 | Stop reason: HOSPADM

## 2018-03-28 RX ORDER — BUPIVACAINE HYDROCHLORIDE AND EPINEPHRINE 2.5; 5 MG/ML; UG/ML
INJECTION, SOLUTION INFILTRATION; PERINEURAL PRN
Status: DISCONTINUED | OUTPATIENT
Start: 2018-03-28 | End: 2018-03-28 | Stop reason: HOSPADM

## 2018-03-28 RX ORDER — MEPERIDINE HYDROCHLORIDE 25 MG/ML
12.5 INJECTION INTRAMUSCULAR; INTRAVENOUS; SUBCUTANEOUS
Status: DISCONTINUED | OUTPATIENT
Start: 2018-03-28 | End: 2018-03-29 | Stop reason: HOSPADM

## 2018-03-28 RX ORDER — PROPOFOL 10 MG/ML
INJECTION, EMULSION INTRAVENOUS CONTINUOUS PRN
Status: DISCONTINUED | OUTPATIENT
Start: 2018-03-28 | End: 2018-03-28

## 2018-03-28 RX ORDER — ONDANSETRON 2 MG/ML
4 INJECTION INTRAMUSCULAR; INTRAVENOUS EVERY 30 MIN PRN
Status: DISCONTINUED | OUTPATIENT
Start: 2018-03-28 | End: 2018-03-29 | Stop reason: HOSPADM

## 2018-03-28 RX ORDER — GABAPENTIN 300 MG/1
300 CAPSULE ORAL ONCE
Status: COMPLETED | OUTPATIENT
Start: 2018-03-28 | End: 2018-03-28

## 2018-03-28 RX ORDER — OXYCODONE HYDROCHLORIDE 5 MG/1
5 TABLET ORAL
Qty: 30 TABLET | Refills: 0 | Status: SHIPPED | OUTPATIENT
Start: 2018-03-28 | End: 2018-09-19

## 2018-03-28 RX ORDER — KETOROLAC TROMETHAMINE 30 MG/ML
30 INJECTION, SOLUTION INTRAMUSCULAR; INTRAVENOUS EVERY 6 HOURS PRN
Status: DISCONTINUED | OUTPATIENT
Start: 2018-03-28 | End: 2018-03-29 | Stop reason: HOSPADM

## 2018-03-28 RX ORDER — ACETAMINOPHEN 325 MG/1
975 TABLET ORAL ONCE
Status: COMPLETED | OUTPATIENT
Start: 2018-03-28 | End: 2018-03-28

## 2018-03-28 RX ORDER — ONDANSETRON 2 MG/ML
INJECTION INTRAMUSCULAR; INTRAVENOUS PRN
Status: DISCONTINUED | OUTPATIENT
Start: 2018-03-28 | End: 2018-03-28

## 2018-03-28 RX ORDER — LIDOCAINE HYDROCHLORIDE 20 MG/ML
INJECTION, SOLUTION INFILTRATION; PERINEURAL PRN
Status: DISCONTINUED | OUTPATIENT
Start: 2018-03-28 | End: 2018-03-28

## 2018-03-28 RX ORDER — CEFAZOLIN SODIUM 2 G/100ML
2 INJECTION, SOLUTION INTRAVENOUS
Status: COMPLETED | OUTPATIENT
Start: 2018-03-28 | End: 2018-03-28

## 2018-03-28 RX ORDER — DEXAMETHASONE SODIUM PHOSPHATE 4 MG/ML
INJECTION, SOLUTION INTRA-ARTICULAR; INTRALESIONAL; INTRAMUSCULAR; INTRAVENOUS; SOFT TISSUE PRN
Status: DISCONTINUED | OUTPATIENT
Start: 2018-03-28 | End: 2018-03-28

## 2018-03-28 RX ORDER — SODIUM CHLORIDE, SODIUM LACTATE, POTASSIUM CHLORIDE, CALCIUM CHLORIDE 600; 310; 30; 20 MG/100ML; MG/100ML; MG/100ML; MG/100ML
INJECTION, SOLUTION INTRAVENOUS CONTINUOUS
Status: DISCONTINUED | OUTPATIENT
Start: 2018-03-28 | End: 2018-03-29 | Stop reason: HOSPADM

## 2018-03-28 RX ORDER — ONDANSETRON 4 MG/1
4 TABLET, ORALLY DISINTEGRATING ORAL EVERY 30 MIN PRN
Status: DISCONTINUED | OUTPATIENT
Start: 2018-03-28 | End: 2018-03-29 | Stop reason: HOSPADM

## 2018-03-28 RX ORDER — DEXAMETHASONE SODIUM PHOSPHATE 4 MG/ML
4 INJECTION, SOLUTION INTRA-ARTICULAR; INTRALESIONAL; INTRAMUSCULAR; INTRAVENOUS; SOFT TISSUE EVERY 10 MIN PRN
Status: DISCONTINUED | OUTPATIENT
Start: 2018-03-28 | End: 2018-03-29 | Stop reason: HOSPADM

## 2018-03-28 RX ORDER — LIDOCAINE 40 MG/G
CREAM TOPICAL
Status: DISCONTINUED | OUTPATIENT
Start: 2018-03-28 | End: 2018-03-29 | Stop reason: HOSPADM

## 2018-03-28 RX ORDER — PROPOFOL 10 MG/ML
INJECTION, EMULSION INTRAVENOUS PRN
Status: DISCONTINUED | OUTPATIENT
Start: 2018-03-28 | End: 2018-03-28

## 2018-03-28 RX ORDER — ALBUTEROL SULFATE 0.83 MG/ML
2.5 SOLUTION RESPIRATORY (INHALATION) EVERY 4 HOURS PRN
Status: DISCONTINUED | OUTPATIENT
Start: 2018-03-28 | End: 2018-03-29 | Stop reason: HOSPADM

## 2018-03-28 RX ORDER — OXYCODONE HYDROCHLORIDE 5 MG/1
5 TABLET ORAL ONCE
Status: COMPLETED | OUTPATIENT
Start: 2018-03-28 | End: 2018-03-28

## 2018-03-28 RX ADMIN — PROPOFOL 150 MG: 10 INJECTION, EMULSION INTRAVENOUS at 10:56

## 2018-03-28 RX ADMIN — Medication: at 12:02

## 2018-03-28 RX ADMIN — PROPOFOL 200 MCG/KG/MIN: 10 INJECTION, EMULSION INTRAVENOUS at 10:59

## 2018-03-28 RX ADMIN — ACETAMINOPHEN 975 MG: 325 TABLET ORAL at 09:36

## 2018-03-28 RX ADMIN — KETOROLAC TROMETHAMINE 30 MG: 30 INJECTION, SOLUTION INTRAMUSCULAR; INTRAVENOUS at 11:23

## 2018-03-28 RX ADMIN — GABAPENTIN 300 MG: 300 CAPSULE ORAL at 09:36

## 2018-03-28 RX ADMIN — BUPIVACAINE HYDROCHLORIDE AND EPINEPHRINE 25 ML: 2.5; 5 INJECTION, SOLUTION INFILTRATION; PERINEURAL at 11:26

## 2018-03-28 RX ADMIN — Medication 30 MG: at 11:08

## 2018-03-28 RX ADMIN — LIDOCAINE HYDROCHLORIDE 60 MG: 20 INJECTION, SOLUTION INFILTRATION; PERINEURAL at 10:56

## 2018-03-28 RX ADMIN — OXYCODONE HYDROCHLORIDE 5 MG: 5 TABLET ORAL at 12:08

## 2018-03-28 RX ADMIN — FENTANYL CITRATE 50 MCG: 50 INJECTION, SOLUTION INTRAMUSCULAR; INTRAVENOUS at 10:56

## 2018-03-28 RX ADMIN — ALBUTEROL SULFATE 4 PUFF: 90 INHALANT RESPIRATORY (INHALATION) at 11:10

## 2018-03-28 RX ADMIN — ONDANSETRON 4 MG: 2 INJECTION INTRAMUSCULAR; INTRAVENOUS at 11:21

## 2018-03-28 RX ADMIN — SODIUM CHLORIDE, SODIUM LACTATE, POTASSIUM CHLORIDE, CALCIUM CHLORIDE: 600; 310; 30; 20 INJECTION, SOLUTION INTRAVENOUS at 10:49

## 2018-03-28 RX ADMIN — DEXAMETHASONE SODIUM PHOSPHATE 10 MG: 4 INJECTION, SOLUTION INTRA-ARTICULAR; INTRALESIONAL; INTRAMUSCULAR; INTRAVENOUS; SOFT TISSUE at 11:21

## 2018-03-28 RX ADMIN — CEFAZOLIN SODIUM 2 G: 2 INJECTION, SOLUTION INTRAVENOUS at 10:49

## 2018-03-28 ASSESSMENT — LIFESTYLE VARIABLES: TOBACCO_USE: 1

## 2018-03-28 NOTE — ANESTHESIA POSTPROCEDURE EVALUATION
Patient: Cedric Figueroa    Procedure(s):  Incision and drainage of left buttock abscess, and fistulotomy - Wound Class: II-Clean Contaminated    Diagnosis:Left abcess  Diagnosis Additional Information: No value filed.    Anesthesia Type:  General, ETT    Note:  Anesthesia Post Evaluation    Patient location during evaluation: Phase 2 and PACU  Patient participation: Able to fully participate in evaluation  Level of consciousness: awake and alert  Pain management: adequate  Airway patency: patent  Cardiovascular status: acceptable  Respiratory status: acceptable  Hydration status: acceptable  PONV: none     Anesthetic complications: None          Last vitals:  Vitals:    03/28/18 1200 03/28/18 1208 03/28/18 1225   BP: 98/56  103/54   Resp: 16  16   Temp:      SpO2: 99% 97% 97%         Electronically Signed By: Jay Vega DO  March 28, 2018  12:57 PM

## 2018-03-28 NOTE — ANESTHESIA PREPROCEDURE EVALUATION
Anesthesia Evaluation     . Pt has had prior anesthetic.     No history of anesthetic complications          ROS/MED HX    ENT/Pulmonary:     (+)sleep apnea, tobacco use, Current use , . .    Neurologic:  - neg neurologic ROS     Cardiovascular:     (+) Dyslipidemia, hypertension----. : . . . :. . Previous cardiac testing Echodate:results:Interpretation Summary  No pathologic basis for murmur identified.  Left ventricular function, chamber size, wall motion, and wall thickness are   normal.The EF is  60-65%.date: results:ECG reviewed date: results:NSR date: results:          METS/Exercise Tolerance:  >4 METS   Hematologic:  - neg hematologic  ROS       Musculoskeletal:   (+) arthritis, , , other musculoskeletal (sciatica)-       GI/Hepatic: Comment: H/O Bariatric surgery - neg GI/hepatic ROS   (+) Other GI/Hepatic H/O bowel perforation     (-) GERD   Renal/Genitourinary:  - ROS Renal section negative       Endo:     (+) type II DM Last HgA1c: 6.6 Using insulin .      Psychiatric:  - neg psychiatric ROS       Infectious Disease:  - neg infectious disease ROS       Malignancy:      - no malignancy   Other:    (+) H/O Chronic Pain,H/O chronic opiod use ,                    Physical Exam  Normal systems: cardiovascular, pulmonary and dental    Airway   Mallampati: I  TM distance: >3 FB  Neck ROM: full    Dental     Cardiovascular   Rhythm and rate: regular and normal      Pulmonary    breath sounds clear to auscultation                    Anesthesia Plan      History & Physical Review  History and physical reviewed and following examination; no interval change.    ASA Status:  2 .    NPO Status:  > 8 hours    Plan for General and ETT with Intravenous and Propofol induction. Maintenance will be TIVA.    PONV prophylaxis:  Ondansetron (or other 5HT-3) and Dexamethasone or Solumedrol       Postoperative Care  Postoperative pain management:  Multi-modal analgesia.      Consents  Anesthetic plan, risks, benefits and  alternatives discussed with:  Patient.  Use of blood products discussed: No .   .                          .

## 2018-03-28 NOTE — IP AVS SNAPSHOT
[facility] [place]    [address] [place] [address]    Phone:  [phone]                                       After Visit Summary   3/28/2018    Cedric Figueroa    MRN: [id number]           After Visit Summary Signature Page     I have received my discharge instructions, and my questions have been answered. I have discussed any challenges I see with this plan with the nurse or doctor.    ..........................................................................................................................................  Patient/Patient Representative Signature      ..........................................................................................................................................  Patient Representative Print Name and Relationship to Patient    ..................................................               ................................................  Date                                            Time    ..........................................................................................................................................  Reviewed by Signature/Title    ...................................................              ..............................................  Date                                                            Time

## 2018-03-28 NOTE — OP NOTE
Procedure Date: 03/28/2018      OPERATIVE REPORT:      PREOPERATIVE DIAGNOSES:  Left buttocks abscess, improving but not getting completely better with oral antibiotics; also rule out possible fistula.      POSTOPERATIVE DIAGNOSES:  Small anal fistula anteriorly, had nothing to do with his abscess; left buttocks abscess that was opened and drained and excess tissue was removed and debrided.      ANESTHESIA:  General anesthetic with 0.25% Marcaine injected into the sites prior to and at the end of the procedure.   PROCEDURE: incision and drainage of left buttock abscess. Fistulotomy, and examination of the anal area for fistula under  General anesthesia .     SURGEON:  Bala Layton MD      ESTIMATED BLOOD LOSS:  Less than 8 mL      INDICATIONS:  The patient is a 62-year-old male who has a left buttocks abscess that he has seen some improvement with oral antibiotics but it is still very tender when he sits on it.  He has had this opened up and can feel the soft area on it.  It is a little far away from the anus; however, I am a little concerned, especially with him being diabetic, that there is a possibility of a fistula, so we discussed taking a look at that area; if there was, to go ahead and divide it.  Discussed other risks and complications, including bleeding, infection, damage to the sphincter muscle, possible seton, that we would laid the fistula open if we see one and we would leave the abscess site opened so it can drain and heal from the inside out.  I also discussed how to remove the packing and what to expect.  He does take narcotics on a regular basis for his knee, so we went with oxycodone so we would not have to worry about Tylenol.      DESCRIPTION OF PROCEDURE:  The patient was brought to the OR, placed in supine position.  After receiving general anesthesia, he was then placed in the jackknife position.  After sterile prep and drape were able to examine the anal area well, did not see any  obvious openings except anteriorly there was a fairly opened, and may be a cm long tissue going across there which seemed like skin or scar tissue.  I did divide this with cautery.  I looked at the area and did not see anything going anywhere else, but there was some stool in this spot, so I think it will be easier for the patient to keep this area clean.  It was not involving the sphincter muscle at all.  Then after doing that, I probed just at that area just to see if there was an opening at that site going to the left buttocks, did not feel that there was anything going to that direction.  So then we made a small nick in the abscess site.  There really was not any pus, but I did put some hydrogen peroxide into the abscess with some pressure and tried to massage things towards the anus and then looking in the anus to see if there was any hydrogen peroxide coming through; I did not see anything.  I then opened up the abscess from one edge to the other; opened up a couple of spots that looked like they were walling off but not necessarily abscessed at this time, so that it would be easier to pack.  Then I took a little excess skin on the area towards the anus off so that it would heal from the inside out before the skin closes, then packed it with half inch iodoform soaked in Marcaine with some fluffs on top of it and ABD pad.  Hemostasis was achieved with pressure and cautery.  I did also inject some Marcaine around the anal area where we had just divided that anal fistula and around the abscess site.  The patient did receive antibiotics preoperatively.      PLAN:  Is discharge to home today, have him remove the packing on Friday or Saturday unless it falls out earlier; to use pressure for any bleeding.  See him back in a couple of weeks to see how he is doing.         AMANDA WILDE MD             D: 03/28/2018   T: 03/28/2018   MT: LORNA      Name:     MACARENA BOLDEN   MRN:      [id number]        Account:         [id number]   :      1955           Procedure Date: 2018      Document: [id number]       cc: Molly Leiva MD

## 2018-03-28 NOTE — IP AVS SNAPSHOT
MRN:[id number]                      After Visit Summary   3/28/2018    Cedric Figueroa    MRN: [id number]           Thank you!     Thank you for choosing [facility] for your care. Our goal is always to provide you with excellent care. Hearing back from our patients is one way we can continue to improve our services. Please take a few minutes to complete the written survey that you may receive in the mail after you visit with us. Thank you!        Patient Information     Date Of Birth          1955        About your hospital stay     You were admitted on:  March 28, 2018 [place]  [facility]    You were discharged on:  March 28, 2018       Who to Call     For medical emergencies, please call 911.  For non-urgent questions about your medical care, please call your primary care provider or clinic, [phone]  For questions related to your surgery, please call your surgery clinic        Attending Provider     Provider Specialty    Bala Wilde MD Surgery       Primary Care Provider Office Phone # Fax #    Molly Leiva [phone]      Your next 10 appointments already scheduled     Apr 10, 2018  8:30 AM CDT   Post Op with Bala Wilde MD   [facility] ([facility]    [id number] [address] [phone]              Further instructions from your care team       HERNIORRHAPHY DISCHARGE INSTRUCTIONS  DR. BALA WILDE    1. You may resume your regular diet when you feel you are ready to. DO NOT drink alcoholic beverages for 24 hours or while you are taking prescription medication.    2. Limit your activities for the first 48 hours. Gradually, increase them as tolerated. You may use stairs. I encourage you to walk as tolerated.     3. You will have some discomfort at the incision sites. This is expected. This should improve over the next 2-3 days. Ice and pain  medication will help with this pain. Use prescribed pain medication as instructed.    4. Bruising and mild swelling is normal after surgery. For males it is common to have bruising going into the penis and scrotum. The area below and around the incision(s) will be hard and elevated. This is normal. I call it the healing ridge. This will resolve slowly over the next several months. If you feel the pain is increasing and cannot explain it by increasing activity please call us at [phone].    5. The dressing will often have some blood on it. You may shower 24 hours after surgery. Clean gently over incision site. If clear plastic covering or steri-strip comes off and there is still some bleeding or drainage then cover with gauze or band-aid. If no bleeding, there is no reason to cover site. The abdominal binder may be removed after 24 hours after surgery. You may continue to wear it however for comfort. I suggest  you wear an old t-shirt under the abdominal binder for a more comfortable wear.    6. Avoid Aspirin for the first 72 hours after the procedure. This medication may increase the tendency to bleed.    7. Use the following medications (in addition to your normal meds) as shown:  a. Percocet 5 mg 1-2 every 6 hours as needed for severe pain. This contains 325 mg of Tylenol (acetaminophen) per tablet.  Please do not take more than 4 grams of Tylenol (acetaminophen) per day. For example, you may take 1 Percocet and 1 Tylenol, or 2 Percocet and no Tylenol, or 2 Tylenol and no Percocet every 6 hours.  b. Tylenol (acetaminophen) 500 mg every 6 hours as needed for mild pain. Do not take more than 1000 mg every 6 hours. (see above).  c. Motrin (ibuprofen) 200-800 mg every 6 hours as needed for mild to moderate pain. Take with food.     8. Notify Dr. Layton's clinic at [phone] if:    Your discomfort is not relieved by your pain medication.    You have signs of infection such as temperature above 100.5  degrees orally, chills, or increasing daily discomfort.    Incision site is becoming more red and/or there is purulent drainage.    You have questions or concerns.    9. Please call [phone] to schedule a follow up appointment in about 2 weeks.    10. When taking narcotics (pain medication more than Tylenol [acetaminophen] and Motrin [ibuprofen]) it is important to keep your stools soft to avoid constipation and pain with straining. This is best done by drinking fluids (non-alcoholic and non-caffeinated) and taking a stool softener (i.e. Metamucil or milk of magnesia). You may be able to use non-narcotics for pain relief especially by the 3rd post- operative day. Tylenol (acetaminophen) 500 mg every 6 hours and/or Motrin (ibuprofen) 200-800 mg every 6 hours. Please do not take more than 4 grams of Tylenol (acetaminophen) per day. Remember your Percocet does have Tylenol (acetaminophen) already in it. Please take Motrin (ibuprofen) with food to help protect the stomach. If you have a history of stomach ulcers or stomach problems, do not take Motrin (ibuprofen).     11. Do not drive or operate heavy machinery for 24 hours after surgery or when taking narcotics. You may resume driving when feel that you can safely avoid an accident and are not taking narcotics. This is usually 5 to 7 days after surgery. You should not be alone for 24 hours after surgery.    12. Have milk of magnesia available at home so that when you take the pain medications you take 1-2 teaspoons a day,  to help reduce problems with constipation.      Pull the packing out after getting it wet in the shower.  Do this on day 2-3.  It is best to pull the packing quickly and if concern take a pain pill 30 minutes before removing the packing.  If packing falls out before this then do not worry about it.  May use gauze of panty shields to protect the underwear.  Follow up in 2 weeks and will decide at that time if needs to be packed.  If any bleeding  then hold pressure on the site or sit on old clean towels until bleeding stops.  Very common to have some bleeding.        [facility]  Same-Day Surgery   Adult Discharge Orders & Instructions   For 24 hours after surgery  1. Get plenty of rest.  A responsible adult must stay with you for at least 24 hours after you leave the hospital.   2. Do not drive or use heavy equipment.  If you have weakness or tingling, don't drive or use heavy equipment until this feeling goes away.  3. Do not drink alcohol.  4. Avoid strenuous or risky activities.  Ask for help when climbing stairs.   5. You may feel lightheaded.  IF so, sit for a few minutes before standing.  Have someone help you get up.   6. If you have nausea (feel sick to your stomach): Drink only clear liquids such as apple juice, ginger ale, broth or 7-Up.  Rest may also help.  Be sure to drink enough fluids.  Move to a regular diet as you feel able.  7. You may have a slight fever. Call the doctor if your fever is over 100 F (37.7 C) (taken under the tongue) or lasts longer than 24 hours.  8. You may have a dry mouth, a sore throat, muscle aches or trouble sleeping.  These should go away after 24 hours.  9. Do not make important or legal decisions.   Call your doctor for any of the followin.  Signs of infection (fever, growing tenderness at the surgery site, a large amount of drainage or bleeding, severe pain, foul-smelling drainage, redness, swelling).    2. It has been over 8 to 10 hours since surgery and you are still not able to urinate (pass water).    3.  Headache for over 24 hours.    4.  Numbness, tingling or weakness the day after surgery (if you had spinal anesthesia).  To contact a doctor, call ___Dr. Layton________________________       Pending Results     No orders found from 3/26/2018 to 3/29/2018.            Admission Information     Date & Time Provider Department Dept. Phone    3/28/2018 Bala Layton MD  [facility] [phone]      Your Vitals Were     Blood Pressure Temperature Respirations Pulse Oximetry          131/65 98.2  F (36.8  C) 16 97%        [employer] Information     [employer] gives you secure access to your electronic health record. If you see a primary care provider, you can also send messages to your care team and make appointments. If you have questions, please call your primary care clinic.  If you do not have a primary care provider, please call [phone] and they will assist you.        Care EveryWhere ID     This is your Care EveryWhere ID. This could be used by other organizations to access your [facility] medical records  [id number]        Equal Access to Services     SOPHIA BRANDT : Abelardo Luong, darrell rogers, ro turcios, khushbu rivera. So [facility] [phone].    ATENCIÓN: Si habla español, tiene a kim disposición servicios gratuitos de asistencia lingüística. Llame al [phone].    We comply with applicable federal civil rights laws and [place] laws. We do not discriminate on the basis of race, color, national origin, age, disability, sex, sexual orientation, or gender identity.               Review of your medicines      START taking        Dose / Directions    oxyCODONE IR 5 MG tablet   Commonly known as:  ROXICODONE   Used for:  Perirectal abscess        Dose:  5 mg   Take 1 tablet (5 mg) by mouth every 3 hours as needed for severe pain maximum 6 tablet(s) per day   Quantity:  30 tablet   Refills:  0         CONTINUE these medicines which have NOT CHANGED        Dose / Directions    BASAGLAR 100 UNIT/ML injection   Used for:  Type 2 diabetes mellitus with hyperglycemia, with long-term current use of insulin (H)        Dose:  42 Units   Inject 42 Units Subcutaneous daily   Quantity:  3 mL   Refills:  11       blood glucose monitoring lancets   Used for:  Type 2 diabetes mellitus with microalbuminuria, with  long-term current use of insulin (H)        Use to test blood sugar 3 times daily or as directed.   Quantity:  100 each   Refills:  3       blood glucose monitoring meter device kit   Used for:  Type 2 diabetes, HbA1c goal < 7% (H)        Use to test blood sugar 3 times daily or as directed.   Quantity:  1 kit   Refills:  0       blood glucose monitoring test strip   Commonly known as:  ACCU-CHEK PELON PLUS   Used for:  Type 2 diabetes mellitus with microalbuminuria, with long-term current use of insulin (H)        Use to test blood sugar 3 times daily or as directed.   Quantity:  100 each   Refills:  11       cephALEXin 500 MG capsule   Commonly known as:  KEFLEX   Used for:  Abscess of buttock, left        Dose:  500 mg   Take 1 capsule (500 mg) by mouth 2 times daily for 10 days   Quantity:  20 capsule   Refills:  0       cyanocobalamin 1000 MCG/ML injection   Commonly known as:  VITAMIN B12   Used for:  Vitamin B 12 deficiency        INJECT 1ML INTO THE MUSCLE EVERY 3O DAYS   Quantity:  3 mL   Refills:  6       diclofenac 1 % Gel topical gel   Commonly known as:  VOLTAREN   Used for:  Chronic pain of left knee        APPLY 4 GRAMS TO KNEES DAILY USING THE ENCLOSED DOSING CARD.   Quantity:  100 g   Refills:  1       insulin pen needle 29G X 12.7MM   Commonly known as:  BD ULTRA-FINE   Used for:  Type 2 diabetes, HbA1c goal < 7% (H)        Use once daily or as directed.   Quantity:  100 each   Refills:  prn       losartan-hydrochlorothiazide 100-25 MG per tablet   Commonly known as:  HYZAAR   Used for:  Hypertension goal BP (blood pressure) < 140/90        Dose:  1 tablet   Take 1 tablet by mouth daily   Quantity:  90 tablet   Refills:  3       MELATONIN PO        Dose:  10 mg   Take 10 mg by mouth At Bedtime   Refills:  0       * oxyCODONE-acetaminophen 5-325 MG per tablet   Commonly known as:  PERCOCET   Used for:  Chronic pain of left knee, Pain of left knee after injury        Dose:  1 tablet   Take 1 tablet  by mouth every 6 hours as needed for moderate to severe pain Max: 4/day   Quantity:  120 tablet   Refills:  0       * oxyCODONE-acetaminophen 5-325 MG per tablet   Commonly known as:  PERCOCET   Used for:  Chronic pain of left knee, Pain of left knee after injury        Dose:  1 tablet   Start taking on:  5/8/2018   Take 1 tablet by mouth every 6 hours as needed for moderate to severe pain or pain maximum 4 tablet(s) per day   Quantity:  120 tablet   Refills:  0       PARoxetine 20 MG tablet   Commonly known as:  PAXIL   Used for:  Mild recurrent major depression (H)        Dose:  20 mg   Take 1 tablet (20 mg) by mouth every morning   Quantity:  90 tablet   Refills:  3       pramipexole 0.25 MG tablet   Commonly known as:  MIRAPEX   Used for:  Restless leg syndrome        Take 1 tablet (0.25mg) in morning, 2 tablets (0.5mg) in the afternoon, and take 1 tablet before bed   Quantity:  240 tablet   Refills:  3       simvastatin 40 MG tablet   Commonly known as:  ZOCOR   Used for:  Hyperlipidemia LDL goal <100        Dose:  40 mg   Take 1 tablet (40 mg) by mouth At Bedtime   Quantity:  90 tablet   Refills:  3       * Notice:  This list has 2 medication(s) that are the same as other medications prescribed for you. Read the directions carefully, and ask your doctor or other care provider to review them with you.      STOP taking     aspirin 81 MG tablet                Where to get your medicines      Some of these will need a paper prescription and others can be bought over the counter. Ask your nurse if you have questions.     Bring a paper prescription for each of these medications     oxyCODONE IR 5 MG tablet                Protect others around you: Learn how to safely use, store and throw away your medicines at www.disposemymeds.org.        Information about OPIOIDS     PRESCRIPTION OPIOIDS: WHAT YOU NEED TO KNOW    Prescription opioids can be used to help relieve moderate to severe pain and are often prescribed  following a surgery or injury, or for certain health conditions. These medications can be an important part of treatment but also come with serious risks. It is important to work with your health care provider to make sure you are getting the safest, most effective care.    WHAT ARE THE RISKS AND SIDE EFFECTS OF OPIOID USE?  Prescription opioids carry serious risks of addiction and overdose, especially with prolonged use. An opioid overdose, often marked by slowed breathing can cause sudden death. The use of prescription opioids can have a number of side effects as well, even when taken as directed:      Tolerance - meaning you might need to take more of a medication for the same pain relief    Physical dependence - meaning you have symptoms of withdrawal when a medication is stopped    Increased sensitivity to pain    Constipation    Nausea, vomiting, and dry mouth    Sleepiness and dizziness    Confusion    Depression    Low levels of testosterone that can result in lower sex drive, energy, and strength    Itching and sweating    RISKS ARE GREATER WITH:    History of drug misuse, substance use disorder, or overdose    Mental health conditions (such as depression or anxiety)    Sleep apnea    Older age (65 years or older)    Pregnancy    Avoid alcohol while taking prescription opioids.   Also, unless specifically advised by your health care provider, medications to avoid include:    Benzodiazepines (such as Xanax or Valium)    Muscle relaxants (such as Soma or Flexeril)    Hypnotics (such as Ambien or Lunesta)    Other prescription opioids    KNOW YOUR OPTIONS:  Talk to your health care provider about ways to manage your pain that do not involve prescription opioids. Some of these options may actually work better and have fewer risks and side effects:    Pain relievers such as acetaminophen, ibuprofen, and naproxen    Some medications that are also used for depression or seizures    Physical therapy and  exercise    Cognitive behavioral therapy, a psychological, goal-directed approach, in which patients learn how to modify physical, behavioral, and emotional triggers of pain and stress    IF YOU ARE PRESCRIBED OPIOIDS FOR PAIN:    Never take opioids in greater amounts or more often than prescribed    Follow up with your primary health care provider and work together to create a plan on how to manage your pain.    Talk about ways to help manage your pain that do not involve prescription opioids    Talk about all concerns and side effects    Help prevent misuse and abuse    Never sell or share prescription opioids    Never use another person's prescription opioids    Store prescription opioids in a secure place and out of reach of others (this may include visitors, children, friends, and family)    Visit www.cdc.gov/drugoverdose to learn about risks of opioid abuse and overdose    If you believe you may be struggling with addiction, tell your health care provider and ask for guidance or call [facility]'s National Helpline at 5-874-566-HELP    LEARN MORE / www.cdc.gov/drugoverdose/prescribing/guideline.html    Safely dispose of unused prescription opioids: Find your local drug take-back programs and more information about the importance of safe disposal at www.doseofreality.mn.gov             Medication List: This is a list of all your medications and when to take them. Check marks below indicate your daily home schedule. Keep this list as a reference.      Medications           Morning Afternoon Evening Bedtime As Needed    BASAGLAR 100 UNIT/ML injection   Inject 42 Units Subcutaneous daily                                blood glucose monitoring lancets   Use to test blood sugar 3 times daily or as directed.                                blood glucose monitoring meter device kit   Use to test blood sugar 3 times daily or as directed.                                blood glucose monitoring test strip   Commonly known as:   ACCU-CHEK PELON PLUS   Use to test blood sugar 3 times daily or as directed.                                cephALEXin 500 MG capsule   Commonly known as:  KEFLEX   Take 1 capsule (500 mg) by mouth 2 times daily for 10 days                                cyanocobalamin 1000 MCG/ML injection   Commonly known as:  VITAMIN B12   INJECT 1ML INTO THE MUSCLE EVERY 3O DAYS                                diclofenac 1 % Gel topical gel   Commonly known as:  VOLTAREN   APPLY 4 GRAMS TO KNEES DAILY USING THE ENCLOSED DOSING CARD.                                insulin pen needle 29G X 12.7MM   Commonly known as:  BD ULTRA-FINE   Use once daily or as directed.                                losartan-hydrochlorothiazide 100-25 MG per tablet   Commonly known as:  HYZAAR   Take 1 tablet by mouth daily                                MELATONIN PO   Take 10 mg by mouth At Bedtime                                oxyCODONE IR 5 MG tablet   Commonly known as:  ROXICODONE   Take 1 tablet (5 mg) by mouth every 3 hours as needed for severe pain maximum 6 tablet(s) per day                                * oxyCODONE-acetaminophen 5-325 MG per tablet   Commonly known as:  PERCOCET   Take 1 tablet by mouth every 6 hours as needed for moderate to severe pain Max: 4/day                                * oxyCODONE-acetaminophen 5-325 MG per tablet   Commonly known as:  PERCOCET   Take 1 tablet by mouth every 6 hours as needed for moderate to severe pain or pain maximum 4 tablet(s) per day   Start taking on:  5/8/2018                                PARoxetine 20 MG tablet   Commonly known as:  PAXIL   Take 1 tablet (20 mg) by mouth every morning                                pramipexole 0.25 MG tablet   Commonly known as:  MIRAPEX   Take 1 tablet (0.25mg) in morning, 2 tablets (0.5mg) in the afternoon, and take 1 tablet before bed                                simvastatin 40 MG tablet   Commonly known as:  ZOCOR   Take 1 tablet (40 mg) by mouth At  Bedtime                                * Notice:  This list has 2 medication(s) that are the same as other medications prescribed for you. Read the directions carefully, and ask your doctor or other care provider to review them with you.

## 2018-03-28 NOTE — DISCHARGE INSTRUCTIONS
HERNIORRHAPHY DISCHARGE INSTRUCTIONS  DR. AMANDA WILDE    1. You may resume your regular diet when you feel you are ready to. DO NOT drink alcoholic beverages for 24 hours or while you are taking prescription medication.    2. Limit your activities for the first 48 hours. Gradually, increase them as tolerated. You may use stairs. I encourage you to walk as tolerated.     3. You will have some discomfort at the incision sites. This is expected. This should improve over the next 2-3 days. Ice and pain medication will help with this pain. Use prescribed pain medication as instructed.    4. Bruising and mild swelling is normal after surgery. For males it is common to have bruising going into the penis and scrotum. The area below and around the incision(s) will be hard and elevated. This is normal. I call it the healing ridge. This will resolve slowly over the next several months. If you feel the pain is increasing and cannot explain it by increasing activity please call us at [phone].    5. The dressing will often have some blood on it. You may shower 24 hours after surgery. Clean gently over incision site. If clear plastic covering or steri-strip comes off and there is still some bleeding or drainage then cover with gauze or band-aid. If no bleeding, there is no reason to cover site. The abdominal binder may be removed after 24 hours after surgery. You may continue to wear it however for comfort. I suggest  you wear an old t-shirt under the abdominal binder for a more comfortable wear.    6. Avoid Aspirin for the first 72 hours after the procedure. This medication may increase the tendency to bleed.    7. Use the following medications (in addition to your normal meds) as shown:  a. Percocet 5 mg 1-2 every 6 hours as needed for severe pain. This contains 325 mg of Tylenol (acetaminophen) per tablet.  Please do not take more than 4 grams of Tylenol (acetaminophen) per day. For example, you may take 1 Percocet  and 1 Tylenol, or 2 Percocet and no Tylenol, or 2 Tylenol and no Percocet every 6 hours.  b. Tylenol (acetaminophen) 500 mg every 6 hours as needed for mild pain. Do not take more than 1000 mg every 6 hours. (see above).  c. Motrin (ibuprofen) 200-800 mg every 6 hours as needed for mild to moderate pain. Take with food.     8. Notify Dr. Layton's clinic at [phone] if:    Your discomfort is not relieved by your pain medication.    You have signs of infection such as temperature above 100.5 degrees orally, chills, or increasing daily discomfort.    Incision site is becoming more red and/or there is purulent drainage.    You have questions or concerns.    9. Please call [phone] to schedule a follow up appointment in about 2 weeks.    10. When taking narcotics (pain medication more than Tylenol [acetaminophen] and Motrin [ibuprofen]) it is important to keep your stools soft to avoid constipation and pain with straining. This is best done by drinking fluids (non-alcoholic and non-caffeinated) and taking a stool softener (i.e. Metamucil or milk of magnesia). You may be able to use non-narcotics for pain relief especially by the 3rd post- operative day. Tylenol (acetaminophen) 500 mg every 6 hours and/or Motrin (ibuprofen) 200-800 mg every 6 hours. Please do not take more than 4 grams of Tylenol (acetaminophen) per day. Remember your Percocet does have Tylenol (acetaminophen) already in it. Please take Motrin (ibuprofen) with food to help protect the stomach. If you have a history of stomach ulcers or stomach problems, do not take Motrin (ibuprofen).     11. Do not drive or operate heavy machinery for 24 hours after surgery or when taking narcotics. You may resume driving when feel that you can safely avoid an accident and are not taking narcotics. This is usually 5 to 7 days after surgery. You should not be alone for 24 hours after surgery.    12. Have milk of magnesia available at home so that when you  take the pain medications you take 1-2 teaspoons a day,  to help reduce problems with constipation.      Pull the packing out after getting it wet in the shower.  Do this on day 2-3.  It is best to pull the packing quickly and if concern take a pain pill 30 minutes before removing the packing.  If packing falls out before this then do not worry about it.  May use gauze of panty shields to protect the underwear.  Follow up in 2 weeks and will decide at that time if needs to be packed.  If any bleeding then hold pressure on the site or sit on old clean towels until bleeding stops.  Very common to have some bleeding.        [facility]  Same-Day Surgery   Adult Discharge Orders & Instructions   For 24 hours after surgery  1. Get plenty of rest.  A responsible adult must stay with you for at least 24 hours after you leave the hospital.   2. Do not drive or use heavy equipment.  If you have weakness or tingling, don't drive or use heavy equipment until this feeling goes away.  3. Do not drink alcohol.  4. Avoid strenuous or risky activities.  Ask for help when climbing stairs.   5. You may feel lightheaded.  IF so, sit for a few minutes before standing.  Have someone help you get up.   6. If you have nausea (feel sick to your stomach): Drink only clear liquids such as apple juice, ginger ale, broth or 7-Up.  Rest may also help.  Be sure to drink enough fluids.  Move to a regular diet as you feel able.  7. You may have a slight fever. Call the doctor if your fever is over 100 F (37.7 C) (taken under the tongue) or lasts longer than 24 hours.  8. You may have a dry mouth, a sore throat, muscle aches or trouble sleeping.  These should go away after 24 hours.  9. Do not make important or legal decisions.   Call your doctor for any of the followin.  Signs of infection (fever, growing tenderness at the surgery site, a large amount of drainage or bleeding, severe pain, foul-smelling drainage,  redness, swelling).    2. It has been over 8 to 10 hours since surgery and you are still not able to urinate (pass water).    3.  Headache for over 24 hours.    4.  Numbness, tingling or weakness the day after surgery (if you had spinal anesthesia).  To contact a doctor, call ___Dr. Layton________________________

## 2018-03-28 NOTE — PROGRESS NOTES
No changes from previous exam  Procedure explained.  Questions answered  Plan I and d of the abscess and possible anal fistula.   Bala Layton MD

## 2018-03-28 NOTE — OP NOTE
POST OPERATIVE NOTE-IMMEDIATE :  Date of Service: 3/28/2018    Preoperative Diagnosis:  Left abcess possible anal fistula    Postoperative Diagnosis: same with short anal fistula anteriorly with no connectionot he left sided buttock abcess.   * No post-op diagnosis entered *    Procedures:  Anal exam under  General anesthesia    Anal fistulotomy  incision and drainage of left buttock abscess.     Prosthetic Devices: See Nurses note.    Surgeon(s) and Assistants (if any):    Surgeon(s):  Bala Layton MD  Circulator: Joselin Talamantes RN; Mary Leo RN  Scrub Person: Melany Patel    Anesthesia:  General    Drains: None    Specimens: none      Tissue Removed, Not Sent:  No    Complications: none    Findings/Conclusions:  As above    Estimated Blood Loss:  Less than 20 mL.    Condition on discharge from OR:  Satisfactory    [id number]  Bala Layton MD

## 2018-03-28 NOTE — ANESTHESIA CARE TRANSFER NOTE
Patient: Cedric Figueroa    Procedure(s):  Incision and drainage of left buttock abscess, and fistulotomy - Wound Class: II-Clean Contaminated    Diagnosis: Left abcess  Diagnosis Additional Information: No value filed.    Anesthesia Type:   General, ETT     Note:  Airway :Face Mask  Patient transferred to:PACU  Comments: To PACU after suctioned and extubated awake.  Report to RN. VSS, Respiratory status stable.Handoff Report: Identifed the Patient, Identified the Reponsible Provider, Reviewed the pertinent medical history, Discussed the surgical course, Reviewed Intra-OP anesthesia mangement and issues during anesthesia, Set expectations for post-procedure period and Allowed opportunity for questions and acknowledgement of understanding      Vitals: (Last set prior to Anesthesia Care Transfer)    CRNA VITALS  3/28/2018 1112 - 3/28/2018 1146      3/28/2018             Pulse: 63    SpO2: 95 %    Resp Rate (observed): (!)  7                Electronically Signed By: FEMI Kowalski CRNA  March 28, 2018  11:46 AM

## 2018-04-10 ENCOUNTER — OFFICE VISIT (OUTPATIENT)
Dept: SURGERY | Facility: CLINIC | Age: 63
End: 2018-04-10
Payer: COMMERCIAL

## 2018-04-10 VITALS
WEIGHT: 243 LBS | BODY MASS INDEX: 31.18 KG/M2 | HEIGHT: 74 IN | SYSTOLIC BLOOD PRESSURE: 118 MMHG | DIASTOLIC BLOOD PRESSURE: 71 MMHG | HEART RATE: 94 BPM

## 2018-04-10 DIAGNOSIS — K61.1 PERIRECTAL ABSCESS: Primary | ICD-10-CM

## 2018-04-10 PROCEDURE — 99024 POSTOP FOLLOW-UP VISIT: CPT | Performed by: SURGERY

## 2018-04-10 NOTE — PROGRESS NOTES
Cedric is a 62 year old male who is status post incision and drainage of perirectal abscess.  with no chills and no fever.      Patient's  Pain is  improving.  Appetite is  improving.    Wound(s)  Is/are  Filling in.  A little more deeper on the medial side.   with no evidence of infection.        Plan: follow up as needed.  Use antibiotic ointment on wound at night.   Use a panty liner and place a finger in the inside (medial side) and make sure that the wound is not closing off before it fills in.   Look for tissue growing above the skin and if so come back sooner and will use silver nitrate to remove it in the office.   Follow up in 1 month and if completley healed call Please call Nneka's number at  if getting worse or if totally healed then just let us know and cancel the visit.       Time spent with the patient with greater that 50% of the time in discussion was 17 minutes.  Bala Layton MD      Procedure Date: 03/28/2018       OPERATIVE REPORT:       PREOPERATIVE DIAGNOSES:  Left buttocks abscess, improving but not getting completely better with oral antibiotics; also rule out possible fistula.       POSTOPERATIVE DIAGNOSES:  Small anal fistula anteriorly, had nothing to do with his abscess; left buttocks abscess that was opened and drained and excess tissue was removed and debrided.       ANESTHESIA:  General anesthetic with 0.25% Marcaine injected into the sites prior to and at the end of the procedure.   PROCEDURE: incision and drainage of left buttock abscess. Fistulotomy, and examination of the anal area for fistula under  General anesthesia .      SURGEON:  Bala Layton MD       ESTIMATED BLOOD LOSS:  Less than 8 mL       INDICATIONS:  The patient is a 62-year-old male who has a left buttocks abscess that he has seen some improvement with oral antibiotics but it is still very tender when he sits on it.  He has had this opened up and can feel the soft area on it.  It is a little far away  from the anus; however, I am a little concerned, especially with him being diabetic, that there is a possibility of a fistula, so we discussed taking a look at that area; if there was, to go ahead and divide it.  Discussed other risks and complications, including bleeding, infection, damage to the sphincter muscle, possible seton, that we would laid the fistula open if we see one and we would leave the abscess site opened so it can drain and heal from the inside out.  I also discussed how to remove the packing and what to expect.  He does take narcotics on a regular basis for his knee, so we went with oxycodone so we would not have to worry about Tylenol.       DESCRIPTION OF PROCEDURE:  The patient was brought to the OR, placed in supine position.  After receiving general anesthesia, he was then placed in the jackknife position.  After sterile prep and drape were able to examine the anal area well, did not see any obvious openings except anteriorly there was a fairly opened, and may be a cm long tissue going across there which seemed like skin or scar tissue.  I did divide this with cautery.  I looked at the area and did not see anything going anywhere else, but there was some stool in this spot, so I think it will be easier for the patient to keep this area clean.  It was not involving the sphincter muscle at all.  Then after doing that, I probed just at that area just to see if there was an opening at that site going to the left buttocks, did not feel that there was anything going to that direction.  So then we made a small nick in the abscess site.  There really was not any pus, but I did put some hydrogen peroxide into the abscess with some pressure and tried to massage things towards the anus and then looking in the anus to see if there was any hydrogen peroxide coming through; I did not see anything.  I then opened up the abscess from one edge to the other; opened up a couple of spots that looked like they  were walling off but not necessarily abscessed at this time, so that it would be easier to pack.  Then I took a little excess skin on the area towards the anus off so that it would heal from the inside out before the skin closes, then packed it with half inch iodoform soaked in Marcaine with some fluffs on top of it and ABD pad.  Hemostasis was achieved with pressure and cautery.  I did also inject some Marcaine around the anal area where we had just divided that anal fistula and around the abscess site.  The patient did receive antibiotics preoperatively.       PLAN:  Is discharge to home today, have him remove the packing on Friday or Saturday unless it falls out earlier; to use pressure for any bleeding.  See him back in a couple of weeks to see how he is doing.           AMANDA WILDE MD

## 2018-04-10 NOTE — NURSING NOTE
"Chief Complaint   Patient presents with     Surgical Followup       Initial /71  Pulse 94  Ht 1.88 m (6' 2\")  Wt 110.2 kg (243 lb)  BMI 31.2 kg/m2 Estimated body mass index is 31.2 kg/(m^2) as calculated from the following:    Height as of this encounter: 1.88 m (6' 2\").    Weight as of this encounter: 110.2 kg (243 lb).  Medication Reconciliation: complete   Nneka Hernandez Cma      "

## 2018-04-10 NOTE — LETTER
4/10/2018         RE: Cedric Figueroa  [id number]  [place] [address]        Dear Colleague,    Thank you for referring your patient, Cedric Figueroa, to the [facility]. Please see a copy of my visit note below.    Cedric is a 62 year old male who is status post incision and drainage of perirectal abscess.  with no chills and no fever.      Patient's  Pain is  improving.  Appetite is  improving.    Wound(s)  Is/are  Filling in.  A little more deeper on the medial side.   with no evidence of infection.        Plan: follow up as needed.  Use antibiotic ointment on wound at night.   Use a panty liner and place a finger in the inside (medial side) and make sure that the wound is not closing off before it fills in.   Look for tissue growing above the skin and if so come back sooner and will use silver nitrate to remove it in the office.   Follow up in 1 month and if completley healed call Please call Nneka's number at  if getting worse or if totally healed then just let us know and cancel the visit.       Time spent with the patient with greater that 50% of the time in discussion was 17 minutes.  Bala Layton MD      Procedure Date: 03/28/2018       OPERATIVE REPORT:       PREOPERATIVE DIAGNOSES:  Left buttocks abscess, improving but not getting completely better with oral antibiotics; also rule out possible fistula.       POSTOPERATIVE DIAGNOSES:  Small anal fistula anteriorly, had nothing to do with his abscess; left buttocks abscess that was opened and drained and excess tissue was removed and debrided.       ANESTHESIA:  General anesthetic with 0.25% Marcaine injected into the sites prior to and at the end of the procedure.   PROCEDURE: incision and drainage of left buttock abscess. Fistulotomy, and examination of the anal area for fistula under  General anesthesia .      SURGEON:  Bala Layton MD       ESTIMATED BLOOD LOSS:  Less than 8 mL       INDICATIONS:  The  patient is a 62-year-old male who has a left buttocks abscess that he has seen some improvement with oral antibiotics but it is still very tender when he sits on it.  He has had this opened up and can feel the soft area on it.  It is a little far away from the anus; however, I am a little concerned, especially with him being diabetic, that there is a possibility of a fistula, so we discussed taking a look at that area; if there was, to go ahead and divide it.  Discussed other risks and complications, including bleeding, infection, damage to the sphincter muscle, possible seton, that we would laid the fistula open if we see one and we would leave the abscess site opened so it can drain and heal from the inside out.  I also discussed how to remove the packing and what to expect.  He does take narcotics on a regular basis for his knee, so we went with oxycodone so we would not have to worry about Tylenol.       DESCRIPTION OF PROCEDURE:  The patient was brought to the OR, placed in supine position.  After receiving general anesthesia, he was then placed in the jackknife position.  After sterile prep and drape were able to examine the anal area well, did not see any obvious openings except anteriorly there was a fairly opened, and may be a cm long tissue going across there which seemed like skin or scar tissue.  I did divide this with cautery.  I looked at the area and did not see anything going anywhere else, but there was some stool in this spot, so I think it will be easier for the patient to keep this area clean.  It was not involving the sphincter muscle at all.  Then after doing that, I probed just at that area just to see if there was an opening at that site going to the left buttocks, did not feel that there was anything going to that direction.  So then we made a small nick in the abscess site.  There really was not any pus, but I did put some hydrogen peroxide into the abscess with some pressure and tried to  massage things towards the anus and then looking in the anus to see if there was any hydrogen peroxide coming through; I did not see anything.  I then opened up the abscess from one edge to the other; opened up a couple of spots that looked like they were walling off but not necessarily abscessed at this time, so that it would be easier to pack.  Then I took a little excess skin on the area towards the anus off so that it would heal from the inside out before the skin closes, then packed it with half inch iodoform soaked in Marcaine with some fluffs on top of it and ABD pad.  Hemostasis was achieved with pressure and cautery.  I did also inject some Marcaine around the anal area where we had just divided that anal fistula and around the abscess site.  The patient did receive antibiotics preoperatively.       PLAN:  Is discharge to home today, have him remove the packing on Friday or Saturday unless it falls out earlier; to use pressure for any bleeding.  See him back in a couple of weeks to see how he is doing.           BALA LAYTON MD                        Again, thank you for allowing me to participate in the care of your patient.        Sincerely,        Bala Layton MD

## 2018-04-10 NOTE — MR AVS SNAPSHOT
After Visit Summary   4/10/2018    Cedric Figueroa    MRN: [id number]           Patient Information     Date Of Birth          1955        Visit Information        Provider Department      4/10/2018 8:30 AM Bala Layton MD [facility]        Care Instructions    Cedric is a 62 year old male who is status post incision and drainage of perirectal abscess.  with no chills and no fever.      Patient's  Pain is  improving.  Appetite is  improving.    Wound(s)  Is/are  Filling in.  A little more deeper on the medial side.   with no evidence of infection.        Plan: follow up as needed.  Use antibiotic ointment on wound at night.   Use a panty liner and place a finger in the inside (medial side) and make sure that the wound is not closing off before it fills in.   Look for tissue growing above the skin and if so come back sooner and will use silver nitrate to remove it in the office.   Follow up in 1 month and if completley healed call Please call Nneka's number at  if getting worse or if totally healed then just let us know and cancel the visit.             Follow-ups after your visit        Who to contact     If you have questions or need follow up information about today's clinic visit or your schedule please contact [facility] directly at [phone].  Normal or non-critical lab and imaging results will be communicated to you by MyChart, letter or phone within 4 business days after the clinic has received the results. If you do not hear from us within 7 days, please contact the clinic through MyChart or phone. If you have a critical or abnormal lab result, we will notify you by phone as soon as possible.  Submit refill requests through [employer] or call your pharmacy and they will forward the refill request to us. Please allow 3 business days for your refill to be completed.          Additional Information About Your Visit        MyChart Information   "   [employer] gives you secure access to your electronic health record. If you see a primary care provider, you can also send messages to your care team and make appointments. If you have questions, please call your primary care clinic.  If you do not have a primary care provider, please call [phone] and they will assist you.        Care EveryWhere ID     This is your Care EveryWhere ID. This could be used by other organizations to access your [place] medical records  [id number]        Your Vitals Were     Pulse Height BMI (Body Mass Index)             94 1.88 m (6' 2\") 31.2 kg/m2          Blood Pressure from Last 3 Encounters:   04/10/18 118/71   03/28/18 110/68   03/27/18 118/71    Weight from Last 3 Encounters:   04/10/18 110.2 kg (243 lb)   03/27/18 110.2 kg (243 lb)   03/27/18 109.8 kg (242 lb)              Today, you had the following     No orders found for display       Primary Care Provider Office Phone # Fax #    Molly Leiva [phone]       [id number]  [place] [address]        Equal Access to Services     [facility]: Hadii aad ku hadasho Soomaali, waaxda luqadaha, qaybta kaalmada adeegyada, khushbu cheung hayvondan william diaz . So [facility] [phone].    ATENCIÓN: Si habla español, tiene a kim disposición servicios gratuitos de asistencia lingüística. [facility] [phone].    We comply with applicable federal civil rights laws and [place] laws. We do not discriminate on the basis of race, color, national origin, age, disability, sex, sexual orientation, or gender identity.            Thank you!     Thank you for [facility]  [facility]. Our goal is always to provide you with excellent care. Hearing back from our patients is one way we can continue to improve our services. Please take a few minutes to complete the written survey that you may receive in the mail after your visit with us. Thank you!             Your Updated Medication List " - Protect others around you: Learn how to safely use, store and throw away your medicines at www.disposemymeds.org.          This list is accurate as of 4/10/18  8:45 AM.  Always use your most recent med list.                   Brand Name Dispense Instructions for use Diagnosis    BASAGLAR 100 UNIT/ML injection     3 mL    Inject 42 Units Subcutaneous daily    Type 2 diabetes mellitus with hyperglycemia, with long-term current use of insulin (H)       blood glucose monitoring lancets     100 each    Use to test blood sugar 3 times daily or as directed.    Type 2 diabetes mellitus with microalbuminuria, with long-term current use of insulin (H)       blood glucose monitoring meter device kit     1 kit    Use to test blood sugar 3 times daily or as directed.    Type 2 diabetes, HbA1c goal < 7% (H)       blood glucose monitoring test strip    ACCU-CHEK PELON PLUS    100 each    Use to test blood sugar 3 times daily or as directed.    Type 2 diabetes mellitus with microalbuminuria, with long-term current use of insulin (H)       cyanocobalamin 1000 MCG/ML injection    VITAMIN B12    3 mL    INJECT 1ML INTO THE MUSCLE EVERY 3O DAYS    Vitamin B 12 deficiency       diclofenac 1 % Gel topical gel    VOLTAREN    100 g    APPLY 4 GRAMS TO KNEES DAILY USING THE ENCLOSED DOSING CARD.    Chronic pain of left knee       insulin pen needle 29G X 12.7MM    BD ULTRA-FINE    100 each    Use once daily or as directed.    Type 2 diabetes, HbA1c goal < 7% (H)       losartan-hydrochlorothiazide 100-25 MG per tablet    HYZAAR    90 tablet    Take 1 tablet by mouth daily    Hypertension goal BP (blood pressure) < 140/90       MELATONIN PO      Take 10 mg by mouth At Bedtime        oxyCODONE IR 5 MG tablet    ROXICODONE    30 tablet    Take 1 tablet (5 mg) by mouth every 3 hours as needed for severe pain maximum 6 tablet(s) per day    Perirectal abscess       * oxyCODONE-acetaminophen 5-325 MG per tablet    PERCOCET    120 tablet    Take 1  tablet by mouth every 6 hours as needed for moderate to severe pain Max: 4/day    Chronic pain of left knee, Pain of left knee after injury       * oxyCODONE-acetaminophen 5-325 MG per tablet   Start taking on:  5/8/2018    PERCOCET    120 tablet    Take 1 tablet by mouth every 6 hours as needed for moderate to severe pain or pain maximum 4 tablet(s) per day    Chronic pain of left knee, Pain of left knee after injury       PARoxetine 20 MG tablet    PAXIL    90 tablet    Take 1 tablet (20 mg) by mouth every morning    Mild recurrent major depression (H)       pramipexole 0.25 MG tablet    MIRAPEX    240 tablet    Take 1 tablet (0.25mg) in morning, 2 tablets (0.5mg) in the afternoon, and take 1 tablet before bed    Restless leg syndrome       simvastatin 40 MG tablet    ZOCOR    90 tablet    Take 1 tablet (40 mg) by mouth At Bedtime    Hyperlipidemia LDL goal <100       * Notice:  This list has 2 medication(s) that are the same as other medications prescribed for you. Read the directions carefully, and ask your doctor or other care provider to review them with you.

## 2018-04-10 NOTE — PATIENT INSTRUCTIONS
Cedric is a 62 year old male who is status post incision and drainage of perirectal abscess.  with no chills and no fever.      Patient's  Pain is  improving.  Appetite is  improving.    Wound(s)  Is/are  Filling in.  A little more deeper on the medial side.   with no evidence of infection.        Plan: follow up as needed.  Use antibiotic ointment on wound at night.   Use a panty liner and place a finger in the inside (medial side) and make sure that the wound is not closing off before it fills in.   Look for tissue growing above the skin and if so come back sooner and will use silver nitrate to remove it in the office.   Follow up in 1 month and if completley healed call Please call Nneka's number at  if getting worse or if totally healed then just let us know and cancel the visit.

## 2018-05-21 DIAGNOSIS — E78.5 HYPERLIPIDEMIA LDL GOAL <100: ICD-10-CM

## 2018-05-21 NOTE — TELEPHONE ENCOUNTER
"Requested Prescriptions   Pending Prescriptions Disp Refills     simvastatin (ZOCOR) 40 MG tablet [Pharmacy Med Name: SIMVASTATIN 40MG TABS] 90 tablet 3    Last Written Prescription Date:  2/13/18  Last Fill Quantity: 90,  # refills: 3   Last office visit: 3/27/2018 with prescribing provider:  3/27/18 Cali   Future Office Visit:   Sig: TAKE ONE TABLET BY MOUTH EVERY NIGHT AT BEDTIME    Statins Protocol Passed    5/21/2018  5:46 PM       Passed - LDL on file in past 12 months    Recent Labs   Lab Test  05/23/17   0725   LDL  70            Passed - No abnormal creatine kinase in past 12 months    Recent Labs   Lab Test  06/27/17   1107   CKT  97               Passed - Recent (12 mo) or future (30 days) visit within the authorizing provider's specialty    Patient had office visit in the last 12 months or has a visit in the next 30 days with authorizing provider or within the authorizing provider's specialty.  See \"Patient Info\" tab in inbasket, or \"Choose Columns\" in Meds & Orders section of the refill encounter.           Passed - Patient is age 18 or older        "

## 2018-05-22 RX ORDER — SIMVASTATIN 40 MG
TABLET ORAL
Qty: 30 TABLET | Refills: 0 | Status: SHIPPED | OUTPATIENT
Start: 2018-05-22 | End: 2018-06-27

## 2018-05-25 DIAGNOSIS — N17.9 ACUTE NONTRAUMATIC KIDNEY INJURY (H): ICD-10-CM

## 2018-05-25 DIAGNOSIS — E87.20 METABOLIC ACIDOSIS: ICD-10-CM

## 2018-05-25 DIAGNOSIS — I10 HYPERTENSION GOAL BP (BLOOD PRESSURE) < 130/80: Primary | ICD-10-CM

## 2018-05-25 DIAGNOSIS — E11.9 DIABETES MELLITUS (H): ICD-10-CM

## 2018-05-29 ENCOUNTER — MYC MEDICAL ADVICE (OUTPATIENT)
Dept: FAMILY MEDICINE | Facility: CLINIC | Age: 63
End: 2018-05-29

## 2018-05-29 DIAGNOSIS — G89.29 CHRONIC PAIN OF LEFT KNEE: ICD-10-CM

## 2018-05-29 DIAGNOSIS — M25.562 CHRONIC PAIN OF LEFT KNEE: ICD-10-CM

## 2018-05-29 DIAGNOSIS — M25.562 PAIN OF LEFT KNEE AFTER INJURY: ICD-10-CM

## 2018-05-29 RX ORDER — OXYCODONE AND ACETAMINOPHEN 5; 325 MG/1; MG/1
1 TABLET ORAL EVERY 6 HOURS PRN
Qty: 120 TABLET | Refills: 0 | Status: SHIPPED | OUTPATIENT
Start: 2018-05-29 | End: 2018-06-26

## 2018-06-01 DIAGNOSIS — G25.81 RESTLESS LEG SYNDROME: ICD-10-CM

## 2018-06-01 RX ORDER — PRAMIPEXOLE DIHYDROCHLORIDE 0.25 MG/1
TABLET ORAL
Qty: 120 TABLET | Refills: 0 | Status: SHIPPED | OUTPATIENT
Start: 2018-06-01 | End: 2018-08-20

## 2018-06-01 NOTE — TELEPHONE ENCOUNTER
"Requested Prescriptions   Pending Prescriptions Disp Refills     pramipexole (MIRAPEX) 0.25 MG tablet 240 tablet 3    Last Written Prescription Date:  2/6/18  Last Fill Quantity: 240,  # refills: 0   Last office visit: 3/27/2018 with prescribing provider:  3/27/18 Cali   Future Office Visit:   Sig: Take 1 tablet (0.25mg) in morning, 2 tablets (0.5mg) in the afternoon, and take 1 tablet before bed    Antiparkinson's Agents Protocol Passed    6/1/2018  3:47 PM       Passed - Blood pressure under 140/90 in past 12 months    BP Readings from Last 3 Encounters:   04/10/18 118/71   03/28/18 110/68   03/27/18 118/71                Passed - CBC on record in past 12 months    Recent Labs   Lab Test  03/27/18   1554   WBC  6.7   RBC  3.93*   HGB  12.6*   HCT  37.6*   PLT  225       For [facility] ONLY: [id number] = WBC, SCLX242 = RBC         Passed - ALT on record in past 12 months        Recent Labs   Lab Test 06/20/17   ALT  26            Passed - Serum Creatinine on file in past 12 months    Recent Labs   Lab Test  03/27/18   1554   CR  1.53*            Passed - Patient is age 18 or older       Passed - Recent (6 mo) or future (30 days) visit within the authorizing provider's specialty    Patient had office visit in the last 6 months or has a visit in the next 30 days with authorizing provider or within the authorizing provider's specialty.  See \"Patient Info\" tab in inbasket, or \"Choose Columns\" in Meds & Orders section of the refill encounter.            "

## 2018-06-01 NOTE — TELEPHONE ENCOUNTER
Medication is being filled for 1 time refill only due to:  Patient needs to be seen because Due for diabetic follow up the end of June, 1 month supply given with reminder. .

## 2018-06-05 DIAGNOSIS — E11.9 DIABETES MELLITUS (H): ICD-10-CM

## 2018-06-05 DIAGNOSIS — E87.20 METABOLIC ACIDOSIS: ICD-10-CM

## 2018-06-05 DIAGNOSIS — I10 HYPERTENSION GOAL BP (BLOOD PRESSURE) < 130/80: ICD-10-CM

## 2018-06-05 DIAGNOSIS — N17.9 ACUTE NONTRAUMATIC KIDNEY INJURY (H): ICD-10-CM

## 2018-06-05 LAB
ANION GAP SERPL CALCULATED.3IONS-SCNC: 10 MMOL/L (ref 3–14)
BUN SERPL-MCNC: 29 MG/DL (ref 7–30)
CALCIUM SERPL-MCNC: 8.8 MG/DL (ref 8.5–10.1)
CHLORIDE SERPL-SCNC: 108 MMOL/L (ref 94–109)
CO2 SERPL-SCNC: 23 MMOL/L (ref 20–32)
CREAT SERPL-MCNC: 1.46 MG/DL (ref 0.66–1.25)
GFR SERPL CREATININE-BSD FRML MDRD: 49 ML/MIN/1.7M2
GLUCOSE SERPL-MCNC: 122 MG/DL (ref 70–99)
POTASSIUM SERPL-SCNC: 3.9 MMOL/L (ref 3.4–5.3)
SODIUM SERPL-SCNC: 141 MMOL/L (ref 133–144)

## 2018-06-05 PROCEDURE — 80048 BASIC METABOLIC PNL TOTAL CA: CPT | Performed by: INTERNAL MEDICINE

## 2018-06-05 PROCEDURE — 36415 COLL VENOUS BLD VENIPUNCTURE: CPT | Performed by: INTERNAL MEDICINE

## 2018-06-07 ENCOUNTER — TRANSFERRED RECORDS (OUTPATIENT)
Dept: HEALTH INFORMATION MANAGEMENT | Facility: CLINIC | Age: 63
End: 2018-06-07

## 2018-06-25 ENCOUNTER — MYC MEDICAL ADVICE (OUTPATIENT)
Dept: FAMILY MEDICINE | Facility: CLINIC | Age: 63
End: 2018-06-25

## 2018-06-25 DIAGNOSIS — G89.29 CHRONIC PAIN OF LEFT KNEE: ICD-10-CM

## 2018-06-25 DIAGNOSIS — M25.562 CHRONIC PAIN OF LEFT KNEE: ICD-10-CM

## 2018-06-25 DIAGNOSIS — M25.562 PAIN OF LEFT KNEE AFTER INJURY: ICD-10-CM

## 2018-06-27 DIAGNOSIS — E78.5 HYPERLIPIDEMIA LDL GOAL <100: ICD-10-CM

## 2018-06-27 RX ORDER — OXYCODONE AND ACETAMINOPHEN 5; 325 MG/1; MG/1
1 TABLET ORAL EVERY 6 HOURS PRN
Qty: 120 TABLET | Refills: 0 | Status: SHIPPED | OUTPATIENT
Start: 2018-06-27 | End: 2018-07-24

## 2018-06-27 NOTE — TELEPHONE ENCOUNTER
"Requested Prescriptions   Pending Prescriptions Disp Refills     simvastatin (ZOCOR) 40 MG tablet [Pharmacy Med Name: SIMVASTATIN 40MG TABS] 30 tablet 0    Last Written Prescription Date:  05/28/18  Last Fill Quantity: 30,  # refills: 0  Last office visit: 3/27/2018 with prescribing provider:  ALEXIS Leiva Future Office Visit:     Sig: TAKE ONE TABLET BY MOUTH EVERY NIGHT AT BEDTIME (DUE FOR AN APPOINTMENT FOR FURTHER REFILLS)    Statins Protocol Failed    6/27/2018 10:02 AM       Failed - LDL on file in past 12 months    Recent Labs   Lab Test  05/23/17   0725   LDL  70            Passed - No abnormal creatine kinase in past 12 months    Recent Labs   Lab Test  06/27/17   1107   CKT  97               Passed - Recent (12 mo) or future (30 days) visit within the authorizing provider's specialty    Patient had office visit in the last 12 months or has a visit in the next 30 days with authorizing provider or within the authorizing provider's specialty.  See \"Patient Info\" tab in inbasket, or \"Choose Columns\" in Meds & Orders section of the refill encounter.           Passed - Patient is age 18 or older          "

## 2018-06-27 NOTE — TELEPHONE ENCOUNTER
Routing refill request to provider for review/approval because:  Azeb given x1 and patient did not follow up, please advise

## 2018-06-29 RX ORDER — SIMVASTATIN 40 MG
TABLET ORAL
Qty: 90 TABLET | Refills: 0 | Status: SHIPPED | OUTPATIENT
Start: 2018-06-29 | End: 2018-08-20

## 2018-06-29 NOTE — TELEPHONE ENCOUNTER
Rx sent.   Please remind patient that he is due for fasting lipid panel and Diabetes follow up visit.   Schedule visit at earliest convenience.

## 2018-07-05 DIAGNOSIS — E53.8 VITAMIN B 12 DEFICIENCY: ICD-10-CM

## 2018-07-06 RX ORDER — CYANOCOBALAMIN 1000 UG/ML
INJECTION, SOLUTION INTRAMUSCULAR; SUBCUTANEOUS
Qty: 3 ML | Refills: 0 | Status: SHIPPED | OUTPATIENT
Start: 2018-07-06 | End: 2018-10-27

## 2018-07-06 NOTE — TELEPHONE ENCOUNTER
Prescription approved per [facility] Refill Protocol.  With reminder appt due.  Naty Kumar RN

## 2018-07-06 NOTE — TELEPHONE ENCOUNTER
"Requested Prescriptions   Pending Prescriptions Disp Refills     cyanocobalamin (VITAMIN B12) 1000 MCG/ML injection [Pharmacy Med Name: CYANOCOBALAMIN 1000MCG/ML SOLN] 3 mL 6    Last Written Prescription Date:  02/09/18  Last Fill Quantity: 3,  # refills: 6   Last office visit: 3/27/2018 with prescribing provider:  ALEXIS Leiva Future Office Visit:     Sig: INJECT 1 ML INTO THE MUSCLE EVERY 30 DAYS    Vitamin Supplements (Adult) Protocol Passed    7/5/2018  9:16 PM       Passed - High dose Vitamin D not ordered       Passed - Recent (12 mo) or future (30 days) visit within the authorizing provider's specialty    Patient had office visit in the last 12 months or has a visit in the next 30 days with authorizing provider or within the authorizing provider's specialty.  See \"Patient Info\" tab in inbasket, or \"Choose Columns\" in Meds & Orders section of the refill encounter.              "

## 2018-07-16 DIAGNOSIS — F33.0 MILD RECURRENT MAJOR DEPRESSION (H): ICD-10-CM

## 2018-07-16 RX ORDER — PAROXETINE 20 MG/1
TABLET, FILM COATED ORAL
Qty: 90 TABLET | Refills: 3 | Status: SHIPPED | OUTPATIENT
Start: 2018-07-16 | End: 2018-09-21

## 2018-07-16 NOTE — TELEPHONE ENCOUNTER
"Requested Prescriptions   Pending Prescriptions Disp Refills     PARoxetine (PAXIL) 20 MG tablet [Pharmacy Med Name: PAROXETINE HCL 20MG TABS] 90 tablet 3    Last Written Prescription Date:  3-16-18  Last Fill Quantity: 90,  # refills: 3   Last office visit: 3/27/2018 with prescribing provider:  3-27-18   Future Office Visit:   Sig: TAKE ONE TABLET BY MOUTH EVERY MORNING    SSRIs Protocol Passed    7/16/2018  6:09 AM       Passed - PHQ-9 score less than 5 in past 6 months    Please review last PHQ-9 score.          Passed - Patient is age 18 or older       Passed - Recent (6 mo) or future (30 days) visit within the authorizing provider's specialty    Patient had office visit in the last 6 months or has a visit in the next 30 days with authorizing provider or within the authorizing provider's specialty.  See \"Patient Info\" tab in inbasket, or \"Choose Columns\" in Meds & Orders section of the refill encounter.            "

## 2018-07-23 ENCOUNTER — MYC MEDICAL ADVICE (OUTPATIENT)
Dept: FAMILY MEDICINE | Facility: CLINIC | Age: 63
End: 2018-07-23

## 2018-07-23 DIAGNOSIS — M25.562 CHRONIC PAIN OF LEFT KNEE: ICD-10-CM

## 2018-07-23 DIAGNOSIS — M25.562 PAIN OF LEFT KNEE AFTER INJURY: ICD-10-CM

## 2018-07-23 DIAGNOSIS — G89.29 CHRONIC PAIN OF LEFT KNEE: ICD-10-CM

## 2018-07-24 RX ORDER — OXYCODONE AND ACETAMINOPHEN 5; 325 MG/1; MG/1
1 TABLET ORAL EVERY 6 HOURS PRN
Qty: 120 TABLET | Refills: 0 | Status: SHIPPED | OUTPATIENT
Start: 2018-07-24 | End: 2018-08-21

## 2018-07-24 NOTE — TELEPHONE ENCOUNTER
Rx for Percocet 5-325 mg brought to [facility], they will call when ready.

## 2018-07-24 NOTE — TELEPHONE ENCOUNTER
[facility]   Rx completed. Please notify patient. Thanks.

## 2018-08-14 ENCOUNTER — MYC MEDICAL ADVICE (OUTPATIENT)
Dept: PHARMACY | Facility: CLINIC | Age: 63
End: 2018-08-14

## 2018-08-20 ENCOUNTER — MYC MEDICAL ADVICE (OUTPATIENT)
Dept: FAMILY MEDICINE | Facility: CLINIC | Age: 63
End: 2018-08-20

## 2018-08-20 DIAGNOSIS — M25.562 PAIN OF LEFT KNEE AFTER INJURY: ICD-10-CM

## 2018-08-20 DIAGNOSIS — M25.562 CHRONIC PAIN OF LEFT KNEE: ICD-10-CM

## 2018-08-20 DIAGNOSIS — G25.81 RESTLESS LEG SYNDROME: ICD-10-CM

## 2018-08-20 DIAGNOSIS — G89.29 CHRONIC PAIN OF LEFT KNEE: ICD-10-CM

## 2018-08-20 RX ORDER — PRAMIPEXOLE DIHYDROCHLORIDE 0.25 MG/1
TABLET ORAL
Qty: 120 TABLET | Refills: 0 | Status: SHIPPED | OUTPATIENT
Start: 2018-08-20 | End: 2018-09-21

## 2018-08-20 NOTE — TELEPHONE ENCOUNTER
Requested Prescriptions   Pending Prescriptions Disp Refills     pramipexole (MIRAPEX) 0.25 MG tablet  Last Written Prescription Date:  06/27/18  Last Fill Quantity: 120,  # refills: 0   Last office visit: 3/27/2018 with prescribing provider:  ALEXIS Leiva   Future Office Visit:     120 tablet 0     Sig: Take 1 tablet (0.25mg) in morning, 2 tablets (0.5mg) in the afternoon, and take 1 tablet before bed    There is no refill protocol information for this order

## 2018-08-21 NOTE — TELEPHONE ENCOUNTER
Prescription approved per [facility] Refill Protocol.  Kinjal Alcazar RN'

## 2018-08-22 RX ORDER — OXYCODONE AND ACETAMINOPHEN 5; 325 MG/1; MG/1
1 TABLET ORAL EVERY 6 HOURS PRN
Qty: 120 TABLET | Refills: 0 | Status: SHIPPED | OUTPATIENT
Start: 2018-08-22 | End: 2018-09-19

## 2018-08-22 NOTE — TELEPHONE ENCOUNTER
Hard copy of script for Percocet 5-325 mg tablet walked to [facility]

## 2018-09-14 ENCOUNTER — TELEPHONE (OUTPATIENT)
Dept: PHARMACY | Facility: CLINIC | Age: 63
End: 2018-09-14

## 2018-09-14 NOTE — TELEPHONE ENCOUNTER
We have been unable to reach this patient for MTM follow-up after several attempts. We will stop reaching out to the patient at this time. Please let us know if we can assist in this patient's care in the future.    Routing to PCP as JAC Douglass, Pharm.D., BCACP  Medication Therapy Management Pharmacist  [phone]

## 2018-09-17 ENCOUNTER — MYC MEDICAL ADVICE (OUTPATIENT)
Dept: FAMILY MEDICINE | Facility: CLINIC | Age: 63
End: 2018-09-17

## 2018-09-17 DIAGNOSIS — G89.4 CHRONIC PAIN SYNDROME: ICD-10-CM

## 2018-09-17 DIAGNOSIS — M25.562 CHRONIC PAIN OF LEFT KNEE: ICD-10-CM

## 2018-09-17 DIAGNOSIS — G89.29 CHRONIC PAIN OF LEFT KNEE: ICD-10-CM

## 2018-09-17 DIAGNOSIS — M25.562 PAIN OF LEFT KNEE AFTER INJURY: ICD-10-CM

## 2018-09-19 RX ORDER — OXYCODONE AND ACETAMINOPHEN 5; 325 MG/1; MG/1
1 TABLET ORAL EVERY 6 HOURS PRN
Qty: 120 TABLET | Refills: 0 | Status: SHIPPED | OUTPATIENT
Start: 2018-09-19 | End: 2018-10-15

## 2018-09-19 NOTE — TELEPHONE ENCOUNTER
Hard copy of script for Percocet 5-325 mg tablet walked to [facility]

## 2018-09-21 ENCOUNTER — OFFICE VISIT (OUTPATIENT)
Dept: FAMILY MEDICINE | Facility: CLINIC | Age: 63
End: 2018-09-21
Payer: COMMERCIAL

## 2018-09-21 VITALS
TEMPERATURE: 97 F | BODY MASS INDEX: 32.34 KG/M2 | WEIGHT: 252 LBS | HEART RATE: 64 BPM | DIASTOLIC BLOOD PRESSURE: 82 MMHG | OXYGEN SATURATION: 99 % | HEIGHT: 74 IN | SYSTOLIC BLOOD PRESSURE: 130 MMHG

## 2018-09-21 DIAGNOSIS — Z79.899 CONTROLLED SUBSTANCE AGREEMENT SIGNED: ICD-10-CM

## 2018-09-21 DIAGNOSIS — F43.9 STRESS AT HOME: ICD-10-CM

## 2018-09-21 DIAGNOSIS — G89.4 CHRONIC PAIN SYNDROME: ICD-10-CM

## 2018-09-21 DIAGNOSIS — R80.9 TYPE 2 DIABETES MELLITUS WITH MICROALBUMINURIA, WITH LONG-TERM CURRENT USE OF INSULIN (H): Primary | ICD-10-CM

## 2018-09-21 DIAGNOSIS — Z87.891 PERSONAL HISTORY OF TOBACCO USE, PRESENTING HAZARDS TO HEALTH: ICD-10-CM

## 2018-09-21 DIAGNOSIS — E11.29 TYPE 2 DIABETES MELLITUS WITH MICROALBUMINURIA, WITH LONG-TERM CURRENT USE OF INSULIN (H): Primary | ICD-10-CM

## 2018-09-21 DIAGNOSIS — F33.0 MILD RECURRENT MAJOR DEPRESSION (H): ICD-10-CM

## 2018-09-21 DIAGNOSIS — E66.01 MORBID OBESITY (H): ICD-10-CM

## 2018-09-21 DIAGNOSIS — Z79.4 TYPE 2 DIABETES MELLITUS WITH MICROALBUMINURIA, WITH LONG-TERM CURRENT USE OF INSULIN (H): Primary | ICD-10-CM

## 2018-09-21 DIAGNOSIS — N18.30 CKD (CHRONIC KIDNEY DISEASE) STAGE 3, GFR 30-59 ML/MIN (H): ICD-10-CM

## 2018-09-21 DIAGNOSIS — I10 HYPERTENSION GOAL BP (BLOOD PRESSURE) < 140/90: ICD-10-CM

## 2018-09-21 DIAGNOSIS — G25.81 RESTLESS LEG SYNDROME: ICD-10-CM

## 2018-09-21 DIAGNOSIS — E78.5 HYPERLIPIDEMIA LDL GOAL <100: ICD-10-CM

## 2018-09-21 LAB
CREAT SERPL-MCNC: 1.62 MG/DL (ref 0.66–1.25)
CREAT UR-MCNC: 77 MG/DL
ERYTHROCYTE [DISTWIDTH] IN BLOOD BY AUTOMATED COUNT: 12.5 % (ref 10–15)
GFR SERPL CREATININE-BSD FRML MDRD: 43 ML/MIN/1.7M2
HBA1C MFR BLD: 7.2 % (ref 0–5.6)
HCT VFR BLD AUTO: 37.1 % (ref 40–53)
HGB BLD-MCNC: 12.4 G/DL (ref 13.3–17.7)
MCH RBC QN AUTO: 31.8 PG (ref 26.5–33)
MCHC RBC AUTO-ENTMCNC: 33.4 G/DL (ref 31.5–36.5)
MCV RBC AUTO: 95 FL (ref 78–100)
MICROALBUMIN UR-MCNC: 79 MG/L
MICROALBUMIN/CREAT UR: 102.59 MG/G CR (ref 0–17)
PLATELET # BLD AUTO: 199 10E9/L (ref 150–450)
RBC # BLD AUTO: 3.9 10E12/L (ref 4.4–5.9)
WBC # BLD AUTO: 8.3 10E9/L (ref 4–11)

## 2018-09-21 PROCEDURE — 82565 ASSAY OF CREATININE: CPT | Performed by: FAMILY MEDICINE

## 2018-09-21 PROCEDURE — 36415 COLL VENOUS BLD VENIPUNCTURE: CPT | Performed by: FAMILY MEDICINE

## 2018-09-21 PROCEDURE — 99214 OFFICE O/P EST MOD 30 MIN: CPT | Performed by: FAMILY MEDICINE

## 2018-09-21 PROCEDURE — 82043 UR ALBUMIN QUANTITATIVE: CPT | Performed by: FAMILY MEDICINE

## 2018-09-21 PROCEDURE — 83036 HEMOGLOBIN GLYCOSYLATED A1C: CPT | Performed by: FAMILY MEDICINE

## 2018-09-21 PROCEDURE — 85027 COMPLETE CBC AUTOMATED: CPT | Performed by: FAMILY MEDICINE

## 2018-09-21 RX ORDER — SIMVASTATIN 40 MG
40 TABLET ORAL AT BEDTIME
Qty: 90 TABLET | Refills: 0 | Status: SHIPPED | OUTPATIENT
Start: 2018-09-21 | End: 2018-11-29

## 2018-09-21 RX ORDER — LOSARTAN POTASSIUM AND HYDROCHLOROTHIAZIDE 25; 100 MG/1; MG/1
1 TABLET ORAL DAILY
Qty: 90 TABLET | Refills: 3 | Status: SHIPPED | OUTPATIENT
Start: 2018-09-21 | End: 2019-10-09

## 2018-09-21 RX ORDER — PRAMIPEXOLE DIHYDROCHLORIDE 0.25 MG/1
TABLET ORAL
Qty: 120 TABLET | Refills: 0 | Status: SHIPPED | OUTPATIENT
Start: 2018-09-21 | End: 2018-10-27

## 2018-09-21 RX ORDER — PAROXETINE 20 MG/1
20 TABLET, FILM COATED ORAL EVERY MORNING
Qty: 90 TABLET | Refills: 3 | Status: SHIPPED | OUTPATIENT
Start: 2018-09-21 | End: 2019-11-22

## 2018-09-21 RX ORDER — INSULIN GLARGINE 100 [IU]/ML
45 INJECTION, SOLUTION SUBCUTANEOUS DAILY
Qty: 3 ML | Refills: 11 | Status: SHIPPED | OUTPATIENT
Start: 2018-09-21 | End: 2019-07-22

## 2018-09-21 ASSESSMENT — ANXIETY QUESTIONNAIRES
2. NOT BEING ABLE TO STOP OR CONTROL WORRYING: NOT AT ALL
2. NOT BEING ABLE TO STOP OR CONTROL WORRYING: NOT AT ALL
IF YOU CHECKED OFF ANY PROBLEMS ON THIS QUESTIONNAIRE, HOW DIFFICULT HAVE THESE PROBLEMS MADE IT FOR YOU TO DO YOUR WORK, TAKE CARE OF THINGS AT HOME, OR GET ALONG WITH OTHER PEOPLE: SOMEWHAT DIFFICULT
GAD7 TOTAL SCORE: 6
5. BEING SO RESTLESS THAT IT IS HARD TO SIT STILL: SEVERAL DAYS
6. BECOMING EASILY ANNOYED OR IRRITABLE: MORE THAN HALF THE DAYS
1. FEELING NERVOUS, ANXIOUS, OR ON EDGE: NOT AT ALL
GAD7 TOTAL SCORE: 6
5. BEING SO RESTLESS THAT IT IS HARD TO SIT STILL: SEVERAL DAYS
6. BECOMING EASILY ANNOYED OR IRRITABLE: MORE THAN HALF THE DAYS
3. WORRYING TOO MUCH ABOUT DIFFERENT THINGS: SEVERAL DAYS
7. FEELING AFRAID AS IF SOMETHING AWFUL MIGHT HAPPEN: NOT AT ALL
1. FEELING NERVOUS, ANXIOUS, OR ON EDGE: NOT AT ALL
7. FEELING AFRAID AS IF SOMETHING AWFUL MIGHT HAPPEN: NOT AT ALL
IF YOU CHECKED OFF ANY PROBLEMS ON THIS QUESTIONNAIRE, HOW DIFFICULT HAVE THESE PROBLEMS MADE IT FOR YOU TO DO YOUR WORK, TAKE CARE OF THINGS AT HOME, OR GET ALONG WITH OTHER PEOPLE: SOMEWHAT DIFFICULT
3. WORRYING TOO MUCH ABOUT DIFFERENT THINGS: SEVERAL DAYS

## 2018-09-21 ASSESSMENT — PATIENT HEALTH QUESTIONNAIRE - PHQ9
5. POOR APPETITE OR OVEREATING: MORE THAN HALF THE DAYS
5. POOR APPETITE OR OVEREATING: MORE THAN HALF THE DAYS

## 2018-09-21 NOTE — LETTER
[facility]    09/21/18    Patient: Cedric Figueroa  YOB: 1955  Medical Record Number: [id number]                                                                  Controlled Substance Agreement  I understand that my care provider has prescribed controlled substances (narcotics, tranquilizers, and/or stimulants) to help manage my condition(s).  I am taking this medicine to help me function or work.  I know that this is strong medicine.  It could have serious side effects and even cause a dependency on the drug.  If I stop these medicines suddenly, I could have severe withdrawal symptoms.    The risks, benefits, and side effects of these medication(s) were explained to me.  I agree that:  1. I will take part in other treatments as advised by my provider.  This may be psychiatry or counseling, physical therapy, behavioral therapy, group treatment, or a referral to a pain clinic.  I will reduce or stop my medicine when my provider tells me to do so.   2. I will take my medicines as prescribed.  I will not change the dose or schedule unless my provider tells me to.  There will be no refills if I  run out early.   I may be contacted at any time without warning and asked to complete a drug test or pill count.   3. I will keep all my appointments at the clinic.  If I miss appointments or fail to follow instructions, my provider may stop my medicine.  4. I will not ask other providers to prescribe controlled substances. And I will not accept controlled substances from other people. If I need another prescribed controlled substance for a new reason, I will notify my provider within one business day.  5. If I enroll in the [place] Medical Marijuana program, I will tell my provider.  I will also sign an agreement to share my medical records with my provider.  6. I will use one pharmacy to fill all of my controlled substance prescriptions.  If my prescription is mailed to my pharmacy, it may take 5 to  7 days for my medicine to be ready.  7. I understand that my provider, clinic care team, and pharmacy can track controlled substance prescriptions from other providers through a central database (prescription monitoring program).  8. I will bring in my list of medications (or my medicine bottles) each time I come to the clinic.  [id number] REV-  07/2018                                                                                                                                   Page 1 of 2      [facility]ETH    09/21/18    Patient: Cedric Figueroa  YOB: 1955  Medical Record Number: [id number]    9. Refills of controlled substances will be made only during office hours.  It is up to me to make sure that I do not run out of my medicines on weekends or holidays.    10. I am responsible for my prescriptions.  If the medicine/prescription is lost or stolen, it will not be replaced.   I also agree not to share these medicines with anyone.  11. I agree to not use ANY illegal or recreational drugs.  This includes marijuana, cocaine, bath salts or other drugs.  I agree not to use alcohol unless my provider says I may.  I agree to give urine samples whenever asked.  If I fail to give a urine sample, the provider may stop my medicine.     12. I will tell my nurse or provider right away if I become pregnant or have a new medical problem treated outside of [facility].  13. I understand that this medicine can affect my thinking and judgment.  It may be unsafe for me to drive, use machinery and do dangerous tasks.  I will not do any of these things until I know how the medicine affects me.  If my dose changes, I will wait to see how it affects me.  I will contact my provider if I have concerns about medicine side effects.  I understand that if I do not follow any of the conditions above, my prescriptions or treatment may be stopped.    I agree that my provider, clinic care team, and pharmacy may work  with any city, state or federal law enforcement agency that investigates the misuse, sale, or other diversion of my controlled medicine. I will allow my provider to discuss my care with or share a copy of this agreement with any other treating provider, pharmacy or emergency room where I receive care.  I agree to give up (waive) any right of privacy or confidentiality with respect to these authorizations.   I have read this agreement and have asked questions about anything I did not understand.   ___________________________________    ___________________________  Patient Signature                                                           Date and Time  ___________________________________     ____________________________  Witness                                                                            Date and Time  ___________________________________  Molly Leiva MD  [id number] REV- 07/2018                                                                                                                                                   Page 2 of 2  Opioid Pain Medicines (also known as Narcotics)  What You Need to Know      What are opioids?   Opioids are pain medicines that must be prescribed by a doctor. Examples are:     morphine (MS Contin, Ruth)    oxycodone (Oxycontin)    oxycodone and acetaminophen (Percocet)    hydrocodone and acetaminophen (Vicodin, Norco)     fentanyl patch (Duragesic)     hydromorphone (Dilaudid)     methadone     What do opioids do well?   Opioids are best for short-term pain after a surgery or injury. They also work well for cancer pain. Unlike other pain medicines, they do not cause liver or kidney failure or ulcers. They may help some people with long-lasting (chronic) pain.     What do opioids NOT do well?   Opioids never get rid of pain entirely, and they do not work well for most patients with chronic pain. Opioids do not reduce swelling, one of the causes of pain. They also  don t work well for nerve pain.     Side effects  Talk to your doctor before you start or decide to keep taking one of these medicines. Side effects include:    Lowers your breathing rate enough that it could cause death    Death due to taking more than the prescribed dose    Serious lifelong opioid use      Dependence is not the same as addiction. Addiction is when people keep using a substance that harms their body, their mind or their relations with others. If you have a history of drug or alcohol abuse, taking opioids can cause a relapse.  Over time, opioids don t work as well. Most people will need higher and higher doses. The higher the dose, the more serious the side effects. We don t know the long-term effects of opioids.   People who have used opioids for a long time have a lower quality of life, worse depression, higher levels of pain and more visits to doctors.  Overdose from prescription drugs is the second leading cause of death in the [place] The risk of overdose rises when opioids are taken with other drugs such as:    Medicines used for anxiety and panic attacks (such as lorazepam, alprazolam, clonazepam    Other sedatives    Alcohol    Illegal drugs such as heroin  Never share your opioids with others. Be sure to store opioids in a secure place, locked if possible.Young children can easily swallow them and overdose.     Are there other ways to manage pain?  Ways to help reduce pain:    Exercise every day.    Treat health problems that may be causing pain.    Treat mental health problems like depression and anxiety.     Worse depression symptoms; Less pleasure in things you usually enjoy    Feeling tired or sluggish    Slower thoughts or cloudy thinking    Being more sensitive to pain over time; Pain is harder to control.    Trouble sleeping or restless sleep    Changes in hormone levels (for example, less testosterone).     Changes in sex drive or ability to have sex    Long lasting nausea and  constipation    Trouble breathing while asleep; This is worse with lung problems like COPD or sleep apnea.    Unsafe driving    Getting sick more often    Itching    Feeling dizzy    Dry mouth    Sweating    Trouble emptying the bladder (peeing). This is worse if you have an enlarged prostate or get urinary tract infections (UTIs).    What else should I know about opioids?  When someone takes opioids for too long or too often, they become dependent. This means that if you stop or reduce the medicine too quickly, you will have withdrawal symptoms.          Practice good sleep habits.  Try to go to bed and get up at the same time every day.    Stop smoking.  Tobacco use can make pain worse.    Do things that you enjoy.    Find a way to work through pain without drugs.  Try deep breathing, meditation, visual imagery and aromatherapy.    Ask your doctor to help you create a plan to manage your pain.

## 2018-09-21 NOTE — PROGRESS NOTES
SUBJECTIVE:   Cedric Figueroa is a 62 year old male who presents to clinic today for the following health issues:        Diabetes Follow-up        Patient is checking blood sugars: 1-2 times a week; 180-200s     Diabetic concerns: None     Symptoms of hypoglycemia (low blood sugar): none     Paresthesias (numbness or burning in feet) or sores: No     Date of last diabetic eye exam (annually):  Appt next week at [facility]      Date of last Urine micro albumin screen, (annually):     Component      Latest Ref Rng & Units 5/8/2017   Creatinine Urine      mg/dL 106   Albumin Urine mg/L      mg/L 23   Albumin Urine mg/g Cr      0 - 17 mg/g Cr 21.79 (H)       Pneumococcal Vaccine:  Yes: up to date    Influenza Vaccine (annually):   Yes: up to date    Tobacco free:  Yes: quit x2 months ago     Aspirin use:  Yes: 81 mg     Amount of exercise or physical activity: not currently     Problems taking medications regularly: No    Medication side effects: none    Diet: regular (no restrictions)      DEPRESSION - Patient has a long history of Depression of moderate severity requiring medication for control with recent symptoms being gradually worsening due to stress at home, wife is still struggling with alcoholism and son recently  and moved into their home. Feels the worsening depression symptoms are due to stress at home.  .cCurrent symptoms of depression include depressed mood, diminished interest or pleasure in activities, fatigue.                                                                                                                                                                                    .  HYPERLIPIDEMIA - Patient has a long history of significant Hyperlipidemia requiring medication for treatment with recent good control. Patient reports no problems or side effects with the medication.                                                                                                                                                        .  HYPERTENSION - Patient has longstanding history of HTN , currently denies any symptoms referable to elevated blood pressure. Specifically denies chest pain, palpitations, dyspnea, orthopnea, PND or peripheral edema. Blood pressure readings have been in normal range. Current medication regimen is as listed below. Patient denies any side effects of medication.                                                                                                                                                                                          .  RENAL INSUFFICIENCY - Patient has a longstanding history of stage 3 CKD. Last Cr of 1.46 and eGFR of 49 in  6/2018.                                                                                                                                                                           History of restless leg syndrome- symptoms are controlled on Mirapex. Requesting a refill.     History of chronic pain syndrome- with chronic knee pain. Currently of Oxycodone 5-325, # 120/month.   Due to sign a controlled substance agreement today. Recent UDS was unremarkable.  website accessed, no concerns.     HEALTH CARE MAINTENANCE: due for lung cancer screening due to long smoking history > 30 years.    Problem list and histories reviewed & adjusted, as indicated.  Additional history: as documented    Patient Active Problem List   Diagnosis     Hyperlipidemia LDL goal <100     Mild major depression (H)     Restless leg syndrome     Sleep apnea     Knee pain     Vitamin B 12 deficiency     Advanced directives, counseling/discussion     Iron deficiency anemia     OA (osteoarthritis) of knee     Perforated bowel (H)     Bilateral low back pain without sciatica     Type 2 diabetes mellitus with microalbuminuria, with long-term current use of insulin (H)     Hypertension goal BP (blood pressure) < 130/80     Bariatric surgery status     Perirectal  abscess     CKD (chronic kidney disease) stage 3, GFR 30-59 ml/min     Controlled substance agreement signed     Chronic pain syndrome     Past Surgical History:   Procedure Laterality Date     ABDOMEN SURGERY  2015    peritonitis, bowel perforation, bowel resection     AS TOTAL KNEE ARTHROPLASTY      Right and Left knee x3( 2 partial knee replacement and 1 total Left knee replacement     BARIATRIC SURGERY      at age 45     COLONOSCOPY  2/1/2006    [facility]     COLONOSCOPY WITH CO2 INSUFFLATION N/A 6/7/2016    Procedure: COLONOSCOPY WITH CO2 INSUFFLATION;  Surgeon: Cedric Schneider MD;  Location: [facility] OR     INCISION AND DRAINAGE BUTTOCKS Left 3/28/2018    Procedure: INCISION AND DRAINAGE BUTTOCKS;  Incision and drainage of left buttock abscess, and fistulotomy;  Surgeon: Bala Layton MD;  Location: [facility] OR       Social History   Substance Use Topics     Smoking status: Former Smoker     Quit date: 7/17/2018     Smokeless tobacco: Never Used     Alcohol use No     Family History   Problem Relation Age of Onset     Diabetes Mother      Diabetes Paternal Grandmother      Coronary Artery Disease Paternal Grandmother      Cerebrovascular Disease Paternal Grandmother      Bleeding Disorder No family hx of      Anesthesia Reaction No family hx of          Current Outpatient Prescriptions   Medication Sig Dispense Refill     ASPIRIN PO Take 81 mg by mouth       BASAGLAR 100 UNIT/ML injection Inject 45 Units Subcutaneous daily 3 mL 11     blood glucose monitoring (ACCU-CHEK PELON PLUS) meter device kit Use to test blood sugar 3 times daily or as directed. 1 kit 0     blood glucose monitoring (ACCU-CHEK PELON PLUS) test strip Use to test blood sugar 3 times daily or as directed. 100 each 11     blood glucose monitoring (SOFTCLIX) lancets Use to test blood sugar 3 times daily or as directed. 100 each 3     cyanocobalamin (VITAMIN B12) 1000 MCG/ML injection INJECT 1 ML INTO THE MUSCLE EVERY 30 DAYS 3 mL 0      diclofenac (VOLTAREN) 1 % GEL topical gel APPLY 4 GRAMS TO KNEES DAILY USING THE ENCLOSED DOSING CARD. 100 g 1     insulin pen needle (BD ULTRA-FINE) 29G X 12.7MM Use once daily or as directed. 100 each prn     losartan-hydrochlorothiazide (HYZAAR) 100-25 MG per tablet Take 1 tablet by mouth daily 90 tablet 3     MELATONIN PO Take 10 mg by mouth At Bedtime        oxyCODONE-acetaminophen (PERCOCET) 5-325 MG per tablet Take 1 tablet by mouth every 6 hours as needed for moderate to severe pain or pain maximum 4 tablet(s) per day 120 tablet 0     PARoxetine (PAXIL) 20 MG tablet Take 1 tablet (20 mg) by mouth every morning 90 tablet 3     pramipexole (MIRAPEX) 0.25 MG tablet Take 1 tablet (0.25mg) in morning, 2 tablets (0.5mg) in the afternoon, and take 1 tablet before bed 120 tablet 0     simvastatin (ZOCOR) 40 MG tablet Take 1 tablet (40 mg) by mouth At Bedtime 90 tablet 0     No Known Allergies  Recent Labs   Lab Test  09/21/18   1403  06/05/18   1417  03/27/18   1554  02/14/18   1423  06/20/17 06/16/17   1552  05/23/17   0725   02/20/17   1517   03/24/15   0747  12/31/14   0715   A1C  7.2*   --   6.6*  6.6*   < >   --    --    --    --    < >  7.5*   < >  7.4*  7.1*   LDL   --    --    --    --    --    --    --    --   70   --    --    --   62  53   HDL   --    --    --    --    --    --    --    --   50   --    --    --   41  37*   TRIG   --    --    --    --    --    --    --    --   143   --    --    --   121  133   ALT   --    --    --    --    --   26   --   35   --    --    --    --   34   --    CR  1.62*  1.46*  1.53*  1.51*   < >  3.04*   < >  2.59*   --    < >   --    < >  0.86  0.85   GFRESTIMATED  43*  49*  46*  47*   < >  21   < >  25*   --    < >   --    < >  >90  Non  GFR Calc    >90  Non  GFR Calc     GFRESTBLACK  52*  59*  56*  57*   < >  26   < >  31*   --    < >   --    < >  >90   GFR Calc    >90   GFR Calc     POTASSIUM   --    "3.9  4.1   --    < >  5.2*   < >  5.6*   --    < >   --    < >  3.9  3.2*   TSH   --    --    --    --    --    --    --   1.86   --    --   2.23   --    --    --     < > = values in this interval not displayed.      BP Readings from Last 3 Encounters:   09/21/18 130/82   04/10/18 118/71   03/28/18 110/68    Wt Readings from Last 3 Encounters:   09/21/18 252 lb (114.3 kg)   04/10/18 243 lb (110.2 kg)   03/27/18 243 lb (110.2 kg)                  Labs reviewed in EPIC    Reviewed and updated as needed this visit by clinical staff  Tobacco  Allergies  Meds  Soc Hx      Reviewed and updated as needed this visit by Provider         ROS:  Constitutional, HEENT, cardiovascular, pulmonary, gi and gu systems are negative, except as otherwise noted.      OBJECTIVE:     /82  Pulse 64  Temp 97  F (36.1  C) (Tympanic)  Ht 6' 2\" (1.88 m)  Wt 252 lb (114.3 kg)  SpO2 99%  BMI 32.35 kg/m2  Body mass index is 32.35 kg/(m^2).  GENERAL: healthy, alert and no distress  RESP: lungs clear to auscultation - no rales, rhonchi or wheezes  CV: regular rate and rhythm, normal S1 S2, no S3 or S4, no murmur, click or rub, no peripheral edema and peripheral pulses strong  MS: no gross musculoskeletal defects noted, no edema  Diabetic foot exam: normal DP and PT pulses, no trophic changes or ulcerative lesions, normal sensory exam and reduced sensation at both heels due to trophic changes but no ulcers.     Diagnostic Test Results:  Results for orders placed or performed in visit on 09/21/18   Hemoglobin A1c   Result Value Ref Range    Hemoglobin A1C 7.2 (H) 0 - 5.6 %   Albumin Random Urine Quantitative with Creat Ratio   Result Value Ref Range    Creatinine Urine 77 mg/dL    Albumin Urine mg/L 79 mg/L    Albumin Urine mg/g Cr 102.59 (H) 0 - 17 mg/g Cr   CBC with platelets   Result Value Ref Range    WBC 8.3 4.0 - 11.0 10e9/L    RBC Count 3.90 (L) 4.4 - 5.9 10e12/L    Hemoglobin 12.4 (L) 13.3 - 17.7 g/dL    Hematocrit 37.1 (L) 40.0 " - 53.0 %    MCV 95 78 - 100 fl    MCH 31.8 26.5 - 33.0 pg    MCHC 33.4 31.5 - 36.5 g/dL    RDW 12.5 10.0 - 15.0 %    Platelet Count 199 150 - 450 10e9/L   Creatinine   Result Value Ref Range    Creatinine 1.62 (H) 0.66 - 1.25 mg/dL    GFR Estimate 43 (L) >60 mL/min/1.7m2    GFR Estimate If Black 52 (L) >60 mL/min/1.7m2         ASSESSMENT/PLAN:     Cedric was seen today for diabetes.    Diagnoses and all orders for this visit:    Type 2 diabetes mellitus with microalbuminuria, with long-term current use of insulin (H); A1C 7.2 today; goal A1C < 7.0  -     Hemoglobin A1c  -     JUST IN CASE  -     Albumin Random Urine Quantitative with Creat Ratio  -     Increase to impove glycemic control: BASAGLAR 100 UNIT/ML injection; Inject 45 Units Subcutaneous daily    CKD (chronic kidney disease) stage 3, GFR 30-59 ml/min  -     CBC with platelets  -     Creatinine    Hypertension goal BP (blood pressure) < 140/90, conttrolled  -     Refill: losartan-hydrochlorothiazide (HYZAAR) 100-25 MG per tablet; Take 1 tablet by mouth daily    Mild recurrent major depression (H) complicated by Stress at home         - PHQ-9/CHARITY 7 completed, see above/Epic for details    -  Refill: PARoxetine (PAXIL) 20 MG tablet; Take 1 tablet (20 mg) by mouth every morning  -  Offered referral for Psychotherapy Counseling; patient declined at this time.       Restless leg syndrome, controlled  -     Refill: pramipexole (MIRAPEX) 0.25 MG tablet; Take 1 tablet (0.25mg) in morning, 2 tablets (0.5mg) in the afternoon, and take 1 tablet before bed    Hyperlipidemia LDL goal <100  -     Refill: simvastatin (ZOCOR) 40 MG tablet; Take 1 tablet (40 mg) by mouth At Bedtime  -     Lipid Profile; Future    Controlled substance agreement signed due to Chronic pain syndrome  Expectations discussed, signed today. Patient given a copy.      Personal history of tobacco use, presenting hazards to health  -     CT Chest Lung Cancer Scrn Low Dose wo; Future    Morbid obesity  (H)  Weight management plan: Discussed healthy diet and exercise guidelines and patient will follow up in 6 months in clinic to re-evaluate.      Diabetes follow up in 3 months      Molly Leiva MD  [facility]

## 2018-09-21 NOTE — MR AVS SNAPSHOT
After Visit Summary   9/21/2018    Cedric Figueroa    MRN: [id number]           Patient Information     Date Of Birth          1955        Visit Information        Provider Department      9/21/2018 2:00 PM Molly Leiva MD [facility]        Today's Diagnoses     Type 2 diabetes mellitus with hyperglycemia, with long-term current use of insulin (H)    -  1    Hypertension goal BP (blood pressure) < 140/90        Mild recurrent major depression (H)        Restless leg syndrome        Hyperlipidemia LDL goal <100        Personal history of tobacco use, presenting hazards to health        Stress at home        Controlled substance agreement signed        Chronic pain syndrome        Morbid obesity (H)           Follow-ups after your visit        Follow-up notes from your care team     Return in about 3 months (around 12/21/2018) for Diabetes follow Up every 3 months.      Future tests that were ordered for you today     Open Future Orders        Priority Expected Expires Ordered    CT Chest Lung Cancer Scrn Low Dose wo Routine  9/22/2019 9/21/2018    Lipid Profile Routine  9/22/2019 9/21/2018            Who to contact     Normal or non-critical lab and imaging results will be communicated to you by Krowdert, letter or phone within 4 business days after the clinic has received the results. If you do not hear from us within 7 days, please contact the clinic through Carbonite or phone. If you have a critical or abnormal lab result, we will notify you by phone as soon as possible.  Submit refill requests through Carbonite or call your pharmacy and they will forward the refill request to us. Please allow 3 business days for your refill to be completed.          If you need to speak with a  for additional information , please call: [phone]             Additional Information About Your Visit        Carbonite Information     Carbonite gives you secure access to your electronic  "health record. If you see a primary care provider, you can also send messages to your care team and make appointments. If you have questions, please call your primary care clinic.  If you do not have a primary care provider, please call [phone] and they will assist you.        Care EveryWhere ID     This is your Care EveryWhere ID. This could be used by other organizations to access your [facility] medical records  [id number]        Your Vitals Were     Pulse Temperature Height Pulse Oximetry BMI (Body Mass Index)       64 97  F (36.1  C) (Tympanic) 6' 2\" (1.88 m) 99% 32.35 kg/m2        Blood Pressure from Last 3 Encounters:   09/21/18 143/74   04/10/18 118/71   03/28/18 110/68    Weight from Last 3 Encounters:   09/21/18 252 lb (114.3 kg)   04/10/18 243 lb (110.2 kg)   03/27/18 243 lb (110.2 kg)              We Performed the Following     Albumin Random Urine Quantitative with Creat Ratio     CBC with platelets     Creatinine     Hemoglobin A1c     JUST IN CASE          Today's Medication Changes          These changes are accurate as of 9/21/18  3:20 PM.  If you have any questions, ask your nurse or doctor.               These medicines have changed or have updated prescriptions.        Dose/Directions    BASAGLAR 100 UNIT/ML injection   This may have changed:  how much to take   Used for:  Type 2 diabetes mellitus with hyperglycemia, with long-term current use of insulin (H)   Changed by:  Molly Leiva MD        Dose:  45 Units   Inject 45 Units Subcutaneous daily   Quantity:  3 mL   Refills:  11       PARoxetine 20 MG tablet   Commonly known as:  PAXIL   This may have changed:  See the new instructions.   Used for:  Mild recurrent major depression (H)   Changed by:  Molly Leiva MD        Dose:  20 mg   Take 1 tablet (20 mg) by mouth every morning   Quantity:  90 tablet   Refills:  3       simvastatin 40 MG tablet   Commonly known as:  ZOCOR   This may have changed:  See the new instructions. "   Used for:  Hyperlipidemia LDL goal <100   Changed by:  Molly Leiva MD        Dose:  40 mg   Take 1 tablet (40 mg) by mouth At Bedtime   Quantity:  90 tablet   Refills:  0            Where to get your medicines      These medications were sent to [facility] - [address]  [address] [address] [address]     Phone:  [phone]     BASAGLAR 100 UNIT/ML injection    losartan-hydrochlorothiazide 100-25 MG per tablet    PARoxetine 20 MG tablet    pramipexole 0.25 MG tablet    simvastatin 40 MG tablet                Primary Care Provider Office Phone # Fax #    Molly Leiva [phone] [address]  [place] [address]        Equal Access to Services     : Hadii kiran ku hadasho Soomaali, waaxda luqadaha, qaybta kaalmada adeegyada, waxay idiin hayaan william khteresa diaz . So [facility] [phone].    ATENCIÓN: Si habla español, tiene a kim disposición servicios gratuitos de asistencia lingüística. Llame al [phone].    We comply with applicable federal civil rights laws and [place] laws. We do not discriminate on the basis of race, color, national origin, age, disability, sex, sexual orientation, or gender identity.            Thank you!     Thank you for choosing [facility]  for your care. Our goal is always to provide you with excellent care. Hearing back from our patients is one way we can continue to improve our services. Please take a few minutes to complete the written survey that you may receive in the mail after your visit with us. Thank you!             Your Updated Medication List - Protect others around you: Learn how to safely use, store and throw away your medicines at www.disposemymeds.org.          This list is accurate as of 9/21/18  3:20 PM.  Always use your most recent med list.                   Brand Name Dispense Instructions for use Diagnosis    ASPIRIN PO      Take 81 mg by mouth     Type 2 diabetes mellitus with hyperglycemia, with long-term current use of insulin (H)       BASAGLAR 100 UNIT/ML injection     3 mL    Inject 45 Units Subcutaneous daily    Type 2 diabetes mellitus with hyperglycemia, with long-term current use of insulin (H)       blood glucose monitoring lancets     100 each    Use to test blood sugar 3 times daily or as directed.    Type 2 diabetes mellitus with microalbuminuria, with long-term current use of insulin (H)       blood glucose monitoring meter device kit     1 kit    Use to test blood sugar 3 times daily or as directed.    Type 2 diabetes, HbA1c goal < 7% (H)       blood glucose monitoring test strip    ACCU-CHEK PELON PLUS    100 each    Use to test blood sugar 3 times daily or as directed.    Type 2 diabetes mellitus with microalbuminuria, with long-term current use of insulin (H)       cyanocobalamin 1000 MCG/ML injection    VITAMIN B12    3 mL    INJECT 1 ML INTO THE MUSCLE EVERY 30 DAYS    Vitamin B 12 deficiency       diclofenac 1 % Gel topical gel    VOLTAREN    100 g    APPLY 4 GRAMS TO KNEES DAILY USING THE ENCLOSED DOSING CARD.    Chronic pain of left knee       insulin pen needle 29G X 12.7MM    BD ULTRA-FINE    100 each    Use once daily or as directed.    Type 2 diabetes, HbA1c goal < 7% (H)       losartan-hydrochlorothiazide 100-25 MG per tablet    HYZAAR    90 tablet    Take 1 tablet by mouth daily    Hypertension goal BP (blood pressure) < 140/90       MELATONIN PO      Take 10 mg by mouth At Bedtime        oxyCODONE-acetaminophen 5-325 MG per tablet    PERCOCET    120 tablet    Take 1 tablet by mouth every 6 hours as needed for moderate to severe pain or pain maximum 4 tablet(s) per day    Chronic pain of left knee, Pain of left knee after injury       PARoxetine 20 MG tablet    PAXIL    90 tablet    Take 1 tablet (20 mg) by mouth every morning    Mild recurrent major depression (H)       pramipexole 0.25 MG tablet    MIRAPEX    120 tablet    Take  1 tablet (0.25mg) in morning, 2 tablets (0.5mg) in the afternoon, and take 1 tablet before bed    Restless leg syndrome       simvastatin 40 MG tablet    ZOCOR    90 tablet    Take 1 tablet (40 mg) by mouth At Bedtime    Hyperlipidemia LDL goal <100

## 2018-09-22 ASSESSMENT — PATIENT HEALTH QUESTIONNAIRE - PHQ9: SUM OF ALL RESPONSES TO PHQ QUESTIONS 1-9: 9

## 2018-09-22 ASSESSMENT — ANXIETY QUESTIONNAIRES: GAD7 TOTAL SCORE: 6

## 2018-09-26 PROBLEM — G89.4 CHRONIC PAIN SYNDROME: Status: ACTIVE | Noted: 2018-09-26

## 2018-09-26 PROBLEM — N18.30 CKD (CHRONIC KIDNEY DISEASE) STAGE 3, GFR 30-59 ML/MIN (H): Status: ACTIVE | Noted: 2018-09-26

## 2018-09-26 PROBLEM — Z79.899 CONTROLLED SUBSTANCE AGREEMENT SIGNED: Status: ACTIVE | Noted: 2018-09-26

## 2018-09-26 NOTE — ASSESSMENT & PLAN NOTE
Patient is followed by Molly Leiva MD for ongoing prescription of pain medication.  All refills should only be approved by this provider, or covering partner.    Medication(s): Oxcodone 5-325 mg .   Maximum quantity per month: # 120  Clinic visit frequency required: Q 3 months     Controlled substance agreement:  Encounter-Level CSA - 02/20/2017:          Controlled Substance Agreement - Scan on 2/24/2017  3:27 PM : CONTROLLED SUBSTANCE AGREEMENT (below)              Pain Clinic evaluation in the past: No    DIRE Total Score(s):  No flowsheet data found.    Last [facility] website verification:  done on 9/21/2018, no concerns.   [email or web address]

## 2018-09-27 ENCOUNTER — TRANSFERRED RECORDS (OUTPATIENT)
Dept: HEALTH INFORMATION MANAGEMENT | Facility: CLINIC | Age: 63
End: 2018-09-27

## 2018-10-15 ENCOUNTER — MYC MEDICAL ADVICE (OUTPATIENT)
Dept: FAMILY MEDICINE | Facility: CLINIC | Age: 63
End: 2018-10-15

## 2018-10-15 DIAGNOSIS — G89.29 CHRONIC PAIN OF LEFT KNEE: ICD-10-CM

## 2018-10-15 DIAGNOSIS — M25.562 CHRONIC PAIN OF LEFT KNEE: ICD-10-CM

## 2018-10-15 DIAGNOSIS — M25.562 PAIN OF LEFT KNEE AFTER INJURY: ICD-10-CM

## 2018-10-15 NOTE — TELEPHONE ENCOUNTER
Routing refill request to provider for review/approval because:  Drug not on the [facility] refill protocol. Last written 9/19/18.  Naty Kumar RN

## 2018-10-16 RX ORDER — OXYCODONE AND ACETAMINOPHEN 5; 325 MG/1; MG/1
1 TABLET ORAL EVERY 6 HOURS PRN
Qty: 120 TABLET | Refills: 0 | Status: SHIPPED | OUTPATIENT
Start: 2018-10-16 | End: 2018-11-12

## 2018-10-17 NOTE — TELEPHONE ENCOUNTER
Hard copy of script for Percocet 5-325 mg tablet walked to [facility]

## 2018-10-27 DIAGNOSIS — G25.81 RESTLESS LEG SYNDROME: ICD-10-CM

## 2018-10-27 DIAGNOSIS — E53.8 VITAMIN B 12 DEFICIENCY: ICD-10-CM

## 2018-10-29 NOTE — TELEPHONE ENCOUNTER
Routing refill request to provider for review/approval because:  Needs provider approval, was seen 9/2018, f/u in 3 months

## 2018-10-29 NOTE — TELEPHONE ENCOUNTER
"Requested Prescriptions   Pending Prescriptions Disp Refills     pramipexole (MIRAPEX) 0.25 MG tablet [Pharmacy Med Name: PRAMIPEXOLE DIHYDROCHLORI 0.25 TABS] 120 tablet 0    Last Written Prescription Date:  09/21/18  Last Fill Quantity: 120,  # refills: 0   Last office visit: 9/21/2018 with prescribing provider:  ALEXIS Leiva   Future Office Visit:     Sig: TAKE ONE TABLET BY MOUTH IN THE MORNING, TWO TABLETS IN THE AFTERNOON, AND ONE TABLET BEFORE BED    Antiparkinson's Agents Protocol Failed    10/27/2018 11:05 AM       Failed - ALT on record in past 12 months        Recent Labs   Lab Test 06/20/17   ALT  26            Passed - Blood pressure under 140/90 in past 12 months    BP Readings from Last 3 Encounters:   09/21/18 130/82   04/10/18 118/71   03/28/18 110/68                Passed - CBC on record in past 12 months    Recent Labs   Lab Test  09/21/18   1403   WBC  8.3   RBC  3.90*   HGB  12.4*   HCT  37.1*   PLT  199                Passed - Serum Creatinine on file in past 12 months    Recent Labs   Lab Test  09/21/18   1403   CR  1.62*            Passed - Patient is age 18 or older       Passed - Recent (6 mo) or future (30 days) visit within the authorizing provider's specialty    Patient had office visit in the last 6 months or has a visit in the next 30 days with authorizing provider or within the authorizing provider's specialty.  See \"Patient Info\" tab in inbasket, or \"Choose Columns\" in Meds & Orders section of the refill encounter.            cyanocobalamin (VITAMIN B12) 1000 MCG/ML injection [Pharmacy Med Name: CYANOCOBALAMIN 1000MCG/ML SOLN] 3 mL 0    Last Written Prescription Date:  07/09/18  Last Fill Quantity: 3 ml,  # refills: 0   Last office visit: 9/21/2018 with prescribing provider:  ALEXIS Leiva   Future Office Visit:     Sig: INJECT 1ML INTO THE MUSCLE EVERY 30 DAYS. (NEED TO BE SEEN FOR FURTHER REFILLS)    Vitamin Supplements (Adult) Protocol Passed    10/27/2018 11:05 AM       Passed - High " "dose Vitamin D not ordered       Passed - Recent (12 mo) or future (30 days) visit within the authorizing provider's specialty    Patient had office visit in the last 12 months or has a visit in the next 30 days with authorizing provider or within the authorizing provider's specialty.  See \"Patient Info\" tab in inbasket, or \"Choose Columns\" in Meds & Orders section of the refill encounter.                "

## 2018-10-30 RX ORDER — PRAMIPEXOLE DIHYDROCHLORIDE 0.25 MG/1
TABLET ORAL
Qty: 120 TABLET | Refills: 1 | Status: SHIPPED | OUTPATIENT
Start: 2018-10-30 | End: 2018-12-27

## 2018-10-30 RX ORDER — CYANOCOBALAMIN 1000 UG/ML
INJECTION, SOLUTION INTRAMUSCULAR; SUBCUTANEOUS
Qty: 3 ML | Refills: 0 | Status: SHIPPED | OUTPATIENT
Start: 2018-10-30 | End: 2019-02-11

## 2018-10-30 NOTE — TELEPHONE ENCOUNTER
Rx completed. Patient is due for follow up around Dec, please help him schedule before Dec 11. Thanks

## 2018-11-07 ENCOUNTER — RADIANT APPOINTMENT (OUTPATIENT)
Dept: CT IMAGING | Facility: CLINIC | Age: 63
End: 2018-11-07
Attending: FAMILY MEDICINE
Payer: COMMERCIAL

## 2018-11-07 DIAGNOSIS — Z87.891 PERSONAL HISTORY OF TOBACCO USE, PRESENTING HAZARDS TO HEALTH: ICD-10-CM

## 2018-11-07 PROCEDURE — G0297 LDCT FOR LUNG CA SCREEN: HCPCS | Mod: TC

## 2018-11-12 ENCOUNTER — MYC MEDICAL ADVICE (OUTPATIENT)
Dept: FAMILY MEDICINE | Facility: CLINIC | Age: 63
End: 2018-11-12

## 2018-11-12 DIAGNOSIS — M25.562 PAIN OF LEFT KNEE AFTER INJURY: ICD-10-CM

## 2018-11-12 DIAGNOSIS — M25.562 CHRONIC PAIN OF LEFT KNEE: ICD-10-CM

## 2018-11-12 DIAGNOSIS — G89.29 CHRONIC PAIN OF LEFT KNEE: ICD-10-CM

## 2018-11-12 RX ORDER — OXYCODONE AND ACETAMINOPHEN 5; 325 MG/1; MG/1
1 TABLET ORAL EVERY 6 HOURS PRN
Qty: 120 TABLET | Refills: 0 | Status: SHIPPED | OUTPATIENT
Start: 2018-11-12 | End: 2018-11-28

## 2018-11-13 NOTE — TELEPHONE ENCOUNTER
Hard copy of script for Percocet 5-325 mg tablet walked to [facility]

## 2018-11-28 ENCOUNTER — OFFICE VISIT (OUTPATIENT)
Dept: FAMILY MEDICINE | Facility: CLINIC | Age: 63
End: 2018-11-28
Payer: COMMERCIAL

## 2018-11-28 ENCOUNTER — RADIANT APPOINTMENT (OUTPATIENT)
Dept: GENERAL RADIOLOGY | Facility: CLINIC | Age: 63
End: 2018-11-28
Attending: FAMILY MEDICINE
Payer: COMMERCIAL

## 2018-11-28 VITALS
OXYGEN SATURATION: 97 % | HEART RATE: 62 BPM | TEMPERATURE: 97.8 F | DIASTOLIC BLOOD PRESSURE: 56 MMHG | SYSTOLIC BLOOD PRESSURE: 134 MMHG | HEIGHT: 74 IN | WEIGHT: 258.8 LBS | BODY MASS INDEX: 33.21 KG/M2

## 2018-11-28 DIAGNOSIS — G89.29 CHRONIC PAIN OF LEFT KNEE: ICD-10-CM

## 2018-11-28 DIAGNOSIS — E78.5 HYPERLIPIDEMIA LDL GOAL <100: Primary | ICD-10-CM

## 2018-11-28 DIAGNOSIS — M54.50 LEFT-SIDED LOW BACK PAIN WITHOUT SCIATICA, UNSPECIFIED CHRONICITY: ICD-10-CM

## 2018-11-28 DIAGNOSIS — M25.562 PAIN OF LEFT KNEE AFTER INJURY: ICD-10-CM

## 2018-11-28 DIAGNOSIS — M25.562 CHRONIC PAIN OF LEFT KNEE: ICD-10-CM

## 2018-11-28 LAB
CHOLEST SERPL-MCNC: 157 MG/DL
HDLC SERPL-MCNC: 56 MG/DL
LDLC SERPL CALC-MCNC: 79 MG/DL
NONHDLC SERPL-MCNC: 101 MG/DL
TRIGL SERPL-MCNC: 108 MG/DL

## 2018-11-28 PROCEDURE — 72100 X-RAY EXAM L-S SPINE 2/3 VWS: CPT | Mod: FY

## 2018-11-28 PROCEDURE — 80061 LIPID PANEL: CPT | Performed by: FAMILY MEDICINE

## 2018-11-28 PROCEDURE — 99214 OFFICE O/P EST MOD 30 MIN: CPT | Performed by: FAMILY MEDICINE

## 2018-11-28 PROCEDURE — 36415 COLL VENOUS BLD VENIPUNCTURE: CPT | Performed by: FAMILY MEDICINE

## 2018-11-28 RX ORDER — CYCLOBENZAPRINE HCL 5 MG
5 TABLET ORAL 3 TIMES DAILY PRN
Qty: 42 TABLET | Refills: 0 | Status: SHIPPED | OUTPATIENT
Start: 2018-11-28 | End: 2018-12-12

## 2018-11-28 RX ORDER — OXYCODONE AND ACETAMINOPHEN 5; 325 MG/1; MG/1
1 TABLET ORAL EVERY 6 HOURS PRN
Qty: 120 TABLET | Refills: 0 | Status: SHIPPED | OUTPATIENT
Start: 2018-12-12 | End: 2019-01-07

## 2018-11-28 ASSESSMENT — ANXIETY QUESTIONNAIRES
GAD7 TOTAL SCORE: 8
5. BEING SO RESTLESS THAT IT IS HARD TO SIT STILL: NOT AT ALL
3. WORRYING TOO MUCH ABOUT DIFFERENT THINGS: SEVERAL DAYS
6. BECOMING EASILY ANNOYED OR IRRITABLE: MORE THAN HALF THE DAYS
7. FEELING AFRAID AS IF SOMETHING AWFUL MIGHT HAPPEN: MORE THAN HALF THE DAYS
2. NOT BEING ABLE TO STOP OR CONTROL WORRYING: MORE THAN HALF THE DAYS
1. FEELING NERVOUS, ANXIOUS, OR ON EDGE: SEVERAL DAYS
IF YOU CHECKED OFF ANY PROBLEMS ON THIS QUESTIONNAIRE, HOW DIFFICULT HAVE THESE PROBLEMS MADE IT FOR YOU TO DO YOUR WORK, TAKE CARE OF THINGS AT HOME, OR GET ALONG WITH OTHER PEOPLE: SOMEWHAT DIFFICULT

## 2018-11-28 ASSESSMENT — PATIENT HEALTH QUESTIONNAIRE - PHQ9
5. POOR APPETITE OR OVEREATING: NOT AT ALL
SUM OF ALL RESPONSES TO PHQ QUESTIONS 1-9: 7

## 2018-11-28 NOTE — PROGRESS NOTES
SUBJECTIVE:   Cedric Figueroa is a 62 year old male who presents to clinic today for the following health issues:      Hyperlipidemia Follow-Up      Rate your low fat/cholesterol diet?: good    Taking statin?  Yes- Simvastatin, minor tolerable muscle aches in lower legs at times    Other lipid medications/supplements?:  none      Amount of exercise or physical activity: None    Problems taking medications regularly: No    Medication side effects: none    Diet: regular (no restrictions)        Medication Followup of Percocet 5-325mg    Taking Medication as prescribed: yes, taking 4x/ day     Side Effects:  None    Medication Helping Symptoms:  Yes- has chronic left knee pain    Recently refilled on 11/12; will be due around 12/12- provider will be out of the office, requesting a Rx in advance       Back Pain       Duration: 3-4 months        Specific cause:unknown, gradual, initially thought it was a back muscle pull that has persisted.     Description:   Location of pain: low back left  Character of pain: dull ache, stabbing and waxing and waning  Pain radiation:none  New numbness or weakness in legs, not attributed to pain:  YES- left lower extremity weakness    Intensity: Currently 2/10, At its worst 7/10    History:   Pain interferes with job: No  History of back problems: no prior back problems  Any previous MRI or X-rays: None  Sees a specialist for back pain:  No  Therapies tried without relief: cold, heat, rest and stretch    Alleviating factors:   Improved by: rest      Precipitating factors:  Worsened by: Walking      Accompanying Signs & Symptoms:  Risk of Fracture:  None  Risk of Cauda Equina:  None  Risk of Infection:  None  Risk of Cancer:  None  Risk of Ankylosing Spondylitis:  Onset at age <35, male, AND morning back stiffness. no       Problem list and histories reviewed & adjusted, as indicated.  Additional history: as documented    Patient Active Problem List   Diagnosis     Hyperlipidemia LDL  goal <100     Mild major depression (H)     Restless leg syndrome     Sleep apnea     Knee pain     Vitamin B 12 deficiency     Advanced directives, counseling/discussion     Iron deficiency anemia     OA (osteoarthritis) of knee     Perforated bowel (H)     Bilateral low back pain without sciatica     Type 2 diabetes mellitus with microalbuminuria, with long-term current use of insulin (H)     Hypertension goal BP (blood pressure) < 130/80     Bariatric surgery status     Perirectal abscess     CKD (chronic kidney disease) stage 3, GFR 30-59 ml/min (H)     Controlled substance agreement signed     Chronic pain syndrome     Past Surgical History:   Procedure Laterality Date     ABDOMEN SURGERY  2015    peritonitis, bowel perforation, bowel resection     AS TOTAL KNEE ARTHROPLASTY      Right and Left knee x3( 2 partial knee replacement and 1 total Left knee replacement     BARIATRIC SURGERY      at age 45     COLONOSCOPY  2/1/2006    [facility]     COLONOSCOPY WITH CO2 INSUFFLATION N/A 6/7/2016    Procedure: COLONOSCOPY WITH CO2 INSUFFLATION;  Surgeon: Cedric Schneider MD;  Location: [facility] OR     INCISION AND DRAINAGE BUTTOCKS Left 3/28/2018    Procedure: INCISION AND DRAINAGE BUTTOCKS;  Incision and drainage of left buttock abscess, and fistulotomy;  Surgeon: Bala Layton MD;  Location: [facility] OR       Social History   Substance Use Topics     Smoking status: Former Smoker     Quit date: 7/17/2018     Smokeless tobacco: Never Used     Alcohol use No     Family History   Problem Relation Age of Onset     Diabetes Mother      Diabetes Paternal Grandmother      Coronary Artery Disease Paternal Grandmother      Cerebrovascular Disease Paternal Grandmother      Bleeding Disorder No family hx of      Anesthesia Reaction No family hx of          Current Outpatient Prescriptions   Medication Sig Dispense Refill     ASPIRIN PO Take 81 mg by mouth       BASAGLAR 100 UNIT/ML injection Inject 45 Units  "Subcutaneous daily 3 mL 11     blood glucose monitoring (ACCU-CHEK PELON PLUS) meter device kit Use to test blood sugar 3 times daily or as directed. 1 kit 0     blood glucose monitoring (ACCU-CHEK PELON PLUS) test strip Use to test blood sugar 3 times daily or as directed. 100 each 11     blood glucose monitoring (SOFTCLIX) lancets Use to test blood sugar 3 times daily or as directed. 100 each 3     cyanocobalamin (VITAMIN B12) 1000 MCG/ML injection INJECT 1ML INTO THE MUSCLE EVERY 30 DAYS. (NEED TO BE SEEN FOR FURTHER REFILLS) 3 mL 0     cyclobenzaprine (FLEXERIL) 5 MG tablet Take 1 tablet (5 mg) by mouth 3 times daily as needed for muscle spasms 42 tablet 0     diclofenac (VOLTAREN) 1 % GEL topical gel APPLY 4 GRAMS TO KNEES DAILY USING THE ENCLOSED DOSING CARD. 100 g 1     insulin pen needle (Zeolife ULTRA-FINE) 29G X 12.7MM Use once daily or as directed. 100 each prn     losartan-hydrochlorothiazide (HYZAAR) 100-25 MG per tablet Take 1 tablet by mouth daily 90 tablet 3     MELATONIN PO Take 10 mg by mouth At Bedtime        [START ON 12/12/2018] oxyCODONE-acetaminophen (PERCOCET) 5-325 MG tablet Take 1 tablet by mouth every 6 hours as needed for moderate to severe pain or pain maximum 4 tablet(s) per day 120 tablet 0     PARoxetine (PAXIL) 20 MG tablet Take 1 tablet (20 mg) by mouth every morning 90 tablet 3     pramipexole (MIRAPEX) 0.25 MG tablet TAKE ONE TABLET BY MOUTH IN THE MORNING, TWO TABLETS IN THE AFTERNOON, AND ONE TABLET BEFORE  tablet 1     simvastatin (ZOCOR) 40 MG tablet Take 1 tablet (40 mg) by mouth At Bedtime 90 tablet 0     No Known Allergies    Reviewed and updated as needed this visit by clinical staff       Reviewed and updated as needed this visit by Provider         ROS:  Constitutional, HEENT, cardiovascular, pulmonary, gi and gu systems are negative, except as otherwise noted.    OBJECTIVE:     /56  Pulse 62  Temp 97.8  F (36.6  C) (Tympanic)  Ht 6' 2\" (1.88 m)  Wt 258 lb " 12.8 oz (117.4 kg)  SpO2 97%  BMI 33.23 kg/m2  Body mass index is 33.23 kg/(m^2).  GENERAL: healthy, alert and no distress  RESP: lungs clear to auscultation - no rales, rhonchi or wheezes  CV: regular rate and rhythm, normal S1 S2, no S3 or S4, no murmur, click or rub, no peripheral edema and peripheral pulses strong  Comprehensive back pain exam:  Tenderness of left lumbar-sacral area., Range of motion not limited by pain, Lower extremity strength functional and equal on both sides, Lower extremity reflexes within normal limits bilaterally, Lower extremity sensation normal and equal on both sides and Straight leg raise negative bilaterally    Diagnostic Test Results:  Labs drawn and in process    ASSESSMENT/PLAN:     Cedric was seen today for lipids and pain management.    Diagnoses and all orders for this visit:    Hyperlipidemia LDL goal <100  -     Lipid Profile    Left-sided low back pain without sciatica, unspecified chronicity, suspect due to degenerative changes.   -     XR Lumbar Spine 2/3 Views  -     cyclobenzaprine (FLEXERIL) 5 MG tablet; Take 1 tablet (5 mg) by mouth 3 times daily as needed for muscle spasms  -     Offered referral to Physical Therapy- patient declined at this time.     Chronic pain of left knee after injury despite knee replacment  -     Refill; Pharmacy to File Rx to be filled around 12/12 oxyCODONE-acetaminophen (PERCOCET) 5-325 MG tablet; Take 1 tablet by mouth every 6 hours as needed for moderate to severe pain or pain maximum 4 tablet(s) per day    Will notify him with results.     Follow up in 2 months- Diabetes follow up visit with A1C prior to Office visit.     [facility]iva MD  [facility]

## 2018-11-28 NOTE — MR AVS SNAPSHOT
After Visit Summary   11/28/2018    Cedric Figueroa    MRN: [id number]           Patient Information     Date Of Birth          1955        Visit Information        Provider Department      11/28/2018 8:30 AM Molly Leiva MD [facility]        Today's Diagnoses     Hyperlipidemia LDL goal <100    -  1    Left-sided low back pain without sciatica, unspecified chronicity        Chronic pain of left knee        Pain of left knee after injury          Care Instructions      Back Care Tips    Caring for your back  These are things you can do to prevent a recurrence of acute back pain and to reduce symptoms from chronic back pain:    Maintain a healthy weight. If you are overweight, losing weight will help most types of back pain.    Exercise is an important part of recovery from most types of back pain. The muscles behind and in front of the spine support the back. This means strengthening both the back muscles and the abdominal muscles will provide better support for your spine.     Swimming and brisk walking are good overall exercises to improve your fitness level.    Practice safe lifting methods (below).    Practice good posture when sitting, standing and walking. Avoid prolonged sitting. This puts more stress on the lower back than standing or walking.    Wear quality shoes with sufficient arch support. Foot and ankle alignment can affect back symptoms. Women should avoid wearing high heels.    Therapeutic massage can help relax the back muscles without stretching them.    During the first 24 to 72 hours after an acute injury or flare-up of chronic back pain, apply an ice pack to the painful area for 20 minutes and then remove it for 20 minutes, over a period of 60 to 90 minutes, or several times a day. As a safety precaution, do not use a heating pad at bedtime. Sleeping on a heating pad can lead to skin burns or tissue damage.    You can alternate ice and heat  therapies.  Medicines  Talk to your healthcare provider before using medicines, especially if you have other medical problems or are taking other medicines.    You may use acetaminophen or ibuprofen to control pain, unless your healthcare provider prescribed other pain medicine. If you have chronic conditions like diabetes, liver or kidney disease, stomach ulcers, or gastrointestinal bleeding, or are taking blood thinners, talk with your healthcare provider before taking any medicines.    Be careful if you are given prescription pain medicines, narcotics, or medicine for muscle spasm. They can cause drowsiness, affect your coordination, reflexes, and judgment. Do not drive or operate heavy machinery while taking these types of medicines. Take prescription pain medicine only as prescribed by your healthcare provider.  Lumbar stretch  Here is a simple stretching exercise that will help relax muscle spasm and keep your back more limber. If exercise makes your back pain worse, don t do it.    Lie on your back with your knees bent and both feet on the ground.    Slowly raise your left knee to your chest as you flatten your lower back against the floor. Hold for 5 seconds.    Relax and repeat the exercise with your right knee.    Do 10 of these exercises for each leg.  Safe lifting method    Don t bend over at the waist to lift an object off the floor.  Instead, bend your knees and hips in a squat.     Keep your back and head upright    Hold the object close to your body, directly in front of you.    Straighten your legs to lift the object.     Lower the object to the floor in the reverse fashion.    If you must slide something across the floor, push it.  Posture tips  Sitting  Sit in chairs with straight backs or low-back support. Keep your knees lower than your hips, with your feet flat on the floor.  When driving, sit up straight. Adjust the seat forward so you are not leaning toward the steering wheel.  A small pillow  or rolled towel behind your lower back may help if you are driving long distances.   Standing  When standing for long periods, shift most of your weight to one leg at a time. Alternate legs every few minutes.   Sleeping  The best way to sleep is on your side with your knees bent. Put a low pillow under your head to support your neck in a neutral spine position. Avoid thick pillows that bend your neck to one side. Put a pillow between your legs to further relax your lower back. If you sleep on your back, put pillows under your knees to support your legs in a slightly flexed position. Use a firm mattress. If your mattress sags, replace it, or use a 1/2-inch plywood board under the mattress to add support.  Follow-up care  Follow up with your healthcare provider, or as advised.  If X-rays, a CT scan or an MRI scan were taken, they will be reviewed by a radiologist. You will be notified of any new findings that may affect your care.  Call 911  Call 911 if any of the following occur:    Trouble breathing    Confusion    Very drowsy    Fainting or loss of consciousness    Rapid or very slow heart rate    Loss of  bowel or bladder control  When to seek medical advice  Call your healthcare provider right away if any of the following occur:    Pain becomes worse or spreads to your arms or legs    Weakness or numbness in one or both arms or legs    Numbness in the groin area  Date Last Reviewed: 6/1/2016 2000-2018 The "[employer]. [address] [place] [address]. All rights reserved. This information is not intended as a substitute for professional medical care. Always follow your healthcare professional's instructions.                Follow-ups after your visit        Follow-up notes from your care team     Return in about 2 months (around 1/28/2019) for Diabetes Follow Up with a HgbA1C prior to visit.      Who to contact     Normal or non-critical lab and imaging results will be communicated to you by  "MyChart, letter or phone within 4 business days after the clinic has received the results. If you do not hear from us within 7 days, please contact the clinic through [facility] or phone. If you have a critical or abnormal lab result, we will notify you by phone as soon as possible.  Submit refill requests through [facility] or call your pharmacy and they will forward the refill request to us. Please allow 3 business days for your refill to be completed.          If you need to speak with a  for additional information , please call: [phone]             Additional Information About Your Visit        [facility] Information     [facility] gives you secure access to your electronic health record. If you see a primary care provider, you can also send messages to your care team and make appointments. If you have questions, please call your primary care clinic.  If you do not have a primary care provider, please call [phone] and they will assist you.        Care EveryWhere ID     This is your Care EveryWhere ID. This could be used by other organizations to access your [place] medical records  [id number]        Your Vitals Were     Pulse Temperature Height Pulse Oximetry BMI (Body Mass Index)       62 97.8  F (36.6  C) (Tympanic) 6' 2\" (1.88 m) 97% 33.23 kg/m2        Blood Pressure from Last 3 Encounters:   11/28/18 134/56   09/21/18 130/82   04/10/18 118/71    Weight from Last 3 Encounters:   11/28/18 258 lb 12.8 oz (117.4 kg)   09/21/18 252 lb (114.3 kg)   04/10/18 243 lb (110.2 kg)              We Performed the Following     Lipid Profile     XR Lumbar Spine 2/3 Views          Today's Medication Changes          These changes are accurate as of 11/28/18  9:08 AM.  If you have any questions, ask your nurse or doctor.               Start taking these medicines.        Dose/Directions    cyclobenzaprine 5 MG tablet   Commonly known as:  FLEXERIL   Used for:  Left-sided low back pain without sciatica, " unspecified chronicity   Started by:  Molly Leiva MD        Dose:  5 mg   Take 1 tablet (5 mg) by mouth 3 times daily as needed for muscle spasms   Quantity:  42 tablet   Refills:  0         These medicines have changed or have updated prescriptions.        Dose/Directions    oxyCODONE-acetaminophen 5-325 MG tablet   Commonly known as:  PERCOCET   This may have changed:  These instructions start on 12/12/2018. If you are unsure what to do until then, ask your doctor or other care provider.   Used for:  Chronic pain of left knee, Pain of left knee after injury   Changed by:  Molly Leiva MD        Dose:  1 tablet   Start taking on:  12/12/2018   Take 1 tablet by mouth every 6 hours as needed for moderate to severe pain or pain maximum 4 tablet(s) per day   Quantity:  120 tablet   Refills:  0            Where to get your medicines      These medications were sent to [facility] - [address] [facility]  [address] [address] [facility] [address] [address]     Phone:  [phone]     cyclobenzaprine 5 MG tablet         Some of these will need a paper prescription and others can be bought over the counter.  Ask your nurse if you have questions.     Bring a paper prescription for each of these medications     oxyCODONE-acetaminophen 5-325 MG tablet               Information about OPIOIDS     PRESCRIPTION OPIOIDS: WHAT YOU NEED TO KNOW   We gave you an opioid (narcotic) pain medicine. It is important to manage your pain, but opioids are not always the best choice. You should first try all the other options your care team gave you. Take this medicine for as short a time (and as few doses) as possible.    Some activities can increase your pain, such as bandage changes or therapy sessions. It may help to take your pain medicine 30 to 60 minutes before these activities. Reduce your stress by getting enough sleep, working on hobbies you enjoy and practicing relaxation or meditation. Talk to your  care team about ways to manage your pain beyond prescription opioids.    These medicines have risks:    DO NOT drive when on new or higher doses of pain medicine. These medicines can affect your alertness and reaction times, and you could be arrested for driving under the influence (DUI). If you need to use opioids long-term, talk to your care team about driving.    DO NOT operate heavy machinery    DO NOT do any other dangerous activities while taking these medicines.    DO NOT drink any alcohol while taking these medicines.     If the opioid prescribed includes acetaminophen, DO NOT take with any other medicines that contain acetaminophen. Read all labels carefully. Look for the word  acetaminophen  or  Tylenol.  Ask your pharmacist if you have questions or are unsure.    You can get addicted to pain medicines, especially if you have a history of addiction (chemical, alcohol or substance dependence). Talk to your care team about ways to reduce this risk.    All opioids tend to cause constipation. Drink plenty of water and eat foods that have a lot of fiber, such as fruits, vegetables, prune juice, apple juice and high-fiber cereal. Take a laxative (Miralax, milk of magnesia, Colace, Senna) if you don t move your bowels at least every other day. Other side effects include upset stomach, sleepiness, dizziness, throwing up, tolerance (needing more of the medicine to have the same effect), physical dependence and slowed breathing.    Store your pills in a secure place, locked if possible. We will not replace any lost or stolen medicine. If you don t finish your medicine, please throw away (dispose) as directed by your pharmacist. The [place] Pollution Control Agency has more information about safe disposal: https://www.pca.[email or web address]/living-green/managing-unwanted-medications         Primary Care Provider Office Phone # Fax #    Molly Leiva [phone]       [address]  [phone]        Equal Access to Services     [facility] Center[facility]ANESSA : Hadii kiran montgomery parveenmady Kekeali, wajakeda luqadaha, qaybta kabenedictokhushbu kiran. So [facility] [phone].    ATENCIÓN: Si habla español, tiene a kim disposición servicios gratuitos de asistencia lingüística. Llame al [phone].    We comply with applicable federal civil rights laws and [place] laws. We do not discriminate on the basis of race, color, national origin, age, disability, sex, sexual orientation, or gender identity.            Thank you!     Thank you for choosing [facility]  for your care. Our goal is always to provide you with excellent care. Hearing back from our patients is one way we can continue to improve our services. Please take a few minutes to complete the written survey that you may receive in the mail after your visit with us. Thank you!             Your Updated Medication List - Protect others around you: Learn how to safely use, store and throw away your medicines at www.disposemymeds.org.          This list is accurate as of 11/28/18  9:08 AM.  Always use your most recent med list.                   Brand Name Dispense Instructions for use Diagnosis    ASPIRIN PO      Take 81 mg by mouth    Type 2 diabetes mellitus with microalbuminuria, with long-term current use of insulin (H)       blood glucose monitoring lancets     100 each    Use to test blood sugar 3 times daily or as directed.    Type 2 diabetes mellitus with microalbuminuria, with long-term current use of insulin (H)       blood glucose monitoring meter device kit     1 kit    Use to test blood sugar 3 times daily or as directed.    Type 2 diabetes, HbA1c goal < 7% (H)       blood glucose monitoring test strip    ACCU-CHEK PELON PLUS    100 each    Use to test blood sugar 3 times daily or as directed.    Type 2 diabetes mellitus with microalbuminuria, with long-term current use of insulin (H)       cyclobenzaprine 5 MG  tablet    FLEXERIL    42 tablet    Take 1 tablet (5 mg) by mouth 3 times daily as needed for muscle spasms    Left-sided low back pain without sciatica, unspecified chronicity       diclofenac 1 % topical gel    VOLTAREN    100 g    APPLY 4 GRAMS TO KNEES DAILY USING THE ENCLOSED DOSING CARD.    Chronic pain of left knee       insulin glargine 100 UNIT/ML pen     3 mL    Inject 45 Units Subcutaneous daily    Type 2 diabetes mellitus with microalbuminuria, with long-term current use of insulin (H)       insulin pen needle 29G X 12.7MM miscellaneous    BD ULTRA-FINE    100 each    Use once daily or as directed.    Type 2 diabetes, HbA1c goal < 7% (H)       losartan-hydrochlorothiazide 100-25 MG per tablet    HYZAAR    90 tablet    Take 1 tablet by mouth daily    Hypertension goal BP (blood pressure) < 140/90       MELATONIN PO      Take 10 mg by mouth At Bedtime        oxyCODONE-acetaminophen 5-325 MG tablet   Start taking on:  12/12/2018    PERCOCET    120 tablet    Take 1 tablet by mouth every 6 hours as needed for moderate to severe pain or pain maximum 4 tablet(s) per day    Chronic pain of left knee, Pain of left knee after injury       PARoxetine 20 MG tablet    PAXIL    90 tablet    Take 1 tablet (20 mg) by mouth every morning    Mild recurrent major depression (H)       pramipexole 0.25 MG tablet    MIRAPEX    120 tablet    TAKE ONE TABLET BY MOUTH IN THE MORNING, TWO TABLETS IN THE AFTERNOON, AND ONE TABLET BEFORE BED    Restless leg syndrome       simvastatin 40 MG tablet    ZOCOR    90 tablet    Take 1 tablet (40 mg) by mouth At Bedtime    Hyperlipidemia LDL goal <100       vitamin B-12 1000 MCG/ML injection    CYANOCOBALAMIN    3 mL    INJECT 1ML INTO THE MUSCLE EVERY 30 DAYS. (NEED TO BE SEEN FOR FURTHER REFILLS)    Vitamin B 12 deficiency

## 2018-11-28 NOTE — PATIENT INSTRUCTIONS
Back Care Tips    Caring for your back  These are things you can do to prevent a recurrence of acute back pain and to reduce symptoms from chronic back pain:    Maintain a healthy weight. If you are overweight, losing weight will help most types of back pain.    Exercise is an important part of recovery from most types of back pain. The muscles behind and in front of the spine support the back. This means strengthening both the back muscles and the abdominal muscles will provide better support for your spine.     Swimming and brisk walking are good overall exercises to improve your fitness level.    Practice safe lifting methods (below).    Practice good posture when sitting, standing and walking. Avoid prolonged sitting. This puts more stress on the lower back than standing or walking.    Wear quality shoes with sufficient arch support. Foot and ankle alignment can affect back symptoms. Women should avoid wearing high heels.    Therapeutic massage can help relax the back muscles without stretching them.    During the first 24 to 72 hours after an acute injury or flare-up of chronic back pain, apply an ice pack to the painful area for 20 minutes and then remove it for 20 minutes, over a period of 60 to 90 minutes, or several times a day. As a safety precaution, do not use a heating pad at bedtime. Sleeping on a heating pad can lead to skin burns or tissue damage.    You can alternate ice and heat therapies.  Medicines  Talk to your healthcare provider before using medicines, especially if you have other medical problems or are taking other medicines.    You may use acetaminophen or ibuprofen to control pain, unless your healthcare provider prescribed other pain medicine. If you have chronic conditions like diabetes, liver or kidney disease, stomach ulcers, or gastrointestinal bleeding, or are taking blood thinners, talk with your healthcare provider before taking any medicines.    Be careful if you are given  prescription pain medicines, narcotics, or medicine for muscle spasm. They can cause drowsiness, affect your coordination, reflexes, and judgment. Do not drive or operate heavy machinery while taking these types of medicines. Take prescription pain medicine only as prescribed by your healthcare provider.  Lumbar stretch  Here is a simple stretching exercise that will help relax muscle spasm and keep your back more limber. If exercise makes your back pain worse, don t do it.    Lie on your back with your knees bent and both feet on the ground.    Slowly raise your left knee to your chest as you flatten your lower back against the floor. Hold for 5 seconds.    Relax and repeat the exercise with your right knee.    Do 10 of these exercises for each leg.  Safe lifting method    Don t bend over at the waist to lift an object off the floor.  Instead, bend your knees and hips in a squat.     Keep your back and head upright    Hold the object close to your body, directly in front of you.    Straighten your legs to lift the object.     Lower the object to the floor in the reverse fashion.    If you must slide something across the floor, push it.  Posture tips  Sitting  Sit in chairs with straight backs or low-back support. Keep your knees lower than your hips, with your feet flat on the floor.  When driving, sit up straight. Adjust the seat forward so you are not leaning toward the steering wheel.  A small pillow or rolled towel behind your lower back may help if you are driving long distances.   Standing  When standing for long periods, shift most of your weight to one leg at a time. Alternate legs every few minutes.   Sleeping  The best way to sleep is on your side with your knees bent. Put a low pillow under your head to support your neck in a neutral spine position. Avoid thick pillows that bend your neck to one side. Put a pillow between your legs to further relax your lower back. If you sleep on your back, put pillows  under your knees to support your legs in a slightly flexed position. Use a firm mattress. If your mattress sags, replace it, or use a 1/2-inch plywood board under the mattress to add support.  Follow-up care  Follow up with your healthcare provider, or as advised.  If X-rays, a CT scan or an MRI scan were taken, they will be reviewed by a radiologist. You will be notified of any new findings that may affect your care.  Call 911  Call 911 if any of the following occur:    Trouble breathing    Confusion    Very drowsy    Fainting or loss of consciousness    Rapid or very slow heart rate    Loss of  bowel or bladder control  When to seek medical advice  Call your healthcare provider right away if any of the following occur:    Pain becomes worse or spreads to your arms or legs    Weakness or numbness in one or both arms or legs    Numbness in the groin area  Date Last Reviewed: 6/1/2016 2000-2018 The [employer]. [address] [place] [address]. All rights reserved. This information is not intended as a substitute for professional medical care. Always follow your healthcare professional's instructions.

## 2018-11-29 DIAGNOSIS — E78.5 HYPERLIPIDEMIA LDL GOAL <100: ICD-10-CM

## 2018-11-29 RX ORDER — SIMVASTATIN 40 MG
40 TABLET ORAL AT BEDTIME
Qty: 90 TABLET | Refills: 3 | Status: SHIPPED | OUTPATIENT
Start: 2018-11-29 | End: 2019-12-20

## 2018-11-29 ASSESSMENT — ANXIETY QUESTIONNAIRES: GAD7 TOTAL SCORE: 8

## 2018-12-12 DIAGNOSIS — M54.50 LEFT-SIDED LOW BACK PAIN WITHOUT SCIATICA, UNSPECIFIED CHRONICITY: ICD-10-CM

## 2018-12-12 NOTE — TELEPHONE ENCOUNTER
Requested Prescriptions   Pending Prescriptions Disp Refills     cyclobenzaprine (FLEXERIL) 5 MG tablet [Pharmacy Med Name: CYCLOBENZAPRINE HCL 5MG TABS] 42 tablet 0     Sig: TAKE ONE TABLET BY MOUTH THREE TIMES A DAY AS NEEDED FOR MUSCLE SPASMS  Cyclobenzaprine      Last Written Prescription Date:  11/28/18  Last Fill Quantity: 42,   # refills: 0  Last Office Visit: 11/28/18 Cali  Future Office visit:       Routing refill request to provider for review/approval because:  Drug not on the [facility] or [facility] refill protocol or controlled substance    There is no refill protocol information for this order

## 2018-12-12 NOTE — TELEPHONE ENCOUNTER
Routing refill request to provider for review/approval because:  Drug not on the [facility] refill protocol     Last OV with Dr. Leiva: 11/28/18    Last filled:   11/28/18 for #42 (14 day supply)    Irene Zacarias, RN, BSN

## 2018-12-13 RX ORDER — CYCLOBENZAPRINE HCL 5 MG
TABLET ORAL
Qty: 42 TABLET | Refills: 0 | Status: SHIPPED | OUTPATIENT
Start: 2018-12-13 | End: 2018-12-27

## 2018-12-27 DIAGNOSIS — M54.50 LEFT-SIDED LOW BACK PAIN WITHOUT SCIATICA, UNSPECIFIED CHRONICITY: ICD-10-CM

## 2018-12-27 DIAGNOSIS — G25.81 RESTLESS LEG SYNDROME: ICD-10-CM

## 2018-12-28 RX ORDER — CYCLOBENZAPRINE HCL 5 MG
TABLET ORAL
Qty: 42 TABLET | Refills: 0 | Status: SHIPPED | OUTPATIENT
Start: 2018-12-28 | End: 2019-01-13

## 2018-12-28 RX ORDER — PRAMIPEXOLE DIHYDROCHLORIDE 0.25 MG/1
TABLET ORAL
Qty: 120 TABLET | Refills: 0 | Status: SHIPPED | OUTPATIENT
Start: 2018-12-28 | End: 2019-01-29

## 2018-12-28 NOTE — TELEPHONE ENCOUNTER
"Requested Prescriptions   Pending Prescriptions Disp Refills     pramipexole (MIRAPEX) 0.25 MG tablet [Pharmacy Med Name: PRAMIPEXOLE DIHYDROCHLORI 0.25 TABS] 120 tablet 1    Last Written Prescription Date:  12/3/18  Last Fill Quantity: 120,  # refills: 1   Last office visit: 11/28/2018 with prescribing provider:  Cali   Future Office Visit:   Sig: TAKE ONE TABLET BY MOUTH IN THE MORNING, TWO TABLETS IN THE AFTERNOON, AND ONE TABLET BEFORE BED    Antiparkinson's Agents Protocol Failed - 12/27/2018  3:49 PM       Failed - ALT on record in past 12 months        Recent Labs   Lab Test 06/20/17   ALT 26            Passed - Blood pressure under 140/90 in past 12 months    BP Readings from Last 3 Encounters:   11/28/18 134/56   09/21/18 130/82   04/10/18 118/71                Passed - CBC on record in past 12 months    Recent Labs   Lab Test 09/21/18  1403   WBC 8.3   RBC 3.90*   HGB 12.4*   HCT 37.1*                   Passed - Serum Creatinine on file in past 12 months    Recent Labs   Lab Test 09/21/18  1403   CR 1.62*            Passed - Patient is age 18 or older       Passed - Recent (6 mo) or future (30 days) visit within the authorizing provider's specialty    Patient had office visit in the last 6 months or has a visit in the next 30 days with authorizing provider or within the authorizing provider's specialty.  See \"Patient Info\" tab in inbasket, or \"Choose Columns\" in Meds & Orders section of the refill encounter.            "

## 2018-12-28 NOTE — TELEPHONE ENCOUNTER
Routing refill request to provider for review/approval because:  Labs not current:  ALT    Last OV with Dr. Leiva: 11/28/18 with recommended F/U 2 months with Diabetic Ed/A1c check and then F/U with PCP.    Last filled: 10/30/18    Irene Zacarias, RN, BSN

## 2018-12-28 NOTE — TELEPHONE ENCOUNTER
Requested Prescriptions   Pending Prescriptions Disp Refills     cyclobenzaprine (FLEXERIL) 5 MG tablet [Pharmacy Med Name: CYCLOBENZAPRINE HCL 5MG TABS] 42 tablet 0     Sig: TAKE ONE TABLET BY MOUTH THREE TIMES A DAY AS NEEDED FOR MUSCLE SPASMS      Last Written Prescription Date:  12/13/18  Last Fill Quantity: 42,   # refills: 0  Last Office Visit: 11/28/18 [place]  Future Office visit:       Routing refill request to provider for review/approval because:  Drug not on the [facility] or [facility] refill protocol or controlled substance    There is no refill protocol information for this order

## 2019-01-02 ENCOUNTER — THERAPY VISIT (OUTPATIENT)
Dept: PHYSICAL THERAPY | Facility: CLINIC | Age: 64
End: 2019-01-02
Payer: COMMERCIAL

## 2019-01-02 DIAGNOSIS — M54.42 LEFT-SIDED LOW BACK PAIN WITH LEFT-SIDED SCIATICA: Primary | ICD-10-CM

## 2019-01-02 DIAGNOSIS — M51.369 DDD (DEGENERATIVE DISC DISEASE), LUMBAR: ICD-10-CM

## 2019-01-02 PROCEDURE — 97110 THERAPEUTIC EXERCISES: CPT | Mod: GP | Performed by: PHYSICAL THERAPIST

## 2019-01-02 PROCEDURE — 97161 PT EVAL LOW COMPLEX 20 MIN: CPT | Mod: GP | Performed by: PHYSICAL THERAPIST

## 2019-01-02 PROCEDURE — 97112 NEUROMUSCULAR REEDUCATION: CPT | Mod: GP | Performed by: PHYSICAL THERAPIST

## 2019-01-02 NOTE — PROGRESS NOTES
[facility] Initial Evaluation  Subjective:  The history is provided by the patient.   Cedric Figueroa is a 63 year old male with a lumbar condition.  Condition occurred with:  Insidious onset.  Condition occurred: for unknown reasons.  This is a new condition  Pain began about 6 months ago. Has increased a lot in the last 3 months. PT referral on 11/20/18.    Patient reports pain:  Lower lumbar spine.  Radiates to:  Gluteals left, knee left, lower leg left, thigh left and foot left.  Pain is described as aching, sharp and burning and is constant and reported as 10/10.  Associated symptoms:  Loss of motion/stiffness, tingling and numbness. Worse during: no pattern.  Symptoms are exacerbated by sitting, walking, certain positions and lying down   Since onset symptoms are gradually worsening.        General health as reported by patient is good.  Pertinent medical history includes:  Depression, diabetes, high blood pressure, kidney disease, overweight, smoking and sleep disorder/apnea.  Medical allergies: no.  Other surgeries include:  Orthopedic surgery (3 TKAs, perforated bowel, gastric bypass).  Current medications:  Anti-depressants, high blood pressure medication, muscle relaxants and pain medication.  Current occupation is .  Patient is working in normal job without restrictions.  Primary job tasks include:  Prolonged sitting.    Barriers include:  None as reported by the patient.    Red flags:  None as reported by the patient.                        Objective:  System         Lumbar/SI Evaluation  ROM:  AROM Lumbar: normal  AROM Lumbar:   Flexion:         Ext:                    *pain   Side Bend:        Left:     Right:  *pain  Rotation:           Left:     Right:   Side Glide:        Left:     Right:           Lumbar Myotomes:  normal                  Neural Tension/Mobility:  Neural tension wnl lumbar: pt unable to relax for SLR.      Left side:Slump  negative.     Right  side:   Slump  negative.   Lumbar Palpation:    Tenderness present at Left:    Piriformis; Gluteus Medius and Vertebral  Tenderness present at Right: Piriformis; Gluteus Medius and Vertebral                                          Hip Evaluation  HIP AROM:  AROM:    Left Hip:     Normal    Right Hip:   Normal                    Hip Strength:  Hip Strength:    Left:    Not assessed (d/t pain)  Right:  Not assessed (d/t pain)                              Functional Testing:          Quad:      Bilateral leg squat:  Excessive anterior knee excursion and mild loss of control                  General     ROS    Assessment/Plan:    Patient is a 63 year old male with lumbar complaints.    Patient has the following significant findings with corresponding treatment plan.                Diagnosis 1:  LBP/L LE  Pain -  hot/cold therapy, manual therapy, self management, education, directional preference exercise and home program  Decreased ROM/flexibility - manual therapy, therapeutic exercise and home program  Decreased strength - therapeutic exercise, therapeutic activities and home program    Therapy Evaluation Codes:   1) History comprised of:   Personal factors that impact the plan of care:      Time since onset of symptoms and None.    Comorbidity factors that impact the plan of care are:      None.     Medications impacting care: None.  2) Examination of Body Systems comprised of:   Body structures and functions that impact the plan of care:      Hip, Lumbar spine and Thoracic Spine.   Activity limitations that impact the plan of care are:      Bending, Driving, Dressing, Lifting, Sitting, Squatting/kneeling, Stairs and Walking.  3) Clinical presentation characteristics are:   Stable/Uncomplicated.  4) Decision-Making    Low complexity using standardized patient assessment instrument and/or measureable assessment of functional outcome.  Cumulative Therapy Evaluation is: Low complexity.    Previous and current functional  limitations:  (See Goal Flow Sheet for this information)    Short term and Long term goals: (See Goal Flow Sheet for this information)     Communication ability:  Patient appears to be able to clearly communicate and understand verbal and written communication and follow directions correctly.  Treatment Explanation - The following has been discussed with the patient:   RX ordered/plan of care  Anticipated outcomes  Possible risks and side effects  This patient would benefit from PT intervention to resume normal activities.   Rehab potential is good.    Frequency:  1 X week, once daily  Duration:  for 8 weeks  Discharge Plan:  Achieve all LTG.  Independent in home treatment program.  Reach maximal therapeutic benefit.    Please refer to the daily flowsheet for treatment today, total treatment time and time spent performing 1:1 timed codes.

## 2019-01-04 ENCOUNTER — MYC MEDICAL ADVICE (OUTPATIENT)
Dept: FAMILY MEDICINE | Facility: CLINIC | Age: 64
End: 2019-01-04

## 2019-01-04 DIAGNOSIS — G89.29 CHRONIC PAIN OF LEFT KNEE: ICD-10-CM

## 2019-01-04 DIAGNOSIS — M25.562 PAIN OF LEFT KNEE AFTER INJURY: ICD-10-CM

## 2019-01-04 DIAGNOSIS — M25.562 CHRONIC PAIN OF LEFT KNEE: ICD-10-CM

## 2019-01-04 NOTE — TELEPHONE ENCOUNTER
Routing refill request to provider for review/approval because:  Drug not on the [facility] refill protocol     Eloisa Turner RN, BSN, PHN

## 2019-01-07 RX ORDER — OXYCODONE AND ACETAMINOPHEN 5; 325 MG/1; MG/1
1 TABLET ORAL EVERY 6 HOURS PRN
Qty: 120 TABLET | Refills: 0 | Status: SHIPPED | OUTPATIENT
Start: 2019-01-07 | End: 2019-02-05

## 2019-01-09 ENCOUNTER — THERAPY VISIT (OUTPATIENT)
Dept: PHYSICAL THERAPY | Facility: CLINIC | Age: 64
End: 2019-01-09
Payer: COMMERCIAL

## 2019-01-09 DIAGNOSIS — M54.42 LEFT-SIDED LOW BACK PAIN WITH LEFT-SIDED SCIATICA: Primary | ICD-10-CM

## 2019-01-09 PROCEDURE — 97110 THERAPEUTIC EXERCISES: CPT | Mod: GP | Performed by: PHYSICAL THERAPIST

## 2019-01-09 PROCEDURE — 97140 MANUAL THERAPY 1/> REGIONS: CPT | Mod: GP | Performed by: PHYSICAL THERAPIST

## 2019-01-09 PROCEDURE — 97112 NEUROMUSCULAR REEDUCATION: CPT | Mod: GP | Performed by: PHYSICAL THERAPIST

## 2019-01-13 DIAGNOSIS — M54.50 LEFT-SIDED LOW BACK PAIN WITHOUT SCIATICA, UNSPECIFIED CHRONICITY: ICD-10-CM

## 2019-01-14 NOTE — TELEPHONE ENCOUNTER
Requested Prescriptions   Pending Prescriptions Disp Refills     cyclobenzaprine (FLEXERIL) 5 MG tablet [Pharmacy Med Name: CYCLOBENZAPRINE HCL 5MG TABS]  Last Written Prescription Date:  12/28/18  Last Fill Quantity: 42,  # refills: 0   Last office visit: 11/28/2018 with prescribing provider:  ALEXIS Leiva   Future Office Visit:     42 tablet 0     Sig: TAKE ONE TABLET BY MOUTH THREE TIMES A DAY AS NEEDED FOR MUSCLE SPASMS    There is no refill protocol information for this order

## 2019-01-15 RX ORDER — CYCLOBENZAPRINE HCL 5 MG
TABLET ORAL
Qty: 90 TABLET | Refills: 0 | Status: SHIPPED | OUTPATIENT
Start: 2019-01-15 | End: 2019-02-11

## 2019-01-16 ENCOUNTER — THERAPY VISIT (OUTPATIENT)
Dept: PHYSICAL THERAPY | Facility: CLINIC | Age: 64
End: 2019-01-16
Payer: COMMERCIAL

## 2019-01-16 DIAGNOSIS — M54.42 LEFT-SIDED LOW BACK PAIN WITH LEFT-SIDED SCIATICA: Primary | ICD-10-CM

## 2019-01-16 PROCEDURE — 97140 MANUAL THERAPY 1/> REGIONS: CPT | Mod: GP | Performed by: PHYSICAL THERAPIST

## 2019-01-16 PROCEDURE — 97110 THERAPEUTIC EXERCISES: CPT | Mod: GP | Performed by: PHYSICAL THERAPIST

## 2019-01-16 PROCEDURE — 97112 NEUROMUSCULAR REEDUCATION: CPT | Mod: GP | Performed by: PHYSICAL THERAPIST

## 2019-01-29 DIAGNOSIS — G25.81 RESTLESS LEG SYNDROME: ICD-10-CM

## 2019-01-29 RX ORDER — PRAMIPEXOLE DIHYDROCHLORIDE 0.25 MG/1
TABLET ORAL
Qty: 120 TABLET | Refills: 3 | Status: SHIPPED | OUTPATIENT
Start: 2019-01-29 | End: 2019-04-30

## 2019-01-29 NOTE — TELEPHONE ENCOUNTER
Routing refill request to provider for review/approval because:  ALT not current    Lab Results   Component Value Date    ALT 26 06/20/2017     Eloisa Turner RN, BSN, PHN

## 2019-01-29 NOTE — TELEPHONE ENCOUNTER
"Requested Prescriptions   Pending Prescriptions Disp Refills     pramipexole (MIRAPEX) 0.25 MG tablet [Pharmacy Med Name: PRAMIPEXOLE DIHYDROCHLORI 0.25 TABS] 120 tablet 0    Last Written Prescription Date:  12/31/18  Last Fill Quantity: 120,  # refills: 0   Last office visit: 11/28/2018 with prescribing provider:  Cali   Future Office Visit:   Sig: TAKE ONE TABLET BY MOUTH IN THE MORNING, TWO TABLETS IN THE AFTERNOON, AND ONE TABLET BEFORE BED.    Antiparkinson's Agents Protocol Failed - 1/29/2019  4:01 PM       Failed - ALT on record in past 12 months        Recent Labs   Lab Test 06/20/17   ALT 26            Passed - Blood pressure under 140/90 in past 12 months    BP Readings from Last 3 Encounters:   11/28/18 134/56   09/21/18 130/82   04/10/18 118/71                Passed - CBC on record in past 12 months    Recent Labs   Lab Test 09/21/18  1403   WBC 8.3   RBC 3.90*   HGB 12.4*   HCT 37.1*                   Passed - Serum Creatinine on file in past 12 months    Recent Labs   Lab Test 09/21/18  1403   CR 1.62*            Passed - Medication is active on med list       Passed - Patient is age 18 or older       Passed - Recent (6 mo) or future (30 days) visit within the authorizing provider's specialty    Patient had office visit in the last 6 months or has a visit in the next 30 days with authorizing provider or within the authorizing provider's specialty.  See \"Patient Info\" tab in inbasket, or \"Choose Columns\" in Meds & Orders section of the refill encounter.            "

## 2019-02-04 ENCOUNTER — MYC MEDICAL ADVICE (OUTPATIENT)
Dept: FAMILY MEDICINE | Facility: CLINIC | Age: 64
End: 2019-02-04

## 2019-02-04 DIAGNOSIS — M25.562 PAIN OF LEFT KNEE AFTER INJURY: ICD-10-CM

## 2019-02-04 DIAGNOSIS — M25.562 CHRONIC PAIN OF LEFT KNEE: ICD-10-CM

## 2019-02-04 DIAGNOSIS — G89.29 CHRONIC PAIN OF LEFT KNEE: ICD-10-CM

## 2019-02-05 RX ORDER — OXYCODONE AND ACETAMINOPHEN 5; 325 MG/1; MG/1
1 TABLET ORAL EVERY 6 HOURS PRN
Qty: 120 TABLET | Refills: 0 | Status: SHIPPED | OUTPATIENT
Start: 2019-02-05 | End: 2019-03-04

## 2019-02-05 NOTE — TELEPHONE ENCOUNTER
Routing refill request to provider for review/approval because:  Drug not on the [facility] refill protocol   Patient needs to be seen because:  Last OV with Dr. Leiva: 11/28/18 with advised F/U in 2 months for Diabetes and A1c, pt did not complete.    Last filled: 1/7/19    Called patient to schedule appt for DM, scheduled to see Dr. Leiva on 2/11/19 and patient will be out of medication prior to appt, requesting fill prior to appt.    Irene Zacarias, RN, BSN

## 2019-02-11 ENCOUNTER — OFFICE VISIT (OUTPATIENT)
Dept: FAMILY MEDICINE | Facility: CLINIC | Age: 64
End: 2019-02-11
Payer: COMMERCIAL

## 2019-02-11 VITALS
HEART RATE: 100 BPM | HEIGHT: 74 IN | WEIGHT: 261.4 LBS | DIASTOLIC BLOOD PRESSURE: 74 MMHG | TEMPERATURE: 97.9 F | OXYGEN SATURATION: 99 % | SYSTOLIC BLOOD PRESSURE: 126 MMHG | BODY MASS INDEX: 33.55 KG/M2

## 2019-02-11 DIAGNOSIS — E66.01 MORBID OBESITY (H): ICD-10-CM

## 2019-02-11 DIAGNOSIS — Z79.4 TYPE 2 DIABETES MELLITUS WITH MICROALBUMINURIA, WITH LONG-TERM CURRENT USE OF INSULIN (H): Primary | ICD-10-CM

## 2019-02-11 DIAGNOSIS — F11.90 CHRONIC NARCOTIC USE: ICD-10-CM

## 2019-02-11 DIAGNOSIS — M54.50 LEFT-SIDED LOW BACK PAIN WITHOUT SCIATICA, UNSPECIFIED CHRONICITY: ICD-10-CM

## 2019-02-11 DIAGNOSIS — E11.29 TYPE 2 DIABETES MELLITUS WITH MICROALBUMINURIA, WITH LONG-TERM CURRENT USE OF INSULIN (H): Primary | ICD-10-CM

## 2019-02-11 DIAGNOSIS — M51.369 DDD (DEGENERATIVE DISC DISEASE), LUMBAR: ICD-10-CM

## 2019-02-11 DIAGNOSIS — N18.30 CKD (CHRONIC KIDNEY DISEASE) STAGE 3, GFR 30-59 ML/MIN (H): ICD-10-CM

## 2019-02-11 DIAGNOSIS — R80.9 TYPE 2 DIABETES MELLITUS WITH MICROALBUMINURIA, WITH LONG-TERM CURRENT USE OF INSULIN (H): Primary | ICD-10-CM

## 2019-02-11 DIAGNOSIS — F33.0 MILD RECURRENT MAJOR DEPRESSION (H): ICD-10-CM

## 2019-02-11 DIAGNOSIS — E53.8 VITAMIN B 12 DEFICIENCY: ICD-10-CM

## 2019-02-11 DIAGNOSIS — I10 HYPERTENSION GOAL BP (BLOOD PRESSURE) < 140/90: ICD-10-CM

## 2019-02-11 LAB
AMPHETAMINES UR QL: NOT DETECTED NG/ML
BARBITURATES UR QL SCN: NOT DETECTED NG/ML
BENZODIAZ UR QL SCN: NOT DETECTED NG/ML
BUPRENORPHINE UR QL: NOT DETECTED NG/ML
CANNABINOIDS UR QL: NOT DETECTED NG/ML
COCAINE UR QL SCN: NOT DETECTED NG/ML
D-METHAMPHET UR QL: NOT DETECTED NG/ML
HBA1C MFR BLD: 6.6 % (ref 0–5.6)
METHADONE UR QL SCN: NOT DETECTED NG/ML
OPIATES UR QL SCN: NOT DETECTED NG/ML
OXYCODONE UR QL SCN: ABNORMAL NG/ML
PCP UR QL SCN: NOT DETECTED NG/ML
PROPOXYPH UR QL: NOT DETECTED NG/ML
TRICYCLICS UR QL SCN: NOT DETECTED NG/ML

## 2019-02-11 PROCEDURE — 99214 OFFICE O/P EST MOD 30 MIN: CPT | Performed by: FAMILY MEDICINE

## 2019-02-11 PROCEDURE — 80306 DRUG TEST PRSMV INSTRMNT: CPT | Performed by: FAMILY MEDICINE

## 2019-02-11 PROCEDURE — 83036 HEMOGLOBIN GLYCOSYLATED A1C: CPT | Performed by: FAMILY MEDICINE

## 2019-02-11 PROCEDURE — 82607 VITAMIN B-12: CPT | Performed by: FAMILY MEDICINE

## 2019-02-11 PROCEDURE — 36415 COLL VENOUS BLD VENIPUNCTURE: CPT | Performed by: FAMILY MEDICINE

## 2019-02-11 RX ORDER — CYCLOBENZAPRINE HCL 5 MG
5 TABLET ORAL 3 TIMES DAILY PRN
Qty: 180 TABLET | Refills: 1 | Status: ON HOLD | OUTPATIENT
Start: 2019-02-11 | End: 2019-04-04

## 2019-02-11 RX ORDER — CYANOCOBALAMIN 1000 UG/ML
INJECTION, SOLUTION INTRAMUSCULAR; SUBCUTANEOUS
Qty: 3 ML | Refills: 3 | Status: SHIPPED | OUTPATIENT
Start: 2019-02-11 | End: 2020-03-03

## 2019-02-11 ASSESSMENT — ANXIETY QUESTIONNAIRES
1. FEELING NERVOUS, ANXIOUS, OR ON EDGE: SEVERAL DAYS
7. FEELING AFRAID AS IF SOMETHING AWFUL MIGHT HAPPEN: SEVERAL DAYS
6. BECOMING EASILY ANNOYED OR IRRITABLE: SEVERAL DAYS
GAD7 TOTAL SCORE: 5
2. NOT BEING ABLE TO STOP OR CONTROL WORRYING: SEVERAL DAYS
3. WORRYING TOO MUCH ABOUT DIFFERENT THINGS: SEVERAL DAYS
IF YOU CHECKED OFF ANY PROBLEMS ON THIS QUESTIONNAIRE, HOW DIFFICULT HAVE THESE PROBLEMS MADE IT FOR YOU TO DO YOUR WORK, TAKE CARE OF THINGS AT HOME, OR GET ALONG WITH OTHER PEOPLE: SOMEWHAT DIFFICULT
5. BEING SO RESTLESS THAT IT IS HARD TO SIT STILL: NOT AT ALL

## 2019-02-11 ASSESSMENT — PATIENT HEALTH QUESTIONNAIRE - PHQ9
SUM OF ALL RESPONSES TO PHQ QUESTIONS 1-9: 6
5. POOR APPETITE OR OVEREATING: NOT AT ALL

## 2019-02-11 ASSESSMENT — MIFFLIN-ST. JEOR: SCORE: 2050.45

## 2019-02-11 NOTE — PROGRESS NOTES
SUBJECTIVE:   Cedric Figueroa is a 63 year old male who presents to clinic today for the following health issues:        Diabetes Follow-up        Patient is checking blood sugars: 1-2 times a week; 170-180    Diabetic concerns: None     Symptoms of hypoglycemia (low blood sugar): none     Paresthesias (numbness or burning in feet) or sores: no      Date of last diabetic eye exam (annually):  9/27/18  Date of last Urine micro albumin screen, (annually):    Component      Latest Ref Rng & Units 9/21/2018   Creatinine Urine      mg/dL 77   Albumin Urine mg/L      mg/L 79   Albumin Urine mg/g Cr      0 - 17 mg/g Cr 102.59 (H)         Pneumococcal Vaccine:  Yes: utd    Influenza Vaccine (annually):   Yes: utd    Tobacco free:  Yes     Aspirin use:  Yes: 81 mg     Amount of exercise or physical activity: None    Problems taking medications regularly: No    Medication side effects: none    Diet: regular (no restrictions)        HYPERTENSION - Patient has longstanding history of HTN , currently denies any symptoms referable to elevated blood pressure. Specifically denies chest pain, palpitations, dyspnea, orthopnea, PND or peripheral edema. Blood pressure readings have been in normal range. Current medication regimen is as listed below. Patient denies any side effects of medication.                                                                                                                                                                                          .  DEPRESSION - Patient has a long history of Depression of moderate severity requiring medication for control with recent symptoms being stable..Current symptoms of depression include;                CHARITY-7   [employer], 2002; Used with Permission) 2/11/2019   1. Feeling nervous, anxious, or on edge 1   2. Not being able to stop or control worrying 1   3. Worrying too much about different things 1   4. Trouble relaxing 0   5. Being so restless that it is hard to  sit still 0   6. Becoming easily annoyed or irritable 1   7. Feeling afraid, as if something awful might happen 1   CHARITY-7 Total Score 5   If you checked any problems, how difficult have they made it for you to do your work, take care of things at home, or get along with other people? Somewhat difficult        PHQ-9 ([employer]) 2/11/2019   1.  Little interest or pleasure in doing things 1   2.  Feeling down, depressed, or hopeless 1   3.  Trouble falling or staying asleep, or sleeping too much 2   4.  Feeling tired or having little energy 1   5.  Poor appetite or overeating 0   6.  Feeling bad about yourself 0   7.  Trouble concentrating 0   8.  Moving slowly or restless 1   9.  Suicidal or self-harm thoughts 0   PHQ-9 Total Score 6   Difficulty at work, home, or with people Somewhat difficult                                                                                                                                                                          .  RENAL INSUFFICIENCY - Patient has a longstanding history of moderate-severe chronic renal insufficiency. Last Cr   Creatinine   Date Value Ref Range Status   09/21/2018 1.62 (H) 0.66 - 1.25 mg/dL Final                                                                                                                                                            Chronic pain- knee and low back.  States that the low back pain has persisted and occasionally has radiating pain in the left thigh area. Back X ray done 11/2018 revealed mild degenerative changes wih posterior facet degenerative changes at L4-5 and L5-S1.   Requesting a refill on Flexeril. On chronic narcotic use-no concerns for misuse.   Due for urine drug screen.      Problem list and histories reviewed & adjusted, as indicated.  Additional history: as documented    Patient Active Problem List   Diagnosis     Hyperlipidemia LDL goal <100     Mild major depression (H)     Restless leg syndrome     Sleep apnea      Knee pain     Vitamin B 12 deficiency     Advanced directives, counseling/discussion     Iron deficiency anemia     OA (osteoarthritis) of knee     Perforated bowel (H)     Left-sided low back pain with left-sided sciatica     Type 2 diabetes mellitus with microalbuminuria, with long-term current use of insulin (H)     Hypertension goal BP (blood pressure) < 130/80     Bariatric surgery status     Perirectal abscess     CKD (chronic kidney disease) stage 3, GFR 30-59 ml/min (H)     Controlled substance agreement signed     Chronic pain syndrome     Morbid obesity (H)     Past Surgical History:   Procedure Laterality Date     ABDOMEN SURGERY      peritonitis, bowel perforation, bowel resection     AS TOTAL KNEE ARTHROPLASTY      Right and Left knee x3( 2 partial knee replacement and 1 total Left knee replacement     BARIATRIC SURGERY      at age 45     COLONOSCOPY  2006    [facility]     COLONOSCOPY WITH CO2 INSUFFLATION N/A 2016    Procedure: COLONOSCOPY WITH CO2 INSUFFLATION;  Surgeon: Cedric Schneider MD;  Location: [facility] OR     INCISION AND DRAINAGE BUTTOCKS Left 3/28/2018    Procedure: INCISION AND DRAINAGE BUTTOCKS;  Incision and drainage of left buttock abscess, and fistulotomy;  Surgeon: Bala Layton MD;  Location: [facility] OR       Social History     Tobacco Use     Smoking status: Former Smoker     Last attempt to quit: 2018     Years since quittin.5     Smokeless tobacco: Never Used   Substance Use Topics     Alcohol use: No     Family History   Problem Relation Age of Onset     Diabetes Mother      Diabetes Paternal Grandmother      Coronary Artery Disease Paternal Grandmother      Cerebrovascular Disease Paternal Grandmother      Bleeding Disorder No family hx of      Anesthesia Reaction No family hx of          Current Outpatient Medications   Medication Sig Dispense Refill     ASPIRIN PO Take 81 mg by mouth       BASAGLAR 100 UNIT/ML injection Inject 45 Units  "Subcutaneous daily 3 mL 11     blood glucose monitoring (ACCU-CHEK PELON PLUS) meter device kit Use to test blood sugar 3 times daily or as directed. 1 kit 0     blood glucose monitoring (ACCU-CHEK PELON PLUS) test strip Use to test blood sugar 3 times daily or as directed. 100 each 11     blood glucose monitoring (SOFTCLIX) lancets Use to test blood sugar 3 times daily or as directed. 100 each 3     cyclobenzaprine (FLEXERIL) 5 MG tablet Take 1 tablet (5 mg) by mouth 3 times daily as needed for muscle spasms 180 tablet 1     diclofenac (VOLTAREN) 1 % GEL topical gel APPLY 4 GRAMS TO KNEES DAILY USING THE ENCLOSED DOSING CARD. 100 g 1     insulin pen needle (BD ULTRA-FINE) 29G X 12.7MM Use once daily or as directed. 100 each prn     losartan-hydrochlorothiazide (HYZAAR) 100-25 MG per tablet Take 1 tablet by mouth daily 90 tablet 3     MELATONIN PO Take 10 mg by mouth At Bedtime        oxyCODONE-acetaminophen (PERCOCET) 5-325 MG tablet Take 1 tablet by mouth every 6 hours as needed for moderate to severe pain or pain maximum 4 tablet(s) per day 120 tablet 0     PARoxetine (PAXIL) 20 MG tablet Take 1 tablet (20 mg) by mouth every morning 90 tablet 3     pramipexole (MIRAPEX) 0.25 MG tablet TAKE ONE TABLET BY MOUTH IN THE MORNING, TWO TABLETS IN THE AFTERNOON, AND ONE TABLET BEFORE BED. 120 tablet 3     simvastatin (ZOCOR) 40 MG tablet Take 1 tablet (40 mg) by mouth At Bedtime 90 tablet 3     vitamin B-12 (CYANOCOBALAMIN) 1000 MCG/ML injection INJECT 1ML INTO THE MUSCLE EVERY 30 DAYS. 3 mL 3     No Known Allergies    Reviewed and updated as needed this visit by clinical staff  Tobacco  Meds       Reviewed and updated as needed this visit by Provider         ROS:  Constitutional, HEENT, cardiovascular, pulmonary, gi and gu systems are negative, except as otherwise noted.    OBJECTIVE:     /74   Pulse 100   Temp 97.9  F (36.6  C) (Tympanic)   Ht 1.88 m (6' 2\")   Wt 118.6 kg (261 lb 6.4 oz)   SpO2 99%   BMI " 33.56 kg/m    Body mass index is 33.56 kg/m .  GENERAL: healthy, alert and no distress  RESP: lungs clear to auscultation - no rales, rhonchi or wheezes  CV: regular rate and rhythm, normal S1 S2, no S3 or S4, no murmur, click or rub, no peripheral edema and peripheral pulses strong  Diabetic foot exam: normal DP and PT pulses, no trophic changes or ulcerative lesions, normal sensory exam and normal monofilament exam    Diagnostic Test Results:  Reviewed and discussed with patient prior to discharge.  Results for orders placed or performed in visit on 02/11/19   Hemoglobin A1c   Result Value Ref Range    Hemoglobin A1C 6.6 (H) 0 - 5.6 %   Urine Drugs of Abuse Screen Panel 13   Result Value Ref Range    Cannabinoids (29-tdl-8-carboxy-9-THC) Not Detected NDET^Not Detected ng/mL    Phencyclidine (Phencyclidine) Not Detected NDET^Not Detected ng/mL    Cocaine (Benzoylecgonine) Not Detected NDET^Not Detected ng/mL    Methamphetamine (d-Methamphetamine) Not Detected NDET^Not Detected ng/mL    Opiates (Morphine) Not Detected NDET^Not Detected ng/mL    Amphetamine (d-Amphetamine) Not Detected NDET^Not Detected ng/mL    Benzodiazepines (Nordiazepam) Not Detected NDET^Not Detected ng/mL    Tricyclic Antidepressants (Desipramine) Not Detected NDET^Not Detected ng/mL    Methadone (Methadone) Not Detected NDET^Not Detected ng/mL    Barbiturates (Butalbital) Not Detected NDET^Not Detected ng/mL    Oxycodone (Oxycodone) Detected, Abnormal Result (A) NDET^Not Detected ng/mL    Propoxyphene (Norpropoxyphene) Not Detected NDET^Not Detected ng/mL    Buprenorphine (Buprenorphine) Not Detected NDET^Not Detected ng/mL   Vitamin B12   Result Value Ref Range    Vitamin B12 488 193 - 986 pg/mL         ASSESSMENT/PLAN:   Cedric was seen today for diabetes.    Diagnoses and all orders for this visit:    Type 2 diabetes mellitus with microalbuminuria, with long-term current use of insulin (H) controlled. A1C today 6.6  -     Hemoglobin A1c  -     Continue current management.    CKD (chronic kidney disease) stage 3, GFR 30-59 ml/min (H)  -     **Creatinine FUTURE anytime; Future    Vitamin B 12 deficiency  -     vitamin B-12 (CYANOCOBALAMIN) 1000 MCG/ML injection; INJECT 1ML INTO THE MUSCLE EVERY 30 DAYS.  -     Vitamin B12    Left-sided low back pain without sciatica, unspecified chronicity  -     cyclobenzaprine (FLEXERIL) 5 MG tablet; Take 1 tablet (5 mg) by mouth 3 times daily as needed for muscle spasms    DDD (degenerative disc disease), lumbar  -     [facility] Lumbar Spine w/o Contrast; Future    Chronic narcotic use  -     Urine Drugs of Abuse Screen Panel 13    Mild recurrent major depression (H)  - PHQ-9/CHARITY 7 completed, see above/Epic for details   - Continue current management.       Hypertension goal BP (blood pressure) < 140/90, controlled  Continue current management.    Morbid obesity (H)  Weight management plan: Discussed healthy diet and exercise guidelines      Follow up in 3 months for a Physical Exam and Diabetes follow up, sooner if needed.      Molly Leiva MD  [facility]

## 2019-02-12 LAB — VIT B12 SERPL-MCNC: 488 PG/ML (ref 193–986)

## 2019-02-12 ASSESSMENT — ANXIETY QUESTIONNAIRES: GAD7 TOTAL SCORE: 5

## 2019-02-21 ENCOUNTER — ANCILLARY PROCEDURE (OUTPATIENT)
Dept: MRI IMAGING | Facility: CLINIC | Age: 64
End: 2019-02-21
Attending: FAMILY MEDICINE
Payer: COMMERCIAL

## 2019-02-21 DIAGNOSIS — M51.369 DDD (DEGENERATIVE DISC DISEASE), LUMBAR: ICD-10-CM

## 2019-02-21 PROCEDURE — 72148 MRI LUMBAR SPINE W/O DYE: CPT | Mod: TC

## 2019-02-26 ENCOUNTER — MYC MEDICAL ADVICE (OUTPATIENT)
Dept: FAMILY MEDICINE | Facility: CLINIC | Age: 64
End: 2019-02-26

## 2019-02-26 DIAGNOSIS — M54.16 LUMBAR RADICULOPATHY: ICD-10-CM

## 2019-02-26 DIAGNOSIS — M51.369 DDD (DEGENERATIVE DISC DISEASE), LUMBAR: Primary | ICD-10-CM

## 2019-02-26 NOTE — TELEPHONE ENCOUNTER
Results have not been reviewed.  Will send to provider to review and my chart patient with results.

## 2019-03-02 ENCOUNTER — MYC MEDICAL ADVICE (OUTPATIENT)
Dept: FAMILY MEDICINE | Facility: CLINIC | Age: 64
End: 2019-03-02

## 2019-03-02 DIAGNOSIS — G89.29 CHRONIC PAIN OF LEFT KNEE: ICD-10-CM

## 2019-03-02 DIAGNOSIS — M25.562 PAIN OF LEFT KNEE AFTER INJURY: ICD-10-CM

## 2019-03-02 DIAGNOSIS — M25.562 CHRONIC PAIN OF LEFT KNEE: ICD-10-CM

## 2019-03-04 RX ORDER — OXYCODONE AND ACETAMINOPHEN 5; 325 MG/1; MG/1
1 TABLET ORAL EVERY 6 HOURS PRN
Qty: 120 TABLET | Refills: 0 | Status: SHIPPED | OUTPATIENT
Start: 2019-03-04 | End: 2019-04-01

## 2019-03-19 ENCOUNTER — OFFICE VISIT (OUTPATIENT)
Dept: NEUROSURGERY | Facility: CLINIC | Age: 64
End: 2019-03-19
Attending: FAMILY MEDICINE
Payer: COMMERCIAL

## 2019-03-19 VITALS
DIASTOLIC BLOOD PRESSURE: 79 MMHG | RESPIRATION RATE: 16 BRPM | HEART RATE: 86 BPM | OXYGEN SATURATION: 97 % | SYSTOLIC BLOOD PRESSURE: 138 MMHG | WEIGHT: 263 LBS | HEIGHT: 74 IN | BODY MASS INDEX: 33.75 KG/M2 | TEMPERATURE: 97.6 F

## 2019-03-19 DIAGNOSIS — M51.369 DDD (DEGENERATIVE DISC DISEASE), LUMBAR: ICD-10-CM

## 2019-03-19 DIAGNOSIS — M54.16 LUMBAR RADICULOPATHY: Primary | ICD-10-CM

## 2019-03-19 PROCEDURE — 99203 OFFICE O/P NEW LOW 30 MIN: CPT | Performed by: NURSE PRACTITIONER

## 2019-03-19 ASSESSMENT — PAIN SCALES - GENERAL: PAINLEVEL: MODERATE PAIN (4)

## 2019-03-19 ASSESSMENT — MIFFLIN-ST. JEOR: SCORE: 2057.71

## 2019-03-19 NOTE — NURSING NOTE
Pre-operative Education    Education included but not limited to:  - Pre-operative physical with primary care physician within 30 days of surgical date.   - Pre-operative clearance (cardiology, hematology, etc).   - Discontinue Aspirin, NSAIDs, Diclofenac x 7 days prior to surgical date.   -Do not begin taking Non-Steroidal Anti-Inflammatory Drugs or NSAIDs (Advil,Motrin, Ibuprofen,Nuprin, Diclofenac,Meloxicam, Aleve, Celebrex, Aspirin, etc.) until 12 weeks after surgery if you had a fusion. May cause bleeding and interfere with bone healing.  -you may resume taking NSAIDs 7 days post op after Laminectomy, microdiscectomy   -Patient is not a smoker  -Forms to be completed- sedentary job only wants 1-2 weeks off.  -Surgical risks: blood clots in the leg or lung, problems urinating, nerve damage, drainage from the incision, infection,stiffness  -Preparation timeline  - When to start NPO           -Special bathing procedure.Patient received Hibiclens and showering instruction sheet.   Hospital stay:  Checking in  The surgery itself   Recovery room  - Likely same day procedure with discharge home day of surgery, may stay for 23 hour observation hospitalization for monitoring  - Post operative incisional pain x 1-2 weeks which will require pain medications and muscle relaxants.   -Do NOT drive or drink alcohol while taking narcotic pain medication.  -Post operative incision care-Keep your incision clean and dry at all times. OK to remove dressing on postop day 2. OK to shower on postop day 3 and allow water to run over incision, pat dry after shower. No bathing, swimming or submerging in water. Call immediately or come to ED if any drainage occurs, or you develop new pain. Watch for signs of infection: redness, swelling, warmth, drainage, and fever of 101 degrees or higher. Notify clinic.   - Post operative activity limitations: 6-8 weeks, no lifting > 10 pounds, no bending, twisting, or overhead reaching.  -Ok to walk  as tolerated, avoid bed rest and prolonged sitting.  -No contact sports until after follow up visit  -No high impact activities such as; running/jogging, snowmobile or 4 richards riding or any other recreational vehicles.  - Post op follow up appointments:6 weeks and 3 months with Physician Assistant or Nurse Practitioner. Please call to schedule follow up appointment at [phone].   - Spine Education book was also given to the patient for further review.      Patient verbalized understanding of above instructions. All questions were answered to the best of my ability and the patient's satisfaction. Patient advised to call with any additional questions or concerns.  Rosa Jacques RN

## 2019-03-19 NOTE — LETTER
"    3/19/2019         RE: Cedric Figueroa  [address]  [place] [address]        Dear Colleague,    Thank you for referring your patient, Cedric Figueroa, to the [facility]. Please see a copy of my visit note below.    Dr. Aaron Ness  [facility]  Neurosurgery Clinic Visit      CC: Left lower back and leg pain    Primary care Provider: Molly Leiva      Reason For Visit:   I was asked by Dr. Leiva to consult on the patient for lumbar DDD and lumbar radiculopathy.      HPI: Cedric Figueroa is a 63 year old male with left-sided lower back and LLE pain that started in 8/2018.  He denies any known cause.  He states the pain is constant dull to sharp pain to the left lower back that radiates along the left lateral thigh and entire left lateral leg.  He is unsure whether or not he has foot pain.  He denies falls or foot drop, but states he feels weakness to the LLE when compared to the RLE.  He feels the pain has progressively become more bothersome, and is exacerbated with prolonged standing and walking as well as with twisting, bending, lifting and reachin.  He finds some relief with lying down.  He has had PT which he found helpful during his sessions, \"But when I walked out it bothered me again.\"  He has not had injections.  He denies changes to bowel or bladder.     Pain at its worst 7-8 Pain right now:  3-4    Past Medical History:   Diagnosis Date     Hyperlipidemia LDL goal <100 2/28/2012     Hypertension goal BP (blood pressure) < 130/80 2/28/2012     Mild major depression (H) 2/28/2012     Restless leg syndrome     controlled on Mirapex     Sleep apnea      Tobacco use     cigar use     Type 2 diabetes, HbA1c goal < 7% (H) 2/28/2012       Past Medical History reviewed with patient during visit.    Past Surgical History:   Procedure Laterality Date     ABDOMEN SURGERY  2015    peritonitis, bowel perforation, bowel resection     AS TOTAL KNEE " ARTHROPLASTY      Right and Left knee x3( 2 partial knee replacement and 1 total Left knee replacement     BARIATRIC SURGERY      at age 45     COLONOSCOPY  2006    [facility]     COLONOSCOPY WITH CO2 INSUFFLATION N/A 2016    Procedure: COLONOSCOPY WITH CO2 INSUFFLATION;  Surgeon: Cedric Schneider MD;  Location: [facility] OR     INCISION AND DRAINAGE BUTTOCKS Left 3/28/2018    Procedure: INCISION AND DRAINAGE BUTTOCKS;  Incision and drainage of left buttock abscess, and fistulotomy;  Surgeon: Bala Layton MD;  Location: [facility] OR     Past Surgical History reviewed with patient during visit.    Current Outpatient Medications   Medication     ASPIRIN PO     BASAGLAR 100 UNIT/ML injection     blood glucose monitoring (ACCU-CHEK PELON PLUS) meter device kit     blood glucose monitoring (ACCU-CHEK PELON PLUS) test strip     blood glucose monitoring (SOFTCLIX) lancets     cyclobenzaprine (FLEXERIL) 5 MG tablet     diclofenac (VOLTAREN) 1 % GEL topical gel     insulin pen needle (BD ULTRA-FINE) 29G X 12.7MM     losartan-hydrochlorothiazide (HYZAAR) 100-25 MG per tablet     MELATONIN PO     oxyCODONE-acetaminophen (PERCOCET) 5-325 MG tablet     PARoxetine (PAXIL) 20 MG tablet     pramipexole (MIRAPEX) 0.25 MG tablet     simvastatin (ZOCOR) 40 MG tablet     vitamin B-12 (CYANOCOBALAMIN) 1000 MCG/ML injection     No current facility-administered medications for this visit.        No Known Allergies    Social History     Socioeconomic History     Marital status:      Spouse name: None     Number of children: None     Years of education: None     Highest education level: None   Occupational History     None   Social Needs     Financial resource strain: None     Food insecurity:     Worry: None     Inability: None     Transportation needs:     Medical: None     Non-medical: None   Tobacco Use     Smoking status: Former Smoker     Last attempt to quit: 2018     Years since quittin.6      "Smokeless tobacco: Never Used   Substance and Sexual Activity     Alcohol use: No     Drug use: No     Sexual activity: No   Lifestyle     Physical activity:     Days per week: None     Minutes per session: None     Stress: None   Relationships     Social connections:     Talks on phone: None     Gets together: None     Attends Christian service: None     Active member of club or organization: None     Attends meetings of clubs or organizations: None     Relationship status: None     Intimate partner violence:     Fear of current or ex partner: None     Emotionally abused: None     Physically abused: None     Forced sexual activity: None   Other Topics Concern     Parent/sibling w/ CABG, MI or angioplasty before 65F 55M? Not Asked   Social History Narrative     None       Family History   Problem Relation Age of Onset     Diabetes Mother      Diabetes Paternal Grandmother      Coronary Artery Disease Paternal Grandmother      Cerebrovascular Disease Paternal Grandmother      Bleeding Disorder No family hx of      Anesthesia Reaction No family hx of          ROS: 10 point ROS neg other than the symptoms noted above in the HPI.    Vital Signs: /79   Pulse 86   Temp 97.6  F (36.4  C) (Oral)   Resp 16   Ht 6' 2\" (1.88 m)   Wt 263 lb (119.3 kg)   SpO2 97%   BMI 33.77 kg/m           Examination:  Constitutional:  Alert, well nourished, NAD.  HEENT: Normocephalic, atraumatic.   Pulm:  Without shortness of breath   CV:  No pitting edema of BLE.    Neurological:  Awake  Alert  Oriented x 3  Speech clear  Cranial nerves II - XII intact      Motor exam   Shoulder Abduction:  Right:  5/5   Left:  5/5  Biceps:                      Right:  5/5   Left:  5/5  Triceps:                     Right:  5/5   Left:  5/5  Wrist Extensors:       Right:  5/5   Left:  5/5  Wrist Flexors:           Right:  5/5   Left:  5/5  Intrinsics:                   Right:  5/5   Left:  5/5  Hip Flexor:                Right: 5/5  Left:  " 4+/5  Hip Adductor:             Right:  5/5  Left:  4+/5  Hip Abductor:             Right:  5/5  Left:  4+/5  Gastroc Soleus:        Right:  5/5  Left:  4+/5  Tib/Ant:                      Right:  5/5  Left:  4+/5  EHL:                          Right:  5/5  Left:  4+/5   DF:                            Right:  5/5  Left:  4+/5  PF:                             Right:  5/5  Left:  5/5  Sensation normal to bilateral upper and lower extremities  Clonus negative  DTRs 1+ symmetric  Gait: Able to stand from a seated position. Normal non-antalgic, non-myelopathic gait.  Mild difficulty with heel/toe standing on left.    Cervical examination reveals good range of motion.  No tenderness to palpation of the cervical spine or paraspinous muscles bilaterally.    Lumbar examination reveals no tenderness of the spine or paraspinous muscles.  Hip height is symmetrical. Negative SI joint, sciatic notch or greater trochanteric tenderness to palpation bilaterally.  Straight leg raise is negative bilaterally.      Imaging: MRI Lumbar Spine 2/21/19:  IMPRESSION: L4-L5 degenerative disc and facet disease with an  associated inferiorly extruded disc fragment into the left anterior  epidural space posterior to the upper portion of the L5 vertebral body resulting in some deformity of both the left L5 and S1 nerve roots.     Assessment/Plan:   Lumbar DDD  Lumbar Radiculopathy        Cedric Figueroa is a 63 year old male with left-sided lower back and LLE pain that started in 8/2018.  He denies any known cause.  He states the pain is constant dull to sharp pain to the left lower back that radiates along the left lateral thigh and entire left lateral leg.  He is unsure whether or not he has foot pain.  He denies falls or foot drop, but states he feels weakness to the LLE when compared to the RLE.  He feels the pain has progressively become more bothersome, and is exacerbated with prolonged standing and walking as well as with twisting, bending,  "lifting and reachin.  He finds some relief with lying down.  He has had PT which he found helpful during his sessions, \"But when I walked out it bothered me again.\"  He has not had injections.  He denies changes to bowel or bladder. We reviewed findings on MRI and discussed option of surgery, L4-5 microdiscectomy.  The patient has noted weakness to the LLE and he states the pain is severe on a weekly basis.  He is interested in surgery at this time.  He was educated on the surgery including risks and benefits.  Dr. Ness will contact the patient to discuss further and patient would like to proceed with the authorization process for surgery.            Patient Instructions   Patient Instructions  Pre-Operative:  -Surgery scheduled at [facility] for L4-5 Microdiscectomy  -Surgical risks: blood clots in the leg or lung, problems urinating, nerve damage, drainage from the incision, infection,stiffness  - Pre-operative physical with primary care physician within 30 days of surgical date.   - Likely same day procedure with discharge home day of surgery, may stay for 23 hour observation hospitalization for monitoring.     -Shower procedure: please shower with antimicrobial soap the night before surgery and morning of surgery. Please refer to showering instruction sheet in folder.  -Stop all solid foods 8 hours before surgery.  -Keep drinking clear liquids until 4 hours before surgery. Clear liquids include water, clear juice, black coffee, or clear tea without milk, Gatorade, clear soda.   - Discontinue Aspirin, NSAIDs (Advil/Ibuprofen, Naproxen,Nuprin,Relafen/Nabumetone, Diclofenac,Meloxicam, Aleve, Celebrex) x 7 days prior to surgical date.  - May try tylenol for pain 1000 mg three times per day for pain      Post-Operative:  -you may resume taking NSAIDs 7 days post op   - Post operative incisional pain x 1-2 weeks which will require pain medications and muscle relaxants. You will receive medication upon " discharge.  -Do NOT drive while taking narcotic pain medication.  -Do not drink alcohol while using any pain medication  -Post operative incision care- Watch for signs of infection: redness, swelling, warmth, drainage, and fever of 101 degrees or higher. Notify clinic [phone].  -No submerging incision in water such as pools, hot tubs,baths for at least 8 weeks or until incision is healed. Showers are fine.   - Post operative activity limitations: 6-8 weeks, no lifting > 10 pounds, no bending, twisting, or overhead reaching.  -Ok to walk as tolerated, avoid bed rest and prolonged sitting.  -No contact sports until after follow up visit  -No high impact activities such as; running/jogging, snowmobile or 4 richards riding or any other recreational vehicles.  - Post op follow up appointments:6 week post op follow up visit with Nurse practitioner.Please call to schedule follow up appointment at [phone].  -If you are currently employed, you will need to be off work for 2-4 weeks for post op recovery and healing.           Kinjal Nelson [facility]  [facility]  [address] [address]    Tel [phone]  Pager [phone]    Again, thank you for allowing me to participate in the care of your patient.        Sincerely,        Kinjal Nelson, SHARI

## 2019-03-19 NOTE — PROGRESS NOTES
"Dr. Aaron Ness  [facility]  Neurosurgery Clinic Visit      CC: Left lower back and leg pain    Primary care Provider: Molly Leiva      Reason For Visit:   I was asked by Dr. Leiva to consult on the patient for lumbar DDD and lumbar radiculopathy.      HPI: Cedric Figueroa is a 63 year old male with left-sided lower back and LLE pain that started in 8/2018.  He denies any known cause.  He states the pain is constant dull to sharp pain to the left lower back that radiates along the left lateral thigh and entire left lateral leg.  He is unsure whether or not he has foot pain.  He denies falls or foot drop, but states he feels weakness to the LLE when compared to the RLE.  He feels the pain has progressively become more bothersome, and is exacerbated with prolonged standing and walking as well as with twisting, bending, lifting and reachin.  He finds some relief with lying down.  He has had PT which he found helpful during his sessions, \"But when I walked out it bothered me again.\"  He has not had injections.  He denies changes to bowel or bladder.     Pain at its worst 7-8 Pain right now:  3-4    Past Medical History:   Diagnosis Date     Hyperlipidemia LDL goal <100 2/28/2012     Hypertension goal BP (blood pressure) < 130/80 2/28/2012     Mild major depression (H) 2/28/2012     Restless leg syndrome     controlled on Mirapex     Sleep apnea      Tobacco use     cigar use     Type 2 diabetes, HbA1c goal < 7% (H) 2/28/2012       Past Medical History reviewed with patient during visit.    Past Surgical History:   Procedure Laterality Date     ABDOMEN SURGERY  2015    peritonitis, bowel perforation, bowel resection     AS TOTAL KNEE ARTHROPLASTY      Right and Left knee x3( 2 partial knee replacement and 1 total Left knee replacement     BARIATRIC SURGERY      at age 45     COLONOSCOPY  2/1/2006    [facility]     COLONOSCOPY WITH CO2 INSUFFLATION N/A 6/7/2016    Procedure: " COLONOSCOPY WITH CO2 INSUFFLATION;  Surgeon: Cedric Schneider MD;  Location: [facility] OR     INCISION AND DRAINAGE BUTTOCKS Left 3/28/2018    Procedure: INCISION AND DRAINAGE BUTTOCKS;  Incision and drainage of left buttock abscess, and fistulotomy;  Surgeon: Bala Layton MD;  Location: [facility] OR     Past Surgical History reviewed with patient during visit.    Current Outpatient Medications   Medication     ASPIRIN PO     BASAGLAR 100 UNIT/ML injection     blood glucose monitoring (ACCU-CHEK PELON PLUS) meter device kit     blood glucose monitoring (ACCU-CHEK PELON PLUS) test strip     blood glucose monitoring (SOFTCLIX) lancets     cyclobenzaprine (FLEXERIL) 5 MG tablet     diclofenac (VOLTAREN) 1 % GEL topical gel     insulin pen needle (BD ULTRA-FINE) 29G X 12.7MM     losartan-hydrochlorothiazide (HYZAAR) 100-25 MG per tablet     MELATONIN PO     oxyCODONE-acetaminophen (PERCOCET) 5-325 MG tablet     PARoxetine (PAXIL) 20 MG tablet     pramipexole (MIRAPEX) 0.25 MG tablet     simvastatin (ZOCOR) 40 MG tablet     vitamin B-12 (CYANOCOBALAMIN) 1000 MCG/ML injection     No current facility-administered medications for this visit.        No Known Allergies    Social History     Socioeconomic History     Marital status:      Spouse name: None     Number of children: None     Years of education: None     Highest education level: None   Occupational History     None   Social Needs     Financial resource strain: None     Food insecurity:     Worry: None     Inability: None     Transportation needs:     Medical: None     Non-medical: None   Tobacco Use     Smoking status: Former Smoker     Last attempt to quit: 2018     Years since quittin.6     Smokeless tobacco: Never Used   Substance and Sexual Activity     Alcohol use: No     Drug use: No     Sexual activity: No   Lifestyle     Physical activity:     Days per week: None     Minutes per session: None     Stress: None   Relationships     Social  "connections:     Talks on phone: None     Gets together: None     Attends Christian service: None     Active member of club or organization: None     Attends meetings of clubs or organizations: None     Relationship status: None     Intimate partner violence:     Fear of current or ex partner: None     Emotionally abused: None     Physically abused: None     Forced sexual activity: None   Other Topics Concern     Parent/sibling w/ CABG, MI or angioplasty before 65F 55M? Not Asked   Social History Narrative     None       Family History   Problem Relation Age of Onset     Diabetes Mother      Diabetes Paternal Grandmother      Coronary Artery Disease Paternal Grandmother      Cerebrovascular Disease Paternal Grandmother      Bleeding Disorder No family hx of      Anesthesia Reaction No family hx of          ROS: 10 point ROS neg other than the symptoms noted above in the HPI.    Vital Signs: /79   Pulse 86   Temp 97.6  F (36.4  C) (Oral)   Resp 16   Ht 6' 2\" (1.88 m)   Wt 263 lb (119.3 kg)   SpO2 97%   BMI 33.77 kg/m          Examination:  Constitutional:  Alert, well nourished, NAD.  HEENT: Normocephalic, atraumatic.   Pulm:  Without shortness of breath   CV:  No pitting edema of BLE.    Neurological:  Awake  Alert  Oriented x 3  Speech clear  Cranial nerves II - XII intact      Motor exam   Shoulder Abduction:  Right:  5/5   Left:  5/5  Biceps:                      Right:  5/5   Left:  5/5  Triceps:                     Right:  5/5   Left:  5/5  Wrist Extensors:       Right:  5/5   Left:  5/5  Wrist Flexors:           Right:  5/5   Left:  5/5  Intrinsics:                   Right:  5/5   Left:  5/5  Hip Flexor:                Right: 5/5  Left:  4+/5  Hip Adductor:             Right:  5/5  Left:  4+/5  Hip Abductor:             Right:  5/5  Left:  4+/5  Gastroc Soleus:        Right:  5/5  Left:  4+/5  Tib/Ant:                      Right:  5/5  Left:  4+/5  EHL:                          Right:  5/5  Left:  " "4+/5   DF:                            Right:  5/5  Left:  4+/5  PF:                             Right:  5/5  Left:  5/5  Sensation normal to bilateral upper and lower extremities  Clonus negative  DTRs 1+ symmetric  Gait: Able to stand from a seated position. Normal non-antalgic, non-myelopathic gait.  Mild difficulty with heel/toe standing on left.    Cervical examination reveals good range of motion.  No tenderness to palpation of the cervical spine or paraspinous muscles bilaterally.    Lumbar examination reveals no tenderness of the spine or paraspinous muscles.  Hip height is symmetrical. Negative SI joint, sciatic notch or greater trochanteric tenderness to palpation bilaterally.  Straight leg raise is negative bilaterally.      Imaging: MRI Lumbar Spine 2/21/19:  IMPRESSION: L4-L5 degenerative disc and facet disease with an  associated inferiorly extruded disc fragment into the left anterior  epidural space posterior to the upper portion of the L5 vertebral body resulting in some deformity of both the left L5 and S1 nerve roots.     Assessment/Plan:   Lumbar DDD  Lumbar Radiculopathy        Cedric Figueroa is a 63 year old male with left-sided lower back and LLE pain that started in 8/2018.  He denies any known cause.  He states the pain is constant dull to sharp pain to the left lower back that radiates along the left lateral thigh and entire left lateral leg.  He is unsure whether or not he has foot pain.  He denies falls or foot drop, but states he feels weakness to the LLE when compared to the RLE.  He feels the pain has progressively become more bothersome, and is exacerbated with prolonged standing and walking as well as with twisting, bending, lifting and reachin.  He finds some relief with lying down.  He has had PT which he found helpful during his sessions, \"But when I walked out it bothered me again.\"  He has not had injections.  He denies changes to bowel or bladder. We reviewed findings on MRI and " discussed option of surgery, L4-5 microdiscectomy.  The patient has noted weakness to the LLE and he states the pain is severe on a weekly basis.  He is interested in surgery at this time.  He has no obvious psych issues that would affect his recovery from the surgery that is recommended. He was educated on the surgery including risks and benefits.  Dr. Ness will contact the patient to discuss further and patient would like to proceed with the authorization process for surgery.            Patient Instructions   Patient Instructions  Pre-Operative:  -Surgery scheduled at [facility] for L4-5 Microdiscectomy  -Surgical risks: blood clots in the leg or lung, problems urinating, nerve damage, drainage from the incision, infection,stiffness  - Pre-operative physical with primary care physician within 30 days of surgical date.   - Likely same day procedure with discharge home day of surgery, may stay for 23 hour observation hospitalization for monitoring.     -Shower procedure: please shower with antimicrobial soap the night before surgery and morning of surgery. Please refer to showering instruction sheet in folder.  -Stop all solid foods 8 hours before surgery.  -Keep drinking clear liquids until 4 hours before surgery. Clear liquids include water, clear juice, black coffee, or clear tea without milk, Gatorade, clear soda.   - Discontinue Aspirin, NSAIDs (Advil/Ibuprofen, Naproxen,Nuprin,Relafen/Nabumetone, Diclofenac,Meloxicam, Aleve, Celebrex) x 7 days prior to surgical date.  - May try tylenol for pain 1000 mg three times per day for pain      Post-Operative:  -you may resume taking NSAIDs 7 days post op   - Post operative incisional pain x 1-2 weeks which will require pain medications and muscle relaxants. You will receive medication upon discharge.  -Do NOT drive while taking narcotic pain medication.  -Do not drink alcohol while using any pain medication  -Post operative incision care- Watch for  signs of infection: redness, swelling, warmth, drainage, and fever of 101 degrees or higher. Notify clinic [phone].  -No submerging incision in water such as pools, hot tubs,baths for at least 8 weeks or until incision is healed. Showers are fine.   - Post operative activity limitations: 6-8 weeks, no lifting > 10 pounds, no bending, twisting, or overhead reaching.  -Ok to walk as tolerated, avoid bed rest and prolonged sitting.  -No contact sports until after follow up visit  -No high impact activities such as; running/jogging, snowmobile or 4 richards riding or any other recreational vehicles.  - Post op follow up appointments:6 week post op follow up visit with Nurse practitioner.Please call to schedule follow up appointment at [phone].  -If you are currently employed, you will need to be off work for 2-4 weeks for post op recovery and healing.           Kinjal Nelson [facility]  [facility]  [address] [address]    Tel [phone]  Pager [phone]

## 2019-03-19 NOTE — PATIENT INSTRUCTIONS
Patient Instructions  Pre-Operative:  -Surgery scheduled at [facility] for L4-5 Microdiscectomy  -Surgical risks: blood clots in the leg or lung, problems urinating, nerve damage, drainage from the incision, infection,stiffness  - Pre-operative physical with primary care physician within 30 days of surgical date.   - Likely same day procedure with discharge home day of surgery, may stay for 23 hour observation hospitalization for monitoring.     -Shower procedure: please shower with antimicrobial soap the night before surgery and morning of surgery. Please refer to showering instruction sheet in folder.  -Stop all solid foods 8 hours before surgery.  -Keep drinking clear liquids until 4 hours before surgery. Clear liquids include water, clear juice, black coffee, or clear tea without milk, Gatorade, clear soda.   - Discontinue Aspirin, NSAIDs (Advil/Ibuprofen, Naproxen,Nuprin,Relafen/Nabumetone, Diclofenac,Meloxicam, Aleve, Celebrex) x 7 days prior to surgical date.  - May try tylenol for pain 1000 mg three times per day for pain      Post-Operative:  -you may resume taking NSAIDs 7 days post op   - Post operative incisional pain x 1-2 weeks which will require pain medications and muscle relaxants. You will receive medication upon discharge.  -Do NOT drive while taking narcotic pain medication.  -Do not drink alcohol while using any pain medication  -Post operative incision care- Watch for signs of infection: redness, swelling, warmth, drainage, and fever of 101 degrees or higher. Notify clinic [phone].  -No submerging incision in water such as pools, hot tubs,baths for at least 8 weeks or until incision is healed. Showers are fine.   - Post operative activity limitations: 6-8 weeks, no lifting > 10 pounds, no bending, twisting, or overhead reaching.  -Ok to walk as tolerated, avoid bed rest and prolonged sitting.  -No contact sports until after follow up visit  -No high impact activities such as;  running/jogging, snowmobile or 4 richards riding or any other recreational vehicles.  - Post op follow up appointments:6 week post op follow up visit with Nurse practitioner.Please call to schedule follow up appointment at [phone].  -If you are currently employed, you will need to be off work for 2-4 weeks for post op recovery and healing.

## 2019-03-19 NOTE — NURSING NOTE
"Cedric Figueroa is a 63 year old male who presents for:  Chief Complaint   Patient presents with     Neurologic Problem     LBP. Onset: a few months, 8/2018. NKI. Referred by Molly Leiva. MRI 2/21/19. Xray 11/28/18. Pain is on the left side of LB and radiates down the left leg. He has N/T in the left leg. He has had PT and used muscle relaxers.         Vitals:    Vitals:    03/19/19 1141   BP: 138/79   Pulse: 86   Resp: 16   Temp: 97.6  F (36.4  C)   TempSrc: Oral   SpO2: 97%   Weight: 263 lb (119.3 kg)   Height: 6' 2\" (1.88 m)       BMI:  Estimated body mass index is 33.77 kg/m  as calculated from the following:    Height as of this encounter: 6' 2\" (1.88 m).    Weight as of this encounter: 263 lb (119.3 kg).    Pain Score:  Moderate Pain (4)        Dasia Barrientos      "

## 2019-03-21 ENCOUNTER — TELEPHONE (OUTPATIENT)
Dept: NEUROSURGERY | Facility: CLINIC | Age: 64
End: 2019-03-21

## 2019-03-21 NOTE — TELEPHONE ENCOUNTER
Type of surgery: Left L4-5 microdiscectomy  Location of surgery: [facility]  Date and time of surgery: 04/04/2019 at 1:00pm  Surgeon: CLINTON Ness  Pre-Op Appt Date: Discussed  Post-Op Appt Date: Will Follow CLINTON Ness   Packet sent out: Yes  Pre-cert/Authorization completed:  Yes  Date: 03/20/2019 [place]

## 2019-03-26 ENCOUNTER — OFFICE VISIT (OUTPATIENT)
Dept: FAMILY MEDICINE | Facility: CLINIC | Age: 64
End: 2019-03-26
Payer: COMMERCIAL

## 2019-03-26 VITALS
RESPIRATION RATE: 14 BRPM | DIASTOLIC BLOOD PRESSURE: 83 MMHG | WEIGHT: 259.2 LBS | SYSTOLIC BLOOD PRESSURE: 135 MMHG | BODY MASS INDEX: 33.28 KG/M2 | TEMPERATURE: 97.9 F | HEART RATE: 94 BPM | OXYGEN SATURATION: 96 %

## 2019-03-26 DIAGNOSIS — N18.30 CKD (CHRONIC KIDNEY DISEASE) STAGE 3, GFR 30-59 ML/MIN (H): ICD-10-CM

## 2019-03-26 DIAGNOSIS — E11.29 TYPE 2 DIABETES MELLITUS WITH MICROALBUMINURIA, WITH LONG-TERM CURRENT USE OF INSULIN (H): ICD-10-CM

## 2019-03-26 DIAGNOSIS — M54.16 LUMBAR RADICULOPATHY: ICD-10-CM

## 2019-03-26 DIAGNOSIS — Z01.818 PREOP GENERAL PHYSICAL EXAM: Primary | ICD-10-CM

## 2019-03-26 DIAGNOSIS — R80.9 TYPE 2 DIABETES MELLITUS WITH MICROALBUMINURIA, WITH LONG-TERM CURRENT USE OF INSULIN (H): ICD-10-CM

## 2019-03-26 DIAGNOSIS — M51.369 DDD (DEGENERATIVE DISC DISEASE), LUMBAR: ICD-10-CM

## 2019-03-26 DIAGNOSIS — Z79.4 TYPE 2 DIABETES MELLITUS WITH MICROALBUMINURIA, WITH LONG-TERM CURRENT USE OF INSULIN (H): ICD-10-CM

## 2019-03-26 LAB
ANION GAP SERPL CALCULATED.3IONS-SCNC: 7 MMOL/L (ref 3–14)
BUN SERPL-MCNC: 30 MG/DL (ref 7–30)
CALCIUM SERPL-MCNC: 8.9 MG/DL (ref 8.5–10.1)
CHLORIDE SERPL-SCNC: 107 MMOL/L (ref 94–109)
CO2 SERPL-SCNC: 24 MMOL/L (ref 20–32)
CREAT SERPL-MCNC: 1.46 MG/DL (ref 0.66–1.25)
ERYTHROCYTE [DISTWIDTH] IN BLOOD BY AUTOMATED COUNT: 12.1 % (ref 10–15)
GFR SERPL CREATININE-BSD FRML MDRD: 50 ML/MIN/{1.73_M2}
GLUCOSE SERPL-MCNC: 127 MG/DL (ref 70–99)
HBA1C MFR BLD: 7.5 % (ref 0–5.6)
HCT VFR BLD AUTO: 40.5 % (ref 40–53)
HGB BLD-MCNC: 13.7 G/DL (ref 13.3–17.7)
MCH RBC QN AUTO: 31.4 PG (ref 26.5–33)
MCHC RBC AUTO-ENTMCNC: 33.8 G/DL (ref 31.5–36.5)
MCV RBC AUTO: 93 FL (ref 78–100)
PLATELET # BLD AUTO: 196 10E9/L (ref 150–450)
POTASSIUM SERPL-SCNC: 3.8 MMOL/L (ref 3.4–5.3)
RBC # BLD AUTO: 4.37 10E12/L (ref 4.4–5.9)
SODIUM SERPL-SCNC: 138 MMOL/L (ref 133–144)
WBC # BLD AUTO: 7.3 10E9/L (ref 4–11)

## 2019-03-26 PROCEDURE — 36415 COLL VENOUS BLD VENIPUNCTURE: CPT | Performed by: FAMILY MEDICINE

## 2019-03-26 PROCEDURE — 80048 BASIC METABOLIC PNL TOTAL CA: CPT | Performed by: FAMILY MEDICINE

## 2019-03-26 PROCEDURE — 83036 HEMOGLOBIN GLYCOSYLATED A1C: CPT | Performed by: FAMILY MEDICINE

## 2019-03-26 PROCEDURE — 99215 OFFICE O/P EST HI 40 MIN: CPT | Performed by: FAMILY MEDICINE

## 2019-03-26 PROCEDURE — 85027 COMPLETE CBC AUTOMATED: CPT | Performed by: FAMILY MEDICINE

## 2019-03-26 PROCEDURE — 93000 ELECTROCARDIOGRAM COMPLETE: CPT | Performed by: FAMILY MEDICINE

## 2019-03-26 NOTE — PROGRESS NOTES
[facility]  [id number]  [place] [address]  [phone]  Dept: [phone]    PRE-OP EVALUATION:  Today's date: 3/26/2019    Cedric Figueroa (: 1955) presents for pre-operative evaluation assessment as requested by Dr. Ness.  He requires evaluation and anesthesia risk assessment prior to undergoing surgery/procedure for treatment of Lumbar radiculopathy and DDD .    Proposed Surgery/ Procedure: Left L4-5 microdiscectomy  Date of Surgery/ Procedure: 19  Time of Surgery/ Procedure: 1:00 PM  Hospital/Surgical Facility: [facility] OR  Fax number for surgical facility:   Primary Physician: Molly Leiva  Type of Anesthesia Anticipated: General    Patient has a Health Care Directive or Living Will:  NO    1. NO - Do you have a history of heart attack, stroke, stent, bypass or surgery on an artery in the head, neck, heart or legs?  2. NO - Do you ever have any pain or discomfort in your chest?  3. NO - Do you have a history of  Heart Failure?  4. NO - Are you troubled by shortness of breath when: walking on the level, up a slight hill or at night?  5. NO - Do you currently have a cold, bronchitis or other respiratory infection?  6. NO - Do you have a cough, shortness of breath or wheezing?  7. NO - Do you sometimes get pains in the calves of your legs when you walk?  8. NO - Do you or anyone in your family have previous history of blood clots?  9. NO - Do you or does anyone in your family have a serious bleeding problem such as prolonged bleeding following surgeries or cuts?  10. NO - Have you ever had problems with anemia or been told to take iron pills?  11. NO - Have you had any abnormal blood loss such as black, tarry or bloody stools, or abnormal vaginal bleeding?  12. NO - Have you ever had a blood transfusion?  13. NO - Have you or any of your relatives ever had problems with anesthesia?  14. YES - DO YOU HAVE SLEEP APNEA, EXCESSIVE SNORING OR DAYTIME DROWSINESS?    15. NO - Do you have any prosthetic heart valves?  16. NO - Do you have prosthetic joints?  17. NO - Is there any chance that you may be pregnant?      HPI:     HPI related to upcoming procedure:     63 year old pleasant male patient of mine here for a pre-op exam.   Patient was recently seen and evaluated by Neurosurgical team for degenerative lumbar disc disease with radiculopathy and weakness to the LLE and pain is severe on a weekly basis, was recommended to undergo an L4-5 microdiscectomy. Patient is agreeable and states that he understands the risks and benefits of the procedure and wishes to proceed.    DIABETES - Patient has a longstanding history of DiabetesType Type II . Patient is being treated with oral agents and insulin injections and denies significant side effects. Control has been fair. Complicating factors include but are not limited to: hypertension and hyperlipidemia.                                                                                                                            .  HYPERTENSION - Patient has longstanding history of HTN , currently denies any symptoms referable to elevated blood pressure. Specifically denies chest pain, palpitations, dyspnea, orthopnea, PND or peripheral edema. Blood pressure readings have been in normal range. Current medication regimen is as listed below. Patient denies any side effects of medication.                                                                                                                                                                                          .  RENAL INSUFFICIENCY - Patient has a longstanding history of moderate-severe chronic renal insufficiency. Last Cr 1.46.                                                                                             States that he feels well overall. Denies any recent illnesses. No fever or chills.                                                                                MEDICAL HISTORY:     Patient Active Problem List    Diagnosis Date Noted     Morbid obesity (H) 02/11/2019     Priority: Medium     CKD (chronic kidney disease) stage 3, GFR 30-59 ml/min (H) 09/26/2018     Priority: Medium     Controlled substance agreement signed 09/26/2018     Priority: Medium     Chronic pain syndrome 09/26/2018     Priority: Medium     Perirectal abscess 04/10/2018     Priority: Medium     Hypertension goal BP (blood pressure) < 130/80 02/14/2018     Priority: Medium     Bariatric surgery status 02/14/2018     Priority: Medium     Type 2 diabetes mellitus with microalbuminuria, with long-term current use of insulin (H) 05/10/2017     Priority: Medium     Left-sided low back pain with left-sided sciatica 02/20/2017     Priority: Medium     Perforated bowel (H) 08/04/2015     Priority: Medium     Pt had a spontaneously perforated bowel- treated with emergency surgery- required inpt rehab for recovery.  Done at [facility]       OA (osteoarthritis) of knee 03/24/2015     Priority: Medium     Right PKA- 9/2013- good result  Left PKA- 8/2014- continued pain and problems  Done by [facility], and still following them, especially as there has been continued swelling, pain, heat at the left knee.  They are wondering if this could be gout/pseudogout, though work-up per pt has included r/o infection, gout per pt    He has been using indocin for daytime, but his nighttime pain is worse, so he's been taking Percocet and hydroxyzine (gets itchy with Percocet).    He is here because the Ortho folks have encouraged him to get his PCP involved, and he would like to make sure nothing is being missed.      We developed the following plan:   1.  Await MRI planned for Tomorrow  2.  If normal results, return to clinic this week and we will draw the fluid out, for analysis (gout, pseudogout, cell counts, culture, etc)  3.  May need to be fitted with a soft knee brace to improve swelling to allow for  healing (possible seroma post-operatively)  4.  If persistent knee pain, and no further surgical intervention planned then would offer Geniculate nerve blocks to improve pain       Iron deficiency anemia 10/24/2014     Priority: Medium     Vitamin B 12 deficiency 04/04/2012     Priority: Medium     Hyperlipidemia LDL goal <100 02/28/2012     Priority: Medium     Mild major depression (H) 02/28/2012     Priority: Medium     Knee pain 02/28/2012     Priority: Medium     Pt has undergone TKA, which is a revision from before, as the partial joint had to be removed due to concerns with infection.  This was 4/2015 and despite good rehab with full ROM of the left knee he continues to have knee pain, with intermittent redness, swelling and pain worsened with exercise.         Restless leg syndrome      Priority: Medium     9/1/2015  Pt now takes Mirapex BID, script had been for only 1 tab/day, but pt reports using 1 in AM, 2 in afternoon and 1 at night.             Sleep apnea      Priority: Medium     Advanced directives, counseling/discussion 01/22/2013     Priority: Low       3/24/2015  Wife Marixa would be MPOA.  Alternate would be Son Floyd.  Would like to be Full code.  He feels he would not want to be on life-support indefinitely, but would defer to his wife and family for decisions at end of life.          Past Medical History:   Diagnosis Date     Hyperlipidemia LDL goal <100 2/28/2012     Hypertension goal BP (blood pressure) < 130/80 2/28/2012     Mild major depression (H) 2/28/2012     Renal disease 2017    acute kidney failure 2 years ago     Restless leg syndrome     controlled on Mirapex     Sleep apnea     CPAP     Tobacco use     cigar use     Type 2 diabetes, HbA1c goal < 7% (H) 2/28/2012     Past Surgical History:   Procedure Laterality Date     ABDOMEN SURGERY  2015    peritonitis, bowel perforation, bowel resection     AS TOTAL KNEE ARTHROPLASTY      Right and Left knee x3( 2 partial knee replacement  and 1 total Left knee replacement     BARIATRIC SURGERY      at age 45     COLONOSCOPY  2/1/2006    [facility]     COLONOSCOPY WITH CO2 INSUFFLATION N/A 6/7/2016    Procedure: COLONOSCOPY WITH CO2 INSUFFLATION;  Surgeon: Cedric Schneider MD;  Location: [facility] OR     INCISION AND DRAINAGE BUTTOCKS Left 3/28/2018    Procedure: INCISION AND DRAINAGE BUTTOCKS;  Incision and drainage of left buttock abscess, and fistulotomy;  Surgeon: Bala Layton MD;  Location: [facility] OR     Current Outpatient Medications   Medication Sig Dispense Refill     ASPIRIN PO Take 81 mg by mouth       BASAGLAR 100 UNIT/ML injection Inject 45 Units Subcutaneous daily 3 mL 11     blood glucose monitoring (ACCU-CHEK PELON PLUS) meter device kit Use to test blood sugar 3 times daily or as directed. 1 kit 0     blood glucose monitoring (ACCU-CHEK PELON PLUS) test strip Use to test blood sugar 3 times daily or as directed. 100 each 11     blood glucose monitoring (SOFTCLIX) lancets Use to test blood sugar 3 times daily or as directed. 100 each 3     cyclobenzaprine (FLEXERIL) 5 MG tablet Take 1 tablet (5 mg) by mouth 3 times daily as needed for muscle spasms 180 tablet 1     diclofenac (VOLTAREN) 1 % GEL topical gel APPLY 4 GRAMS TO KNEES DAILY USING THE ENCLOSED DOSING CARD. 100 g 1     insulin pen needle (BD ULTRA-FINE) 29G X 12.7MM Use once daily or as directed. 100 each prn     losartan-hydrochlorothiazide (HYZAAR) 100-25 MG per tablet Take 1 tablet by mouth daily 90 tablet 3     MELATONIN PO Take 10 mg by mouth At Bedtime        oxyCODONE-acetaminophen (PERCOCET) 5-325 MG tablet Take 1 tablet by mouth every 6 hours as needed for moderate to severe pain or pain maximum 4 tablet(s) per day 120 tablet 0     PARoxetine (PAXIL) 20 MG tablet Take 1 tablet (20 mg) by mouth every morning 90 tablet 3     pramipexole (MIRAPEX) 0.25 MG tablet TAKE ONE TABLET BY MOUTH IN THE MORNING, TWO TABLETS IN THE AFTERNOON, AND ONE TABLET BEFORE BED.  120 tablet 3     simvastatin (ZOCOR) 40 MG tablet Take 1 tablet (40 mg) by mouth At Bedtime 90 tablet 3     vitamin B-12 (CYANOCOBALAMIN) 1000 MCG/ML injection INJECT 1ML INTO THE MUSCLE EVERY 30 DAYS. 3 mL 3     OTC products: None, except as noted above    No Known Allergies   Latex Allergy: NO    Social History     Tobacco Use     Smoking status: Former Smoker     Last attempt to quit: 2018     Years since quittin.6     Smokeless tobacco: Never Used   Substance Use Topics     Alcohol use: Yes     Comment: occasionally     History   Drug Use No       REVIEW OF SYSTEMS:   Constitutional, neuro, ENT, endocrine, pulmonary, cardiac, gastrointestinal, genitourinary, musculoskeletal, integument and psychiatric systems are negative, except as otherwise noted.    EXAM:   /83   Pulse 94   Temp 97.9  F (36.6  C) (Tympanic)   Resp 14   Wt 117.6 kg (259 lb 3.2 oz)   SpO2 96%   BMI 33.28 kg/m      GENERAL APPEARANCE: healthy, alert and no distress     EYES: EOMI, PERRL     HENT: ear canals and TM's normal and nose and mouth without ulcers or lesions     NECK: no adenopathy, no asymmetry, masses, or scars and thyroid normal to palpation     RESP: lungs clear to auscultation - no rales, rhonchi or wheezes     CV: regular rates and rhythm, normal S1 S2, no S3 or S4 and no murmur, click or rub     ABDOMEN:  soft, nontender, no HSM or masses and bowel sounds normal     MS: extremities normal- no gross deformities noted, no evidence of inflammation in joints, FROM in all extremities.     SKIN: no suspicious lesions or rashes     NEURO: Normal strength and tone, sensory exam grossly normal, mentation intact and speech normal     PSYCH: mentation appears normal. and affect normal/bright     LYMPHATICS: No cervical adenopathy    DIAGNOSTICS:     EKG: appears normal, NSR, normal axis, normal intervals, no acute ST/T changes c/w ischemia, no LVH by voltage criteria, unchanged from previous tracings  Labs Drawn and in  Process:   Unresulted Labs Ordered in the Past 30 Days of this Admission     No orders found from 1/25/2019 to 3/27/2019.          Recent Labs   Lab Test 02/11/19  1520 09/21/18  1403 06/05/18  1417 03/27/18  1554  08/08/14  1305  09/16/13   HGB  --  12.4*  --  12.6*   < > 13.1*   < >  --    PLT  --  199  --  225   < > 200  --   --    INR  --   --   --   --   --  0.95  --  2.7*   NA  --   --  141 138   < > 141   < >  --    POTASSIUM  --   --  3.9 4.1   < > 3.9   < >  --    CR  --  1.62* 1.46* 1.53*   < > 0.88   < >  --    A1C 6.6* 7.2*  --  6.6*   < >  --    < >  --     < > = values in this interval not displayed.        IMPRESSION:   Reason for surgery/procedure: Left L4-5 microdiscectomy  Diagnosis/reason for consult: Degenerative Disc disease with lumbar radiculopathy.    The proposed surgical procedure is considered INTERMEDIATE risk.    REVISED CARDIAC RISK INDEX  The patient has the following serious cardiovascular risks for perioperative complications such as (MI, PE, VFib and 3  AV Block):  Diabetes Mellitus (on Insulin)  INTERPRETATION: 1 risks: Class II (low risk - 0.9% complication rate)    The patient has the following additional risks for perioperative complications:  Morbid obesity      ICD-10-CM    1. Preop general physical exam Z01.818 Hemoglobin A1c     EKG 12-lead complete w/read - Clinics     CBC with platelets     Basic metabolic panel     Hemoglobin A1c   2. Lumbar radiculopathy M54.16    3. DDD (degenerative disc disease), lumbar M51.36    4. Type 2 diabetes mellitus with microalbuminuria, with long-term current use of insulin (H) E11.29 Hemoglobin A1c    R80.9 Hemoglobin A1c    Z79.4    5. CKD (chronic kidney disease) stage 3, GFR 30-59 ml/min (H) N18.3        RECOMMENDATIONS:       Cardiovascular Risk  None identified.      Pulmonary Risk  None identified      --Patient is to take all scheduled medications on the day of surgery EXCEPT for modifications listed below.    Diabetes Medication  Use  -----Take 80% of long acting insulin (e.g. Lantus, NPH) while NPO (fasting)      Anticoagulant or Antiplatelet Medication Use  ASPIRIN: Discontinue ASA 7-10 days prior to procedure to reduce bleeding risk.  It should be resumed post-operatively.        ACE Inhibitor or Angiotensin Receptor Blocker (ARB) Use  Ace inhibitor or Angiotensin Receptor Blocker (ARB) and should HOLD this medication for the 24 hours prior to surgery.      APPROVAL GIVEN to proceed with proposed procedure, without further diagnostic evaluation       Signed Electronically by: Molly Leiva MD    Copy of this evaluation report is provided to requesting physician.    [facility] Preop Guidelines    Revised Cardiac Risk Index

## 2019-03-26 NOTE — PATIENT INSTRUCTIONS
Before Your Surgery      Call your surgeon if there is any change in your health. This includes signs of a cold or flu (such as a sore throat, runny nose, cough, rash or fever).    Do not smoke, drink alcohol or take over the counter medicine (unless your surgeon or primary care doctor tells you to) for the 24 hours before and after surgery.    If you take prescribed drugs: Follow your doctor s orders about which medicines to take and which to stop until after surgery.    Eating and drinking prior to surgery: follow the instructions from your surgeon    Take a shower or bath the night before surgery. Use the soap your surgeon gave you to gently clean your skin. If you do not have soap from your surgeon, use your regular soap. Do not shave or scrub the surgery site.  Wear clean pajamas and have clean sheets on your bed.     Pre-operative medication management   1. Stop over the counter vitamins and Fish Oils  2. Stop Asprin/NSAIDS at least 7-10 days prior to surgery.  3. Do not take Losartan-Hydrochlorothiazide  the morning of surgery   4. Take 80 % of your insulin- in your case 36 units of Lantus the night before surgery.  5. May take Paxil the morning of surgery with small sips of water.

## 2019-03-31 ENCOUNTER — MYC MEDICAL ADVICE (OUTPATIENT)
Dept: FAMILY MEDICINE | Facility: CLINIC | Age: 64
End: 2019-03-31

## 2019-03-31 DIAGNOSIS — G89.29 CHRONIC PAIN OF LEFT KNEE: ICD-10-CM

## 2019-03-31 DIAGNOSIS — M25.562 PAIN OF LEFT KNEE AFTER INJURY: ICD-10-CM

## 2019-03-31 DIAGNOSIS — M25.562 CHRONIC PAIN OF LEFT KNEE: ICD-10-CM

## 2019-04-01 RX ORDER — OXYCODONE AND ACETAMINOPHEN 5; 325 MG/1; MG/1
1 TABLET ORAL EVERY 6 HOURS PRN
Qty: 120 TABLET | Refills: 0 | Status: ON HOLD | OUTPATIENT
Start: 2019-04-01 | End: 2019-04-04

## 2019-04-01 NOTE — TELEPHONE ENCOUNTER
Routing refill request to provider for review/approval because:  Drug not on the [facility] refill protocol   Last written script on 3/4/2019 for #120    Eloisa Turner, RN, BSN, PHN

## 2019-04-04 ENCOUNTER — ANESTHESIA EVENT (OUTPATIENT)
Dept: SURGERY | Facility: CLINIC | Age: 64
End: 2019-04-04
Payer: COMMERCIAL

## 2019-04-04 ENCOUNTER — HOSPITAL ENCOUNTER (OUTPATIENT)
Facility: CLINIC | Age: 64
Discharge: HOME | End: 2019-04-04
Attending: NEUROLOGICAL SURGERY | Admitting: NEUROLOGICAL SURGERY
Payer: COMMERCIAL

## 2019-04-04 ENCOUNTER — APPOINTMENT (OUTPATIENT)
Dept: GENERAL RADIOLOGY | Facility: CLINIC | Age: 64
End: 2019-04-04
Attending: NEUROLOGICAL SURGERY
Payer: COMMERCIAL

## 2019-04-04 ENCOUNTER — ANESTHESIA (OUTPATIENT)
Dept: SURGERY | Facility: CLINIC | Age: 64
End: 2019-04-04
Payer: COMMERCIAL

## 2019-04-04 VITALS
DIASTOLIC BLOOD PRESSURE: 92 MMHG | OXYGEN SATURATION: 94 % | SYSTOLIC BLOOD PRESSURE: 142 MMHG | BODY MASS INDEX: 32.08 KG/M2 | TEMPERATURE: 99.2 F | WEIGHT: 258 LBS | HEIGHT: 75 IN | HEART RATE: 67 BPM | RESPIRATION RATE: 16 BRPM

## 2019-04-04 DIAGNOSIS — Z98.890 S/P LUMBAR MICRODISCECTOMY: Primary | ICD-10-CM

## 2019-04-04 DIAGNOSIS — M25.562 PAIN OF LEFT KNEE AFTER INJURY: ICD-10-CM

## 2019-04-04 DIAGNOSIS — G89.29 CHRONIC PAIN OF LEFT KNEE: ICD-10-CM

## 2019-04-04 DIAGNOSIS — M25.562 CHRONIC PAIN OF LEFT KNEE: ICD-10-CM

## 2019-04-04 LAB
GLUCOSE BLDC GLUCOMTR-MCNC: 134 MG/DL (ref 70–99)
GLUCOSE BLDC GLUCOMTR-MCNC: 139 MG/DL (ref 70–99)

## 2019-04-04 PROCEDURE — 36000069 ZZH SURGERY LEVEL 5 EA 15 ADDTL MIN: Performed by: NEUROLOGICAL SURGERY

## 2019-04-04 PROCEDURE — 71000014 ZZH RECOVERY PHASE 1 LEVEL 2 FIRST HR: Performed by: NEUROLOGICAL SURGERY

## 2019-04-04 PROCEDURE — 25800030 ZZH RX IP 258 OP 636: Performed by: ANESTHESIOLOGY

## 2019-04-04 PROCEDURE — 25000300 ZZH OR RX SURGIFLO HEMOSTATIC MATRIX 10ML 199102S OPNP: Performed by: NEUROLOGICAL SURGERY

## 2019-04-04 PROCEDURE — 25000132 ZZH RX MED GY IP 250 OP 250 PS 637: Performed by: NEUROLOGICAL SURGERY

## 2019-04-04 PROCEDURE — 37000008 ZZH ANESTHESIA TECHNICAL FEE, 1ST 30 MIN: Performed by: NEUROLOGICAL SURGERY

## 2019-04-04 PROCEDURE — 25000125 ZZHC RX 250: Performed by: NEUROLOGICAL SURGERY

## 2019-04-04 PROCEDURE — 27210995 ZZH RX 272: Performed by: NEUROLOGICAL SURGERY

## 2019-04-04 PROCEDURE — 82962 GLUCOSE BLOOD TEST: CPT | Mod: 91

## 2019-04-04 PROCEDURE — 37000009 ZZH ANESTHESIA TECHNICAL FEE, EACH ADDTL 15 MIN: Performed by: NEUROLOGICAL SURGERY

## 2019-04-04 PROCEDURE — 25000128 H RX IP 250 OP 636: Performed by: NEUROLOGICAL SURGERY

## 2019-04-04 PROCEDURE — 25000128 H RX IP 250 OP 636: Performed by: ANESTHESIOLOGY

## 2019-04-04 PROCEDURE — 27210794 ZZH OR GENERAL SUPPLY STERILE: Performed by: NEUROLOGICAL SURGERY

## 2019-04-04 PROCEDURE — 25000128 H RX IP 250 OP 636: Performed by: NURSE ANESTHETIST, CERTIFIED REGISTERED

## 2019-04-04 PROCEDURE — 25800025 ZZH RX 258: Performed by: NEUROLOGICAL SURGERY

## 2019-04-04 PROCEDURE — 36000071 ZZH SURGERY LEVEL 5 W FLUORO 1ST 30 MIN: Performed by: NEUROLOGICAL SURGERY

## 2019-04-04 PROCEDURE — 25000131 ZZH RX MED GY IP 250 OP 636 PS 637: Performed by: NEUROLOGICAL SURGERY

## 2019-04-04 PROCEDURE — 25000125 ZZHC RX 250: Performed by: NURSE ANESTHETIST, CERTIFIED REGISTERED

## 2019-04-04 PROCEDURE — 63030 LAMOT DCMPRN NRV RT 1 LMBR: CPT | Mod: LT | Performed by: NEUROLOGICAL SURGERY

## 2019-04-04 PROCEDURE — 40000306 ZZH STATISTIC PRE PROC ASSESS II: Performed by: NEUROLOGICAL SURGERY

## 2019-04-04 PROCEDURE — 71000015 ZZH RECOVERY PHASE 1 LEVEL 2 EA ADDTL HR: Performed by: NEUROLOGICAL SURGERY

## 2019-04-04 PROCEDURE — 25800030 ZZH RX IP 258 OP 636: Performed by: NURSE ANESTHETIST, CERTIFIED REGISTERED

## 2019-04-04 PROCEDURE — 40000985 XR LUMBAR SPINE PORT 1 VW: Mod: TC

## 2019-04-04 PROCEDURE — 71000027 ZZH RECOVERY PHASE 2 EACH 15 MINS: Performed by: NEUROLOGICAL SURGERY

## 2019-04-04 RX ORDER — OXYCODONE AND ACETAMINOPHEN 5; 325 MG/1; MG/1
1-2 TABLET ORAL EVERY 4 HOURS PRN
Status: DISCONTINUED | OUTPATIENT
Start: 2019-04-04 | End: 2019-04-04 | Stop reason: HOSPADM

## 2019-04-04 RX ORDER — PROPOFOL 10 MG/ML
INJECTION, EMULSION INTRAVENOUS PRN
Status: DISCONTINUED | OUTPATIENT
Start: 2019-04-04 | End: 2019-04-04

## 2019-04-04 RX ORDER — NEOSTIGMINE METHYLSULFATE 1 MG/ML
VIAL (ML) INJECTION PRN
Status: DISCONTINUED | OUTPATIENT
Start: 2019-04-04 | End: 2019-04-04

## 2019-04-04 RX ORDER — DEXAMETHASONE SODIUM PHOSPHATE 4 MG/ML
4 INJECTION, SOLUTION INTRA-ARTICULAR; INTRALESIONAL; INTRAMUSCULAR; INTRAVENOUS; SOFT TISSUE EVERY 10 MIN PRN
Status: DISCONTINUED | OUTPATIENT
Start: 2019-04-04 | End: 2019-04-04 | Stop reason: HOSPADM

## 2019-04-04 RX ORDER — NALOXONE HYDROCHLORIDE 0.4 MG/ML
.1-.4 INJECTION, SOLUTION INTRAMUSCULAR; INTRAVENOUS; SUBCUTANEOUS
Status: DISCONTINUED | OUTPATIENT
Start: 2019-04-04 | End: 2019-04-04 | Stop reason: HOSPADM

## 2019-04-04 RX ORDER — LIDOCAINE HYDROCHLORIDE 10 MG/ML
INJECTION, SOLUTION INFILTRATION; PERINEURAL PRN
Status: DISCONTINUED | OUTPATIENT
Start: 2019-04-04 | End: 2019-04-04

## 2019-04-04 RX ORDER — LABETALOL 20 MG/4 ML (5 MG/ML) INTRAVENOUS SYRINGE
10
Status: DISCONTINUED | OUTPATIENT
Start: 2019-04-04 | End: 2019-04-04 | Stop reason: HOSPADM

## 2019-04-04 RX ORDER — EPHEDRINE SULFATE 50 MG/ML
INJECTION, SOLUTION INTRAMUSCULAR; INTRAVENOUS; SUBCUTANEOUS PRN
Status: DISCONTINUED | OUTPATIENT
Start: 2019-04-04 | End: 2019-04-04

## 2019-04-04 RX ORDER — CEFAZOLIN SODIUM 1 G/3ML
1 INJECTION, POWDER, FOR SOLUTION INTRAMUSCULAR; INTRAVENOUS SEE ADMIN INSTRUCTIONS
Status: DISCONTINUED | OUTPATIENT
Start: 2019-04-04 | End: 2019-04-04 | Stop reason: HOSPADM

## 2019-04-04 RX ORDER — ONDANSETRON 4 MG/1
4 TABLET, ORALLY DISINTEGRATING ORAL EVERY 30 MIN PRN
Status: DISCONTINUED | OUTPATIENT
Start: 2019-04-04 | End: 2019-04-04 | Stop reason: HOSPADM

## 2019-04-04 RX ORDER — METOCLOPRAMIDE HYDROCHLORIDE 5 MG/ML
10 INJECTION INTRAMUSCULAR; INTRAVENOUS EVERY 6 HOURS PRN
Status: DISCONTINUED | OUTPATIENT
Start: 2019-04-04 | End: 2019-04-04

## 2019-04-04 RX ORDER — MEPERIDINE HYDROCHLORIDE 50 MG/ML
12.5 INJECTION INTRAMUSCULAR; INTRAVENOUS; SUBCUTANEOUS
Status: DISCONTINUED | OUTPATIENT
Start: 2019-04-04 | End: 2019-04-04 | Stop reason: HOSPADM

## 2019-04-04 RX ORDER — HYDRALAZINE HYDROCHLORIDE 20 MG/ML
2.5-5 INJECTION INTRAMUSCULAR; INTRAVENOUS EVERY 10 MIN PRN
Status: DISCONTINUED | OUTPATIENT
Start: 2019-04-04 | End: 2019-04-04 | Stop reason: HOSPADM

## 2019-04-04 RX ORDER — FENTANYL CITRATE 50 UG/ML
INJECTION, SOLUTION INTRAMUSCULAR; INTRAVENOUS PRN
Status: DISCONTINUED | OUTPATIENT
Start: 2019-04-04 | End: 2019-04-04

## 2019-04-04 RX ORDER — LIDOCAINE 40 MG/G
CREAM TOPICAL
Status: DISCONTINUED | OUTPATIENT
Start: 2019-04-04 | End: 2019-04-04 | Stop reason: HOSPADM

## 2019-04-04 RX ORDER — METOCLOPRAMIDE 10 MG/1
10 TABLET ORAL EVERY 6 HOURS PRN
Status: DISCONTINUED | OUTPATIENT
Start: 2019-04-04 | End: 2019-04-04

## 2019-04-04 RX ORDER — DIMENHYDRINATE 50 MG/ML
25 INJECTION, SOLUTION INTRAMUSCULAR; INTRAVENOUS
Status: DISCONTINUED | OUTPATIENT
Start: 2019-04-04 | End: 2019-04-04

## 2019-04-04 RX ORDER — CEFAZOLIN SODIUM 2 G/100ML
2 INJECTION, SOLUTION INTRAVENOUS
Status: COMPLETED | OUTPATIENT
Start: 2019-04-04 | End: 2019-04-04

## 2019-04-04 RX ORDER — METOCLOPRAMIDE 10 MG/1
10 TABLET ORAL EVERY 6 HOURS PRN
Status: DISCONTINUED | OUTPATIENT
Start: 2019-04-04 | End: 2019-04-04 | Stop reason: HOSPADM

## 2019-04-04 RX ORDER — KETOROLAC TROMETHAMINE 30 MG/ML
30 INJECTION, SOLUTION INTRAMUSCULAR; INTRAVENOUS EVERY 6 HOURS PRN
Status: DISCONTINUED | OUTPATIENT
Start: 2019-04-04 | End: 2019-04-04 | Stop reason: HOSPADM

## 2019-04-04 RX ORDER — SODIUM CHLORIDE, SODIUM LACTATE, POTASSIUM CHLORIDE, CALCIUM CHLORIDE 600; 310; 30; 20 MG/100ML; MG/100ML; MG/100ML; MG/100ML
INJECTION, SOLUTION INTRAVENOUS CONTINUOUS
Status: DISCONTINUED | OUTPATIENT
Start: 2019-04-04 | End: 2019-04-04 | Stop reason: HOSPADM

## 2019-04-04 RX ORDER — OXYCODONE AND ACETAMINOPHEN 5; 325 MG/1; MG/1
1-2 TABLET ORAL EVERY 4 HOURS PRN
Qty: 40 TABLET | Refills: 0 | Status: SHIPPED | OUTPATIENT
Start: 2019-04-04 | End: 2019-05-07

## 2019-04-04 RX ORDER — DIAZEPAM 5 MG
5 TABLET ORAL EVERY 8 HOURS PRN
Qty: 20 TABLET | Refills: 0 | Status: SHIPPED | OUTPATIENT
Start: 2019-04-04 | End: 2019-07-22

## 2019-04-04 RX ORDER — KETAMINE HYDROCHLORIDE 10 MG/ML
INJECTION, SOLUTION INTRAMUSCULAR; INTRAVENOUS PRN
Status: DISCONTINUED | OUTPATIENT
Start: 2019-04-04 | End: 2019-04-04

## 2019-04-04 RX ORDER — ONDANSETRON 2 MG/ML
INJECTION INTRAMUSCULAR; INTRAVENOUS PRN
Status: DISCONTINUED | OUTPATIENT
Start: 2019-04-04 | End: 2019-04-04

## 2019-04-04 RX ORDER — TAMSULOSIN HYDROCHLORIDE 0.4 MG/1
0.8 CAPSULE ORAL DAILY
Status: DISCONTINUED | OUTPATIENT
Start: 2019-04-04 | End: 2019-04-04 | Stop reason: HOSPADM

## 2019-04-04 RX ORDER — ONDANSETRON 2 MG/ML
4 INJECTION INTRAMUSCULAR; INTRAVENOUS EVERY 30 MIN PRN
Status: DISCONTINUED | OUTPATIENT
Start: 2019-04-04 | End: 2019-04-04 | Stop reason: HOSPADM

## 2019-04-04 RX ORDER — FENTANYL CITRATE 50 UG/ML
25-50 INJECTION, SOLUTION INTRAMUSCULAR; INTRAVENOUS
Status: DISCONTINUED | OUTPATIENT
Start: 2019-04-04 | End: 2019-04-04 | Stop reason: HOSPADM

## 2019-04-04 RX ORDER — GLYCOPYRROLATE 0.2 MG/ML
INJECTION, SOLUTION INTRAMUSCULAR; INTRAVENOUS PRN
Status: DISCONTINUED | OUTPATIENT
Start: 2019-04-04 | End: 2019-04-04

## 2019-04-04 RX ORDER — METOCLOPRAMIDE HYDROCHLORIDE 5 MG/ML
10 INJECTION INTRAMUSCULAR; INTRAVENOUS EVERY 6 HOURS PRN
Status: DISCONTINUED | OUTPATIENT
Start: 2019-04-04 | End: 2019-04-04 | Stop reason: HOSPADM

## 2019-04-04 RX ORDER — HYDROMORPHONE HYDROCHLORIDE 1 MG/ML
.3-.5 INJECTION, SOLUTION INTRAMUSCULAR; INTRAVENOUS; SUBCUTANEOUS EVERY 10 MIN PRN
Status: DISCONTINUED | OUTPATIENT
Start: 2019-04-04 | End: 2019-04-04 | Stop reason: HOSPADM

## 2019-04-04 RX ORDER — VANCOMYCIN HYDROCHLORIDE 1 G/20ML
INJECTION, POWDER, LYOPHILIZED, FOR SOLUTION INTRAVENOUS PRN
Status: DISCONTINUED | OUTPATIENT
Start: 2019-04-04 | End: 2019-04-04 | Stop reason: HOSPADM

## 2019-04-04 RX ORDER — BUPIVACAINE HYDROCHLORIDE AND EPINEPHRINE 5; 5 MG/ML; UG/ML
INJECTION, SOLUTION PERINEURAL PRN
Status: DISCONTINUED | OUTPATIENT
Start: 2019-04-04 | End: 2019-04-04 | Stop reason: HOSPADM

## 2019-04-04 RX ORDER — METOPROLOL TARTRATE 1 MG/ML
1-2 INJECTION, SOLUTION INTRAVENOUS EVERY 5 MIN PRN
Status: DISCONTINUED | OUTPATIENT
Start: 2019-04-04 | End: 2019-04-04 | Stop reason: HOSPADM

## 2019-04-04 RX ORDER — DIMENHYDRINATE 50 MG/ML
25 INJECTION, SOLUTION INTRAMUSCULAR; INTRAVENOUS
Status: DISCONTINUED | OUTPATIENT
Start: 2019-04-04 | End: 2019-04-04 | Stop reason: HOSPADM

## 2019-04-04 RX ORDER — DEXAMETHASONE SODIUM PHOSPHATE 4 MG/ML
INJECTION, SOLUTION INTRA-ARTICULAR; INTRALESIONAL; INTRAMUSCULAR; INTRAVENOUS; SOFT TISSUE PRN
Status: DISCONTINUED | OUTPATIENT
Start: 2019-04-04 | End: 2019-04-04

## 2019-04-04 RX ORDER — KETAMINE HYDROCHLORIDE 10 MG/ML
INJECTION INTRAMUSCULAR; INTRAVENOUS PRN
Status: DISCONTINUED | OUTPATIENT
Start: 2019-04-04 | End: 2019-04-04

## 2019-04-04 RX ADMIN — Medication 0.5 MG: at 15:13

## 2019-04-04 RX ADMIN — SODIUM CHLORIDE, POTASSIUM CHLORIDE, SODIUM LACTATE AND CALCIUM CHLORIDE: 600; 310; 30; 20 INJECTION, SOLUTION INTRAVENOUS at 12:05

## 2019-04-04 RX ADMIN — Medication 20 MG: at 12:46

## 2019-04-04 RX ADMIN — Medication 2 MG: at 13:59

## 2019-04-04 RX ADMIN — PROPOFOL 200 MG: 10 INJECTION, EMULSION INTRAVENOUS at 12:29

## 2019-04-04 RX ADMIN — DEXMEDETOMIDINE HYDROCHLORIDE 0.4 MCG/KG/HR: 100 INJECTION, SOLUTION INTRAVENOUS at 12:30

## 2019-04-04 RX ADMIN — Medication 5 MG: at 13:24

## 2019-04-04 RX ADMIN — OXYCODONE HYDROCHLORIDE AND ACETAMINOPHEN 1 TABLET: 5; 325 TABLET ORAL at 15:25

## 2019-04-04 RX ADMIN — Medication 10 MG: at 13:30

## 2019-04-04 RX ADMIN — GLYCOPYRROLATE 0.3 MG: 0.2 INJECTION, SOLUTION INTRAMUSCULAR; INTRAVENOUS at 13:59

## 2019-04-04 RX ADMIN — LIDOCAINE HYDROCHLORIDE 50 MG: 10 INJECTION, SOLUTION INFILTRATION; PERINEURAL at 12:29

## 2019-04-04 RX ADMIN — ONDANSETRON 4 MG: 2 INJECTION INTRAMUSCULAR; INTRAVENOUS at 13:44

## 2019-04-04 RX ADMIN — HYDROMORPHONE HYDROCHLORIDE 0.5 MG: 1 INJECTION, SOLUTION INTRAMUSCULAR; INTRAVENOUS; SUBCUTANEOUS at 12:48

## 2019-04-04 RX ADMIN — GLYCOPYRROLATE 0.2 MG: 0.2 INJECTION, SOLUTION INTRAMUSCULAR; INTRAVENOUS at 13:22

## 2019-04-04 RX ADMIN — ROCURONIUM BROMIDE 10 MG: 10 INJECTION INTRAVENOUS at 13:17

## 2019-04-04 RX ADMIN — MIDAZOLAM 2 MG: 1 INJECTION INTRAMUSCULAR; INTRAVENOUS at 12:05

## 2019-04-04 RX ADMIN — FENTANYL CITRATE 50 MCG: 50 INJECTION, SOLUTION INTRAMUSCULAR; INTRAVENOUS at 14:53

## 2019-04-04 RX ADMIN — GLYCOPYRROLATE 0.2 MG: 0.2 INJECTION, SOLUTION INTRAMUSCULAR; INTRAVENOUS at 12:29

## 2019-04-04 RX ADMIN — PHENYLEPHRINE HYDROCHLORIDE 100 MCG: 10 INJECTION, SOLUTION INTRAMUSCULAR; INTRAVENOUS; SUBCUTANEOUS at 13:05

## 2019-04-04 RX ADMIN — ROCURONIUM BROMIDE 50 MG: 10 INJECTION INTRAVENOUS at 12:29

## 2019-04-04 RX ADMIN — TAMSULOSIN HYDROCHLORIDE 0.8 MG: 0.4 CAPSULE ORAL at 16:52

## 2019-04-04 RX ADMIN — PHENYLEPHRINE HYDROCHLORIDE 50 MCG: 10 INJECTION, SOLUTION INTRAMUSCULAR; INTRAVENOUS; SUBCUTANEOUS at 13:24

## 2019-04-04 RX ADMIN — DEXAMETHASONE 10 MG: 2 TABLET ORAL at 17:04

## 2019-04-04 RX ADMIN — Medication 10 MG: at 12:59

## 2019-04-04 RX ADMIN — CEFAZOLIN SODIUM 2 G: 2 INJECTION, SOLUTION INTRAVENOUS at 12:15

## 2019-04-04 RX ADMIN — SODIUM CHLORIDE, POTASSIUM CHLORIDE, SODIUM LACTATE AND CALCIUM CHLORIDE: 600; 310; 30; 20 INJECTION, SOLUTION INTRAVENOUS at 13:00

## 2019-04-04 RX ADMIN — FENTANYL CITRATE 100 MCG: 50 INJECTION, SOLUTION INTRAMUSCULAR; INTRAVENOUS at 12:29

## 2019-04-04 RX ADMIN — Medication 5 MG: at 13:18

## 2019-04-04 RX ADMIN — DEXAMETHASONE SODIUM PHOSPHATE 4 MG: 4 INJECTION, SOLUTION INTRA-ARTICULAR; INTRALESIONAL; INTRAMUSCULAR; INTRAVENOUS; SOFT TISSUE at 12:29

## 2019-04-04 RX ADMIN — Medication 20 MG: at 12:44

## 2019-04-04 RX ADMIN — FENTANYL CITRATE 50 MCG: 50 INJECTION, SOLUTION INTRAMUSCULAR; INTRAVENOUS at 15:11

## 2019-04-04 RX ADMIN — OXYCODONE HYDROCHLORIDE AND ACETAMINOPHEN 1 TABLET: 5; 325 TABLET ORAL at 18:56

## 2019-04-04 RX ADMIN — FENTANYL CITRATE 50 MCG: 50 INJECTION, SOLUTION INTRAMUSCULAR; INTRAVENOUS at 15:00

## 2019-04-04 ASSESSMENT — MIFFLIN-ST. JEOR: SCORE: 2050.91

## 2019-04-04 NOTE — PROGRESS NOTES
Patient unable to urinate and bladder scanned for 432.  Dr. Ness notified and orders received to give Flomax 0.8 mg orally and Decadron 10 mg orally and to update MD if patient continues to not be able to void.

## 2019-04-04 NOTE — OP NOTE
Operative Report    PREOPERATIVE DIAGNOSIS: Large left L4-5 inferiorly migrated herniated disk with left lower extremity radiculopathy     POSTOPERATIVE DIAGNOSIS: Same    PROCEDURE: Left L4-5 hemilaminectomy, medial facetectomy, foraminotomy with microdiscectomy and use of X-ray and intraoperative microscope     ASSISTANT: Devyn Cisneros     INDICATION FOR PROCEDURE: The patient is a 63 year old male that presented with the above clinical and radiographic findings. After reviewing the imaging studies and examination, the decision was made to proceed with the above procedure. The patient understood the risk of surgery to be infection, CSF leak, nerve root injury, failure of improvement of his symptoms. The patient voiced understanding and wanted to proceed.     DESCRIPTION OF PROCEDURE: The patient was seen in the pre op area and the procedure was discussed with the patient and spoue once again and all questions were answered. The consent was then signed and the lumbar spine was marked with a marker. The patient was transported to the operating room on a stretcher and received general endotracheal anesthesia. The patient was placed on the operating table in the prone position on the Pedro frame with all pressure points padded. The planned operative area of the back was prepped and draped in normal sterile fashion. Portable X-ray was used to identify the appropriate level. Local anesthesia was then injected along the planned incision. A 10 blade scalpel was used to make a midline incision with dissection down through the subcutaneous tissue to the fascia. The fascia was opened on the left side with the Bovie cautery. Subperiosteal dissection exposed the lamina facet joint at L4-5. The Wallace retractor was then placed. The Midas Jason drill and the Kerrison rongeur were used to perform the hemilaminectomy, medial facetectomy, foraminotomy, and to remove the ligamentum flavum. The thecal sac and L5 nerve root were  identified. There was a large caudally migrated disk fragment compressing the L5 root. The disk was then removed with the Hemanth curette and also the pituitary rongeur. The nerve root was then noted to be decompressed and the Warner ball hook was used to verify that. Copious irrigation was performed with saline. The retractor was removed and the wound was irrigated once again. The fascia was then closed with 0-Vicryl, the subcutaneous tissue with 3-0 Vicryl, and the skin closed with dermabond. The patient was then transported back to the stretcher, extubated, and sent to recovery. At the end of the case, all counts were correct.     No complications.     ESTIMATED BLOOD LOSS: 20 ml     IV FLUID: See Anesthesia report     The patient received Ancef preoperatively and Vancomycin powder in the wound     Aaron Ness MD, MS, FAANS

## 2019-04-04 NOTE — PROGRESS NOTES
"Patient unable to void a significant amount; states he just \"dribbles.\"  Bladder scan reveals 498 mls.  Dr. Ness notified of patient's voiding difficulty and orders received to do an intermittent straight catheterization and patient can be discharged to home without voiding here.   "

## 2019-04-04 NOTE — ADDENDUM NOTE
Addendum  created 04/04/19 1445 by Jeffry Negron MD    Order list changed, Order sets accessed

## 2019-04-04 NOTE — ANESTHESIA CARE TRANSFER NOTE
Patient: Cedric Figueroa    Procedure(s):  Left L4-5 microdiscectomy    Diagnosis: Left L4-5 herniated disc with LLE rediculopathy and weakness  Diagnosis Additional Information: No value filed.    Anesthesia Type:   General, ETT     Note:  Airway :Face Mask  Patient transferred to:PACU  Handoff Report: Identifed the Patient, Identified the Reponsible Provider, Reviewed the pertinent medical history, Discussed the surgical course, Reviewed Intra-OP anesthesia mangement and issues during anesthesia, Set expectations for post-procedure period and Allowed opportunity for questions and acknowledgement of understanding      Vitals: (Last set prior to Anesthesia Care Transfer)    CRNA VITALS  4/4/2019 1340 - 4/4/2019 1420      4/4/2019             EKG:  Sinus rhythm                Electronically Signed By: Dean Dennis Severson, APRN CRNA  April 4, 2019  2:20 PM

## 2019-04-04 NOTE — OR NURSING
Straight cath'd patient for 450 ml, no complications.  Will discharge home per Dr. Ness in stable condition.

## 2019-04-04 NOTE — ANESTHESIA PREPROCEDURE EVALUATION
Anesthesia Pre-Procedure Evaluation    Patient: Cedric Figueroa   MRN: [id number] : 1955          Preoperative Diagnosis: Left L4-5 herniated disc with LLE rediculopathy and weakness    Procedure(s):  Left L4-5 microdiscectomy    Past Medical History:   Diagnosis Date     Hyperlipidemia LDL goal <100 2012     Hypertension goal BP (blood pressure) < 130/80 2012     Mild major depression (H) 2012     Renal disease 2017    acute kidney failure 2 years ago     Restless leg syndrome     controlled on Mirapex     Sleep apnea     CPAP     Tobacco use     cigar use     Type 2 diabetes, HbA1c goal < 7% (H) 2012     Past Surgical History:   Procedure Laterality Date     ABDOMEN SURGERY      peritonitis, bowel perforation, bowel resection     AS TOTAL KNEE ARTHROPLASTY      Right and Left knee x3( 2 partial knee replacement and 1 total Left knee replacement     BARIATRIC SURGERY      at age 45     COLONOSCOPY  2006    [facility] [employer]     COLONOSCOPY WITH CO2 INSUFFLATION N/A 2016    Procedure: COLONOSCOPY WITH CO2 INSUFFLATION;  Surgeon: Cedric Schneider MD;  Location: [facility] OR     INCISION AND DRAINAGE BUTTOCKS Left 3/28/2018    Procedure: INCISION AND DRAINAGE BUTTOCKS;  Incision and drainage of left buttock abscess, and fistulotomy;  Surgeon: Bala Layton MD;  Location: [facility] OR     Anesthesia Evaluation     .             ROS/MED HX    ENT/Pulmonary:     (+)sleep apnea, , . .    Neurologic:     (+)other neuro radiculopathic pain    Cardiovascular:     (+) Dyslipidemia, hypertension----. : . . . :. .       METS/Exercise Tolerance:     Hematologic:  - neg hematologic  ROS       Musculoskeletal:  - neg musculoskeletal ROS       GI/Hepatic:  - neg GI/hepatic ROS       Renal/Genitourinary:     (+) chronic renal disease,       Endo:     (+) type II DM Not using insulin - not using insulin pump Obesity, .      Psychiatric:  - neg psychiatric ROS       Infectious Disease:  - neg  "infectious disease ROS       Malignancy:      - no malignancy   Other:    (+) No chance of pregnancy C-spine cleared: N/A, H/O Chronic Pain,no other significant disability                         Physical Exam  Normal systems: cardiovascular, pulmonary and dental    Airway   Mallampati: II  TM distance: >3 FB  Neck ROM: full    Dental     Cardiovascular       Pulmonary             Lab Results   Component Value Date    WBC 7.3 03/26/2019    HGB 13.7 03/26/2019    HCT 40.5 03/26/2019     03/26/2019    CRP 2.4 03/24/2015    CRP 3.2 03/24/2015    SED 11 03/24/2015     03/26/2019    POTASSIUM 3.8 03/26/2019    CHLORIDE 107 03/26/2019    CO2 24 03/26/2019    BUN 30 03/26/2019    CR 1.46 (H) 03/26/2019     (H) 03/26/2019    CARMELO 8.9 03/26/2019    ALBUMIN 3.7 06/16/2017    PROTTOTAL 7.7 06/16/2017    ALT 26 06/20/2017    AST 23 06/20/2017    ALKPHOS 121 06/16/2017    BILITOTAL 0.3 06/16/2017    BILIDIRECT 0.3 07/14/2010    INR 0.95 08/08/2014    TSH 1.86 06/16/2017       Preop Vitals  BP Readings from Last 3 Encounters:   04/04/19 (!) 115/94   03/26/19 135/83   03/19/19 138/79    Pulse Readings from Last 3 Encounters:   04/04/19 85   03/26/19 94   03/19/19 86      Resp Readings from Last 3 Encounters:   04/04/19 22   03/26/19 14   03/19/19 16    SpO2 Readings from Last 3 Encounters:   04/04/19 98%   03/26/19 96%   03/19/19 97%      Temp Readings from Last 1 Encounters:   04/04/19 98.1  F (36.7  C) (Temporal)    Ht Readings from Last 1 Encounters:   04/04/19 1.905 m (6' 3\")      Wt Readings from Last 1 Encounters:   04/04/19 117 kg (258 lb)    Estimated body mass index is 32.25 kg/m  as calculated from the following:    Height as of this encounter: 1.905 m (6' 3\").    Weight as of this encounter: 117 kg (258 lb).       Anesthesia Plan      History & Physical Review  History and physical reviewed and following examination; no interval change.    ASA Status:  2 .    NPO Status:  > 8 hours    Plan for General " and ETT with Intravenous induction. Maintenance will be Balanced.    PONV prophylaxis:  Ondansetron (or other 5HT-3) and Dexamethasone or Solumedrol       Postoperative Care  Postoperative pain management:  IV analgesics.      Consents  Anesthetic plan, risks, benefits and alternatives discussed with:  Patient.  Use of blood products discussed: Yes.   Use of blood products discussed with Patient.  Consented to blood products.  .                 Jeffry Negron MD                    .

## 2019-04-04 NOTE — ANESTHESIA POSTPROCEDURE EVALUATION
Patient: Cedric Figueroa    Procedure(s):  Left L4-5 microdiscectomy    Diagnosis:Left L4-5 herniated disc with LLE rediculopathy and weakness  Diagnosis Additional Information: Left L4-5 herniated disc with LLE rediculopathy and weakness    Anesthesia Type:  General, ETT    Note:  Anesthesia Post Evaluation    Patient location during evaluation: PACU  Patient participation: Able to fully participate in evaluation  Level of consciousness: awake and alert  Pain management: adequate  Airway patency: patent  Cardiovascular status: acceptable  Respiratory status: acceptable  Hydration status: acceptable  PONV: controlled     Anesthetic complications: None          Last vitals:  Vitals:    04/04/19 1420 04/04/19 1425 04/04/19 1430   BP: 139/73 137/74 137/68   Pulse: 74 75 72   Resp: 14 10 9   Temp: 97.4  F (36.3  C)     SpO2: 99% 100% 99%         Electronically Signed By: Jeffry Negron MD  April 4, 2019  2:34 PM

## 2019-04-05 ENCOUNTER — TELEPHONE (OUTPATIENT)
Dept: FAMILY MEDICINE | Facility: CLINIC | Age: 64
End: 2019-04-05

## 2019-04-05 NOTE — TELEPHONE ENCOUNTER
03/07/2019  2  03/04/2019  Oxycodone-Acetaminophen 5-325  120 [address]  [id number] Sudheer ([id number])  0 30.00 [place]   02/05/2019  2  02/05/2019  Oxycodone-Acetaminophen 5-325  120 [address]  [id number] Sudheer ([id number])  [place] 30.[place]   01/09/2019  2  01/07/2019  Oxycodone-Acetaminophen 5-325  120 [address]  [id number] Sudheer ([id number])  [place] 30.[place]   12/12/2018  2  11/28/2018  Oxycodone-Acetaminophen 5-325  120 30 [address]  [id number] Sudheer ([id number])  [place] 30.[place]      report above.    Irene Zacarias, RN, BSN

## 2019-04-05 NOTE — TELEPHONE ENCOUNTER
pt looking for refill on percocet knows had surgery but limit pills please contact DR TEE  Thanks

## 2019-04-05 NOTE — TELEPHONE ENCOUNTER
Routing refill request to provider for review/approval because:  Drug not on the [facility] refill protocol. Per chart script written by another provider on 4/4/19, not listed in .  Last fill on  was #120, 3/7/19

## 2019-05-06 ENCOUNTER — TELEPHONE (OUTPATIENT)
Dept: NEUROSURGERY | Facility: CLINIC | Age: 64
End: 2019-05-06

## 2019-05-06 NOTE — TELEPHONE ENCOUNTER
Patient called. He is s/p Left L4-5 Microdiscectomy with Dr Ness. He needs PW completed for his STD/FMLA and a work letter with a RTW date and restrictions. He will fax over the PW today. Patient had 6 week post op visit with Dr Ness and NP 5/14/19 at [facility].

## 2019-05-06 NOTE — LETTER
[facility]   [facility]  [address] [address]        May 6, 2019        To Whom it May Concern,    Cedric Figueroa is able to return to work on 5/13/2019 as long as the following restrictions can be accommodated:    - No heavy lifting greater than 10 pounds until further notice.    -No bending or twisting at the waist, or overhead reaching.    -5 minute hourly stretch breaks.    -Will be reassessed at 6 week post op visit on 5/14/19.       Sincerely,          Aaron Ness MD  [facility]  [facility]  [address] [place] [id number]    Tel [phone]

## 2019-05-07 ENCOUNTER — MYC MEDICAL ADVICE (OUTPATIENT)
Dept: FAMILY MEDICINE | Facility: CLINIC | Age: 64
End: 2019-05-07

## 2019-05-07 DIAGNOSIS — G89.29 CHRONIC PAIN OF LEFT KNEE: ICD-10-CM

## 2019-05-07 DIAGNOSIS — M25.562 CHRONIC PAIN OF LEFT KNEE: ICD-10-CM

## 2019-05-07 DIAGNOSIS — M25.562 PAIN OF LEFT KNEE AFTER INJURY: ICD-10-CM

## 2019-05-07 RX ORDER — OXYCODONE AND ACETAMINOPHEN 5; 325 MG/1; MG/1
1 TABLET ORAL EVERY 6 HOURS PRN
Qty: 120 TABLET | Refills: 0 | Status: SHIPPED | OUTPATIENT
Start: 2019-05-07 | End: 2019-06-07

## 2019-05-07 RX ORDER — OXYCODONE AND ACETAMINOPHEN 5; 325 MG/1; MG/1
1-2 TABLET ORAL EVERY 4 HOURS PRN
Qty: 40 TABLET | Refills: 0 | Status: CANCELLED | OUTPATIENT
Start: 2019-05-07

## 2019-05-07 NOTE — TELEPHONE ENCOUNTER
Routing refill request to provider for review/approval because:  Drug not on the [facility] refill protocol. Pended for approval.  Naty Kumar RN

## 2019-05-07 NOTE — TELEPHONE ENCOUNTER
May 7, 2019    FLMA Forms: yes    Faxed to [facility] [phone]    Type of form Attending Physician's statement , op note and work letter     Placed a copy in the bin and sent the original to medical records    Sarita Rayo RN

## 2019-05-14 ENCOUNTER — TRANSFERRED RECORDS (OUTPATIENT)
Dept: HEALTH INFORMATION MANAGEMENT | Facility: CLINIC | Age: 64
End: 2019-05-14

## 2019-05-16 DIAGNOSIS — N17.9 ACUTE KIDNEY FAILURE, UNSPECIFIED (H): Primary | ICD-10-CM

## 2019-05-16 DIAGNOSIS — E11.29 TYPE 2 DIABETES MELLITUS WITH MICROALBUMINURIA, WITH LONG-TERM CURRENT USE OF INSULIN (H): ICD-10-CM

## 2019-05-16 DIAGNOSIS — N18.30 CHRONIC KIDNEY DISEASE, STAGE III (MODERATE) (H): ICD-10-CM

## 2019-05-16 DIAGNOSIS — R80.9 TYPE 2 DIABETES MELLITUS WITH MICROALBUMINURIA, WITH LONG-TERM CURRENT USE OF INSULIN (H): ICD-10-CM

## 2019-05-16 DIAGNOSIS — I10 HYPERTENSION: ICD-10-CM

## 2019-05-16 DIAGNOSIS — E66.01 MORBID OBESITY (H): ICD-10-CM

## 2019-05-16 DIAGNOSIS — G89.4 CHRONIC PAIN SYNDROME: ICD-10-CM

## 2019-05-16 DIAGNOSIS — Z79.4 TYPE 2 DIABETES MELLITUS WITH MICROALBUMINURIA, WITH LONG-TERM CURRENT USE OF INSULIN (H): ICD-10-CM

## 2019-05-16 DIAGNOSIS — E11.8: ICD-10-CM

## 2019-05-16 DIAGNOSIS — R80.9 PROTEINURIA: ICD-10-CM

## 2019-05-21 ENCOUNTER — TELEPHONE (OUTPATIENT)
Dept: FAMILY MEDICINE | Facility: CLINIC | Age: 64
End: 2019-05-21

## 2019-05-21 NOTE — TELEPHONE ENCOUNTER
Prior authorization required for: Basaglar Kwikpen 100unit/ml soln  Insurance plan:  Commercial  Patient Id:[id number]  Bin Number: [id number]  Pcn: [id number]  [facility] Desk Number: [phone]    Please fax over an alternative medication to the pharmacy or if you are going to pursue a prior authorization please contact the pharmacy and patient when approved. Thanks!    Lulú Chilel [facility]

## 2019-05-22 NOTE — TELEPHONE ENCOUNTER
Per the insurance Lantus is covered but because Lantus and Basaglar aren't interchangeable we will need a new script for the Lantus unless Dr. Leiva would like to pursue a PA for the Basaglar.    Thank You,  Mariangel Del Rio, Pharmacy Tech  Patrick/ [facility]

## 2019-05-22 NOTE — TELEPHONE ENCOUNTER
Central Prior Authorization Team   Phone: [phone]      PA Initiation    Medication: Basaglar Kwikpen non formulary-Initiated  Insurance Company: [employer] - Phone [phone] Fax [phone]  Ph[facility] Filling the Rx[facility] - [address] [facility]  Filling Pharmacy Phone: [phone]  Filling Pharmacy Fax:    Start Date: 5/22/2019

## 2019-05-22 NOTE — TELEPHONE ENCOUNTER
Please clarify/confirm that the Lantus Solostar Pen is covered under patient's insurance and will send in Rx.

## 2019-05-22 NOTE — TELEPHONE ENCOUNTER
PRIOR AUTHORIZATION DENIED    Medication: Basaglar Kwikpen non formulary-DENIED    Denial Date: 5/22/2019    Denial Rational: Insurance will cover only if patient has a documented allergic reaction to Lantus.        Appeal Information:

## 2019-05-23 NOTE — TELEPHONE ENCOUNTER
Noted.   Please clarify/confirm that the Lantus Solostar Pen is covered under patient's insurance and will send in Rx

## 2019-05-24 DIAGNOSIS — R80.9 TYPE 2 DIABETES MELLITUS WITH MICROALBUMINURIA, WITH LONG-TERM CURRENT USE OF INSULIN (H): Primary | ICD-10-CM

## 2019-05-24 DIAGNOSIS — Z79.4 TYPE 2 DIABETES MELLITUS WITH MICROALBUMINURIA, WITH LONG-TERM CURRENT USE OF INSULIN (H): Primary | ICD-10-CM

## 2019-05-24 DIAGNOSIS — E11.29 TYPE 2 DIABETES MELLITUS WITH MICROALBUMINURIA, WITH LONG-TERM CURRENT USE OF INSULIN (H): Primary | ICD-10-CM

## 2019-06-03 ENCOUNTER — MYC MEDICAL ADVICE (OUTPATIENT)
Dept: FAMILY MEDICINE | Facility: CLINIC | Age: 64
End: 2019-06-03

## 2019-06-03 DIAGNOSIS — M25.562 PAIN OF LEFT KNEE AFTER INJURY: ICD-10-CM

## 2019-06-03 DIAGNOSIS — G89.29 CHRONIC PAIN OF LEFT KNEE: ICD-10-CM

## 2019-06-03 DIAGNOSIS — M25.562 CHRONIC PAIN OF LEFT KNEE: ICD-10-CM

## 2019-06-04 NOTE — TELEPHONE ENCOUNTER
Routing refill request to provider for review/approval because:  Drug not on the [facility] refill protocol. Last written 5/7/19.  Naty Kumar RN

## 2019-06-07 ENCOUNTER — TELEPHONE (OUTPATIENT)
Dept: FAMILY MEDICINE | Facility: CLINIC | Age: 64
End: 2019-06-07

## 2019-06-07 RX ORDER — OXYCODONE AND ACETAMINOPHEN 5; 325 MG/1; MG/1
1 TABLET ORAL EVERY 6 HOURS PRN
Qty: 120 TABLET | Refills: 0 | Status: SHIPPED | OUTPATIENT
Start: 2019-06-07 | End: 2019-06-28

## 2019-06-07 NOTE — TELEPHONE ENCOUNTER
Patient requested a refill for Percocet on 6/3/19, still not refilled. He is now out and has not heard from provider or pharmacy. Please refill or contact ASAP.

## 2019-06-14 DIAGNOSIS — E11.8: ICD-10-CM

## 2019-06-14 DIAGNOSIS — R80.9 PROTEINURIA: ICD-10-CM

## 2019-06-14 DIAGNOSIS — N18.30 CHRONIC KIDNEY DISEASE, STAGE III (MODERATE) (H): ICD-10-CM

## 2019-06-14 DIAGNOSIS — G89.4 CHRONIC PAIN SYNDROME: ICD-10-CM

## 2019-06-14 DIAGNOSIS — E66.01 MORBID OBESITY (H): ICD-10-CM

## 2019-06-14 DIAGNOSIS — I10 HYPERTENSION: ICD-10-CM

## 2019-06-14 DIAGNOSIS — N17.9 ACUTE KIDNEY FAILURE, UNSPECIFIED (H): ICD-10-CM

## 2019-06-14 LAB
ANION GAP SERPL CALCULATED.3IONS-SCNC: 12 MMOL/L (ref 3–14)
BUN SERPL-MCNC: 36 MG/DL (ref 7–30)
CALCIUM SERPL-MCNC: 8.6 MG/DL (ref 8.5–10.1)
CHLORIDE SERPL-SCNC: 106 MMOL/L (ref 94–109)
CO2 SERPL-SCNC: 22 MMOL/L (ref 20–32)
CREAT SERPL-MCNC: 1.57 MG/DL (ref 0.66–1.25)
CREAT UR-MCNC: 74 MG/DL
GFR SERPL CREATININE-BSD FRML MDRD: 46 ML/MIN/{1.73_M2}
GLUCOSE SERPL-MCNC: 251 MG/DL (ref 70–99)
POTASSIUM SERPL-SCNC: 4.1 MMOL/L (ref 3.4–5.3)
PROT UR-MCNC: 0.21 G/L
PROT/CREAT 24H UR: 0.29 G/G CR (ref 0–0.2)
SODIUM SERPL-SCNC: 140 MMOL/L (ref 133–144)

## 2019-06-14 PROCEDURE — 80048 BASIC METABOLIC PNL TOTAL CA: CPT | Performed by: INTERNAL MEDICINE

## 2019-06-14 PROCEDURE — 36415 COLL VENOUS BLD VENIPUNCTURE: CPT | Performed by: INTERNAL MEDICINE

## 2019-06-14 PROCEDURE — 84156 ASSAY OF PROTEIN URINE: CPT | Performed by: INTERNAL MEDICINE

## 2019-06-18 ENCOUNTER — TRANSFERRED RECORDS (OUTPATIENT)
Dept: HEALTH INFORMATION MANAGEMENT | Facility: CLINIC | Age: 64
End: 2019-06-18

## 2019-06-20 DIAGNOSIS — M54.50 LEFT-SIDED LOW BACK PAIN WITHOUT SCIATICA, UNSPECIFIED CHRONICITY: ICD-10-CM

## 2019-06-20 RX ORDER — CYCLOBENZAPRINE HCL 5 MG
TABLET ORAL
Qty: 180 TABLET | Refills: 1 | OUTPATIENT
Start: 2019-06-20

## 2019-06-20 NOTE — TELEPHONE ENCOUNTER
Med discharge by Dr. Ness on 4/4/19, pt needs to follow up post op visit. Message sent to pharmacy.

## 2019-06-20 NOTE — TELEPHONE ENCOUNTER
Requested Prescriptions   Pending Prescriptions Disp Refills     cyclobenzaprine (FLEXERIL) 5 MG tablet [Pharmacy Med Name: CYCLOBENZAPRINE HCL 5MG TABS]  Last Written Prescription Date:  04/15/19  Last Fill Quantity: 180,  # refills: 1   Last office visit: 3/26/2019 with prescribing provider:  ALEXIS Leiva   Future Office Visit:     180 tablet 1     Sig: TAKE ONE TABLET BY MOUTH THREE TIMES A DAY AS NEEDED FOR MUSCLE SPASMS       There is no refill protocol information for this order

## 2019-06-28 ENCOUNTER — MYC REFILL (OUTPATIENT)
Dept: FAMILY MEDICINE | Facility: CLINIC | Age: 64
End: 2019-06-28

## 2019-06-28 DIAGNOSIS — G89.29 CHRONIC PAIN OF LEFT KNEE: ICD-10-CM

## 2019-06-28 DIAGNOSIS — M25.562 PAIN OF LEFT KNEE AFTER INJURY: ICD-10-CM

## 2019-06-28 DIAGNOSIS — M25.562 CHRONIC PAIN OF LEFT KNEE: ICD-10-CM

## 2019-06-28 RX ORDER — OXYCODONE AND ACETAMINOPHEN 5; 325 MG/1; MG/1
1 TABLET ORAL EVERY 6 HOURS PRN
Qty: 120 TABLET | Refills: 0 | Status: SHIPPED | OUTPATIENT
Start: 2019-07-07 | End: 2019-07-01

## 2019-06-28 NOTE — TELEPHONE ENCOUNTER
Routing refill request to provider for review/approval because:  Drug not on the [facility] refill protocol. Last written 6/7/19

## 2019-07-01 DIAGNOSIS — M25.562 PAIN OF LEFT KNEE AFTER INJURY: ICD-10-CM

## 2019-07-01 DIAGNOSIS — G89.29 CHRONIC PAIN OF LEFT KNEE: ICD-10-CM

## 2019-07-01 DIAGNOSIS — M25.562 CHRONIC PAIN OF LEFT KNEE: ICD-10-CM

## 2019-07-01 RX ORDER — OXYCODONE AND ACETAMINOPHEN 5; 325 MG/1; MG/1
1 TABLET ORAL EVERY 6 HOURS PRN
Qty: 120 TABLET | Refills: 0 | Status: SHIPPED | OUTPATIENT
Start: 2019-07-07 | End: 2019-07-29

## 2019-07-01 NOTE — TELEPHONE ENCOUNTER
Hard copy of Rx for Percocet walked to [facility] with start date 7/7/19

## 2019-07-22 ENCOUNTER — OFFICE VISIT (OUTPATIENT)
Dept: FAMILY MEDICINE | Facility: CLINIC | Age: 64
End: 2019-07-22
Payer: COMMERCIAL

## 2019-07-22 ENCOUNTER — TELEPHONE (OUTPATIENT)
Dept: FAMILY MEDICINE | Facility: CLINIC | Age: 64
End: 2019-07-22

## 2019-07-22 VITALS
WEIGHT: 260.4 LBS | HEART RATE: 91 BPM | OXYGEN SATURATION: 96 % | HEIGHT: 75 IN | SYSTOLIC BLOOD PRESSURE: 131 MMHG | DIASTOLIC BLOOD PRESSURE: 75 MMHG | BODY MASS INDEX: 32.38 KG/M2 | TEMPERATURE: 98.2 F

## 2019-07-22 DIAGNOSIS — Z98.890 S/P LUMBAR MICRODISCECTOMY: Primary | ICD-10-CM

## 2019-07-22 DIAGNOSIS — I10 HYPERTENSION GOAL BP (BLOOD PRESSURE) < 140/90: ICD-10-CM

## 2019-07-22 DIAGNOSIS — Z79.4 TYPE 2 DIABETES MELLITUS WITH MICROALBUMINURIA, WITH LONG-TERM CURRENT USE OF INSULIN (H): ICD-10-CM

## 2019-07-22 DIAGNOSIS — R80.9 TYPE 2 DIABETES MELLITUS WITH MICROALBUMINURIA, WITH LONG-TERM CURRENT USE OF INSULIN (H): ICD-10-CM

## 2019-07-22 DIAGNOSIS — E11.29 TYPE 2 DIABETES MELLITUS WITH MICROALBUMINURIA, WITH LONG-TERM CURRENT USE OF INSULIN (H): ICD-10-CM

## 2019-07-22 LAB
ALBUMIN SERPL-MCNC: 4.1 G/DL (ref 3.4–5)
ALP SERPL-CCNC: 129 U/L (ref 40–150)
ALT SERPL W P-5'-P-CCNC: 38 U/L (ref 0–70)
ANION GAP SERPL CALCULATED.3IONS-SCNC: 7 MMOL/L (ref 3–14)
AST SERPL W P-5'-P-CCNC: 31 U/L (ref 0–45)
BILIRUB SERPL-MCNC: 0.3 MG/DL (ref 0.2–1.3)
BUN SERPL-MCNC: 32 MG/DL (ref 7–30)
CALCIUM SERPL-MCNC: 8.7 MG/DL (ref 8.5–10.1)
CHLORIDE SERPL-SCNC: 106 MMOL/L (ref 94–109)
CO2 SERPL-SCNC: 25 MMOL/L (ref 20–32)
CREAT SERPL-MCNC: 1.45 MG/DL (ref 0.66–1.25)
ERYTHROCYTE [DISTWIDTH] IN BLOOD BY AUTOMATED COUNT: 12.3 % (ref 10–15)
GFR SERPL CREATININE-BSD FRML MDRD: 51 ML/MIN/{1.73_M2}
GLUCOSE SERPL-MCNC: 159 MG/DL (ref 70–99)
HBA1C MFR BLD: 7.6 % (ref 0–5.6)
HCT VFR BLD AUTO: 38.5 % (ref 40–53)
HGB BLD-MCNC: 12.5 G/DL (ref 13.3–17.7)
MCH RBC QN AUTO: 30.3 PG (ref 26.5–33)
MCHC RBC AUTO-ENTMCNC: 32.5 G/DL (ref 31.5–36.5)
MCV RBC AUTO: 93 FL (ref 78–100)
PLATELET # BLD AUTO: 189 10E9/L (ref 150–450)
POTASSIUM SERPL-SCNC: 3.9 MMOL/L (ref 3.4–5.3)
PROT SERPL-MCNC: 7.5 G/DL (ref 6.8–8.8)
RBC # BLD AUTO: 4.13 10E12/L (ref 4.4–5.9)
SODIUM SERPL-SCNC: 138 MMOL/L (ref 133–144)
TSH SERPL DL<=0.005 MIU/L-ACNC: 2.26 MU/L (ref 0.4–4)
WBC # BLD AUTO: 7 10E9/L (ref 4–11)

## 2019-07-22 PROCEDURE — 99214 OFFICE O/P EST MOD 30 MIN: CPT | Performed by: FAMILY MEDICINE

## 2019-07-22 PROCEDURE — 83036 HEMOGLOBIN GLYCOSYLATED A1C: CPT | Performed by: FAMILY MEDICINE

## 2019-07-22 PROCEDURE — 80053 COMPREHEN METABOLIC PANEL: CPT | Performed by: FAMILY MEDICINE

## 2019-07-22 PROCEDURE — 84443 ASSAY THYROID STIM HORMONE: CPT | Performed by: FAMILY MEDICINE

## 2019-07-22 PROCEDURE — 36415 COLL VENOUS BLD VENIPUNCTURE: CPT | Performed by: FAMILY MEDICINE

## 2019-07-22 PROCEDURE — 85027 COMPLETE CBC AUTOMATED: CPT | Performed by: FAMILY MEDICINE

## 2019-07-22 RX ORDER — CYCLOBENZAPRINE HCL 10 MG
10 TABLET ORAL 3 TIMES DAILY PRN
Qty: 42 TABLET | Refills: 1 | Status: SHIPPED | OUTPATIENT
Start: 2019-07-22 | End: 2019-08-19

## 2019-07-22 RX ORDER — LIDOCAINE 50 MG/G
1 PATCH TOPICAL EVERY 24 HOURS
Qty: 30 PATCH | Refills: 0 | Status: SHIPPED | OUTPATIENT
Start: 2019-07-22 | End: 2019-10-04

## 2019-07-22 ASSESSMENT — MIFFLIN-ST. JEOR: SCORE: 2061.8

## 2019-07-22 NOTE — TELEPHONE ENCOUNTER
Prior authorization required for : Lidocaine 5% Patch  Insurance plan:  Commercial  Patient Id: [id number]  Bin Number: [id number]  Pcn: [id number]  Help Desk Number: [phone]    Please fax over an alternative medication to the pharmacy or if you are going to pursue a prior authorization please contact the pharmacy and patient when approved. Thanks!    Lulú Chilel [facility]

## 2019-07-22 NOTE — PROGRESS NOTES
Subjective     Cedric Figueroa is a 63 year old male who presents to clinic today for the following health issues:    HPI   Back Pain - worsening after recent surgery.    S/p left L4-5 hemilanectomy, medial facetectomy, foraminotomy with microdiscectomy on 4/4/19 with Dr CAITLIN Ness.      Duration: x 2 months, worsening since surgery        Specific cause: none    Description:   Location of pain: low back- middle  Character of pain: sharp, cramping, dull ache, stabbing   Pain radiation:radiates into the right buttocks and radiates into the left buttocks  New numbness or weakness in legs, not attributed to pain:  YES    Intensity: Currently 7/10, At its worst 9/10    History:   Pain interferes with job: YES  History of back problems: lumbar surgery 4/4/19  Any previous MRI or X-rays: Yes- at [place].  Date 4/4/19  Sees a specialist for back pain:  No  Therapies tried without relief: stretch    Alleviating factors:   Improved by: percocet helps for the first hour, resting, laying down    Precipitating factors:  Worsened by: Bending, Standing, Sitting and Walking    Flower STarT Back    Last 2 scores:     FLOWER START BACK TOTAL SCORE 1/2/2019 7/22/2019   Total Score (all 9) 5 7         Accompanying Signs & Symptoms:  Risk of Fracture:  None  Risk of Cauda Equina:  None  Risk of Infection:  None  Risk of Cancer:  None  Risk of Ankylosing Spondylitis:  Onset at age <35, male, AND morning back stiffness. no     Denies having any fever or chills.   Has a known history of Type 2 diabetes- insulin dependent. Overdue for an A1C.   Hypertension- currently controlled on medication.       Patient Active Problem List   Diagnosis     Hyperlipidemia LDL goal <100     Mild major depression (H)     Restless leg syndrome     Sleep apnea     Knee pain     Vitamin B 12 deficiency     Advanced directives, counseling/discussion     Iron deficiency anemia     OA (osteoarthritis) of knee     Perforated bowel (H)     Left-sided low back pain  with left-sided sciatica     Type 2 diabetes mellitus with microalbuminuria, with long-term current use of insulin (H)     Hypertension goal BP (blood pressure) < 130/80     Bariatric surgery status     Perirectal abscess     CKD (chronic kidney disease) stage 3, GFR 30-59 ml/min (H)     Controlled substance agreement signed     Chronic pain syndrome     Morbid obesity (H)     Past Surgical History:   Procedure Laterality Date     ABDOMEN SURGERY      peritonitis, bowel perforation, bowel resection     AS TOTAL KNEE ARTHROPLASTY      Right and Left knee x3( 2 partial knee replacement and 1 total Left knee replacement     BARIATRIC SURGERY      at age 45     COLONOSCOPY  2006    [facility]     COLONOSCOPY WITH CO2 INSUFFLATION N/A 2016    Procedure: COLONOSCOPY WITH CO2 INSUFFLATION;  Surgeon: Cedric Schneider MD;  Location: [facility] OR     DISCECTOMY LUMBAR POSTERIOR MICROSCOPIC ONE LEVEL Left 2019    Procedure: Left L4-5 hemilaminectomy, medial facetectomy, foraminotomy with microdiscectomy and use of X-ray and intraoperative microscope;  Surgeon: Aaron Ness MD;  Location: [facility] OR     INCISION AND DRAINAGE BUTTOCKS Left 3/28/2018    Procedure: INCISION AND DRAINAGE BUTTOCKS;  Incision and drainage of left buttock abscess, and fistulotomy;  Surgeon: Bala Layton MD;  Location: [facility] OR       Social History     Tobacco Use     Smoking status: Former Smoker     Last attempt to quit: 2018     Years since quittin.0     Smokeless tobacco: Never Used   Substance Use Topics     Alcohol use: Yes     Comment: occasionally     Family History   Problem Relation Age of Onset     Diabetes Mother      Diabetes Paternal Grandmother      Coronary Artery Disease Paternal Grandmother      Cerebrovascular Disease Paternal Grandmother      Bleeding Disorder No family hx of      Anesthesia Reaction No family hx of          Current Outpatient Medications   Medication Sig Dispense Refill  "    cyclobenzaprine (FLEXERIL) 10 MG tablet Take 1 tablet (10 mg) by mouth 3 times daily as needed for muscle spasms 42 tablet 1     lidocaine (LIDODERM) 5 % patch Place 1 patch onto the skin every 24 hours 12 hours on; 12 hours off. 30 patch 0     blood glucose (ACCU-CHEK PELON PLUS) test strip USE TO TEST BLOOD SUGAR THREE TIMES A DAY OR AS DIRECTED 100 each 11     blood glucose monitoring (ACCU-CHEK PELON PLUS) meter device kit Use to test blood sugar 3 times daily or as directed. 1 kit 0     blood glucose monitoring (SOFTCLIX) lancets USE TO TEST BLOOD SUGAR THREE TIMES A DAY OR AS DIRECTED 100 each 3     insulin glargine (LANTUS SOLOSTAR PEN) 100 UNIT/ML pen Inject 45 Units Subcutaneous At Bedtime 40 mL 3     insulin pen needle (BD ULTRA-FINE) 29G X 12.7MM Use once daily or as directed. 100 each prn     losartan-hydrochlorothiazide (HYZAAR) 100-25 MG per tablet Take 1 tablet by mouth daily 90 tablet 3     MELATONIN PO Take 10 mg by mouth At Bedtime        oxyCODONE-acetaminophen (PERCOCET) 5-325 MG tablet Take 1 tablet by mouth every 6 hours as needed for pain or moderate to severe pain maximum 4 tablet(s) per day 120 tablet 0     PARoxetine (PAXIL) 20 MG tablet Take 1 tablet (20 mg) by mouth every morning 90 tablet 3     pramipexole (MIRAPEX) 0.25 MG tablet Take 1 tablet by mouth in the morning, 1 in the afternoon and 2 tabs before bed 120 tablet 3     simvastatin (ZOCOR) 40 MG tablet Take 1 tablet (40 mg) by mouth At Bedtime 90 tablet 3     vitamin B-12 (CYANOCOBALAMIN) 1000 MCG/ML injection INJECT 1ML INTO THE MUSCLE EVERY 30 DAYS. 3 mL 3     No Known Allergies      Reviewed and updated as needed this visit by Provider         Review of Systems   ROS COMP: Constitutional, HEENT, cardiovascular, pulmonary, gi and gu systems are negative, except as otherwise noted.      Objective    /75   Pulse 91   Temp 98.2  F (36.8  C) (Tympanic)   Ht 1.905 m (6' 3\")   Wt 118.1 kg (260 lb 6.4 oz)   SpO2 96%   " BMI 32.55 kg/m    Body mass index is 32.55 kg/m .  Physical Exam   GENERAL: healthy, alert and no distress  RESP: lungs clear to auscultation - no rales, rhonchi or wheezes  CV: regular rate and rhythm, normal S1 S2, no S3 or S4, no murmur, click or rub, no peripheral edema and peripheral pulses strong  Comprehensive back pain exam:  Tenderness of surgical site with a palable soft tissue around it but no overlying skin changes. , Pain limits the following motions: flexion and extension. , Lower extremity strength functional and equal on both sides, Lower extremity reflexes within normal limits bilaterally, Lower extremity sensation normal and equal on both sides and Straight leg raise negative bilaterally    Diagnostic Test Results:  Results for orders placed or performed in visit on 07/22/19   CBC with platelets   Result Value Ref Range    WBC 7.0 4.0 - 11.0 10e9/L    RBC Count 4.13 (L) 4.4 - 5.9 10e12/L    Hemoglobin 12.5 (L) 13.3 - 17.7 g/dL    Hematocrit 38.5 (L) 40.0 - 53.0 %    MCV 93 78 - 100 fl    MCH 30.3 26.5 - 33.0 pg    MCHC 32.5 31.5 - 36.5 g/dL    RDW 12.3 10.0 - 15.0 %    Platelet Count 189 150 - 450 10e9/L   Hemoglobin A1c   Result Value Ref Range    Hemoglobin A1C 7.6 (H) 0 - 5.6 %   Comprehensive metabolic panel   Result Value Ref Range    Sodium 138 133 - 144 mmol/L    Potassium 3.9 3.4 - 5.3 mmol/L    Chloride 106 94 - 109 mmol/L    Carbon Dioxide 25 20 - 32 mmol/L    Anion Gap 7 3 - 14 mmol/L    Glucose 159 (H) 70 - 99 mg/dL    Urea Nitrogen 32 (H) 7 - 30 mg/dL    Creatinine 1.45 (H) 0.66 - 1.25 mg/dL    GFR Estimate 51 (L) >60 mL/min/[1.73_m2]    GFR Estimate If Black 59 (L) >60 mL/min/[1.73_m2]    Calcium 8.7 8.5 - 10.1 mg/dL    Bilirubin Total 0.3 0.2 - 1.3 mg/dL    Albumin 4.1 3.4 - 5.0 g/dL    Protein Total 7.5 6.8 - 8.8 g/dL    Alkaline Phosphatase 129 40 - 150 U/L    ALT 38 0 - 70 U/L    AST 31 0 - 45 U/L   TSH with free T4 reflex   Result Value Ref Range    TSH 2.26 0.40 - 4.00 mU/L  "            Assessment & Plan     Cedric was seen today for back pain.    Diagnoses and all orders for this visit:    S/P lumbar microdiscectomy on 4/4/2019- with recent worsening back pain.  -     MR Lumbar Spine w/o & w Contrast; Future  -     cyclobenzaprine (FLEXERIL) 10 MG tablet; Take 1 tablet (10 mg) by mouth 3 times daily as needed for muscle spasms  -     lidocaine (LIDODERM) 5 % patch; Place 1 patch onto the skin every 24 hours 12 hours on; 12 hours off    -     CBC with platelets    Type 2 diabetes mellitus with microalbuminuria, with long-term current use of insulin (H)  -     Hemoglobin A1c  -     Comprehensive metabolic panel  -     TSH with free T4 reflex    Hypertension goal BP (blood pressure) < 140/90, controlled  Continue current management.       BMI:   Estimated body mass index is 32.55 kg/m  as calculated from the following:    Height as of this encounter: 1.905 m (6' 3\").    Weight as of this encounter: 118.1 kg (260 lb 6.4 oz).   Weight management plan: Discussed healthy diet and exercise guidelines    Will notify him with his MRI results and next steps.     Return in about 2 weeks (around 8/5/2019) for Follow up, Physical Exam at earliest convenience, Medication check.    Molly Leiva MD  [facility]      "

## 2019-07-23 ENCOUNTER — ANCILLARY PROCEDURE (OUTPATIENT)
Dept: MRI IMAGING | Facility: CLINIC | Age: 64
End: 2019-07-23
Attending: FAMILY MEDICINE
Payer: COMMERCIAL

## 2019-07-23 DIAGNOSIS — Z98.890 S/P LUMBAR MICRODISCECTOMY: ICD-10-CM

## 2019-07-23 PROCEDURE — 72158 MRI LUMBAR SPINE W/O & W/DYE: CPT | Mod: TC

## 2019-07-23 PROCEDURE — A9585 GADOBUTROL INJECTION: HCPCS | Mod: JW

## 2019-07-23 RX ORDER — GADOBUTROL 604.72 MG/ML
15 INJECTION INTRAVENOUS ONCE
Status: COMPLETED | OUTPATIENT
Start: 2019-07-23 | End: 2019-07-23

## 2019-07-23 RX ADMIN — GADOBUTROL 12 ML: 604.72 INJECTION INTRAVENOUS at 19:43

## 2019-07-23 NOTE — TELEPHONE ENCOUNTER
Prior Authorization Retail Medication Request    Medication/Dose: Lidocaine 5% patch requires a prior authorization  ICD code (if different than what is on RX):  S/P lumbar microdiscectomy [Z98.890]  Previously Tried and Failed:   Rationale:     Insurance Name:  [employer]  Insurance ID: [id number]      Pharmacy Information (if different than what is on RX)  Name:    Phone:

## 2019-07-29 ENCOUNTER — MYC MEDICAL ADVICE (OUTPATIENT)
Dept: FAMILY MEDICINE | Facility: CLINIC | Age: 64
End: 2019-07-29

## 2019-07-29 DIAGNOSIS — M25.562 PAIN OF LEFT KNEE AFTER INJURY: ICD-10-CM

## 2019-07-29 DIAGNOSIS — G89.29 CHRONIC PAIN OF LEFT KNEE: ICD-10-CM

## 2019-07-29 DIAGNOSIS — M25.562 CHRONIC PAIN OF LEFT KNEE: ICD-10-CM

## 2019-07-31 ENCOUNTER — TELEPHONE (OUTPATIENT)
Dept: NEUROSURGERY | Facility: CLINIC | Age: 64
End: 2019-07-31

## 2019-07-31 NOTE — TELEPHONE ENCOUNTER
Called patient who has upcoming appointment in [place] on Tuesday August 6th. Appointment is scheduled as a follow up from a Microdisc done in April with Dr. Ness. Patient stated he is unsure on who he should follow up with. He states he followed up initally after surgery at [facility] with Dr. Ness but is having problems with his surgical site now and isn't sure if he should follow up with Dr. Ness or us here. He would like a nurse to call him back to discuss his concerns further.

## 2019-08-01 RX ORDER — OXYCODONE AND ACETAMINOPHEN 5; 325 MG/1; MG/1
1 TABLET ORAL EVERY 6 HOURS PRN
Qty: 120 TABLET | Refills: 0 | Status: SHIPPED | OUTPATIENT
Start: 2019-08-05 | End: 2019-08-26

## 2019-08-01 NOTE — TELEPHONE ENCOUNTER
Central Prior Authorization Team  Phone: [phone]    PA Initiation    Medication: Lidocaine 5% patch  Insurance Company: [employer] - Phone [phone] Fax [phone]  Pharmacy Filling the Rx[facility] - [address] [facility]  Filling Pharmacy Phone: [phone]  Filling Pharmacy Fax:    Start Date: 8/1/2019

## 2019-08-05 NOTE — TELEPHONE ENCOUNTER
PRIOR AUTHORIZATION DENIED    Medication: Lidocaine 5% patch- DENIED     Denial Date: 8/4/2019    Denial Rational:  Lidoderm/ Lidocaine patches are only covered with the diagnosis of post-herpetic neuralgia, diabetic neuropathy, or cancer related pain. It will not be covered for the associated diagnosis.         Appeal Information: If provider would like to appeal please provide a letter of medical necessity.

## 2019-08-06 ENCOUNTER — TRANSFERRED RECORDS (OUTPATIENT)
Dept: HEALTH INFORMATION MANAGEMENT | Facility: CLINIC | Age: 64
End: 2019-08-06

## 2019-08-07 NOTE — TELEPHONE ENCOUNTER
Noted, as per message he only wanted a call back if any new instructions.  Patient is to continue same plan.  Naty Kumar RN

## 2019-08-07 NOTE — TELEPHONE ENCOUNTER
"Patient informed that the Lidocaine patches are not covered by his insurance and he verbalized a good understanding.     He has been using the Icy Hot 4% patches without much success. He has given up on them for now. He describes his back pain as \"manageble but not ideal\" right now.     Patient takes Flexeril and Percocet for pain relief. They only work for a short time. These medications seem to help with his knee pain, however not as effective for his back.     Cedric was seen by his back surgeon yesterday. They told him that there is a sack of fluid pressing on some nerves. They want him to return in 1 month for further evaluation.     Patient wonders if Dr. Leiva has any other suggestions? If not, he will continue with his current regimen. He only needs a call back if there are new instructions.     Please review and advise.       "

## 2019-08-07 NOTE — TELEPHONE ENCOUNTER
Noted. Continue with the current treatment plan and follow up with [facility] Specialist as recommended. Will await records.

## 2019-08-12 NOTE — PROGRESS NOTES
Subjective:  HPI                    Objective:  System    Physical Exam    General     ROS    Assessment/Plan:    DISCHARGE REPORT    Progress reporting period is from 1/2/19 to 1/16/19.     SUBJECTIVE  Subjective: Continues to report no change in back pain or sxs down the legs. Felt  better after last session but states that the pain returned right afterwards.    Current Pain level: No change   Initial Pain level: 10/10(at worst)   Changes in function: No changes noted in function since last SOAP note   Adverse reactions: None;   ,     Patient has failed to return to therapy so current objective findings are unknown.  The subjective and objective information are from the last SOAP note on this patient.    OBJECTIVE  Objective: Centralized with left side glide repeated. Independent in HEP.      ASSESSMENT/PLAN  Diagnosis 1:  LBP/L LE  Pain -  hot/cold therapy, manual therapy, self management, education, directional preference exercise and home program  Decreased ROM/flexibility - manual therapy, therapeutic exercise and home program  Decreased strength - therapeutic exercise, therapeutic activities and home program  STG/LTGs have been met or progress has been made towards goals:  Yes (See Goal flow sheet completed today.)  Assessment of Progress: The patient has not returned to therapy. Current status is unknown.  Self Management Plans:  Patient has been instructed in a home treatment program.  Patient  has been instructed in self management of symptoms.  Cedric continues to require the following intervention to meet STG and LTG's: PT intervention is no longer required to meet STG/LTG.  The patient failed to complete scheduled/ordered appointments so current information is unknown.  We will discharge this patient from PT.    Recommendations:  This patient is ready to be discharged from therapy and continue their home treatment program.    Please refer to the daily flowsheet for treatment today, total treatment time  and time spent performing 1:1 timed codes.

## 2019-08-19 DIAGNOSIS — Z98.890 S/P LUMBAR MICRODISCECTOMY: ICD-10-CM

## 2019-08-22 RX ORDER — CYCLOBENZAPRINE HCL 10 MG
TABLET ORAL
Qty: 42 TABLET | Refills: 1 | Status: SHIPPED | OUTPATIENT
Start: 2019-08-22 | End: 2019-09-22

## 2019-08-27 ENCOUNTER — TRANSFERRED RECORDS (OUTPATIENT)
Dept: HEALTH INFORMATION MANAGEMENT | Facility: CLINIC | Age: 64
End: 2019-08-27

## 2019-09-09 ENCOUNTER — TELEPHONE (OUTPATIENT)
Dept: PALLIATIVE MEDICINE | Facility: CLINIC | Age: 64
End: 2019-09-09

## 2019-09-09 NOTE — TELEPHONE ENCOUNTER

## 2019-09-09 NOTE — TELEPHONE ENCOUNTER
Diallo to schedule Consult-only    Azeb Perez    [facility]

## 2019-09-16 ENCOUNTER — THERAPY VISIT (OUTPATIENT)
Dept: PHYSICAL THERAPY | Facility: CLINIC | Age: 64
End: 2019-09-16
Payer: COMMERCIAL

## 2019-09-16 DIAGNOSIS — Z98.890 S/P LUMBAR MICRODISCECTOMY: ICD-10-CM

## 2019-09-16 DIAGNOSIS — M54.50 BILATERAL LOW BACK PAIN WITHOUT SCIATICA, UNSPECIFIED CHRONICITY: ICD-10-CM

## 2019-09-16 PROCEDURE — 97162 PT EVAL MOD COMPLEX 30 MIN: CPT | Mod: GP | Performed by: PHYSICAL THERAPIST

## 2019-09-16 PROCEDURE — 97110 THERAPEUTIC EXERCISES: CPT | Mod: GP | Performed by: PHYSICAL THERAPIST

## 2019-09-16 NOTE — PROGRESS NOTES
"[facility] Initial Evaluation  HPI  SUBJECTIVE  Cedric Figueroa is a 63 year old male patient presenting to Physical Therapy with the following Primary Symptoms: Bilateral low back pain radiating to bilateral posterior hips.    He denies having painful cough/sneeze, saddle anaesthesia, severe night pain, peripheral motor deficit. He admits to having recent bowel/bladder change. Patient notes that urinary retention and bowel urgency have been noted since his procedure. They have progressed very slightly since about a month ago. He has not had any issues of incontinence and is not necessarily worried about the progression of these issues.     These pains are described as constant. Pain is rated 7/10 at rest in the bilateral buttocks, and 10/10 at worst. Patient describes pain as sharp, stabbing, throbbing and \"pain\". History of symptoms: These pains began gradually about 2 months ago as the result of no specific episode or injury.     Previous treatment has included rest. Patient notes that prior treatment has resulted in worsening of symptoms. Since onset, these pains are getting worse :     Current Symptoms are worsened by: sustained positioning in any position; standing tolerance is around 10 minutes, sitting tolerance is around 10 minutes. Rolling in bed is painful. Pain is worst in PM, best in AM.    Current Symptoms are relieved by lying supine.     Patient states that current complaints are affecting sleep pattern, with several wakings per night and 1 hours of sleep lost on average.     Recent surgical procedure: L4-L5 microdiscectomy performed on April / 04 / 2019.     Recent imaging studies have been performed and results showed: Large 4.5x5.5 cm seroma as a result of microdiscectomy procedure, first noted 7/23/19 on MRI.     General health as reported by patient is: good. Pertinent medical history: depression, diabetes, HTN, CKD.     He denies any significant current illness or recent " hospital admissions.     He denies any regonal implanted devices. Patient has hx of TKA bilaterally.    Current occupational status: . Occupational duties include: computer work, prolonged sitting. Previous functional status: fully functional prior to pain onset/injury.    PATIENT GOALS: To achieve a significant reduction in pain and to learn a self-management strategy to alleviate pain on a daily basis.    Patient states that he has poor positional tolerance to all positions.  Sleeping has been affected, he is able to fall asleep with Percocet. Lying supine is alleviating after several minutes of lying still. Rolling provokes pain. Patient reports he cannot on his socks due to pain. He is not able to reach to floor and  items.     OBJECTIVE    STATIC POSTURE: Patient sits in position with loss of lumbar lordosis. Standing posture adopted to disclude lumbar lordosis also.  -This patient was not able to remain comfortably seated throughout exam.    GAIT: Shortened stride length bilaterally with mildly antalgic gait due to bilateral buttock pain.    TRANSFERS/TRANSITIONAL MOVEMENTS: Rolling, sit<>supine, and sit<>stand all independent with extra time. Painful with intiation and termination of all transitional movements.    MOTOR CONTROL:  -Pelvic tilting: Fair, verbal cues necessary for proper activation.      ROM:  Flexion Knees*   Extension 50% limited*   Sidebend left To proximal shin   Sidebend right To mid shin   Rotation left WNL   Rotation right WNL   *Denotes pain      LE NEURO SCREEN    Myotomes:   Left Right   Hip flexion (L2) 4/5* 4/5*   Knee extension (L3) 5/5 5/5   Ankle dorsiflexion (L4) 5/5 5/5   Great toe extension (L5) 5/5 5/5   Ankle Plantarflexion (S1) NT NT       SEGMENTAL MOBILITY:  -PA spring test: Patient was Hypomobile through all lumbar segments T-12-L1 to L5-S1. Pain with pressure through all regions, gradually increasing at distal segments.    PALPATION: Tender over  lower lumbar paraspinals, bilateral superior glute max, bilateral PSIS.      HIP SCREEN:  -ROM: Hypomobile bilaterally  -Other relevant findings: Pain with end range hip flexion (110 bilaterally), reproduction of back pain with piriformis test.      Lumbar Special Tests   Left Right   Mariano test/Modified Mariano test Mild limitation* Mild limitation*   Straight Leg Raise 45 dgs* 45 dgs*   Active Straight Leg Raise     Hamstring 90-90 35 dgs 35 dgs   Piriformis Test Pos, mod limit Pos, mod limit   Long Axis Distraction No change No change   FADIR     ISABELL Pos Pos   Posterior Capsule Compression Pos for pain Pos for pain   Slump Test Pos Pos   NT=Not Tested        Therapist Assessment: Cedric Figueroa is a 63 year old male patient presenting to Physical Therapy with bilateral low back and posterior buttock pain. Patient demonstrates restricted ROM, poor neurodynamic mobility, pain with transitional movements, and hypomobility of the lumbopelvic complex. Patient also has post-operative seroma.  These impairments limit their ability to tolerate sustained positions, ambulate with normal pattern. Skilled PT services are necessary in order to reduce impairments and improve independent function.      System    Physical Exam    General     ROS    Assessment/Plan:    Patient is a 63 year old male with lumbar complaints.    Patient has the following significant findings with corresponding treatment plan.                Diagnosis 1:  Bilateral LBP with posterior buttock pain s/p L4-L5 microdiscectomy  Pain -  hot/cold therapy, manual therapy, self management, education and home program  Decreased ROM/flexibility - manual therapy, therapeutic exercise and home program  Decreased joint mobility - manual therapy, therapeutic exercise and home program  Decreased strength - therapeutic exercise, therapeutic activities and home program  Impaired gait - gait training and home program  Decreased function - therapeutic activities and  home program    Therapy Evaluation Codes:   1) History comprised of:   Personal factors that impact the plan of care:      Time since onset of symptoms and presence of yellow flags.    Comorbidity factors that impact the plan of care are:      Diabetes, Depression, High blood pressure, Numbness/tingling and Pain at night/rest.     Medications impacting care: Anti-depressant, High blood pressure, Muscle relaxant and Pain.  2) Examination of Body Systems comprised of:   Body structures and functions that impact the plan of care:      Lumbar spine.   Activity limitations that impact the plan of care are:      Bending, Driving, Dressing, Lifting, Reading/Computer work, Sitting, Standing and Walking.  3) Clinical presentation characteristics are:   Evolving/Changing.  4) Decision-Making    Moderate complexity using standardized patient assessment instrument and/or measureable assessment of functional outcome.  Cumulative Therapy Evaluation is: Moderate complexity.    Previous and current functional limitations:  (See Goal Flow Sheet for this information)    Short term and Long term goals: (See Goal Flow Sheet for this information)     Communication ability:  Patient appears to be able to clearly communicate and understand verbal and written communication and follow directions correctly.  Treatment Explanation - The following has been discussed with the patient:   RX ordered/plan of care  Anticipated outcomes  Possible risks and side effects  This patient would benefit from PT intervention to resume normal activities.   Rehab potential is fair due to presence of yellow flags related to bowel/bladder changes since procedure, as well as seroma post-operatively. These findings limit ability to predict positive outcome for rehabilitation.    Frequency:  1 X week, once daily  Duration:  for 6 weeks  Discharge Plan:  Achieve all LTG.  Independent in home treatment program.  Reach maximal therapeutic benefit.    Please refer to  the daily flowsheet for treatment today, total treatment time and time spent performing 1:1 timed codes.

## 2019-09-23 ENCOUNTER — THERAPY VISIT (OUTPATIENT)
Dept: PHYSICAL THERAPY | Facility: CLINIC | Age: 64
End: 2019-09-23
Payer: COMMERCIAL

## 2019-09-23 DIAGNOSIS — Z98.890 S/P LUMBAR MICRODISCECTOMY: ICD-10-CM

## 2019-09-23 DIAGNOSIS — M54.50 BILATERAL LOW BACK PAIN WITHOUT SCIATICA, UNSPECIFIED CHRONICITY: ICD-10-CM

## 2019-09-23 PROCEDURE — 97110 THERAPEUTIC EXERCISES: CPT | Mod: GP | Performed by: PHYSICAL THERAPIST

## 2019-10-01 ENCOUNTER — OFFICE VISIT (OUTPATIENT)
Dept: FAMILY MEDICINE | Facility: CLINIC | Age: 64
End: 2019-10-01
Payer: COMMERCIAL

## 2019-10-01 VITALS
SYSTOLIC BLOOD PRESSURE: 125 MMHG | RESPIRATION RATE: 16 BRPM | DIASTOLIC BLOOD PRESSURE: 86 MMHG | HEART RATE: 86 BPM | OXYGEN SATURATION: 98 % | BODY MASS INDEX: 32.18 KG/M2 | WEIGHT: 258.8 LBS | HEIGHT: 75 IN | TEMPERATURE: 98.5 F

## 2019-10-01 DIAGNOSIS — R30.0 DYSURIA: ICD-10-CM

## 2019-10-01 DIAGNOSIS — E11.65 TYPE 2 DIABETES MELLITUS WITH HYPERGLYCEMIA, WITH LONG-TERM CURRENT USE OF INSULIN (H): Primary | ICD-10-CM

## 2019-10-01 DIAGNOSIS — N18.30 CKD (CHRONIC KIDNEY DISEASE) STAGE 3, GFR 30-59 ML/MIN (H): ICD-10-CM

## 2019-10-01 DIAGNOSIS — Z23 NEED FOR TDAP VACCINATION: ICD-10-CM

## 2019-10-01 DIAGNOSIS — Z98.890 S/P LUMBAR MICRODISCECTOMY: ICD-10-CM

## 2019-10-01 DIAGNOSIS — E11.29 TYPE 2 DIABETES MELLITUS WITH MICROALBUMINURIA, WITH LONG-TERM CURRENT USE OF INSULIN (H): ICD-10-CM

## 2019-10-01 DIAGNOSIS — R80.9 TYPE 2 DIABETES MELLITUS WITH MICROALBUMINURIA, WITH LONG-TERM CURRENT USE OF INSULIN (H): ICD-10-CM

## 2019-10-01 DIAGNOSIS — Z79.4 TYPE 2 DIABETES MELLITUS WITH HYPERGLYCEMIA, WITH LONG-TERM CURRENT USE OF INSULIN (H): Primary | ICD-10-CM

## 2019-10-01 DIAGNOSIS — Z79.4 TYPE 2 DIABETES MELLITUS WITH MICROALBUMINURIA, WITH LONG-TERM CURRENT USE OF INSULIN (H): ICD-10-CM

## 2019-10-01 LAB
ALBUMIN UR-MCNC: ABNORMAL MG/DL
APPEARANCE UR: CLEAR
BACTERIA #/AREA URNS HPF: ABNORMAL /HPF
BILIRUB UR QL STRIP: NEGATIVE
COLOR UR AUTO: YELLOW
CREAT SERPL-MCNC: 1.59 MG/DL (ref 0.66–1.25)
CREAT UR-MCNC: 84 MG/DL
GFR SERPL CREATININE-BSD FRML MDRD: 45 ML/MIN/{1.73_M2}
GLUCOSE UR STRIP-MCNC: 100 MG/DL
HBA1C MFR BLD: 7.9 % (ref 0–5.6)
HGB UR QL STRIP: NEGATIVE
KETONES UR STRIP-MCNC: NEGATIVE MG/DL
LEUKOCYTE ESTERASE UR QL STRIP: NEGATIVE
MICROALBUMIN UR-MCNC: 97 MG/L
MICROALBUMIN/CREAT UR: 114.81 MG/G CR (ref 0–17)
NITRATE UR QL: NEGATIVE
PH UR STRIP: 5 PH (ref 5–7)
RBC #/AREA URNS AUTO: ABNORMAL /HPF
SOURCE: ABNORMAL
SP GR UR STRIP: 1.01 (ref 1–1.03)
UROBILINOGEN UR STRIP-ACNC: 0.2 EU/DL (ref 0.2–1)
WBC #/AREA URNS AUTO: ABNORMAL /HPF

## 2019-10-01 PROCEDURE — 82565 ASSAY OF CREATININE: CPT | Performed by: FAMILY MEDICINE

## 2019-10-01 PROCEDURE — 83036 HEMOGLOBIN GLYCOSYLATED A1C: CPT | Performed by: FAMILY MEDICINE

## 2019-10-01 PROCEDURE — 81001 URINALYSIS AUTO W/SCOPE: CPT | Performed by: FAMILY MEDICINE

## 2019-10-01 PROCEDURE — 36415 COLL VENOUS BLD VENIPUNCTURE: CPT | Performed by: FAMILY MEDICINE

## 2019-10-01 PROCEDURE — 99214 OFFICE O/P EST MOD 30 MIN: CPT | Mod: 25 | Performed by: FAMILY MEDICINE

## 2019-10-01 PROCEDURE — 90471 IMMUNIZATION ADMIN: CPT | Performed by: FAMILY MEDICINE

## 2019-10-01 PROCEDURE — 90715 TDAP VACCINE 7 YRS/> IM: CPT | Performed by: FAMILY MEDICINE

## 2019-10-01 PROCEDURE — 82043 UR ALBUMIN QUANTITATIVE: CPT | Performed by: FAMILY MEDICINE

## 2019-10-01 RX ORDER — ASPIRIN 81 MG/1
81 TABLET ORAL DAILY
COMMUNITY
End: 2021-07-09

## 2019-10-01 ASSESSMENT — ANXIETY QUESTIONNAIRES
1. FEELING NERVOUS, ANXIOUS, OR ON EDGE: NOT AT ALL
2. NOT BEING ABLE TO STOP OR CONTROL WORRYING: SEVERAL DAYS
7. FEELING AFRAID AS IF SOMETHING AWFUL MIGHT HAPPEN: SEVERAL DAYS
6. BECOMING EASILY ANNOYED OR IRRITABLE: MORE THAN HALF THE DAYS
5. BEING SO RESTLESS THAT IT IS HARD TO SIT STILL: NOT AT ALL
3. WORRYING TOO MUCH ABOUT DIFFERENT THINGS: NOT AT ALL
GAD7 TOTAL SCORE: 4
IF YOU CHECKED OFF ANY PROBLEMS ON THIS QUESTIONNAIRE, HOW DIFFICULT HAVE THESE PROBLEMS MADE IT FOR YOU TO DO YOUR WORK, TAKE CARE OF THINGS AT HOME, OR GET ALONG WITH OTHER PEOPLE: SOMEWHAT DIFFICULT

## 2019-10-01 ASSESSMENT — PATIENT HEALTH QUESTIONNAIRE - PHQ9
5. POOR APPETITE OR OVEREATING: NOT AT ALL
SUM OF ALL RESPONSES TO PHQ QUESTIONS 1-9: 7

## 2019-10-01 ASSESSMENT — MIFFLIN-ST. JEOR: SCORE: 2054.54

## 2019-10-01 NOTE — PROGRESS NOTES
Subjective     Cedric Figueroa is a 63 year old male who presents to clinic today for the following health issues:    HPI         Diabetes Follow-up        Patient is checking blood sugars: not at all    Diabetic concerns: None     Symptoms of hypoglycemia (low blood sugar): none     Paresthesias (numbness or burning in feet) or sores: Yes numbness at nights      Date of last diabetic eye exam (annually):  9/2018; advised patient is due to repeat     Date of last Urine micro albumin screen, (annually):    Component      Latest Ref Rng & Units 9/21/2018   Creatinine Urine      mg/dL 77   Albumin Urine mg/L      mg/L 79   Albumin Urine mg/g Cr      0 - 17 mg/g Cr 102.59 (H)       Pneumococcal Vaccine:  Yes: utd    Influenza Vaccine (annually):   Yes: utd    Tobacco free:  No    Aspirin use:  Yes: 81mg      Amount of exercise or physical activity: None    Problems taking medications regularly: No    Medication side effects: none    Diet: regular (no restrictions)      Medication Followup of Percocet    Taking Medication as prescribed: has been taking 5 tabs/ day PRN     Side Effects:  None    Medication Helping Symptoms:  Yes    Would like to discuss refilling medication due to low amount dispensed with last script.      Patient still having uncontrolled back pain after recent surgery. Attending Physical therapy and due for a Pain consult on 10/4/2019.   Patient states that the back pain has limited his routine activities and states that after his back surgery, he developed dysuria but no frequency, gross hematuria, urgency, or flank pain. Previous urinalysis done revealed no UTI and states that unfortunately symptoms have persisted.     RENAL INSUFFICIENCY - Patient has a longstanding history of moderate-severe chronic renal insufficiency. Last Cr was .     Creatinine   Date Value Ref Range Status   10/01/2019 1.59 (H) 0.66 - 1.25 mg/dL Final         Patient Active Problem List   Diagnosis     Hyperlipidemia LDL goal  <100     Mild major depression (H)     Restless leg syndrome     Sleep apnea     Knee pain     Vitamin B 12 deficiency     Advanced directives, counseling/discussion     Iron deficiency anemia     OA (osteoarthritis) of knee     Perforated bowel (H)     Left-sided low back pain with left-sided sciatica     Type 2 diabetes mellitus with microalbuminuria, with long-term current use of insulin (H)     Hypertension goal BP (blood pressure) < 130/80     Bariatric surgery status     Perirectal abscess     CKD (chronic kidney disease) stage 3, GFR 30-59 ml/min (H)     Controlled substance agreement signed     Chronic pain syndrome     Morbid obesity (H)     S/P lumbar microdiscectomy     Bilateral low back pain without sciatica, unspecified chronicity     Past Surgical History:   Procedure Laterality Date     ABDOMEN SURGERY      peritonitis, bowel perforation, bowel resection     AS TOTAL KNEE ARTHROPLASTY      Right and Left knee x3( 2 partial knee replacement and 1 total Left knee replacement     BARIATRIC SURGERY      at age 45     COLONOSCOPY  2006    [facility] [employer]     COLONOSCOPY WITH CO2 INSUFFLATION N/A 2016    Procedure: COLONOSCOPY WITH CO2 INSUFFLATION;  Surgeon: Cedric Schneider MD;  Location: [facility] OR     DISCECTOMY LUMBAR POSTERIOR MICROSCOPIC ONE LEVEL Left 2019    Procedure: Left L4-5 hemilaminectomy, medial facetectomy, foraminotomy with microdiscectomy and use of X-ray and intraoperative microscope;  Surgeon: Aaron Ness MD;  Location: [facility] OR     INCISION AND DRAINAGE BUTTOCKS Left 3/28/2018    Procedure: INCISION AND DRAINAGE BUTTOCKS;  Incision and drainage of left buttock abscess, and fistulotomy;  Surgeon: Bala Layton MD;  Location: [facility] OR       Social History     Tobacco Use     Smoking status: Former Smoker     Last attempt to quit: 2018     Years since quittin.2     Smokeless tobacco: Never Used   Substance Use Topics     Alcohol use: Yes      Comment: occasionally     Family History   Problem Relation Age of Onset     Diabetes Mother      Diabetes Paternal Grandmother      Coronary Artery Disease Paternal Grandmother      Cerebrovascular Disease Paternal Grandmother      Bleeding Disorder No family hx of      Anesthesia Reaction No family hx of          Current Outpatient Medications   Medication Sig Dispense Refill     aspirin 81 MG EC tablet Take 81 mg by mouth daily       blood glucose (ACCU-CHEK PELON PLUS) test strip USE TO TEST BLOOD SUGAR THREE TIMES A DAY OR AS DIRECTED 100 each 11     blood glucose monitoring (ACCU-CHEK PELON PLUS) meter device kit Use to test blood sugar 3 times daily or as directed. 1 kit 0     blood glucose monitoring (SOFTCLIX) lancets USE TO TEST BLOOD SUGAR THREE TIMES A DAY OR AS DIRECTED 100 each 3     cyclobenzaprine (FLEXERIL) 10 MG tablet TAKE ONE TABLET BY MOUTH THREE TIMES A DAY AS NEEDED FOR MUSCLE SPASMS 90 tablet 1     dulaglutide (TRULICITY) 0.75 MG/0.5ML pen Inject 0.75 mg Subcutaneous every 7 days 3 mL 3     insulin glargine (LANTUS SOLOSTAR PEN) 100 UNIT/ML pen Inject 45 Units Subcutaneous At Bedtime 40 mL 3     insulin pen needle (BD ULTRA-FINE) 29G X 12.7MM Use once daily or as directed. 100 each prn     losartan-hydrochlorothiazide (HYZAAR) 100-25 MG per tablet Take 1 tablet by mouth daily 90 tablet 3     MELATONIN PO Take 10 mg by mouth At Bedtime        oxyCODONE-acetaminophen (PERCOCET) 5-325 MG tablet Take 1 tablet by mouth every 6 hours as needed for pain or moderate to severe pain maximum 4 tablet(s) per day 120 tablet 0     PARoxetine (PAXIL) 20 MG tablet Take 1 tablet (20 mg) by mouth every morning 90 tablet 3     pramipexole (MIRAPEX) 0.25 MG tablet Take 1 tablet by mouth in the morning, 1 in the afternoon and 2 tabs before bed 120 tablet 3     simvastatin (ZOCOR) 40 MG tablet Take 1 tablet (40 mg) by mouth At Bedtime 90 tablet 3     vitamin B-12 (CYANOCOBALAMIN) 1000 MCG/ML injection INJECT 1ML  "INTO THE MUSCLE EVERY 30 DAYS. 3 mL 3     No Known Allergies    Reviewed and updated as needed this visit by Provider         Review of Systems   ROS COMP: Constitutional, HEENT, cardiovascular, pulmonary, gi and gu systems are negative, except as otherwise noted.      Objective    /86   Pulse 86   Temp 98.5  F (36.9  C) (Tympanic)   Resp 16   Ht 1.905 m (6' 3\")   Wt 117.4 kg (258 lb 12.8 oz)   SpO2 98%   BMI 32.35 kg/m    Body mass index is 32.35 kg/m .  Physical Exam   GENERAL: healthy, alert and no distress  RESP: lungs clear to auscultation - no rales, rhonchi or wheezes  CV: regular rate and rhythm, normal S1 S2, no S3 or S4, no murmur, click or rub, no peripheral edema and peripheral pulses strong  MS: no gross musculoskeletal defects noted, no edema    Diagnostic Test Results:  Results for orders placed or performed in visit on 10/01/19   Hemoglobin A1c   Result Value Ref Range    Hemoglobin A1C 7.9 (H) 0 - 5.6 %   Albumin Random Urine Quantitative with Creat Ratio   Result Value Ref Range    Creatinine Urine 84 mg/dL    Albumin Urine mg/L 97 mg/L    Albumin Urine mg/g Cr 114.81 (H) 0 - 17 mg/g Cr   UA reflex to Microscopic and Culture   Result Value Ref Range    Color Urine Yellow     Appearance Urine Clear     Glucose Urine 100 (A) NEG^Negative mg/dL    Bilirubin Urine Negative NEG^Negative    Ketones Urine Negative NEG^Negative mg/dL    Specific Gravity Urine 1.015 1.003 - 1.035    Blood Urine Negative NEG^Negative    pH Urine 5.0 5.0 - 7.0 pH    Protein Albumin Urine Trace (A) NEG^Negative mg/dL    Urobilinogen Urine 0.2 0.2 - 1.0 EU/dL    Nitrite Urine Negative NEG^Negative    Leukocyte Esterase Urine Negative NEG^Negative    Source Midstream Urine    Creatinine   Result Value Ref Range    Creatinine 1.59 (H) 0.66 - 1.25 mg/dL    GFR Estimate 45 (L) >60 mL/min/[1.73_m2]    GFR Estimate If Black 52 (L) >60 mL/min/[1.73_m2]   Urine Microscopic   Result Value Ref Range    WBC Urine 0 - 5 OTO5^0 " "- 5 /HPF    RBC Urine O - 2 OTO2^O - 2 /HPF    Bacteria Urine Few (A) NEG^Negative /HPF             Assessment & Plan     Cedric was seen today for diabetes.    Diagnoses and all orders for this visit:    Type 2 diabetes mellitus with hyperglycemia and microalbuminuria, with long-term current use of insulin (H); uncontrolled. A1C 7.9  -     Hemoglobin A1c  -     Urine Microscopic  -     Start: dulaglutide (TRULICITY) 0.75 MG/0.5ML pen; Inject 0.75 mg Subcutaneous every 7 days  -     Albumin Random Urine Quantitative with Creat Ratio  -     OPHTHALMOLOGY ADULT REFERRAL    S/p recent lumbar microdiscectomy with persistent low back pain       -    Following with Physical Therapy       -    Persistent pain despite narcotic therapy; due for a Pain Management consult on 10/4/2019    Dysuria, persistent  -     UA reflex to Microscopic and Culture  -     UROLOGY ADULT REFERRAL    CKD (chronic kidney disease) stage 3, GFR 30-59 ml/min (H)  -     Creatinine    Need for Tdap vaccination  -     TDAP VACCINE  -     ADMIN 1st VACCINE         BMI:   Estimated body mass index is 32.35 kg/m  as calculated from the following:    Height as of this encounter: 1.905 m (6' 3\").    Weight as of this encounter: 117.4 kg (258 lb 12.8 oz).   Weight management plan: Discussed healthy diet and exercise guidelines      Return in about 3 months (around 1/1/2020) for Diabetes Follow Up with a HgbA1C prior to visit.    Molly Leiva MD  [facility]          "

## 2019-10-02 ASSESSMENT — ANXIETY QUESTIONNAIRES: GAD7 TOTAL SCORE: 4

## 2019-10-03 NOTE — PROGRESS NOTES
"Cedric Figureoa is a 63 year old male     This patient is being seen in consultation at the request of his primary care provider Dr. Leiva, for evaluation of his pain issues and recommendations for management, with specific emphasis on:     Reason for Referral: Consult-only- patient seen for one-time evaluation to provide recommendations back to referring provider.    Do you have any specific questions for the pain specialist? No  Are there any red flags that may impact the assessment or management of the patient? None  What is your diagnosis for the patient's pain? Worsening pain, s/p Lumbar microdiscectomy    Primary Care Provider is Molly Leiva    Current controlled substance medications are being prescribed by: PCP    Please see the [facility] [place] health questionnaire which the patient completed and reviewed with me in detail    CHIEF COMPLAINT:  Worsening low back pain s/p microdisectomy 4/2019    HISTORY OF PRESENT ILLNESS:  Cedric Figueroa is a 63 year old male with history of low back pain and knee pain     Pain Information:   Onset/Progression:  Pain started   Left knee: had two knee replacements , last 3 yrs ago, two years ago fell twice and \"broke a chunk\" off of it. Has been taking Percocet 5/325 mg QID for knee. Has not been to seeing surgeon for ongoing pain since last post op appt. Dr Foley at [facility].    Low back pain: had N/T left leg. Had microdiscectomy and hemilaminectomy@ L4-5. Has developed a seroma near the surgical site which is painful. Per Dr Ness, he does not want to operate at this time. Wanted to wait at least a couple of months. Has not been back to [facility] since last visit which he did not feel was a positive experience. Going to PT, at first was too strenuous., new exercises are not helpful.    Pain quality: Aching, Sharp and Shooting    Pain timing: Constant     Pain rating: intensity ranges from 3/10 to 9/10, and averages 7/10 on a 0-10 scale.   Aggravating " factors include: Sitting, bending, standing.    Relieving factors include: Changing positions, stretch, Percocet 5/325 mg 5-6 tabs per day.       Past Pain Treatments:    Pain Clinic:   Yes     PT: Yes, either too painful or NH      Psychologist: No   Relaxation techniques/biofeedback: No   Chiropractor: No   Acupuncture: No   Pharmacotherapy:     Opioids: Yes      Non-opioids:  Yes    TENs Unit:Yes: NH   Injections: Yes, prior to knee surgeries.    Self-care:   Yes    Surgeries related to pain: Yes: April 2019: Microdiscectomy and hemilaminectomy@ L4-5      Current Pain Relevant Medications:    Percocet 5/325 mg 5-6 tabs per day   Total opiate dose: 37.5-45 MME/day  Cyclobenzaprine: 10 gm TID    Previous Pain Relevant Medications: (H--helped; HI--Helped initially; [facility]--Somewhat helpful; NH--No help; W--worse; SE--side effects; ?--Unsure if helpful)   NOTE: This medication information taken from patient's intake form, not medical records.    Opiates: Oxycodone: H, tramadol: NH   NSAIDS: Rare use of ibuprofen due to chronic kidney disease:SW    Muscle Relaxants: Cyclobenzaprine:?   Anti-migraine mediations: None noted   Anti-depressants: Paxil: Not for pain   Sleep aids: None noted   Anxiolytics: None noted   Neuropathics: None noted    Topicals: None noted   Other medications not covered above: None noted    Any illicit drug use: Last use 40 + years ago   EtOH use: Three per week, advised today to stop all ETOH consumption due to opiate use  Caffeine use: 4 per day   Nicotine use: none, quit 1 year ago, occasionally vapes, lowest dose.   Any use of prescriptions other than how they were prescribed:denies       THE 4 As OF OPIOID MAINTENANCE ANALGESIA    Analgesia: Is pain relief clinically significant? YES   Activity: Is patient functional and able to perform Activities of Daily Living? YES   Adverse effects: Is patient free from adverse side effects from opiates? YES   Adherence to Rx protocol: Is patient adhering  to Controlled Substance Agreement and taking medications ONLY as ordered? YES    Is Narcan prescribed for opiate use >50 MME daily? N/A    [place] Board of Pharmacy Data Base Reviewed:    YES; No concern for abuse or misuse of controlled medications based on this report.       PAST MEDICAL HISTORY:   Past Medical History:   Diagnosis Date     Hyperlipidemia LDL goal <100 2012     Hypertension goal BP (blood pressure) < 130/80 2012     Mild major depression (H) 2012     Renal disease 2017    acute kidney failure 2 years ago     Restless leg syndrome     controlled on Mirapex     Sleep apnea     CPAP     Tobacco use     cigar use     Type 2 diabetes, HbA1c goal < 7% (H) 2012       CURRENT FAMILY/SOCIAL SITUATION:  Living situation: Lives with wife in [facility]  Support system: Wife,   Occupation: , 40 hours/weeks   Current stressors: pain   Safety concerns: frequently falls.     FAMILY MEDICAL HISTORY:  Chronic pain: No   Family history of headaches:  No      HEALTH & LIFESTYLE PRACTICES:  Social History     Socioeconomic History     Marital status:      Spouse name: Not on file     Number of children: Not on file     Years of education: Not on file     Highest education level: Not on file   Occupational History     Not on file   Social Needs     Financial resource strain: Not on file     Food insecurity:     Worry: Not on file     Inability: Not on file     Transportation needs:     Medical: Not on file     Non-medical: Not on file   Tobacco Use     Smoking status: Former Smoker     Last attempt to quit: 2018     Years since quittin.2     Smokeless tobacco: Never Used   Substance and Sexual Activity     Alcohol use: Yes     Comment: occasionally     Drug use: No     Sexual activity: Never   Lifestyle     Physical activity:     Days per week: Not on file     Minutes per session: Not on file     Stress: Not on file   Relationships     Social connections:     Talks on  phone: Not on file     Gets together: Not on file     Attends Hoahaoism service: Not on file     Active member of club or organization: Not on file     Attends meetings of clubs or organizations: Not on file     Relationship status: Not on file     Intimate partner violence:     Fear of current or ex partner: Not on file     Emotionally abused: Not on file     Physically abused: Not on file     Forced sexual activity: Not on file   Other Topics Concern     Parent/sibling w/ CABG, MI or angioplasty before 65F 55M? Not Asked   Social History Narrative     Not on file       ALLERGIES:  No Known Allergies    MEDICATIONS:  Current Outpatient Medications   Medication Sig Dispense Refill     aspirin 81 MG EC tablet Take 81 mg by mouth daily       blood glucose (ACCU-CHEK PELON PLUS) test strip USE TO TEST BLOOD SUGAR THREE TIMES A DAY OR AS DIRECTED 100 each 11     blood glucose monitoring (ACCU-CHEK PELON PLUS) meter device kit Use to test blood sugar 3 times daily or as directed. 1 kit 0     blood glucose monitoring (SOFTCLIX) lancets USE TO TEST BLOOD SUGAR THREE TIMES A DAY OR AS DIRECTED 100 each 3     cyclobenzaprine (FLEXERIL) 10 MG tablet TAKE ONE TABLET BY MOUTH THREE TIMES A DAY AS NEEDED FOR MUSCLE SPASMS 90 tablet 1     dulaglutide (TRULICITY) 0.75 MG/0.5ML pen Inject 0.75 mg Subcutaneous every 7 days 3 mL 3     insulin glargine (LANTUS SOLOSTAR PEN) 100 UNIT/ML pen Inject 45 Units Subcutaneous At Bedtime 40 mL 3     insulin pen needle (BD ULTRA-FINE) 29G X 12.7MM Use once daily or as directed. 100 each prn     losartan-hydrochlorothiazide (HYZAAR) 100-25 MG per tablet Take 1 tablet by mouth daily 90 tablet 3     MELATONIN PO Take 10 mg by mouth At Bedtime        oxyCODONE-acetaminophen (PERCOCET) 5-325 MG tablet Take 1 tablet by mouth every 6 hours as needed for pain or moderate to severe pain maximum 4 tablet(s) per day 120 tablet 0     PARoxetine (PAXIL) 20 MG tablet Take 1 tablet (20 mg) by mouth every  morning 90 tablet 3     pramipexole (MIRAPEX) 0.25 MG tablet Take 1 tablet by mouth in the morning, 1 in the afternoon and 2 tabs before bed 120 tablet 3     simvastatin (ZOCOR) 40 MG tablet Take 1 tablet (40 mg) by mouth At Bedtime 90 tablet 3     vitamin B-12 (CYANOCOBALAMIN) 1000 MCG/ML injection INJECT 1ML INTO THE MUSCLE EVERY 30 DAYS. 3 mL 3       REVIEW OF SYSTEMS:   Constitutional:  Fatigue  Eyes/Head: Negative  Ears/Nose/Throat: Negative  Allergy/Immune: Negative  Skin:Negative  Hematologic/Lymphatic/Immunologic:Negative  Respiratory: Negative  Cardiovascular: Negative  Gastrointestinal: Negative  Endocrine: Diabetes  Musculoskeletal: Back pain and Joint pain  Urinary:  Hesitancy   Any bowel or bladder incontinence: Denies   Neurologic: Negative  Psychiatric: Stress    PHYSICAL EXAM    /84   Pulse 96      Appearance:     A&O. Patient is appropriate.   Patient is in NAD.     HEENT:   Normocephalic, atraumatic, sclera, conjunctiva and pharynx normal. Pupils are equal, round. Hearing is adequate for exam .  Neck: No deformities or adenopathy  Cardiovascular:  No JVD appreciated. No edema on bilateral lower extremities.   Skin:  No rashes, erythema, breakdowns, lesions to exposed skin.   Hematologic:  No bruises, petechiae or ecchymosis to exposed areas.  Musculoskeletal:  Posture stooped forward due to pain, shoulders and pelvis are leveled.   Deltoid: R: 4/5 L: 4/5  Biceps: R: 4/5 L: 4/5  Triceps: R: 4/5 L: 4/5  Intrinsic hand:R: 4/5 L: 4/5  Hip flexion: R: 3/5 L: 3/5  Knee ext: R: 3/5 L: 3/5  Knee flex: R: 3/5 L: 3/5  Dorsiflexion: R: 3/5 L: 3/5  Plantarflexion:R: 3/5 L: 3/5    Gait pattern:  Very slow to rise from seated position.  Able to walk on the heels and toes with minimal pain. Patient has antalgic gait favoring the right and left side.     Neurological:   Deep Tendon Reflex exam:   Biceps:     R:  2/4   L: 2/4   Brachioradialis   R:  2/4   L: 2/4:   Triceps:  R:  2/4   L: 2/4   Patella:  R:   2/4   L: 2/4   Achilles:  R:  2/4   L: 2/4    Sensory exam:   Light touch: normal bilateral upper and lower extremities    Vibration: normal in LE   No allodynia, dysesthesia, or hyperalgesia.    Cervical spine: Exam declined, patient states he has no cervical pain    Thoracic spine:    Kyphosis. Yes   Tenderness in the thoracic spine at midline. No   Tenderness in the thoracic paraspinal muscles. No  Lumbar/Sacral spine:   Forward Flexion:  40 degrees, painful    Ext: 30 degrees, painful    Rotation/ext to right: painful    Rotation/ext to left: painful    Lordosis. No   Tenderness in the lumbar spine at midline. Yes   Tenderness in the lumbar paraspinal muscles.Yes   Straight leg exam: Unable to tolerate due to pain   Amparo/Darryl's test: Unable to tolerate due to pain   Passive internal rotation: Unable to tolerate due to pain   Active external rotation: Unable to tolerate due to pain   Tenderness over SI joint:      Right: negative     Left:  positive   Tenderness over piriformis:     Right: negative     Left:  negative   Tenderness over Trochanteric Bursa:     Right: negative     Left: negative     Psychiatric:  mentation appears normal., affect and mood normal    DIRE Score for ongoing opioid management is calculated as follows:    Diagnosis = 2 pts (slowly progressive; moderate pain/objective findings)    Intractability = 2 pts (most treatments tried; patient not fully engaged/barriers)    Risk        Psych = 3 pts (no significant personality dysfunction/mental illness; good communication with clinic)         Chem Hlth = 3 pts (no history of chemical dependency; not drug-focused)       Reliability = 3 pts (highly reliable with meds, appointments, treatments)       Social = 3 pts (supportive family/close relationships; involved in work/school; no isolation)       (Psych + Chem hlth + Reliability + Social) = 16    Efficacy = 1 pt (poor function; minimal pain relief despite mod/high med dose)    DIRE Score = 17         7-13: likely NOT suitable candidate for long-term opioid analgesia       14-21: may be a suitable candidate for long-term opioid analgesia    Previous Diagnostic Tests:   Imaging Studies:   MRI LUMBAR SPINE WITHOUT AND WITH CONTRAST   7/23/2019 8:21 PM   HISTORY: Back pain, prior surgery, new or progressive sx; S/P lumbar  microdiscectomy   TECHNIQUE: Multiplanar multisequence MRI of the lumbar spine without  and with 12 mL Gadavist   COMPARISON: Lumbar spine radiographs 4/4/2019, MRI of the lumbar spine  2/21/2019.  FINDINGS: Alignment is normal. No fracture. Mild type I degenerative  endplate changes are again seen along the leftward superior endplate  of L5. Mildly heterogeneous marrow signal, without concerning focal  lesion. There is mild multilevel disc height loss, most pronounced at  L4-5 and L5-S1.  Post surgical changes of left L4-5 hemilaminectomy are noted. There is  a loculated fluid collection which appears to be contiguous with the  left L4-5 facet joint, emanating through the laminectomy defect and  through the midline aspect of the erector spinae musculature and  overlying fascia, with the dominant component of the fluid collection  residing within the overlying posterior superficial subcutaneous soft  tissues. This fluid collection shows intense postcontrast enhancement  along the periphery, but does not show internal restricted diffusion.  The fluid collection measures approximately 4.6 x 2.9 x 3.2 cm. There  is also prominent periarticular enhancement about the left L4-5 facet  joint, with a small joint effusion. The conus terminates at L1.  Segmental analysis:  T12-L1: Shallow disc bulge. No spinal or neural foraminal stenosis.  L1-L2: Shallow disc bulge. No spinal or neural foraminal stenosis.  L2-L3: Shallow symmetric disc bulge with mild facet arthropathy. No  spinal, lateral recess, or neural foraminal stenosis.  L3-L4: Shallow disc bulge with mild facet arthropathy and  ligamentum  flavum thickening. No significant spinal, lateral recess, or neural  foraminal stenosis.  L4-L5: Degenerative changes of left hemilaminectomy, with interval  debridement of the previously seen left lateral recess disc extrusion.  There is a persistent central disc herniation with herniated disc  material that extends inferiorly and slightly toward the left along  the lateral recess. There is mild to moderate left lateral recess  stenosis and mild right lateral recess stenosis, without central  spinal stenosis on the current study. There is contact and slight  residual posterior displacement of the traversing left L5 nerve root  (series 5 image 20). Moderate left and mild right neural foraminal  stenosis, similar to prior.  L5-S1: There is a disc bulge with moderate facet arthropathy. No  significant spinal, lateral recess, or neural foraminal stenosis.  IMPRESSION:  1. Interval posterior changes status post left L4-L5 hemilaminectomy  for debridement of a lateral recess disc extrusion at that level.  There is a residual central disc protrusion that extends slightly  inferiorly and to the leftward lateral recess, with mild to moderate  residual left lateral recess stenosis and slight displacement of the  traversing left L5 nerve root. No residual central spinal stenosis  L4-5.  2. Large loculated peripherally enhancing subcutaneous soft tissue  fluid collection without central restricted diffusion measuring 4.6 x  2.9 x 3.2 cm. A small portion of the collection appears to be  continuous with the left L4-5 facet joint, extending through the  hemilaminectomy defect and through the erector spinae musculature and  overlying fascia to be centered within the superficial subcutaneous  soft tissues. This may represent a loculated residual postsurgical  seroma. Sequela of CSF leak is also a consideration. Superimposed  infection is felt to be unlikely given the absence of diffusion  restriction, but not entirely  "excluded.        ASSESSMENT:   1.  Chronic low back pain status post L4-5 microdiscectomy and hemicolectomy  2.  Chronic left knee pain status post surgery    Cedric is a very pleasant man who presents for ongoing low back pain and knee pain status post surgical repairs.  As noted above he will likely be having a surgical intervention in the next few months for the seroma at his lumbar surgical site however his pain is very poorly controlled at this time.  He is not interested in participating in the multidisciplinary pain management program.  I explained that we are not a prescribing only clinic however I can certainly make recommendations back to his primary care provider to implement as she CFIDS.  Patient is informed that the decision to prescribe is up to the prescriber and I am only making recommendations.    Medication recommendation:  Current dose of opiates equals 37.5-50 MME.  He notes \"peaks and valleys\" and pain control throughout the day.  Recommend a combination of long and short acting opiates for better baseline pain control.  1:OxyContin 10 mg BID or MS Contin BID15 mg  = 30 MME     Oxycodone 5 mg BID  = 15 MME   Total recommended dose  = 45 MME  2: Consider gabapentin, dosing as appropriate given chronic kidney disease    On examination Cedric had exquisite tenderness with palpation to the left SI joint.  I recommended to Cedric that he have a left SI joint injection.  If he chooses to do so this can be ordered by his primary care provider with a \"injection only\" order or I am happy to place that as well.  He will follow-up with us by phone if he wishes to have the injection.  I did advise that he speak with his back surgeon prior to doing any injection as steroids can interfere with healing and his surgeon may not want to have him do an injection if surgery date is imminent.     If Cedric wishes to participate in a multidisciplinary pain management program, I am more than happy to work with him on an " ongoing basis.  I am happy to consult with primary care providers or specialists at any time regarding Cedric's ongoing pain management needs.    PLAN:    Diagnosis reviewed, treatment option addressed, and risk/benefits discussed.  Self-care instructions given.  I am recommending a multidisciplinary treatment plan to help this patient better manage pain.       1. Schedule follow-up with FEMI Benson, NP-C as needed   2. Procedures recommended: Left SI joint injection    3. Medication recommendations:   1. We will make medication recommendations to primary care provider to implement at their discretion.   2. A combination of long and short acting opiate pain medications. See above details   3. Add gabapentin, dose based on renal requirement.     TIME SPENT:     A total of 60 minutes was spent on the patient today, greater than 50% of that time was spent on face to face counseling and care coordination regarding diagnoses and treatment options as mentioned above including the multidisciplinary pain management program and the benefits of interventional procedures (as appropriate), medication management, physical therapy and pain psychology.        I would like to thank Dr. Leiva for allowing me to participate in the management of this patient.     FEMI Carson, NP-C  [facility]    Disclaimer: This note consists of symbols derived from keyboarding, dictation and/or voice recognition software. As a result, there may be errors in the script that have gone undetected. Please consider this when interpreting information found in this chart.

## 2019-10-04 ENCOUNTER — OFFICE VISIT (OUTPATIENT)
Dept: PALLIATIVE MEDICINE | Facility: CLINIC | Age: 64
End: 2019-10-04
Payer: COMMERCIAL

## 2019-10-04 VITALS — HEART RATE: 96 BPM | SYSTOLIC BLOOD PRESSURE: 134 MMHG | DIASTOLIC BLOOD PRESSURE: 84 MMHG

## 2019-10-04 DIAGNOSIS — G89.29 CHRONIC LEFT-SIDED LOW BACK PAIN WITH LEFT-SIDED SCIATICA: Primary | ICD-10-CM

## 2019-10-04 DIAGNOSIS — M54.42 CHRONIC LEFT-SIDED LOW BACK PAIN WITH LEFT-SIDED SCIATICA: Primary | ICD-10-CM

## 2019-10-04 DIAGNOSIS — M25.562 CHRONIC PAIN OF LEFT KNEE: ICD-10-CM

## 2019-10-04 DIAGNOSIS — G89.29 CHRONIC PAIN OF LEFT KNEE: ICD-10-CM

## 2019-10-04 PROCEDURE — 99215 OFFICE O/P EST HI 40 MIN: CPT | Performed by: NURSE PRACTITIONER

## 2019-10-04 PROCEDURE — 99207 ZZC DOWN CODE DUE TO SUBSEQUENT EXAM: CPT | Performed by: NURSE PRACTITIONER

## 2019-10-04 ASSESSMENT — PAIN SCALES - GENERAL: PAINLEVEL: EXTREME PAIN (8)

## 2019-10-04 NOTE — PATIENT INSTRUCTIONS
After Visit Instructions:     Thank you for coming to [facility] for your care. It is my goal to partner with you to help you reach your optimal state of health.     Since this was a consultation, we will make recommend    1. Schedule follow-up with FEMI Benson, NP-C as needed   2. Procedures recommended: Left SI joint injection    3. Medication recommendations:   1. We will make medication recommendations to primary care provider to implement at their discretion.   2. A combination of long and short acting opiate pain medications.   3. Add gabapentin, dose based on renal requirement.     FEMI Carson, NP-C  [facility]  [facility]    Clinic Number:  [phone]     Call with any questions about your care and for scheduling assistance.     Calls are returned Monday through Friday between 8 AM and 4:30 PM. We usually get back to you within 2 business days depending on the issue/request.    If we are prescribing your medications:    For opioid medication refills, call the clinic or send a Gig[employer] message 7 days in advance.  Please include:    Name of requested medication    Name of the pharmacy.    For non-opioid medications, call your pharmacy directly to request a refill. Please allow 3-4 days to be processed.     Per [place] State Law:    All controlled substance prescriptions must be filled within 30 days of being written.      For those controlled substances allowing refills, pickup must occur within 30 days of last fill.      We believe regular attendance is key to your success in our program!      Any time you are unable to keep your appointment we ask that you call us at least 24 hours in advance to cancel.This will allow us to offer the appointment time to another patient.   Multiple missed appointments may lead to dismissal from the clinic.

## 2019-10-07 ENCOUNTER — THERAPY VISIT (OUTPATIENT)
Dept: PHYSICAL THERAPY | Facility: CLINIC | Age: 64
End: 2019-10-07
Payer: COMMERCIAL

## 2019-10-07 DIAGNOSIS — Z98.890 S/P LUMBAR MICRODISCECTOMY: ICD-10-CM

## 2019-10-07 DIAGNOSIS — M54.50 BILATERAL LOW BACK PAIN WITHOUT SCIATICA, UNSPECIFIED CHRONICITY: ICD-10-CM

## 2019-10-07 PROCEDURE — 97112 NEUROMUSCULAR REEDUCATION: CPT | Mod: GP | Performed by: PHYSICAL THERAPIST

## 2019-10-07 PROCEDURE — 97110 THERAPEUTIC EXERCISES: CPT | Mod: GP | Performed by: PHYSICAL THERAPIST

## 2019-10-08 DIAGNOSIS — G89.29 CHRONIC BILATERAL LOW BACK PAIN, UNSPECIFIED WHETHER SCIATICA PRESENT: ICD-10-CM

## 2019-10-08 DIAGNOSIS — M54.50 CHRONIC BILATERAL LOW BACK PAIN, UNSPECIFIED WHETHER SCIATICA PRESENT: ICD-10-CM

## 2019-10-08 DIAGNOSIS — G89.4 CHRONIC PAIN SYNDROME: Primary | ICD-10-CM

## 2019-10-08 DIAGNOSIS — Z98.890 S/P LUMBAR MICRODISCECTOMY: ICD-10-CM

## 2019-10-08 DIAGNOSIS — M25.562 CHRONIC PAIN OF LEFT KNEE: ICD-10-CM

## 2019-10-08 DIAGNOSIS — G89.29 CHRONIC PAIN OF LEFT KNEE: ICD-10-CM

## 2019-10-08 RX ORDER — OXYCODONE HCL 10 MG/1
10 TABLET, FILM COATED, EXTENDED RELEASE ORAL EVERY 12 HOURS
Qty: 60 TABLET | Refills: 0 | Status: SHIPPED | OUTPATIENT
Start: 2019-10-08 | End: 2019-11-07

## 2019-10-08 RX ORDER — OXYCODONE HYDROCHLORIDE 5 MG/1
5 TABLET ORAL 2 TIMES DAILY
Qty: 60 TABLET | Refills: 0 | Status: SHIPPED | OUTPATIENT
Start: 2019-10-08 | End: 2019-11-07

## 2019-10-09 ENCOUNTER — TELEPHONE (OUTPATIENT)
Dept: FAMILY MEDICINE | Facility: CLINIC | Age: 64
End: 2019-10-09

## 2019-10-09 DIAGNOSIS — I10 HYPERTENSION GOAL BP (BLOOD PRESSURE) < 140/90: ICD-10-CM

## 2019-10-09 RX ORDER — LOSARTAN POTASSIUM AND HYDROCHLOROTHIAZIDE 25; 100 MG/1; MG/1
TABLET ORAL
Qty: 90 TABLET | Refills: 1 | Status: SHIPPED | OUTPATIENT
Start: 2019-10-09 | End: 2020-03-03

## 2019-10-09 NOTE — TELEPHONE ENCOUNTER
Hello,    This drug Oxycontin 10mg and Oxycodone 5mg requires prior authorization. Please contact insurance at [phone] .   Patient's ID: [id number] .    Please contact pharmacy when prior authorization has been approved. We will contact patient when order has been filled.     Oxycodone 5mg is covered but is limited to a 2 week supply or less by insurance unless a PA is approved.    RxBin: [id number]  RxPCN: [id number]    Thank You,  Mariangel Del Rio, Pharmacy Tech  [facility]

## 2019-10-09 NOTE — TELEPHONE ENCOUNTER
Prior Authorization Approval    Authorization Effective Date: 9/9/2019  Authorization Expiration Date: 10/8/2020  Medication: Oxycontin 10mg  Approved Dose/Quantity: 60  Reference #: [id number]   Insurance Company: [employer] - Phone [phone] Fax [phone]  Which Pharmacy is filling the prescription (Not needed for infusion/clinic administered): [facility] [place] [id number] [facility] [place]  Pharmacy Notified: Yes  Patient Notified: **Instructed pharmacy to notify patient when script is ready to /ship.**

## 2019-10-09 NOTE — TELEPHONE ENCOUNTER
Please see telephone encounter created 10/9/19 for updates on the oxycodone 5mg as PA team can only document one medication per encounter.

## 2019-10-09 NOTE — TELEPHONE ENCOUNTER
Routing refill request to provider for review/approval because:  Labs out of range:  Creatinine (see below)   Last OV 10/1/19.     Loren Morrell RN BSN

## 2019-10-09 NOTE — TELEPHONE ENCOUNTER
PA Initiation    Medication: oxyCODONE (ROXICODONE) 5 MG tablet  Insurance Company: [employer] - Phone [phone] Fax [phone]  Pharmacy Filling the Rx: [facility] - [id number] [facility] - [place], [place]  Filling Pharmacy Phone: [phone]  Filling Pharmacy Fax:    Start Date: 10/9/2019    [place] Prior Authorization Team   Phone: [phone]        Awaiting additional questions via CMM

## 2019-10-09 NOTE — TELEPHONE ENCOUNTER
PA Initiation    Medication: Oxycontin 10mg  Insurance Company: [employer] - Phone [phone] Fax [phone]  Pharmacy Filling the Rx: [facility] - [address] [facility] [place]  Filling Pharmacy Phone: [phone]  Filling Pharmacy Fax:    Start Date: 10/9/2019    [place] Prior Authorization Team   Phone: [phone]

## 2019-10-09 NOTE — TELEPHONE ENCOUNTER
"Requested Prescriptions   Pending Prescriptions Disp Refills     losartan-hydrochlorothiazide (HYZAAR) 100-25 MG tablet [Pharmacy Med Name: LOSARTAN/HCTZ 100-25MG TAB] 90 tablet 3     Sig: TAKE ONE TABLET BY MOUTH EVERY DAY   Last Written Prescription Date:  6-27-19  Last Fill Quantity: 90,  # refills: 3   Last office visit: 10/1/2019 with prescribing provider:  10-1-19   Future Office Visit:      Angiotensin-II Receptors Failed - 10/9/2019 10:05 AM        Failed - Normal serum creatinine on file in past 12 months     Recent Labs   Lab Test 10/01/19  1716   CR 1.59*             Passed - Last blood pressure under 140/90 in past 12 months     BP Readings from Last 3 Encounters:   10/04/19 134/84   10/01/19 125/86   07/22/19 131/75                 Passed - Recent (12 mo) or future (30 days) visit within the authorizing provider's specialty     Patient has had an office visit with the authorizing provider or a provider within the authorizing providers department within the previous 12 mos or has a future within next 30 days. See \"Patient Info\" tab in inbasket, or \"Choose Columns\" in Meds & Orders section of the refill encounter.              Passed - Medication is active on med list        Passed - Patient is age 18 or older        Passed - Normal serum potassium on file in past 12 months     Recent Labs   Lab Test 07/22/19  1610   POTASSIUM 3.9                    "

## 2019-10-09 NOTE — TELEPHONE ENCOUNTER
Copied from 10/9/19 encounter:     This drug Oxycontin 10mg and Oxycodone 5mg requires prior authorization. Please contact insurance at [phone] .   Patient's ID: [id number] .    Please contact pharmacy when prior authorization has been approved. We will contact patient when order has been filled.      Oxycodone 5mg is covered but is limited to a 2 week supply or less by insurance unless a PA is approved.     RxBin: [id number]  RxPCN: [id number]     Thank You,  Mariangel Del Rio, Pharmacy Tech  [facility]

## 2019-10-10 NOTE — TELEPHONE ENCOUNTER
Prior Authorization Approval    Authorization Effective Date: 9/10/2019  Authorization Expiration Date: 10/9/2020  Medication: oxyCODONE (ROXICODONE) 5 MG tablet  Approved Dose/Quantity: 60  Reference #:   [id number]  Insurance Company: [employer] - Phone [phone] Fax [phone]  Which Pharmacy is filling the prescription (Not needed for infusion/clinic administered): [facility] - [id number] [facility]  Pharmacy Notified: Yes- patient already picked up this medication

## 2019-10-28 DIAGNOSIS — G25.81 RESTLESS LEG SYNDROME: ICD-10-CM

## 2019-10-28 NOTE — TELEPHONE ENCOUNTER
"Requested Prescriptions   Pending Prescriptions Disp Refills     pramipexole (MIRAPEX) 0.25 MG tablet [Pharmacy Med Name: PRAMIPEXOLE 0.25 MG] 120 tablet 3     Sig: TAKE ONE TABLET BY MOUTH EVERY MORNING, THEN TAKE ONE TABLET EVERY AFTERNOON, THEN TAKE TWO TABLETS BEFORE BED.   Last Written Prescription Date:  9-6-19  Last Fill Quantity: 120,  # refills: 3   Last office visit: 10/1/2019 with prescribing provider:  10-1-19   Future Office Visit:      Antiparkinson's Agents Protocol Passed - 10/28/2019  9:00 AM        Passed - Blood pressure under 140/90 in past 12 months     BP Readings from Last 3 Encounters:   10/04/19 134/84   10/01/19 125/86   07/22/19 131/75                 Passed - CBC on record in past 12 months     Recent Labs   Lab Test 07/22/19  1610   WBC 7.0   RBC 4.13*   HGB 12.5*   HCT 38.5*                    Passed - ALT on record in past 12 months         Recent Labs   Lab Test 07/22/19  1610   ALT 38             Passed - Serum Creatinine on file in past 12 months     Recent Labs   Lab Test 10/01/19  1716   CR 1.59*             Passed - Medication is active on med list        Passed - Patient is age 18 or older        Passed - Recent (6 mo) or future (30 days) visit within the authorizing provider's specialty     Patient had office visit in the last 6 months or has a visit in the next 30 days with authorizing provider or within the authorizing provider's specialty.  See \"Patient Info\" tab in inbasket, or \"Choose Columns\" in Meds & Orders section of the refill encounter.            "

## 2019-10-29 NOTE — TELEPHONE ENCOUNTER
Routing refill request to provider for review/approval because:  Labs out of range:  See below

## 2019-10-30 RX ORDER — PRAMIPEXOLE DIHYDROCHLORIDE 0.25 MG/1
TABLET ORAL
Qty: 120 TABLET | Refills: 3 | Status: SHIPPED | OUTPATIENT
Start: 2019-10-30 | End: 2020-03-09

## 2019-11-07 ENCOUNTER — MYC MEDICAL ADVICE (OUTPATIENT)
Dept: FAMILY MEDICINE | Facility: CLINIC | Age: 64
End: 2019-11-07

## 2019-11-07 DIAGNOSIS — G89.29 CHRONIC BILATERAL LOW BACK PAIN, UNSPECIFIED WHETHER SCIATICA PRESENT: ICD-10-CM

## 2019-11-07 DIAGNOSIS — M25.562 CHRONIC PAIN OF LEFT KNEE: ICD-10-CM

## 2019-11-07 DIAGNOSIS — Z98.890 S/P LUMBAR MICRODISCECTOMY: ICD-10-CM

## 2019-11-07 DIAGNOSIS — G89.29 CHRONIC PAIN OF LEFT KNEE: ICD-10-CM

## 2019-11-07 DIAGNOSIS — G89.4 CHRONIC PAIN SYNDROME: ICD-10-CM

## 2019-11-07 DIAGNOSIS — M54.50 CHRONIC BILATERAL LOW BACK PAIN, UNSPECIFIED WHETHER SCIATICA PRESENT: ICD-10-CM

## 2019-11-07 RX ORDER — OXYCODONE HYDROCHLORIDE 5 MG/1
5 TABLET ORAL 2 TIMES DAILY
Qty: 60 TABLET | Refills: 0 | Status: SHIPPED | OUTPATIENT
Start: 2019-11-07 | End: 2019-12-05

## 2019-11-07 RX ORDER — OXYCODONE HCL 10 MG/1
10 TABLET, FILM COATED, EXTENDED RELEASE ORAL EVERY 12 HOURS
Qty: 60 TABLET | Refills: 0 | Status: SHIPPED | OUTPATIENT
Start: 2019-11-07 | End: 2019-12-05

## 2019-11-07 RX ORDER — OXYCODONE HYDROCHLORIDE 5 MG/1
5 TABLET ORAL 2 TIMES DAILY
Qty: 60 TABLET | Refills: 0 | Status: CANCELLED | OUTPATIENT
Start: 2019-11-07

## 2019-11-07 RX ORDER — OXYCODONE HCL 10 MG/1
10 TABLET, FILM COATED, EXTENDED RELEASE ORAL EVERY 12 HOURS
Qty: 60 TABLET | Refills: 0 | Status: CANCELLED | OUTPATIENT
Start: 2019-11-07

## 2019-11-07 NOTE — PROGRESS NOTES
11/7/19:    Patient has failed to schedule follow-up since last visit and is to be considered discharged from PT at this time. STGs and LTGs addressed in addendum to this note.

## 2019-11-22 DIAGNOSIS — F33.0 MILD RECURRENT MAJOR DEPRESSION (H): ICD-10-CM

## 2019-11-22 NOTE — TELEPHONE ENCOUNTER
"Requested Prescriptions   Pending Prescriptions Disp Refills     PARoxetine (PAXIL) 20 MG tablet [Pharmacy Med Name: PAROXETINE HCL 20MG TABS]  Last Written Prescription Date:  07/05/19  Last Fill Quantity: 90,  # refills: 3   Last office visit: 10/1/2019 with prescribing provider:  ALEXIS eLiva   Future Office Visit:       90 tablet 3     Sig: TAKE ONE TABLET BY MOUTH EVERY MORNING       SSRIs Protocol Failed - 11/22/2019  3:31 PM     CHARITY-7 SCORE 11/28/2018 2/11/2019 10/1/2019   Total Score - - -   Total Score 8 5 4       PHQ-9 SCORE 11/28/2018 2/11/2019 10/1/2019   PHQ-9 Total Score - - -   PHQ-9 Total Score 7 6 7        Failed - PHQ-9 score less than 5 in past 6 months     Please review last PHQ-9 score.           Passed - Medication is active on med list        Passed - Patient is age 18 or older        Passed - Recent (6 mo) or future (30 days) visit within the authorizing provider's specialty     Patient had office visit in the last 6 months or has a visit in the next 30 days with authorizing provider or within the authorizing provider's specialty.  See \"Patient Info\" tab in inbasket, or \"Choose Columns\" in Meds & Orders section of the refill encounter.            \  "

## 2019-11-25 RX ORDER — PAROXETINE 20 MG/1
TABLET, FILM COATED ORAL
Qty: 90 TABLET | Refills: 3 | Status: SHIPPED | OUTPATIENT
Start: 2019-11-25 | End: 2020-01-27

## 2019-12-04 DIAGNOSIS — Z98.890 S/P LUMBAR MICRODISCECTOMY: ICD-10-CM

## 2019-12-05 RX ORDER — CYCLOBENZAPRINE HCL 10 MG
TABLET ORAL
Qty: 90 TABLET | Refills: 1 | Status: SHIPPED | OUTPATIENT
Start: 2019-12-05 | End: 2020-02-11

## 2019-12-05 RX ORDER — OXYCODONE HCL 10 MG/1
10 TABLET, FILM COATED, EXTENDED RELEASE ORAL EVERY 12 HOURS
Qty: 60 TABLET | Refills: 0 | Status: SHIPPED | OUTPATIENT
Start: 2019-12-05 | End: 2020-01-03

## 2019-12-05 RX ORDER — OXYCODONE HYDROCHLORIDE 5 MG/1
5 TABLET ORAL 2 TIMES DAILY
Qty: 60 TABLET | Refills: 0 | Status: SHIPPED | OUTPATIENT
Start: 2019-12-05 | End: 2020-01-03

## 2019-12-05 NOTE — TELEPHONE ENCOUNTER
Requested Prescriptions   Pending Prescriptions Disp Refills     cyclobenzaprine (FLEXERIL) 10 MG tablet [Pharmacy Med Name: CYCLOBENZAPRINE HCL 10MG TABS]  Last Written Prescription Date:  10/28/19  Last Fill Quantity: 90,  # refills: 1   Last office visit: 10/1/2019 with prescribing provider:  ALEXIS Leiva   Future Office Visit:     90 tablet 1     Sig: TAKE 1 TABLET BY MOUTH THREE TIEMS DAILY AS NEEDED FOR MUSCLE SPASMS       There is no refill protocol information for this order

## 2019-12-18 DIAGNOSIS — E78.5 HYPERLIPIDEMIA LDL GOAL <100: ICD-10-CM

## 2019-12-18 DIAGNOSIS — Z79.4 TYPE 2 DIABETES MELLITUS WITH HYPERGLYCEMIA, WITH LONG-TERM CURRENT USE OF INSULIN (H): Primary | ICD-10-CM

## 2019-12-18 DIAGNOSIS — E11.65 TYPE 2 DIABETES MELLITUS WITH HYPERGLYCEMIA, WITH LONG-TERM CURRENT USE OF INSULIN (H): Primary | ICD-10-CM

## 2019-12-19 NOTE — TELEPHONE ENCOUNTER
"SIMVASTATIN 40MG TABS  Last Written Prescription Date:  11/29/2018  Last Fill Quantity: 90,  # refills: 3   Last office visit: 10/1/2019 with prescribing provider:  ОЛЕГ   Future Office Visit:    Requested Prescriptions   Pending Prescriptions Disp Refills     simvastatin (ZOCOR) 40 MG tablet [Pharmacy Med Name: SIMVASTATIN 40MG TABS] 90 tablet 3     Sig: TAKE ONE TABLET BY MOUTH AT BEDTIME       Statins Protocol Failed - 12/18/2019  9:41 PM        Failed - LDL on file in past 12 months     Recent Labs   Lab Test 11/28/18  0915   LDL 79             Passed - No abnormal creatine kinase in past 12 months     Recent Labs   Lab Test 06/27/17  1107   CKT 97                Passed - Recent (12 mo) or future (30 days) visit within the authorizing provider's specialty     Patient has had an office visit with the authorizing provider or a provider within the authorizing providers department within the previous 12 mos or has a future within next 30 days. See \"Patient Info\" tab in inbasket, or \"Choose Columns\" in Meds & Orders section of the refill encounter.              Passed - Medication is active on med list        Passed - Patient is age 18 or older          "

## 2019-12-20 ENCOUNTER — MYC MEDICAL ADVICE (OUTPATIENT)
Dept: FAMILY MEDICINE | Facility: CLINIC | Age: 64
End: 2019-12-20

## 2019-12-20 RX ORDER — SIMVASTATIN 40 MG
40 TABLET ORAL AT BEDTIME
Qty: 90 TABLET | Refills: 0 | Status: SHIPPED | OUTPATIENT
Start: 2019-12-20 | End: 2020-03-03

## 2019-12-20 NOTE — TELEPHONE ENCOUNTER
Rx sent.   Please remind patient that he is due for an Annual physical and med check visit in 1/2020- come in fasting as he's due for labs.

## 2019-12-20 NOTE — TELEPHONE ENCOUNTER
Routing refill request to provider for review/approval because:  Labs not current:  LDL    LDL Cholesterol Calculated   Date Value Ref Range Status   11/28/2018 79 <100 mg/dL Final     Comment:     Desirable:       <100 mg/dl     No future labs placed.    Eloisa Desouza, RN, BSN, PHN

## 2020-01-03 RX ORDER — OXYCODONE HCL 10 MG/1
10 TABLET, FILM COATED, EXTENDED RELEASE ORAL EVERY 12 HOURS
Qty: 60 TABLET | Refills: 0 | Status: SHIPPED | OUTPATIENT
Start: 2020-01-03 | End: 2020-01-27

## 2020-01-03 RX ORDER — OXYCODONE HYDROCHLORIDE 5 MG/1
5 TABLET ORAL 2 TIMES DAILY
Qty: 60 TABLET | Refills: 0 | Status: SHIPPED | OUTPATIENT
Start: 2020-01-03 | End: 2020-01-27

## 2020-01-27 ENCOUNTER — OFFICE VISIT (OUTPATIENT)
Dept: FAMILY MEDICINE | Facility: CLINIC | Age: 65
End: 2020-01-27
Payer: COMMERCIAL

## 2020-01-27 VITALS
WEIGHT: 262 LBS | DIASTOLIC BLOOD PRESSURE: 82 MMHG | HEART RATE: 104 BPM | OXYGEN SATURATION: 98 % | BODY MASS INDEX: 32.58 KG/M2 | HEIGHT: 75 IN | SYSTOLIC BLOOD PRESSURE: 124 MMHG | RESPIRATION RATE: 16 BRPM | TEMPERATURE: 98.6 F

## 2020-01-27 DIAGNOSIS — F33.0 MILD RECURRENT MAJOR DEPRESSION (H): ICD-10-CM

## 2020-01-27 DIAGNOSIS — E78.5 HYPERLIPIDEMIA LDL GOAL <100: ICD-10-CM

## 2020-01-27 DIAGNOSIS — Z87.891 PERSONAL HISTORY OF TOBACCO USE, PRESENTING HAZARDS TO HEALTH: ICD-10-CM

## 2020-01-27 DIAGNOSIS — R80.9 TYPE 2 DIABETES MELLITUS WITH MICROALBUMINURIA, WITH LONG-TERM CURRENT USE OF INSULIN (H): ICD-10-CM

## 2020-01-27 DIAGNOSIS — N18.30 CKD (CHRONIC KIDNEY DISEASE) STAGE 3, GFR 30-59 ML/MIN (H): ICD-10-CM

## 2020-01-27 DIAGNOSIS — E11.65 TYPE 2 DIABETES MELLITUS WITH HYPERGLYCEMIA, WITH LONG-TERM CURRENT USE OF INSULIN (H): Primary | ICD-10-CM

## 2020-01-27 DIAGNOSIS — G89.4 CHRONIC PAIN SYNDROME: ICD-10-CM

## 2020-01-27 DIAGNOSIS — Z79.4 TYPE 2 DIABETES MELLITUS WITH HYPERGLYCEMIA, WITH LONG-TERM CURRENT USE OF INSULIN (H): Primary | ICD-10-CM

## 2020-01-27 DIAGNOSIS — Z98.890 S/P LUMBAR MICRODISCECTOMY: ICD-10-CM

## 2020-01-27 DIAGNOSIS — Z79.4 TYPE 2 DIABETES MELLITUS WITH MICROALBUMINURIA, WITH LONG-TERM CURRENT USE OF INSULIN (H): ICD-10-CM

## 2020-01-27 DIAGNOSIS — E66.01 MORBID OBESITY (H): ICD-10-CM

## 2020-01-27 DIAGNOSIS — E11.29 TYPE 2 DIABETES MELLITUS WITH MICROALBUMINURIA, WITH LONG-TERM CURRENT USE OF INSULIN (H): ICD-10-CM

## 2020-01-27 LAB
ALBUMIN SERPL-MCNC: 4 G/DL (ref 3.4–5)
ALP SERPL-CCNC: 136 U/L (ref 40–150)
ALT SERPL W P-5'-P-CCNC: 33 U/L (ref 0–70)
ANION GAP SERPL CALCULATED.3IONS-SCNC: 5 MMOL/L (ref 3–14)
AST SERPL W P-5'-P-CCNC: 31 U/L (ref 0–45)
BILIRUB SERPL-MCNC: 0.3 MG/DL (ref 0.2–1.3)
BUN SERPL-MCNC: 39 MG/DL (ref 7–30)
CALCIUM SERPL-MCNC: 8.7 MG/DL (ref 8.5–10.1)
CHLORIDE SERPL-SCNC: 109 MMOL/L (ref 94–109)
CHOLEST SERPL-MCNC: 149 MG/DL
CO2 SERPL-SCNC: 25 MMOL/L (ref 20–32)
CREAT SERPL-MCNC: 1.83 MG/DL (ref 0.66–1.25)
GFR SERPL CREATININE-BSD FRML MDRD: 38 ML/MIN/{1.73_M2}
GLUCOSE SERPL-MCNC: 157 MG/DL (ref 70–99)
HBA1C MFR BLD: 6.7 % (ref 0–5.6)
HDLC SERPL-MCNC: 40 MG/DL
LDLC SERPL CALC-MCNC: 61 MG/DL
NONHDLC SERPL-MCNC: 109 MG/DL
POTASSIUM SERPL-SCNC: 3.1 MMOL/L (ref 3.4–5.3)
PROT SERPL-MCNC: 7.8 G/DL (ref 6.8–8.8)
SODIUM SERPL-SCNC: 139 MMOL/L (ref 133–144)
TRIGL SERPL-MCNC: 239 MG/DL

## 2020-01-27 PROCEDURE — 99207 C FOOT EXAM  NO CHARGE: CPT | Performed by: FAMILY MEDICINE

## 2020-01-27 PROCEDURE — 99214 OFFICE O/P EST MOD 30 MIN: CPT | Performed by: FAMILY MEDICINE

## 2020-01-27 PROCEDURE — 36415 COLL VENOUS BLD VENIPUNCTURE: CPT | Performed by: FAMILY MEDICINE

## 2020-01-27 PROCEDURE — 83036 HEMOGLOBIN GLYCOSYLATED A1C: CPT | Performed by: FAMILY MEDICINE

## 2020-01-27 PROCEDURE — 80053 COMPREHEN METABOLIC PANEL: CPT | Performed by: FAMILY MEDICINE

## 2020-01-27 PROCEDURE — 80061 LIPID PANEL: CPT | Performed by: FAMILY MEDICINE

## 2020-01-27 RX ORDER — OXYCODONE HYDROCHLORIDE 5 MG/1
5 TABLET ORAL 2 TIMES DAILY
Qty: 60 TABLET | Refills: 0 | Status: SHIPPED | OUTPATIENT
Start: 2020-01-27 | End: 2020-02-26

## 2020-01-27 RX ORDER — OXYCODONE HCL 10 MG/1
10 TABLET, FILM COATED, EXTENDED RELEASE ORAL EVERY 12 HOURS
Qty: 60 TABLET | Refills: 0 | Status: SHIPPED | OUTPATIENT
Start: 2020-01-27 | End: 2020-02-26

## 2020-01-27 RX ORDER — ESCITALOPRAM OXALATE 10 MG/1
10 TABLET ORAL DAILY
Qty: 30 TABLET | Refills: 1 | Status: SHIPPED | OUTPATIENT
Start: 2020-01-27 | End: 2020-03-03

## 2020-01-27 ASSESSMENT — MIFFLIN-ST. JEOR: SCORE: 2064.05

## 2020-01-27 ASSESSMENT — ANXIETY QUESTIONNAIRES
1. FEELING NERVOUS, ANXIOUS, OR ON EDGE: SEVERAL DAYS
GAD7 TOTAL SCORE: 6
6. BECOMING EASILY ANNOYED OR IRRITABLE: MORE THAN HALF THE DAYS
5. BEING SO RESTLESS THAT IT IS HARD TO SIT STILL: NOT AT ALL
2. NOT BEING ABLE TO STOP OR CONTROL WORRYING: SEVERAL DAYS
3. WORRYING TOO MUCH ABOUT DIFFERENT THINGS: SEVERAL DAYS
7. FEELING AFRAID AS IF SOMETHING AWFUL MIGHT HAPPEN: SEVERAL DAYS

## 2020-01-27 ASSESSMENT — PATIENT HEALTH QUESTIONNAIRE - PHQ9
SUM OF ALL RESPONSES TO PHQ QUESTIONS 1-9: 9
5. POOR APPETITE OR OVEREATING: NOT AT ALL

## 2020-01-27 NOTE — PATIENT INSTRUCTIONS
Taper to discontinue the Paroxetine over 2 weeks- take 10 mg (1/2 tab) x 1 week then every other day x 1 week then STOP and start Lexapro.    Depression Self Care Plan / Survival Kit    Self-Care for Depression  Here s the deal. Your body and mind are really not as separate as most people think.  What you do and think affects how you feel and how you feel influences what you do and think. This means if you do things that people who feel good do, it will help you feel better.  Sometimes this is all it takes.  There is also a place for medication and therapy depending on how severe your depression is, so be sure to consult with your medical provider and/ or Behavioral Health Consultant if your symptoms are worsening or not improving.     In order to better manage my stress, I will:    Exercise  Get some form of exercise, every day. This will help reduce pain and release endorphins, the  feel good  chemicals in your brain. This is almost as good as taking antidepressants!  This is not the same as joining a gym and then never going! (they count on that by the way ) It can be as simple as just going for a walk or doing some gardening, anything that will get you moving.      Hygiene   Maintain good hygiene (Get out of bed in the morning, Make your bed, Brush your teeth, Take a shower, and Get dressed like you were going to work, even if you are unemployed).  If your clothes don't fit try to get ones that do.    Diet  I will strive to eat foods that are good for me, drink plenty of water, and avoid excessive sugar, caffeine, alcohol, and other mood-altering substances.  Some foods that are helpful in depression are: complex carbohydrates, B vitamins, flaxseed, fish or fish oil, fresh fruits and vegetables.    Psychotherapy  I agree to participate in Individual Therapy (if recommended).    Medication  If prescribed medications, I agree to take them.  Missing doses can result in serious side effects.  I understand that  drinking alcohol, or other illicit drug use, may cause potential side effects.  I will not stop my medication abruptly without first discussing it with my provider.    Staying Connected With Others  I will stay in touch with my friends, family members, and my primary care provider/team.    Use your imagination  Be creative.  We all have a creative side; it doesn t matter if it s oil painting, sand castles, or mud pies! This will also kick up the endorphins.    Witness Beauty  (AKA stop and smell the roses) Take a look outside, even in mid-winter. Notice colors, textures. Watch the squirrels and birds.     Service to others  Be of service to others.  There is always someone else in need.  By helping others we can  get out of ourselves  and remember the really important things.  This also provides opportunities for practicing all the other parts of the program.    Humor  Laugh and be silly!  Adjust your TV habits for less news and crime-drama and more comedy.    Control your stress  Try breathing deep, massage therapy, biofeedback, and meditation. Find time to relax each day.

## 2020-01-27 NOTE — PROGRESS NOTES
Subjective     Cedric Figueroa is a 64 year old male who presents to clinic today for the following health issues:    HPI         Diabetes Follow-up    Patient is checking blood sugars: not at all    Diabetic concerns: None     Symptoms of hypoglycemia (low blood sugar): none     Paresthesias (numbness or burning in feet) or sores: Yes left foot numbness      Date of last diabetic eye exam (annually):  9/2018 pt is aware he is due to repeat       Date of last Urine micro albumin screen, (annually):   Component      Latest Ref Rng & Units 10/1/2019   Creatinine Urine      mg/dL 84   Albumin Urine mg/L      mg/L 97   Albumin Urine mg/g Cr      0 - 17 mg/g Cr 114.81 (H)       Pneumococcal Vaccine:  Yes: utd    Influenza Vaccine (annually):   Yes: utd    Tobacco free:  Yes    Aspirin use:  Yes: 81     Amount of exercise or physical activity: None    Problems taking medications regularly: No    Medication side effects: none    Diet: regular (no restrictions)      DEPRESSION - Patient has a long history of Depression of moderate severity requiring medication for control - Paroxetine with recent symptoms being gradually worsening.  States that he has been on paroxetine for as long as he can remember and questions its effectiveness at this time.  Wishes to try a different serotonin specific reuptake inhibitor.  Current symptoms of depression include -   Last PHQ-9 1/27/2020   1.  Little interest or pleasure in doing things 2   2.  Feeling down, depressed, or hopeless 2   3.  Trouble falling or staying asleep, or sleeping too much 2   4.  Feeling tired or having little energy 1   5.  Poor appetite or overeating 1   6.  Feeling bad about yourself 0   7.  Trouble concentrating 0   8.  Moving slowly or restless 1   Q9: Thoughts of better off dead/self-harm past 2 weeks 0   PHQ-9 Total Score 9   Difficulty at work, home, or with people -     CHARITY-7  1/27/2020   1. Feeling nervous, anxious, or on edge 1   2. Not being able to stop  or control worrying 1   3. Worrying too much about different things 1   4. Trouble relaxing 0   5. Being so restless that it is hard to sit still 0   6. Becoming easily annoyed or irritable 2   7. Feeling afraid, as if something awful might happen 1   CHARITY-7 Total Score 6   If you checked any problems, how difficult have they made it for you to do your work, take care of things at home, or get along with other people? -     In the past two weeks have you had thoughts of suicide or self-harm?  No.    Do you have concerns about your personal safety or the safety of others?   No  .     HYPERLIPIDEMIA - Patient has a long history of significant Hyperlipidemia requiring medication for treatment with recent good control. Patient reports no problems or side effects with the medication- Simvastatin 40 mg/day.   Last lipid panel  Recent Labs   Lab Test 11/28/18  0915 05/23/17  0725 03/24/15  0747 12/31/14  0715   CHOL 157 149 127 117   HDL 56 50 41 37*   LDL 79 70 62 53   TRIG 108 143 121 133   CHOLHDLRATIO  --   --  3.1 3.2         RENAL INSUFFICIENCY - Patient has a longstanding history of moderate-severe chronic renal insufficiency. Last Cr     Creatinine   Date Value Ref Range Status   10/01/2019 1.59 (H) 0.66 - 1.25 mg/dL Final       Has a known history of chronic low back pain status post lumbar microdiscectomy currently on long-term opiate therapy requesting his med refill that will be due in a few days.    HEALTH CARE MAINTENANCE: Due for his annual lung cancer screening low-dose CT scan.    Patient Active Problem List   Diagnosis     Hyperlipidemia LDL goal <100     Mild major depression (H)     Restless leg syndrome     Sleep apnea     Knee pain     Vitamin B 12 deficiency     Advanced directives, counseling/discussion     Iron deficiency anemia     OA (osteoarthritis) of knee     Perforated bowel (H)     Left-sided low back pain with left-sided sciatica     Type 2 diabetes mellitus with microalbuminuria, with  long-term current use of insulin (H)     Hypertension goal BP (blood pressure) < 130/80     Bariatric surgery status     Perirectal abscess     CKD (chronic kidney disease) stage 3, GFR 30-59 ml/min (H)     Controlled substance agreement signed     Chronic pain syndrome     Morbid obesity (H)     Past Surgical History:   Procedure Laterality Date     ABDOMEN SURGERY      peritonitis, bowel perforation, bowel resection     AS TOTAL KNEE ARTHROPLASTY      Right and Left knee x3( 2 partial knee replacement and 1 total Left knee replacement     BARIATRIC SURGERY      at age 45     COLONOSCOPY  2006    [facility] [employer]     COLONOSCOPY WITH CO2 INSUFFLATION N/A 2016    Procedure: COLONOSCOPY WITH CO2 INSUFFLATION;  Surgeon: Cedric Schneider MD;  Location: [facility] OR     DISCECTOMY LUMBAR POSTERIOR MICROSCOPIC ONE LEVEL Left 2019    Procedure: Left L4-5 hemilaminectomy, medial facetectomy, foraminotomy with microdiscectomy and use of X-ray and intraoperative microscope;  Surgeon: Aaron Ness MD;  Location: [facility] OR     INCISION AND DRAINAGE BUTTOCKS Left 3/28/2018    Procedure: INCISION AND DRAINAGE BUTTOCKS;  Incision and drainage of left buttock abscess, and fistulotomy;  Surgeon: Bala Layton MD;  Location: [facility] OR       Social History     Tobacco Use     Smoking status: Former Smoker     Last attempt to quit: 2018     Years since quittin.5     Smokeless tobacco: Never Used   Substance Use Topics     Alcohol use: Yes     Comment: occasionally     Family History   Problem Relation Age of Onset     Diabetes Mother      Diabetes Paternal Grandmother      Coronary Artery Disease Paternal Grandmother      Cerebrovascular Disease Paternal Grandmother      Bleeding Disorder No family hx of      Anesthesia Reaction No family hx of          Current Outpatient Medications   Medication Sig Dispense Refill     aspirin 81 MG EC tablet Take 81 mg by mouth daily       blood glucose  (ACCU-CHEK PELON PLUS) test strip USE TO TEST BLOOD SUGAR THREE TIMES A DAY OR AS DIRECTED 100 each 11     blood glucose monitoring (ACCU-CHEK PELON PLUS) meter device kit Use to test blood sugar 3 times daily or as directed. 1 kit 0     blood glucose monitoring (SOFTCLIX) lancets USE TO TEST BLOOD SUGAR THREE TIMES A DAY OR AS DIRECTED 100 each 3     cyclobenzaprine (FLEXERIL) 10 MG tablet TAKE 1 TABLET BY MOUTH THREE TIEMS DAILY AS NEEDED FOR MUSCLE SPASMS 90 tablet 1     dulaglutide (TRULICITY) 0.75 MG/0.5ML pen Inject 0.75 mg Subcutaneous every 7 days 3 mL 3     escitalopram (LEXAPRO) 10 MG tablet Take 1 tablet (10 mg) by mouth daily 30 tablet 1     insulin glargine (LANTUS SOLOSTAR PEN) 100 UNIT/ML pen Inject 45 Units Subcutaneous At Bedtime 40 mL 3     insulin pen needle (BD ULTRA-FINE) 29G X 12.7MM Use once daily or as directed. 100 each prn     losartan-hydrochlorothiazide (HYZAAR) 100-25 MG tablet TAKE ONE TABLET BY MOUTH EVERY DAY 90 tablet 1     MELATONIN PO Take 10 mg by mouth At Bedtime        oxyCODONE (OXYCONTIN) 10 MG 12 hr tablet Take 1 tablet (10 mg) by mouth every 12 hours maximum 2 tablet(s) per day 60 tablet 0     oxyCODONE (ROXICODONE) 5 MG tablet Take 1 tablet (5 mg) by mouth 2 times daily 60 tablet 0     pramipexole (MIRAPEX) 0.25 MG tablet TAKE ONE TABLET BY MOUTH EVERY MORNING, THEN TAKE ONE TABLET EVERY AFTERNOON, THEN TAKE TWO TABLETS BEFORE BED. 120 tablet 3     simvastatin (ZOCOR) 40 MG tablet Take 1 tablet (40 mg) by mouth At Bedtime . You are due for a fasting lipid panel. 90 tablet 0     vitamin B-12 (CYANOCOBALAMIN) 1000 MCG/ML injection INJECT 1ML INTO THE MUSCLE EVERY 30 DAYS. 3 mL 3     No Known Allergies    Reviewed and updated as needed this visit by Provider         Review of Systems   ROS COMP: Constitutional, HEENT, cardiovascular, pulmonary, gi and gu systems are negative, except as otherwise noted.      Objective    /82   Pulse 104   Temp 98.6  F (37  C) (Tympanic)  "  Resp 16   Ht 1.905 m (6' 3\")   Wt 118.8 kg (262 lb)   SpO2 98%   BMI 32.75 kg/m    Body mass index is 32.75 kg/m .  Physical Exam   GENERAL: healthy, alert and no distress  NECK: no adenopathy, no asymmetry, masses, or scars and thyroid normal to palpation  RESP: lungs clear to auscultation - no rales, rhonchi or wheezes  CV: regular rate and rhythm, normal S1 S2, no S3 or S4, no murmur, click or rub, no peripheral edema and peripheral pulses strong  MS: no gross musculoskeletal defects noted, no edema  PSYCH: mentation appears normal, affect flat, judgement and insight intact and appearance well groomed  Diabetic foot exam: normal DP and PT pulses, no trophic changes or ulcerative lesions, normal sensory exam and normal monofilament exam    Diagnostic Test Results:  Reviewed and discussed with patient prior to discharge.  Results for orders placed or performed in visit on 01/27/20   Hemoglobin A1c     Status: Abnormal   Result Value Ref Range    Hemoglobin A1C 6.7 (H) 0 - 5.6 %             Assessment & Plan     Cedric was seen today for diabetes.    Diagnoses and all orders for this visit:    Type 2 diabetes mellitus with hyperglycemia, with long-term current use of insulin (H); controlled. A1C 6.7 today  -     Hemoglobin A1c  -     FOOT EXAM  -     Refill: dulaglutide (TRULICITY) 0.75 MG/0.5ML pen; Inject 0.75 mg Subcutaneous every 7 days  -     Comprehensive metabolic panel  -     OPHTHALMOLOGY ADULT REFERRAL    Type 2 diabetes mellitus with microalbuminuria, with long-term current use of insulin (H)  -     dulaglutide (TRULICITY) 0.75 MG/0.5ML pen; Inject 0.75 mg Subcutaneous every 7 days    CKD (chronic kidney disease) stage 3, GFR 30-59 ml/min (H)  -     Comprehensive metabolic panel    Chronic pain syndrome S/P lumbar microdiscectomy  -     Refill: oxyCODONE (ROXICODONE) 5 MG tablet; Take 1 tablet (5 mg) by mouth 2 times daily  -     Refill: oxyCODONE (OXYCONTIN) 10 MG 12 hr tablet; Take 1 tablet (10 mg) " by mouth every 12 hours maximum 2 tablet(s) per day      Hyperlipidemia LDL goal <100; overdue for a lipid panel. On Simvastatin  -     Lipid Profile    Mild recurrent major depression (H)  -    PHQ-9/CHARITY 7 completed, see above/Epic for details    -   Taper to discontinue Paroxetine and try a different serotonin specific reuptake inhibitor       -   Start: escitalopram (LEXAPRO) 10 MG tablet; Take 1 tablet (10 mg) by mouth daily    Personal history of tobacco use, presenting hazards to health  -     CT Chest Lung Cancer Scrn Low Dose wo; Future    Morbid obesity (H)  Weight management plan: Discussed healthy diet and exercise guidelines      Return in about 1 month (around 2/27/2020) for Medication check, Physical Exam.    Molly Leiva MD  [facility]

## 2020-01-28 ASSESSMENT — ANXIETY QUESTIONNAIRES: GAD7 TOTAL SCORE: 6

## 2020-01-30 DIAGNOSIS — N18.30 CKD (CHRONIC KIDNEY DISEASE) STAGE 3, GFR 30-59 ML/MIN (H): Primary | ICD-10-CM

## 2020-01-30 DIAGNOSIS — E87.6 HYPOKALEMIA: ICD-10-CM

## 2020-02-09 DIAGNOSIS — Z98.890 S/P LUMBAR MICRODISCECTOMY: ICD-10-CM

## 2020-02-10 NOTE — TELEPHONE ENCOUNTER
Requested Prescriptions   Pending Prescriptions Disp Refills     cyclobenzaprine (FLEXERIL) 10 MG tablet [Pharmacy Med Name: CYCLOBENZAPRINE HCL 10MG TABS] 90 tablet 1     Sig: TAKE ONE TABLET BY MOUTH THREE TIMES A DAY AS NEEDED FOR MUSCLE SPASMS   Last Written Prescription Date:  1-6-20  Last Fill Quantity: 90,  # refills: 1  Last office visit: 1/27/2020 with prescribing provider:  1-27-20   Future Office Visit:      There is no refill protocol information for this order

## 2020-02-11 NOTE — TELEPHONE ENCOUNTER
Routing refill request to provider for review/approval because:  Drug not on the [facility] refill protocol   Last script written 12/5 for qty 90 with 1 refill  Last ov 1/27/20, due for follow up 2/27

## 2020-02-12 RX ORDER — CYCLOBENZAPRINE HCL 10 MG
TABLET ORAL
Qty: 90 TABLET | Refills: 1 | Status: SHIPPED | OUTPATIENT
Start: 2020-02-12 | End: 2020-04-06

## 2020-02-24 ENCOUNTER — MYC MEDICAL ADVICE (OUTPATIENT)
Dept: FAMILY MEDICINE | Facility: CLINIC | Age: 65
End: 2020-02-24

## 2020-02-24 DIAGNOSIS — Z98.890 S/P LUMBAR MICRODISCECTOMY: ICD-10-CM

## 2020-02-24 DIAGNOSIS — G89.4 CHRONIC PAIN SYNDROME: ICD-10-CM

## 2020-02-24 NOTE — TELEPHONE ENCOUNTER
information:    Written: 1/27/2020, Filled 1/31/2020 for #60 Oxycodone by Dr. Leiva    Written: 1/27/2020, Filled 1/31/2020 for #60 Oxycontin by Dr. Leiva    Written: 1/3/2020, filled 1/3/2020 for #60 Oxycodone by Sofi Lauren    Written: 1/3/2020, filled 1/3/2020 for #60 Oxycontin by Sofi Lauren    Routing refill request to provider for review/approval because:  Drug not on the [facility] refill protocol     Last OV with Dr. Leiva: 1/27/2020 with advised F/U in one month.    Med pended, please advise.    Irene Zacarias, RN, BSN

## 2020-02-26 RX ORDER — OXYCODONE HYDROCHLORIDE 5 MG/1
5 TABLET ORAL 2 TIMES DAILY
Qty: 60 TABLET | Refills: 0 | Status: SHIPPED | OUTPATIENT
Start: 2020-02-26 | End: 2020-03-25

## 2020-02-26 RX ORDER — OXYCODONE HCL 10 MG/1
10 TABLET, FILM COATED, EXTENDED RELEASE ORAL EVERY 12 HOURS
Qty: 60 TABLET | Refills: 0 | Status: SHIPPED | OUTPATIENT
Start: 2020-02-26 | End: 2020-03-25

## 2020-03-02 ENCOUNTER — HEALTH MAINTENANCE LETTER (OUTPATIENT)
Age: 65
End: 2020-03-02

## 2020-03-03 ENCOUNTER — OFFICE VISIT (OUTPATIENT)
Dept: FAMILY MEDICINE | Facility: CLINIC | Age: 65
End: 2020-03-03
Payer: COMMERCIAL

## 2020-03-03 VITALS
HEIGHT: 75 IN | RESPIRATION RATE: 18 BRPM | DIASTOLIC BLOOD PRESSURE: 78 MMHG | TEMPERATURE: 98.1 F | WEIGHT: 261.8 LBS | OXYGEN SATURATION: 98 % | HEART RATE: 88 BPM | BODY MASS INDEX: 32.55 KG/M2 | SYSTOLIC BLOOD PRESSURE: 124 MMHG

## 2020-03-03 DIAGNOSIS — Z00.00 ROUTINE GENERAL MEDICAL EXAMINATION AT A HEALTH CARE FACILITY: Primary | ICD-10-CM

## 2020-03-03 DIAGNOSIS — E53.8 VITAMIN B 12 DEFICIENCY: ICD-10-CM

## 2020-03-03 DIAGNOSIS — Z87.891 PERSONAL HISTORY OF TOBACCO USE: ICD-10-CM

## 2020-03-03 DIAGNOSIS — E11.22 TYPE 2 DIABETES MELLITUS WITH STAGE 3 CHRONIC KIDNEY DISEASE, WITH LONG-TERM CURRENT USE OF INSULIN (H): ICD-10-CM

## 2020-03-03 DIAGNOSIS — Z11.4 ENCOUNTER FOR SCREENING FOR HIV: ICD-10-CM

## 2020-03-03 DIAGNOSIS — Z71.89 ADVANCED CARE PLANNING/COUNSELING DISCUSSION: ICD-10-CM

## 2020-03-03 DIAGNOSIS — I10 HYPERTENSION GOAL BP (BLOOD PRESSURE) < 140/90: ICD-10-CM

## 2020-03-03 DIAGNOSIS — Z79.891 LONG TERM (CURRENT) USE OF OPIATE ANALGESIC: ICD-10-CM

## 2020-03-03 DIAGNOSIS — Z79.4 TYPE 2 DIABETES MELLITUS WITH STAGE 3 CHRONIC KIDNEY DISEASE, WITH LONG-TERM CURRENT USE OF INSULIN (H): ICD-10-CM

## 2020-03-03 DIAGNOSIS — F33.0 MILD RECURRENT MAJOR DEPRESSION (H): ICD-10-CM

## 2020-03-03 DIAGNOSIS — E78.5 HYPERLIPIDEMIA LDL GOAL <100: ICD-10-CM

## 2020-03-03 DIAGNOSIS — N18.30 TYPE 2 DIABETES MELLITUS WITH STAGE 3 CHRONIC KIDNEY DISEASE, WITH LONG-TERM CURRENT USE OF INSULIN (H): ICD-10-CM

## 2020-03-03 LAB
AMPHETAMINES UR QL: NOT DETECTED NG/ML
BARBITURATES UR QL SCN: NOT DETECTED NG/ML
BENZODIAZ UR QL SCN: NOT DETECTED NG/ML
BUPRENORPHINE UR QL: NOT DETECTED NG/ML
CANNABINOIDS UR QL: NOT DETECTED NG/ML
COCAINE UR QL SCN: NOT DETECTED NG/ML
D-METHAMPHET UR QL: NOT DETECTED NG/ML
ERYTHROCYTE [DISTWIDTH] IN BLOOD BY AUTOMATED COUNT: 12.4 % (ref 10–15)
HCT VFR BLD AUTO: 38.8 % (ref 40–53)
HGB BLD-MCNC: 12.9 G/DL (ref 13.3–17.7)
MCH RBC QN AUTO: 30.6 PG (ref 26.5–33)
MCHC RBC AUTO-ENTMCNC: 33.2 G/DL (ref 31.5–36.5)
MCV RBC AUTO: 92 FL (ref 78–100)
METHADONE UR QL SCN: NOT DETECTED NG/ML
OPIATES UR QL SCN: NOT DETECTED NG/ML
OXYCODONE UR QL SCN: ABNORMAL NG/ML
PCP UR QL SCN: NOT DETECTED NG/ML
PLATELET # BLD AUTO: 200 10E9/L (ref 150–450)
PROPOXYPH UR QL: NOT DETECTED NG/ML
RBC # BLD AUTO: 4.22 10E12/L (ref 4.4–5.9)
TRICYCLICS UR QL SCN: NOT DETECTED NG/ML
WBC # BLD AUTO: 7.3 10E9/L (ref 4–11)

## 2020-03-03 PROCEDURE — 36415 COLL VENOUS BLD VENIPUNCTURE: CPT | Performed by: FAMILY MEDICINE

## 2020-03-03 PROCEDURE — 80306 DRUG TEST PRSMV INSTRMNT: CPT | Performed by: FAMILY MEDICINE

## 2020-03-03 PROCEDURE — 80048 BASIC METABOLIC PNL TOTAL CA: CPT | Performed by: FAMILY MEDICINE

## 2020-03-03 PROCEDURE — 85027 COMPLETE CBC AUTOMATED: CPT | Performed by: FAMILY MEDICINE

## 2020-03-03 PROCEDURE — 99214 OFFICE O/P EST MOD 30 MIN: CPT | Mod: 25 | Performed by: FAMILY MEDICINE

## 2020-03-03 PROCEDURE — 99396 PREV VISIT EST AGE 40-64: CPT | Performed by: FAMILY MEDICINE

## 2020-03-03 PROCEDURE — 82607 VITAMIN B-12: CPT | Performed by: FAMILY MEDICINE

## 2020-03-03 PROCEDURE — 87389 HIV-1 AG W/HIV-1&-2 AB AG IA: CPT | Performed by: FAMILY MEDICINE

## 2020-03-03 PROCEDURE — G0296 VISIT TO DETERM LDCT ELIG: HCPCS | Performed by: FAMILY MEDICINE

## 2020-03-03 RX ORDER — LANCETS
EACH MISCELLANEOUS
Qty: 100 EACH | Refills: 3 | Status: SHIPPED | OUTPATIENT
Start: 2020-03-03

## 2020-03-03 RX ORDER — SIMVASTATIN 40 MG
40 TABLET ORAL AT BEDTIME
Qty: 90 TABLET | Refills: 3 | Status: SHIPPED | OUTPATIENT
Start: 2020-03-03 | End: 2021-01-22

## 2020-03-03 RX ORDER — ESCITALOPRAM OXALATE 20 MG/1
20 TABLET ORAL DAILY
Qty: 90 TABLET | Refills: 1 | Status: SHIPPED | OUTPATIENT
Start: 2020-03-03 | End: 2020-08-28

## 2020-03-03 RX ORDER — LOSARTAN POTASSIUM AND HYDROCHLOROTHIAZIDE 25; 100 MG/1; MG/1
1 TABLET ORAL DAILY
Qty: 90 TABLET | Refills: 3 | Status: SHIPPED | OUTPATIENT
Start: 2020-03-03 | End: 2021-03-11

## 2020-03-03 RX ORDER — CYANOCOBALAMIN 1000 UG/ML
INJECTION, SOLUTION INTRAMUSCULAR; SUBCUTANEOUS
Qty: 3 ML | Refills: 3 | Status: SHIPPED | OUTPATIENT
Start: 2020-03-03 | End: 2021-06-15

## 2020-03-03 ASSESSMENT — MIFFLIN-ST. JEOR: SCORE: 2063.15

## 2020-03-03 ASSESSMENT — ANXIETY QUESTIONNAIRES
1. FEELING NERVOUS, ANXIOUS, OR ON EDGE: NOT AT ALL
7. FEELING AFRAID AS IF SOMETHING AWFUL MIGHT HAPPEN: NOT AT ALL
IF YOU CHECKED OFF ANY PROBLEMS ON THIS QUESTIONNAIRE, HOW DIFFICULT HAVE THESE PROBLEMS MADE IT FOR YOU TO DO YOUR WORK, TAKE CARE OF THINGS AT HOME, OR GET ALONG WITH OTHER PEOPLE: SOMEWHAT DIFFICULT
6. BECOMING EASILY ANNOYED OR IRRITABLE: NOT AT ALL
5. BEING SO RESTLESS THAT IT IS HARD TO SIT STILL: NOT AT ALL
GAD7 TOTAL SCORE: 1
2. NOT BEING ABLE TO STOP OR CONTROL WORRYING: NOT AT ALL
3. WORRYING TOO MUCH ABOUT DIFFERENT THINGS: SEVERAL DAYS

## 2020-03-03 ASSESSMENT — PATIENT HEALTH QUESTIONNAIRE - PHQ9
SUM OF ALL RESPONSES TO PHQ QUESTIONS 1-9: 4
5. POOR APPETITE OR OVEREATING: NOT AT ALL

## 2020-03-04 DIAGNOSIS — E53.8 VITAMIN B 12 DEFICIENCY: Primary | ICD-10-CM

## 2020-03-04 LAB
ANION GAP SERPL CALCULATED.3IONS-SCNC: 7 MMOL/L (ref 3–14)
BUN SERPL-MCNC: 29 MG/DL (ref 7–30)
CALCIUM SERPL-MCNC: 8.6 MG/DL (ref 8.5–10.1)
CHLORIDE SERPL-SCNC: 108 MMOL/L (ref 94–109)
CO2 SERPL-SCNC: 24 MMOL/L (ref 20–32)
CREAT SERPL-MCNC: 1.69 MG/DL (ref 0.66–1.25)
GFR SERPL CREATININE-BSD FRML MDRD: 42 ML/MIN/{1.73_M2}
GLUCOSE SERPL-MCNC: 113 MG/DL (ref 70–99)
HIV 1+2 AB+HIV1 P24 AG SERPL QL IA: NONREACTIVE
POTASSIUM SERPL-SCNC: 3.4 MMOL/L (ref 3.4–5.3)
SODIUM SERPL-SCNC: 139 MMOL/L (ref 133–144)
VIT B12 SERPL-MCNC: 773 PG/ML (ref 193–986)

## 2020-03-04 ASSESSMENT — ANXIETY QUESTIONNAIRES: GAD7 TOTAL SCORE: 1

## 2020-03-04 NOTE — PATIENT INSTRUCTIONS
Preventive Health Recommendations  Male Ages 50 - 64    Yearly exam:             See your health care provider every year in order to  o   Review health changes.   o   Discuss preventive care.    o   Review your medicines if your doctor has prescribed any.     Have a cholesterol test every 5 years, or more frequently if you are at risk for high cholesterol/heart disease.     Have a diabetes test (fasting glucose) every three years. If you are at risk for diabetes, you should have this test more often.     Have a colonoscopy at age 50, or have a yearly FIT test (stool test). These exams will check for colon cancer.      Talk with your health care provider about whether or not a prostate cancer screening test (PSA) is right for you.    You should be tested each year for STDs (sexually transmitted diseases), if you re at risk.     Shots: Get a flu shot each year. Get a tetanus shot every 10 years.     Nutrition:    Eat at least 5 servings of fruits and vegetables daily.     Eat whole-grain bread, whole-wheat pasta and brown rice instead of white grains and rice.     Get adequate Calcium and Vitamin D.     Lifestyle    Exercise for at least 150 minutes a week (30 minutes a day, 5 days a week). This will help you control your weight and prevent disease.     Limit alcohol to one drink per day.     No smoking.     Wear sunscreen to prevent skin cancer.     See your dentist every six months for an exam and cleaning.     See your eye doctor every 1 to 2 years.    Lung Cancer Screening   Frequently Asked Questions  If you are at high-risk for lung cancer, getting screened with low-dose computed tomography (LDCT) every year can help save your life. This handout offers answers to some of the most common questions about lung cancer screening. If you have other questions, please call [phone] ([phone]).     What is it?  Lung cancer screening uses special X-ray technology to create an image of your lung tissue.  The exam is quick and easy and takes less than 10 seconds. We don t give you any medicine or use any needles. You can eat before and after the exam. You don t need to change your clothes as long as the clothing on your chest doesn t contain metal. But, you do need to be able to hold your breath for at least 6 seconds during the exam.    What is the goal of lung cancer screening?  The goal of lung cancer screening is to save lives. Many times, lung cancer is not found until a person starts having physical symptoms. Lung cancer screening can help detect lung cancer in the earliest stages when it may be easier to treat.    Who should be screened for lung cancer?  We suggest lung cancer screening for anyone who is at high-risk for lung cancer. You are in the high-risk group if you:      are between the ages of 55 and 79, and    have smoked at least 1 pack of cigarettes a day for 30 or more years, and    still smoke or have quit within the past 15 years.    However, if you have a new cough or shortness of breath, you should talk to your doctor before being screened.    Some national lung health advocacy groups also recommend screening for people ages 50 to 79 who have smoked an average of 1 pack of cigarettes a day for 20 years. They must also have at least 1 other risk factor for lung cancer, not including exposure to secondhand smoke. Other risk factors are having had cancer in the past, emphysema, pulmonary fibrosis, COPD, a family history of lung cancer, or exposure to certain materials such as arsenic, asbestos, beryllium, cadmium, chromium, diesel fumes, nickel, radon or silica. Your care team can help you know if you have one of these risk factors.     Why does it matter if I have symptoms?  Certain symptoms can be a sign that you have a condition in your lungs that should be checked and treated by your doctor. These symptoms include fever, chest pain, a new or changing cough, shortness of breath that you have  never felt before, coughing up blood or unexplained weight loss. Having any of these symptoms can greatly affect the results of lung cancer screening.       Should all smokers get an LDCT lung cancer screening exam?  It depends. Lung cancer screening is for a very specific group of men and women who have a history of heavy smoking over a long period of time (see  Who should be screened for lung cancer  above).  I am in the high-risk group, but have been diagnosed with cancer in the past. Is LDCT lung cancer screening right for me?  In some cases, you should not have LDCT lung screening, such as when your doctor is already following your cancer with CT scan studies. Your doctor will help you decide if LDCT lung screening is right for you.  Do I need to have a screening exam every year?  Yes. If you are in the high-risk group described earlier, you should get an LDCT lung cancer screening exam every year until you are 79, or are no longer willing or able to undergo screening and possible procedures to diagnose and treat lung cancer.  How effective is LDCT at preventing death from lung cancer?  Studies have shown that LDCT lung cancer screening can lower the risk of death from lung cancer by 20 percent in people who are at high-risk.  What are the risks?  There are some risks and limitations of LDCT lung cancer screening. We want to make sure you understand the risks and benefits, so please let us know if you have any questions. Your doctor may want to talk with you more about these risks.    Radiation exposure: As with any exam that uses radiation, there is a very small increased risk of cancer. The amount of radiation in LDCT is small--about the same amount a person would get from a mammogram. Your doctor orders the exam when he or she feels the potential benefits outweigh the risks.    False negatives: No test is perfect, including LDCT. It is possible that you may have a medical condition, including lung cancer,  that is not found during your exam. This is called a false negative result.    False positives and more testing: LDCT very often finds something in the lung that could be cancer, but in fact is not. This is called a false positive result. False positive tests often cause anxiety. To make sure these findings are not cancer, you may need to have more tests. These tests will be done only if you give us permission. Sometimes patients need a treatment that can have side effects, such as a biopsy. For more information on false positives, see  What can I expect from the results?     Findings not related to lung cancer: Your LDCT exam also takes pictures of areas of your body next to your lungs. In a very small number of cases, the CT scan will show an abnormal finding in one of these areas, such as your kidneys, adrenal glands, liver or thyroid. This finding may not be serious, but you may need more tests. Your doctor can help you decide what other tests you may need, if any.  What can I expect from the results?  About 1 out of 4 LDCT exams will find something that may need more tests. Most of the time, these findings are lung nodules. Lung nodules are very small collections of tissue in the lung. These nodules are very common, and the vast majority--more than 97 percent--are not cancer (benign). Most are normal lymph nodes or small areas of scarring from past infections.  But, if a small lung nodule is found to be cancer, the cancer can be cured more than 90 percent of the time. To know if the nodule is cancer, we may need to get more images before your next yearly screening exam. If the nodule has suspicious features (for example, it is large, has an odd shape or grows over time), we will refer you to a specialist for further testing.  Will my doctor also get the results?  Yes. Your doctor will get a copy of your results.  Is it okay to keep smoking now that there s a cancer screening exam?  No. Tobacco is one of the  strongest cancer-causing agents. It causes not only lung cancer, but other cancers and cardiovascular (heart) diseases as well. The damage caused by smoking builds over time. This means that the longer you smoke, the higher your risk of disease. While it is never too late to quit, the sooner you quit, the better.  Where can I find help to quit smoking?  The best way to prevent lung cancer is to stop smoking. If you have already quit smoking, congratulations and keep it up! For help on quitting smoking, please call [employer] at 3-742-821-ZVVZ ([phone]) or the [employer] at 1-152.654.7999 to find local resources near you.  One-on-one health coaching:  If you d prefer to work individually with a health care provider on tobacco cessation, we offer:      Medication Therapy Management:  Our specially trained pharmacists work closely with you and your doctor to help you quit smoking.  Call [phone] or [phone] (toll free).     Can Do: Health coaching offered by [facility].  [email or web address]

## 2020-03-04 NOTE — PROGRESS NOTES
Lung Cancer Screening Shared Decision Making Visit     Cedric Figueroa is eligible for lung cancer screening on the basis of the information provided in my signed lung cancer screening order.     I have discussed with patient the risks and benefits of screening for lung cancer with low-dose CT.     The risks include:  radiation exposure: one low dose chest CT has as much ionizing radiation as about 15 chest x-rays or 6 months of background radiation living in [place]    false positives: 96% of positive findings/nodules are NOT cancer, but some might still require additional diagnostic evaluation, including biopsy  over-diagnosis: some slow growing cancers that might never have been clinically significant will be detected and treated unnecessarily     The benefit of early detection of lung cancer is contingent upon adherence to annual screening or more frequent follow up if indicated.     Furthermore, reaping the benefits of screening requires Cedric Figueroa to be willing and physically able to undergo diagnostic procedures, if indicated. Although no specific guide is available for determining severity of comorbidities, it is reasonable to withhold screening in patients who have greater mortality risk from other diseases.     We did discuss that the only way to prevent lung cancer is to not smoke. Smoking cessation assistance was offered.    I did offer risk estimation using a calculator such as this one:    ShouldIScreen

## 2020-03-04 NOTE — PROGRESS NOTES
3  SUBJECTIVE:   CC: Cedric Figueroa is an 64 year old male who presents for preventive health visit.     Healthy Habits:    Do you get at least three servings of calcium containing foods daily (dairy, green leafy vegetables, etc.)? no    Amount of exercise or daily activities, outside of work: none     Problems taking medications regularly No    Medication side effects: No    Have you had an eye exam in the past two years? yes    Do you see a dentist twice per year? no    Do you have sleep apnea, excessive snoring or daytime drowsiness?yes- has sleep apnea       Would like to discuss labs from January - noted to have mild hypokalemia and declining renal function.     DEPRESSION - Patient has a long history of Depression of moderate severity requiring medication - Lexapro 10 mg for control with recent symptoms being gradually improving- still feels there is room for improvement. Previously on Paxil. Current symptoms of depression include -- PHQ-9/CHARITY 7 completed, see below/Epic for details    Last PHQ-9 3/3/2020   1.  Little interest or pleasure in doing things 1   2.  Feeling down, depressed, or hopeless 0   3.  Trouble falling or staying asleep, or sleeping too much 1   4.  Feeling tired or having little energy 0   5.  Poor appetite or overeating 1   6.  Feeling bad about yourself 0   7.  Trouble concentrating 0   8.  Moving slowly or restless 1   Q9: Thoughts of better off dead/self-harm past 2 weeks 0   PHQ-9 Total Score 4   Difficulty at work, home, or with people Somewhat difficult     CHARITY-7  3/3/2020   1. Feeling nervous, anxious, or on edge 0   2. Not being able to stop or control worrying 0   3. Worrying too much about different things 1   4. Trouble relaxing 0   5. Being so restless that it is hard to sit still 0   6. Becoming easily annoyed or irritable 0   7. Feeling afraid, as if something awful might happen 0   CHARITY-7 Total Score 1   If you checked any problems, how difficult have they made it for you  to do your work, take care of things at home, or get along with other people? Somewhat difficult     In the past two weeks have you had thoughts of suicide or self-harm?  No.    Do you have concerns about your personal safety or the safety of others?   No       DIABETES - Patient has a longstanding history of DiabetesType Type II . Patient is being treated with diet, insulin injections, ASA and Trulicity and denies significant side effects. Control has been good. Complicating factors include but are not limited to: hypertension, hyperlipidemia, chronic kidney disease and tobacco use. Requesting a refill on Trulicity.   Last A1C  Component      Latest Ref Rng & Units 1/27/2020   Hemoglobin A1C      0 - 5.6 % 6.7 (H)         HYPERLIPIDEMIA - Patient has a long history of significant Hyperlipidemia requiring medication for treatment with recent good control. Patient reports no problems or side effects with the medication- Simvastatin. Last Lipid panel was-   Recent Labs   Lab Test 01/27/20  1407 11/28/18  0915  03/24/15  0747 12/31/14  0715   CHOL 149 157   < > 127 117   HDL 40 56   < > 41 37*   LDL 61 79   < > 62 53   TRIG 239* 108   < > 121 133   CHOLHDLRATIO  --   --   --  3.1 3.2    < > = values in this interval not displayed.     .     HYPERTENSION - Patient has longstanding history of HTN , currently denies any symptoms referable to elevated blood pressure. Specifically denies chest pain, palpitations, dyspnea, orthopnea, PND or peripheral edema. Blood pressure readings have been in normal range. Current medication regimen is as listed below. Patient denies any side effects of medication.   BP Readings from Last 3 Encounters:   03/03/20 124/78   01/27/20 124/82   10/04/19 134/84       Has a history of Vitamin B12 deficiency- injects Vitamin B12 shots once a month. Due for a refill and lab check.     HEALTH CARE MAINTENANCE: Due for his annual Lung CT scan. UDS- on long term opiate therapy.      Patient informed  that anything we discuss that is not related to preventative medicine, may be billed for; patient verbalizes understanding.      Today's PHQ-2 Score:   PHQ-2 (  [employer]) 3/3/2020 2018   Q1: Little interest or pleasure in doing things 0 2   Q2: Feeling down, depressed or hopeless 0 2   PHQ-2 Score 0 4       Abuse: Current or Past(Physical, Sexual or Emotional)- No  Do you feel safe in your environment? Yes    Have you ever done Advance Care Planning? (For example, a Health Directive, POLST, or a discussion with a medical provider or your loved ones about your wishes): No, advance care planning information given to patient to review.  Patient plans to discuss their wishes with loved ones or provider.      Social History     Tobacco Use     Smoking status: Former Smoker     Last attempt to quit: 2018     Years since quittin.6     Smokeless tobacco: Never Used   Substance Use Topics     Alcohol use: Yes     Comment: occasionally     If you drink alcohol do you typically have >3 drinks per day or >7 drinks per week? No                      Last PSA: No results found for: PSA    Reviewed orders with patient. Reviewed health maintenance and updated orders accordingly - Yes  Lab work is in process  Labs reviewed in EPIC  BP Readings from Last 3 Encounters:   20 124/78   20 124/82   10/04/19 134/84    Wt Readings from Last 3 Encounters:   20 118.8 kg (261 lb 12.8 oz)   20 118.8 kg (262 lb)   10/01/19 117.4 kg (258 lb 12.8 oz)                  Patient Active Problem List   Diagnosis     Hyperlipidemia LDL goal <100     Mild major depression (H)     Restless leg syndrome     Sleep apnea     Knee pain     Vitamin B 12 deficiency     Advanced directives, counseling/discussion     Iron deficiency anemia     OA (osteoarthritis) of knee     Perforated bowel (H)     Left-sided low back pain with left-sided sciatica     Type 2 diabetes mellitus with microalbuminuria, with long-term current use  of insulin (H)     Hypertension goal BP (blood pressure) < 130/80     Bariatric surgery status     Perirectal abscess     CKD (chronic kidney disease) stage 3, GFR 30-59 ml/min (H)     Controlled substance agreement signed     Chronic pain syndrome     Morbid obesity (H)     Past Surgical History:   Procedure Laterality Date     ABDOMEN SURGERY      peritonitis, bowel perforation, bowel resection     AS TOTAL KNEE ARTHROPLASTY      Right and Left knee x3( 2 partial knee replacement and 1 total Left knee replacement     BARIATRIC SURGERY      at age 45     COLONOSCOPY  2006    [facility] [employer]     COLONOSCOPY WITH CO2 INSUFFLATION N/A 2016    Procedure: COLONOSCOPY WITH CO2 INSUFFLATION;  Surgeon: Cedric Schneider MD;  Location: [facility] OR     DISCECTOMY LUMBAR POSTERIOR MICROSCOPIC ONE LEVEL Left 2019    Procedure: Left L4-5 hemilaminectomy, medial facetectomy, foraminotomy with microdiscectomy and use of X-ray and intraoperative microscope;  Surgeon: Aaron Ness MD;  Location: [facility] OR     INCISION AND DRAINAGE BUTTOCKS Left 3/28/2018    Procedure: INCISION AND DRAINAGE BUTTOCKS;  Incision and drainage of left buttock abscess, and fistulotomy;  Surgeon: Bala Layton MD;  Location: [facility] OR       Social History     Tobacco Use     Smoking status: Former Smoker     Last attempt to quit: 2018     Years since quittin.6     Smokeless tobacco: Never Used   Substance Use Topics     Alcohol use: Yes     Comment: occasionally     Family History   Problem Relation Age of Onset     Diabetes Mother      Diabetes Paternal Grandmother      Coronary Artery Disease Paternal Grandmother      Cerebrovascular Disease Paternal Grandmother      Bleeding Disorder No family hx of      Anesthesia Reaction No family hx of          Current Outpatient Medications   Medication Sig Dispense Refill     aspirin 81 MG EC tablet Take 81 mg by mouth daily       blood glucose (ACCU-CHEK PELON PLUS) test  strip USE TO TEST BLOOD SUGAR THREE TIMES A DAY OR AS DIRECTED 100 each 11     blood glucose monitoring (ACCU-CHEK PELON PLUS) meter device kit Use to test blood sugar 3 times daily or as directed. 1 kit 0     blood glucose monitoring (SOFTCLIX) lancets USE TO TEST BLOOD SUGAR THREE TIMES A DAY OR AS DIRECTED 100 each 3     cyanocobalamin (CYANOCOBALAMIN) 1000 MCG/ML injection INJECT 1ML INTO THE MUSCLE EVERY 30 DAYS. 3 mL 3     cyclobenzaprine (FLEXERIL) 10 MG tablet TAKE ONE TABLET BY MOUTH THREE TIMES A DAY AS NEEDED FOR MUSCLE SPASMS 90 tablet 1     dulaglutide (TRULICITY) 0.75 MG/0.5ML pen Inject 0.75 mg Subcutaneous every 7 days 6 mL 3     escitalopram (LEXAPRO) 20 MG tablet Take 1 tablet (20 mg) by mouth daily 90 tablet 1     insulin glargine (LANTUS SOLOSTAR PEN) 100 UNIT/ML pen Inject 45 Units Subcutaneous At Bedtime 40 mL 3     insulin pen needle (BD ULTRA-FINE) 29G X 12.7MM Use once daily or as directed. 100 each prn     losartan-hydrochlorothiazide (HYZAAR) 100-25 MG tablet Take 1 tablet by mouth daily 90 tablet 3     MELATONIN PO Take 10 mg by mouth At Bedtime        oxyCODONE (OXYCONTIN) 10 MG 12 hr tablet Take 1 tablet (10 mg) by mouth every 12 hours maximum 2 tablet(s) per day 60 tablet 0     oxyCODONE (ROXICODONE) 5 MG tablet Take 1 tablet (5 mg) by mouth 2 times daily 60 tablet 0     pramipexole (MIRAPEX) 0.25 MG tablet TAKE ONE TABLET BY MOUTH EVERY MORNING, THEN TAKE ONE TABLET EVERY AFTERNOON, THEN TAKE TWO TABLETS BEFORE BED. 120 tablet 3     simvastatin (ZOCOR) 40 MG tablet Take 1 tablet (40 mg) by mouth At Bedtime 90 tablet 3     No Known Allergies  Recent Labs   Lab Test 03/03/20  1901 01/27/20  1407  10/01/19  1632 07/22/19  1610  11/28/18  0915  06/20/17 06/16/17  1552 05/23/17  0725   A1C  --  6.7*  --  7.9* 7.6*   < >  --    < >  --   --   --   --    LDL  --  61  --   --   --   --  79  --   --   --   --  70   HDL  --  40  --   --   --   --  56  --   --   --   --  50   TRIG  --  239*  --  "  --   --   --  108  --   --   --   --  143   ALT  --  33  --   --  38  --   --   --  26  --  35  --    CR 1.69* 1.83*   < >  --  1.45*   < >  --    < > 3.04*   < > 2.59*  --    GFRESTIMATED 42* 38*   < >  --  51*   < >  --    < > 21   < > 25*  --    GFRESTBLACK 49* 44*   < >  --  59*   < >  --    < > 26   < > 31*  --    POTASSIUM 3.4 3.1*  --   --  3.9   < >  --    < > 5.2*   < > 5.6*  --    TSH  --   --   --   --  2.26  --   --   --   --   --  1.86  --     < > = values in this interval not displayed.        Reviewed and updated as needed this visit by clinical staff  Meds  Med Hx  Surg Hx  Fam Hx         Reviewed and updated as needed this visit by Provider  Med Hx  Surg Hx  Fam Hx            ROS:  CONSTITUTIONAL: NEGATIVE for fever, chills, change in weight  INTEGUMENTARY/SKIN: NEGATIVE for worrisome rashes, moles or lesions  EYES: NEGATIVE for vision changes or irritation  ENT: NEGATIVE for ear, mouth and throat problems  RESP: NEGATIVE for significant cough or SOB  CV: NEGATIVE for chest pain, palpitations or peripheral edema  GI: NEGATIVE for nausea, abdominal pain, heartburn, or change in bowel habits   male: negative for dysuria, hematuria, decreased urinary stream, erectile dysfunction, urethral discharge  MUSCULOSKELETAL: NEGATIVE for significant arthralgias or myalgia  NEURO: NEGATIVE for weakness, dizziness or paresthesias  PSYCHIATRIC: NEGATIVE for changes in mood or affect    OBJECTIVE:   /78   Pulse 88   Temp 98.1  F (36.7  C) (Tympanic)   Resp 18   Ht 1.905 m (6' 3\")   Wt 118.8 kg (261 lb 12.8 oz)   SpO2 98%   BMI 32.72 kg/m    EXAM:  GENERAL: healthy, alert and no distress  EYES: Eyes grossly normal to inspection, PERRL and conjunctivae and sclerae normal  HENT: ear canals and TM's normal, nose and mouth without ulcers or lesions  NECK: no adenopathy, no asymmetry, masses, or scars and thyroid normal to palpation  RESP: lungs clear to auscultation - no rales, rhonchi or " wheezes  CV: regular rate and rhythm, normal S1 S2, no S3 or S4, no murmur, click or rub, no peripheral edema and peripheral pulses strong  ABDOMEN: soft, nontender, no hepatosplenomegaly, no masses and bowel sounds normal  MS: no gross musculoskeletal defects noted, no edema  SKIN: no suspicious lesions or rashes  NEURO: Normal strength and tone, mentation intact and speech normal  PSYCH: mentation appears normal, affect normal/bright    Diagnostic Test Results:  Labs reviewed and discussed with patient in Epic    ASSESSMENT/PLAN:   Cedric was seen today for physical.    Diagnoses and all orders for this visit:    Routine general medical examination at a health care facility    Type 2 diabetes mellitus with stage 3 chronic kidney disease, with long-term current use of insulin (H); controlled  -     Refill:  dulaglutide (TRULICITY) 0.75 MG/0.5ML pen; Inject 0.75 mg Subcutaneous every 7 days  -     blood glucose (ACCU-CHEK PELON PLUS) test strip; USE TO TEST BLOOD SUGAR THREE TIMES A DAY OR AS DIRECTED  -     blood glucose monitoring (SOFTCLIX) lancets; USE TO TEST BLOOD SUGAR THREE TIMES A DAY OR AS DIRECTED  -     OPHTHALMOLOGY ADULT REFERRAL  -     Basic metabolic panel  -     CBC with platelets    Long term (current) use of opiate analgesic  -     Urine Drugs of Abuse Screen Panel 13    Hypertension goal BP (blood pressure) < 140/90, controlled  -     Refill: losartan-hydrochlorothiazide (HYZAAR) 100-25 MG tablet; Take 1 tablet by mouth daily    Hyperlipidemia LDL goal <100  -     Refill: simvastatin (ZOCOR) 40 MG tablet; Take 1 tablet (40 mg) by mouth At Bedtime    Vitamin B 12 deficiency  -     Refill: cyanocobalamin (CYANOCOBALAMIN) 1000 MCG/ML injection; INJECT 1ML INTO THE MUSCLE EVERY 30 DAYS.  -     Vitamin B12    Personal history of tobacco use  -     Prof fee: Shared Decisionmaking for Lung Cancer Screening  -     CT Chest Lung Cancer Scrn Low Dose wo; Future  -     Okay for Smoking Cessation Study (PLUTO)  "to Contact Patient    Encounter for screening for HIV  -     HIV Antigen Antibody Combo    Mild recurrent major depression (H); improving  -  PHQ-9/CHARITY 7 completed, see above/Epic for details      - Increase dose: escitalopram (LEXAPRO) 20 MG tablet; Take 1 tablet (20 mg) by mouth daily    Advanced care planning/counseling discussion  I have verified the patient's ablity to prepare an advanced directive/make health care decisions.    Literature was provided to assist patient in preparing an advanced directive.          COUNSELING:  Reviewed preventive health counseling, as reflected in patient instructions       Regular exercise       Healthy diet/nutrition    Estimated body mass index is 32.72 kg/m  as calculated from the following:    Height as of this encounter: 1.905 m (6' 3\").    Weight as of this encounter: 118.8 kg (261 lb 12.8 oz).    Weight management plan: Discussed healthy diet and exercise guidelines     reports that he quit smoking about 19 months ago. He has never used smokeless tobacco.      Counseling Resources:  ATP IV Guidelines  Pooled Cohorts Equation Calculator  FRAX Risk Assessment  ICSI Preventive Guidelines  Dietary Guidelines for Americans, 2010  USDA's MyPlate  ASA Prophylaxis  Lung CA Screening    Follow up in 1 months- med check/Diabetes follow up.   Next Annual Physical due in 3/2021    Molly Leiva MD  [facility]  "

## 2020-03-05 NOTE — TELEPHONE ENCOUNTER
Shimon Leiva,    Can we please get a new script for the BD Eclipse Injection Needle with BD Luer-Demar Syringe for Cedric's  B-12 injections, these are the needles that go into the muscle.       BD Eclipse Injection Needle with BD Luer-Demar Syringe 3ml 25G x 1. NDC: [address]-68    Thank You,  Mariangel Del Rio, Pharmacy [facility]

## 2020-03-05 NOTE — TELEPHONE ENCOUNTER
Routing refill request to provider for review/approval because:  Drug not active on patient's medication list. New script.  Pended for approval.  Naty Kumar RN

## 2020-03-06 DIAGNOSIS — E11.22 TYPE 2 DIABETES MELLITUS WITH STAGE 3 CHRONIC KIDNEY DISEASE, WITH LONG-TERM CURRENT USE OF INSULIN (H): ICD-10-CM

## 2020-03-06 DIAGNOSIS — Z79.4 TYPE 2 DIABETES MELLITUS WITH STAGE 3 CHRONIC KIDNEY DISEASE, WITH LONG-TERM CURRENT USE OF INSULIN (H): ICD-10-CM

## 2020-03-06 DIAGNOSIS — G25.81 RESTLESS LEG SYNDROME: ICD-10-CM

## 2020-03-06 DIAGNOSIS — N18.30 TYPE 2 DIABETES MELLITUS WITH STAGE 3 CHRONIC KIDNEY DISEASE, WITH LONG-TERM CURRENT USE OF INSULIN (H): ICD-10-CM

## 2020-03-06 NOTE — TELEPHONE ENCOUNTER
"Requested Prescriptions   Pending Prescriptions Disp Refills     TRULICITY 0.75 MG/0.5ML pen [Pharmacy Med Name: TRULICITY 0.75MG/0.5ML SOPN] 3 mL 3     Sig: INJECT 0.75MG UNDER THE SKIN EVERY 7 DAYS  Last Written Prescription Date:  2/12/20  Last Fill Quantity: 3 ml,  # refills: 3   Last office visit: 3/3/2020 with prescribing provider:  Cali   Future Office Visit:         GLP-1 Agonists Protocol Failed - 3/6/2020  5:01 AM        Failed - Normal serum creatinine on file in past 12 months     Recent Labs   Lab Test 03/03/20  1901   CR 1.69*             Passed - Blood pressure less than 140/90 in past 6 months     BP Readings from Last 3 Encounters:   03/03/20 124/78   01/27/20 124/82   10/04/19 134/84                 Passed - LDL on file in past 12 months     Recent Labs   Lab Test 01/27/20  1407   LDL 61             Passed - Microalbumin on file in past 12 months     Recent Labs   Lab Test 10/01/19  1727   MICROL 97   UMALCR 114.81*             Passed - HgbA1C in past 3 or 6 months     If HgbA1C is 8 or greater, it needs to be on file within the past 3 months.  If less than 8, must be on file within the past 6 months.     Recent Labs   Lab Test 01/27/20  1407   A1C 6.7*             Passed - Medication is active on med list        Passed - Patient is age 18 or older        Passed - Recent (6 mo) or future (30 days) visit within the authorizing provider's specialty     Patient had office visit in the last 6 months or has a visit in the next 30 days with authorizing provider.  See \"Patient Info\" tab in inbasket, or \"Choose Columns\" in Meds & Orders section of the refill encounter.            pramipexole (MIRAPEX) 0.25 MG tablet [Pharmacy Med Name: PRAMIPEXOLE 0.25 MG] 120 tablet 3     Sig: TAKE ONE TABLET BY MOUTH EVERY MORNING, THEN TAKE ONE TABLET EVERY AFTERNOON, THEN TAKE TWO TABLETS BEFORE BED.  Last Written Prescription Date:  2/11/20  Last Fill Quantity: 120,  # refills: 3   Last office visit: 3/3/2020 with " "prescribing provider:  Cali   Future Office Visit:         Antiparkinson's Agents Protocol Passed - 3/6/2020  5:01 AM        Passed - Blood pressure under 140/90 in past 12 months     BP Readings from Last 3 Encounters:   03/03/20 124/78   01/27/20 124/82   10/04/19 134/84                 Passed - CBC on record in past 12 months     Recent Labs   Lab Test 03/03/20  1901   WBC 7.3   RBC 4.22*   HGB 12.9*   HCT 38.8*                    Passed - ALT on record in past 12 months         Recent Labs   Lab Test 01/27/20  1407   ALT 33             Passed - Serum Creatinine on file in past 12 months     Recent Labs   Lab Test 03/03/20  1901   CR 1.69*             Passed - Medication is active on med list        Passed - Patient is age 18 or older        Passed - Recent (6 mo) or future (30 days) visit within the authorizing provider's specialty     Patient had office visit in the last 6 months or has a visit in the next 30 days with authorizing provider or within the authorizing provider's specialty.  See \"Patient Info\" tab in inbasket, or \"Choose Columns\" in Meds & Orders section of the refill encounter.            "

## 2020-03-09 RX ORDER — PRAMIPEXOLE DIHYDROCHLORIDE 0.25 MG/1
TABLET ORAL
Qty: 120 TABLET | Refills: 3 | Status: SHIPPED | OUTPATIENT
Start: 2020-03-09 | End: 2020-09-04

## 2020-03-09 RX ORDER — DULAGLUTIDE 0.75 MG/.5ML
INJECTION, SOLUTION SUBCUTANEOUS
Qty: 3 ML | Refills: 3 | OUTPATIENT
Start: 2020-03-09

## 2020-03-09 NOTE — TELEPHONE ENCOUNTER
Prescription approved per [facility] Refill Protocol.  Pt has refills on trulicity, sent 3/3/20

## 2020-03-12 ENCOUNTER — ANCILLARY PROCEDURE (OUTPATIENT)
Dept: CT IMAGING | Facility: CLINIC | Age: 65
End: 2020-03-12
Attending: FAMILY MEDICINE
Payer: COMMERCIAL

## 2020-03-12 DIAGNOSIS — Z87.891 PERSONAL HISTORY OF TOBACCO USE: ICD-10-CM

## 2020-03-12 PROCEDURE — G0297 LDCT FOR LUNG CA SCREEN: HCPCS | Mod: TC

## 2020-03-13 DIAGNOSIS — Z87.891 PERSONAL HISTORY OF TOBACCO USE: Primary | ICD-10-CM

## 2020-03-13 NOTE — PATIENT INSTRUCTIONS

## 2020-03-29 DIAGNOSIS — E11.29 TYPE 2 DIABETES MELLITUS WITH MICROALBUMINURIA, WITH LONG-TERM CURRENT USE OF INSULIN (H): ICD-10-CM

## 2020-03-29 DIAGNOSIS — R80.9 TYPE 2 DIABETES MELLITUS WITH MICROALBUMINURIA, WITH LONG-TERM CURRENT USE OF INSULIN (H): ICD-10-CM

## 2020-03-29 DIAGNOSIS — Z79.4 TYPE 2 DIABETES MELLITUS WITH MICROALBUMINURIA, WITH LONG-TERM CURRENT USE OF INSULIN (H): ICD-10-CM

## 2020-03-29 NOTE — TELEPHONE ENCOUNTER
"Requested Prescriptions   Pending Prescriptions Disp Refills     LANTUS SOLOSTAR 100 UNIT/ML soln [Pharmacy Med Name: LANTUS SOLOSTAR 100UNIT/ML SOPN] 40 mL 3     Sig: INJECT 45 UNITS UNDER THE SKIN ONCE DAILY AT BEDTIME.   Last Written Prescription Date:  05/24/19  Last Fill Quantity: 40,  # refills: 3   Last office visitt: 3/3/2020 with prescribing provider:  ALEXIS Leiva   Future Office Visit:        Long Acting Insulin Protocol Passed - 3/29/2020  5:01 AM        Passed - Serum creatinine on file in past 12 months     Recent Labs   Lab Test 03/03/20  1901   CR 1.69*       Ok to refill medication if creatinine is low          Passed - HgbA1C in past 3 or 6 months     If HgbA1C is 8 or greater, it needs to be on file within the past 3 months.  If less than 8, must be on file within the past 6 months.     Recent Labs   Lab Test 01/27/20  1407   A1C 6.7*             Passed - Medication is active on med list        Passed - Patient is age 18 or older        Passed - Recent (6 mo) or future (30 days) visit within the authorizing provider's specialty     Patient had office visit in the last 6 months or has a visit in the next 30 days with authorizing provider or within the authorizing provider's specialty.  See \"Patient Info\" tab in inbasket, or \"Choose Columns\" in Meds & Orders section of the refill encounter.                 "

## 2020-03-30 NOTE — TELEPHONE ENCOUNTER
Prescription approved per [facility] Refill Protocol. Given patient reminder due for follow up in April 2020 for Diabetes with A1c prior to further refills.  Irene Zacarias, RN, BSN

## 2020-04-04 DIAGNOSIS — Z98.890 S/P LUMBAR MICRODISCECTOMY: ICD-10-CM

## 2020-04-06 RX ORDER — CYCLOBENZAPRINE HCL 10 MG
TABLET ORAL
Qty: 90 TABLET | Refills: 1 | Status: SHIPPED | OUTPATIENT
Start: 2020-04-06 | End: 2020-06-15

## 2020-04-06 NOTE — TELEPHONE ENCOUNTER
Requested Prescriptions   Pending Prescriptions Disp Refills     cyclobenzaprine (FLEXERIL) 10 MG tablet [Pharmacy Med Name: CYCLOBENZAPRINE HCL 10MG TABS] 90 tablet 1     Sig: TAKE ONE TABLET BY MOUTH THREE TIMES A DAY AS NEEDED FOR MUSCLE SPASMS  Last Written Prescription Date:  3/10/20  Last Fill Quantity: 90,  # refills: 1   Last office visit: 3/3/2020 with prescribing provider:  Cali   Future Office Visit:         There is no refill protocol information for this order

## 2020-04-20 ENCOUNTER — MYC MEDICAL ADVICE (OUTPATIENT)
Dept: FAMILY MEDICINE | Facility: CLINIC | Age: 65
End: 2020-04-20

## 2020-04-20 DIAGNOSIS — G89.4 CHRONIC PAIN SYNDROME: ICD-10-CM

## 2020-04-20 DIAGNOSIS — Z98.890 S/P LUMBAR MICRODISCECTOMY: ICD-10-CM

## 2020-04-20 NOTE — TELEPHONE ENCOUNTER
Routing refill request to provider for review/approval because:  Drug not on the [facility] refill protocol     Per :    OxyCODONE (ROXICODONE) 5 MG tablet   OxyCODONE (OXYCONTIN) 10 MG 12 hr tablet   1.  Written: Both 3/25/20  Filled: Both 3/27/20 with start date of 3/29/20  Quantity: 60 each  Provider: Sofi Lauren PA-C    2.  Written: Both 2/26/20  Filled: Both 2/28/20   Quantity: 60 each  Dr. Molly Leiva    3.   Written: Both 1/27/20  Filled: Both 1/31/20   Quantity: 60 each  Dr. Molly Leiva    See Note regarding Prior Authorization.     Loren Morrell RN BSN

## 2020-04-21 RX ORDER — OXYCODONE HCL 10 MG/1
10 TABLET, FILM COATED, EXTENDED RELEASE ORAL EVERY 12 HOURS
Qty: 60 TABLET | Refills: 0 | Status: SHIPPED | OUTPATIENT
Start: 2020-04-21 | End: 2020-05-19

## 2020-04-21 RX ORDER — OXYCODONE HYDROCHLORIDE 5 MG/1
5 TABLET ORAL 2 TIMES DAILY
Qty: 60 TABLET | Refills: 0 | Status: SHIPPED | OUTPATIENT
Start: 2020-04-21 | End: 2020-05-19

## 2020-05-07 NOTE — TELEPHONE ENCOUNTER
Chronic problem. Will continue chronic medications and monitor for any changes, adjusting as needed.         Please let patient know that Insurance denied coverage of the Lidocaine patches. Ask if he is doing ok with the OTC patches.

## 2020-05-13 ENCOUNTER — TELEPHONE (OUTPATIENT)
Dept: FAMILY MEDICINE | Facility: CLINIC | Age: 65
End: 2020-05-13

## 2020-05-13 NOTE — TELEPHONE ENCOUNTER
MTM referral from: Patient's insurance ([employer] products)    MTM referral outreach attempt #1 on May 13, 2020 at 3:53 PM      Outcome: Left Message    Latisha Gallegos MTM Coordinator

## 2020-05-18 ENCOUNTER — MYC MEDICAL ADVICE (OUTPATIENT)
Dept: FAMILY MEDICINE | Facility: CLINIC | Age: 65
End: 2020-05-18

## 2020-05-18 DIAGNOSIS — Z98.890 S/P LUMBAR MICRODISCECTOMY: ICD-10-CM

## 2020-05-18 DIAGNOSIS — G89.4 CHRONIC PAIN SYNDROME: ICD-10-CM

## 2020-05-19 RX ORDER — OXYCODONE HYDROCHLORIDE 5 MG/1
5 TABLET ORAL 2 TIMES DAILY
Qty: 60 TABLET | Refills: 0 | Status: SHIPPED | OUTPATIENT
Start: 2020-05-19 | End: 2020-06-19

## 2020-05-19 RX ORDER — OXYCODONE HCL 10 MG/1
10 TABLET, FILM COATED, EXTENDED RELEASE ORAL EVERY 12 HOURS
Qty: 60 TABLET | Refills: 0 | Status: SHIPPED | OUTPATIENT
Start: 2020-05-19 | End: 2020-06-19

## 2020-05-19 NOTE — TELEPHONE ENCOUNTER
Routing refill request to provider for review/approval because:  Drug not on the [facility] refill protocol. Last filled 4/21/20

## 2020-05-20 RX ORDER — OXYCODONE HYDROCHLORIDE 5 MG/1
TABLET ORAL
Qty: 60 TABLET | Refills: 0 | OUTPATIENT
Start: 2020-05-20

## 2020-05-20 RX ORDER — OXYCODONE HYDROCHLORIDE 10 MG/1
TABLET, FILM COATED, EXTENDED RELEASE ORAL
Qty: 60 TABLET | Refills: 0 | OUTPATIENT
Start: 2020-05-20

## 2020-06-04 ENCOUNTER — ALLIED HEALTH/NURSE VISIT (OUTPATIENT)
Dept: PHARMACY | Facility: CLINIC | Age: 65
End: 2020-06-04
Payer: COMMERCIAL

## 2020-06-04 DIAGNOSIS — I10 HYPERTENSION GOAL BP (BLOOD PRESSURE) < 130/80: ICD-10-CM

## 2020-06-04 DIAGNOSIS — Z79.4 TYPE 2 DIABETES MELLITUS WITH MICROALBUMINURIA, WITH LONG-TERM CURRENT USE OF INSULIN (H): Primary | ICD-10-CM

## 2020-06-04 DIAGNOSIS — G25.81 RESTLESS LEG SYNDROME: ICD-10-CM

## 2020-06-04 DIAGNOSIS — F32.0 MILD MAJOR DEPRESSION (H): ICD-10-CM

## 2020-06-04 DIAGNOSIS — E11.29 TYPE 2 DIABETES MELLITUS WITH MICROALBUMINURIA, WITH LONG-TERM CURRENT USE OF INSULIN (H): Primary | ICD-10-CM

## 2020-06-04 DIAGNOSIS — E53.8 VITAMIN B 12 DEFICIENCY: ICD-10-CM

## 2020-06-04 DIAGNOSIS — G47.00 INSOMNIA, UNSPECIFIED TYPE: ICD-10-CM

## 2020-06-04 DIAGNOSIS — R80.9 TYPE 2 DIABETES MELLITUS WITH MICROALBUMINURIA, WITH LONG-TERM CURRENT USE OF INSULIN (H): Primary | ICD-10-CM

## 2020-06-04 DIAGNOSIS — G89.4 CHRONIC PAIN SYNDROME: ICD-10-CM

## 2020-06-04 PROCEDURE — 99605 MTMS BY PHARM NP 15 MIN: CPT | Performed by: PHARMACIST

## 2020-06-04 PROCEDURE — 99607 MTMS BY PHARM ADDL 15 MIN: CPT | Performed by: PHARMACIST

## 2020-06-04 NOTE — PROGRESS NOTES
"MTM ENCOUNTER  SUBJECTIVE/OBJECTIVE:                           Cedric Figueroa is a 64 year old male called for an initial visit. He was referred to me from his insurance with [employer].      Patient consented to a telehealth visit: yes  Telemedicine Visit Details  Type of service:  Telephone visit  Start Time: 4:03 PM  End Time: 4:30 PM  Originating Location (pt. Location): Home  Distant Location (provider location):  [facility]  Mode of Communication:  Telephone    Chief Complaint: Med Review.    Allergies/ADRs: Reviewed in EHR  Tobacco:  reports that he quit smoking about 22 months ago. He has never used smokeless tobacco.  Alcohol: 1-2 beers after work most days  Caffeine: 2 cups/day of coffee, 1-2 cans diet soda  Activity: Not much right now  PMH: Reviewed in EHR    Medication Adherence/Access: no issues reported  Patient takes medications directly from bottles.  Patient takes medications 1 time(s) per day QHS    Type 2 Diabetes: Pt currently taking Trulicity 0.75mg injected once weekly, Lantus 45units QHS.   SMBG Ranges (patient reported): not really checking  Symptoms of low blood sugar? none.   Symptoms of high blood sugar? none  Diet/Exercise: \"I know what I can eat and what I can't eat\"  Aspirin: Taking 81mg daily and denies side effects  Statin: Yes: simvastatin 40mg QHS and denies side effects  ACEi/ARB: Yes: losartan/HCTZ 100/25mg at bedtime.Urine albumin is   Lab Results   Component Value Date    UMALCR 114.81 (H) 10/01/2019       Lab Results   Component Value Date    A1C 6.7 01/27/2020    A1C 7.9 10/01/2019    A1C 7.6 07/22/2019    A1C 7.5 03/26/2019    A1C 6.6 02/11/2019        Recent Labs   Lab Test 01/27/20  1407 11/28/18  0915  03/24/15  0747 12/31/14  0715   CHOL 149 157   < > 127 117   HDL 40 56   < > 41 37*   LDL 61 79   < > 62 53   TRIG 239* 108   < > 121 133   CHOLHDLRATIO  --   --   --  3.1 3.2    < > = values in this interval not displayed.       GFR Estimate   Date " Value Ref Range Status   03/03/2020 42 (L) >60 mL/min/[1.73_m2] Final     Comment:     Non  GFR Calc  Starting 12/18/2018, serum creatinine based estimated GFR (eGFR) will be   calculated using the Chronic Kidney Disease Epidemiology Collaboration   (CKD-EPI) equation.     01/27/2020 38 (L) >60 mL/min/[1.73_m2] Final     Comment:     Non  GFR Calc  Starting 12/18/2018, serum creatinine based estimated GFR (eGFR) will be   calculated using the Chronic Kidney Disease Epidemiology Collaboration   (CKD-EPI) equation.     10/01/2019 45 (L) >60 mL/min/[1.73_m2] Final     Comment:     Non  GFR Calc  Starting 12/18/2018, serum creatinine based estimated GFR (eGFR) will be   calculated using the Chronic Kidney Disease Epidemiology Collaboration   (CKD-EPI) equation.         Hypertension: Current medications include losartan/HCTZ 100/25mg at bedtime.  Patient does not self-monitor BP.  Patient reports the following medication side effects: gets up at least 2x/night to urinate. Upon further questioning, he admits his prostate is large, he has trouble voiding while standing, and doesn't always feel able to fully empty his bladder. He has never discussed this with his PCP before and does not have a diagnosis of BPH. The nocturia disrupts his sleep.  BP Readings from Last 3 Encounters:   03/03/20 124/78   01/27/20 124/82   10/04/19 134/84       Supplements: Hx of Neda-en-Y gastric bypass about 18 years ago. Currently taking Vitamin B12 injection once monthly. B12 level (3/3/2020) 773pg/mL WNL. Denies issues at this time.    Chronic pain:  Knee and back pain, hx of 3 knee replacements, back surgery (ruptered disc). Met with pain clinic in [place]. Current medications include: Cyclobenzaprine 10mg TID PRN (takes 2-3 times daily), oxycodone 10mg QAM, 5mg Qnoon and Q6pm, 10mg QHS. How is your pain? Comfortably manageable. Are you able to do your normal activities? Can do most things, but  "pain gets in the way some. Are you able to sleep? Normal sleep. Patient reports the following side effects:  Dry mouth, maybe fatigue -- but this is not every day and feels more likely due to waking up at night.    Depression:  Current medications include: Escitalopram 20mg once daily. Pt reports that depression symptoms are \"pretty good\". No side effects noted.  PHQ-9 SCORE 10/1/2019 1/27/2020 3/3/2020   PHQ-9 Total Score - - -   PHQ-9 Total Score 7 9 4       Insomnia: Current medications include: melatonin 10mg QHS. Pt reports works well to \"put me asleep\". No side effects noted.    RLS: pramipexole 0.25mg QAM, Qnoon, 2 tabs at bedtime. Usually only takes 2 per day. More than this actually leads to leg cramps in his shins. If he misses or skips this he does have worsened RLS symptoms. Could experiment with one a day?  Ferritin   Date Value Ref Range Status   08/07/2017 42 26 - 388 ng/mL Final       Today's Vitals: There were no vitals taken for this visit. Phone Visit.      ASSESSMENT:                              Medication Adherence: good, no issues identified    Type 2 Diabetes: Stable. Patient is meeting A1c goal of < 7% - due for recheck.    Hypertension: Needs Improvement. Patient is meeting BP goal of < 140/90mmHg.   Pt would benefit from the following changes - move losartan/HCTZ to AM to reduce risk it is contributing to nocturia. However, he also needs further evalution by PCP for other causes contributing to his symptoms.    Supplements: Stable.    Chronic Pain: Stable.     Depression: Stable.     Insomnia: Stable.    RLS: Stable. He may consider experimenting with one tablet daily to see if lower dose continues to manage RLS symptoms and reduces risk of side effects.      PLAN:                            1. Future A1c ordered today. Pt to schedule lab- only appointment ASAP.  2. Switch losartan/HCTZ to AM  3. Talk to PCP about enlarged prostate, difficulty voiding, etc.  3. [place] with reducing " pramipexole to 1 tablet at bedtime. If RLS symptoms worsen, ok to go back to 2 tablets daily.    I spent 30 minutes with this patient today. All changes were made via collaborative practice agreement with Molly Leiva. A copy of the visit note was provided to the patient's primary care provider.a    Will follow up in 6 months if needed, he is to schedule with lab and PCP.    The patient declined a summary of these recommendations.     Marcia Douglass, Pharm.D., [facility]  Medication Therapy Management Pharmacist  [phone]

## 2020-06-04 NOTE — Clinical Note
Dr. Leiva,    I have asked Cedric to schedule an A1c check ASAP. I also suggested he needs to see you for evaluation of BPH symptoms. He doesn't currently have a diagnoses, but reports enlarged prostate and lower urinary symptoms. Hopefully he schedules soon or you can have your team reach out to him if needed.    Please contact me with any questions.    Thanks!  Marcia Douglass, Pharm.D., [facility]  Medication Therapy Management Pharmacist  [phone]

## 2020-06-10 DIAGNOSIS — Z98.890 S/P LUMBAR MICRODISCECTOMY: ICD-10-CM

## 2020-06-12 NOTE — TELEPHONE ENCOUNTER
Routing refill request to provider for review/approval because:  Drug not on the [facility] refill protocol. Last seen 3/3/20

## 2020-06-15 ENCOUNTER — MYC MEDICAL ADVICE (OUTPATIENT)
Dept: FAMILY MEDICINE | Facility: CLINIC | Age: 65
End: 2020-06-15

## 2020-06-15 DIAGNOSIS — G89.4 CHRONIC PAIN SYNDROME: ICD-10-CM

## 2020-06-15 DIAGNOSIS — Z98.890 S/P LUMBAR MICRODISCECTOMY: ICD-10-CM

## 2020-06-15 DIAGNOSIS — N17.9 ACUTE KIDNEY FAILURE, UNSPECIFIED (H): Primary | ICD-10-CM

## 2020-06-15 RX ORDER — OXYCODONE HCL 10 MG/1
10 TABLET, FILM COATED, EXTENDED RELEASE ORAL EVERY 12 HOURS
Qty: 60 TABLET | Refills: 0 | Status: CANCELLED | OUTPATIENT
Start: 2020-06-15

## 2020-06-15 RX ORDER — CYCLOBENZAPRINE HCL 10 MG
TABLET ORAL
Qty: 90 TABLET | Refills: 1 | Status: SHIPPED | OUTPATIENT
Start: 2020-06-15 | End: 2020-08-06

## 2020-06-15 NOTE — DISCHARGE INSTRUCTIONS
GENERAL ANESTHESIA OR SEDATION ADULT DISCHARGE INSTRUCTIONS   SPECIAL PRECAUTIONS FOR 24 HOURS AFTER SURGERY    IT IS NOT UNUSUAL TO FEEL LIGHT-HEADED OR FAINT, UP TO 24 HOURS AFTER SURGERY OR WHILE TAKING PAIN MEDICATION.  IF YOU HAVE THESE SYMPTOMS; SIT FOR A FEW MINUTES BEFORE STANDING AND HAVE SOMEONE ASSIST YOU WHEN YOU GET UP TO WALK OR USE THE BATHROOM.    YOU SHOULD REST AND RELAX FOR THE NEXT 24 HOURS AND YOU MUST MAKE ARRANGEMENTS TO HAVE SOMEONE STAY WITH YOU FOR AT LEAST 24 HOURS AFTER YOUR DISCHARGE.  AVOID HAZARDOUS AND STRENUOUS ACTIVITIES.  DO NOT MAKE IMPORTANT DECISIONS FOR 24 HOURS.    DO NOT DRIVE ANY VEHICLE OR OPERATE MECHANICAL EQUIPMENT FOR 24 HOURS FOLLOWING THE END OF YOUR SURGERY.  EVEN THOUGH YOU MAY FEEL NORMAL, YOUR REACTIONS MAY BE AFFECTED BY THE MEDICATION YOU HAVE RECEIVED.    DO NOT DRINK ALCOHOLIC BEVERAGES FOR 24 HOURS FOLLOWING YOUR SURGERY.    DRINK CLEAR LIQUIDS (APPLE JUICE, GINGER ALE, 7-UP, BROTH, ETC.).  PROGRESS TO YOUR REGULAR DIET AS YOU FEEL ABLE.    YOU MAY HAVE A DRY MOUTH, A SORE THROAT, MUSCLES ACHES OR TROUBLE SLEEPING.  THESE SHOULD GO AWAY AFTER 24 HOURS.    CALL YOUR DOCTOR FOR ANY OF THE FOLLOWING:  SIGNS OF INFECTION (FEVER, GROWING TENDERNESS AT THE SURGERY SITE, A LARGE AMOUNT OF DRAINAGE OR BLEEDING, SEVERE PAIN, FOUL-SMELLING DRAINAGE, REDNESS OR SWELLING.    IT HAS BEEN OVER 8 TO 10 HOURS SINCE SURGERY AND YOU ARE STILL NOT ABLE TO URINATE (PASS WATER).      .DR. RY CAMARENA M.D.                  CLINIC PHONE NUMBER:  [phone]      You were given one percocet tablet for pain at 3:25 p.m.      SPINE SURGERY DISCHARGE INSTRUCTIONS  DR. RY CAMARENA M.D.  [phone]    1.  NO LIFTING OF MORE THAT 10 POUNDS AND NO BENDING, TWISTING OR OVERHEAD REACHING UNTIL FOLLOW-UP VISIT IN 6-8 WEEKS.    2.  OK TO REMOVE DRESSING IN 24 HOURS.  YOU MAY SHOWER IN 48 HOURS,, BUT DO NOT SCRUB OR SUBMERGE INCISION UNDER WATER FOR 6 WEEKS.  IF YOU HAVE DRAINAGE  FROM THE INCISION, YOU MAY REPLACE THE DRESSING AS NEEDED.    3.  OK TO USE ICE PACKS TO THE BACK AREA FOR 20 MINUTES ON AND 20 MINUTES OFF FOR 24-48 HOURS AFTER SURGERY.  AVOID ICE CONTACT DIRECTLY TO THE SKIN OR INCISION.    4.  OK TO WALK AS MUCH AS TOLERATED.    5.  NO CONTACT SPORTS UNTIL FOLLOW-UP VISIT.    6.  NO HIGH IMPACT ACTIVITIES SUCH AS RUNNING/JOGGING, SNOWMOBILE OR FOUR RAMSEY RIDING OR ANY OTHER RECREACTIONAL VEHICLES.    CALL MY OFFICE [phone] FOR INCREASING REDNESS, SWELLING OR PUS DRAINING FROM THE INCISION.  CALL IF YOU HAVE INCREASED PAIN OR ANY OTHER QUESTIONS OR CONCERNS.

## 2020-06-19 ENCOUNTER — MYC MEDICAL ADVICE (OUTPATIENT)
Dept: FAMILY MEDICINE | Facility: CLINIC | Age: 65
End: 2020-06-19

## 2020-06-19 DIAGNOSIS — G89.4 CHRONIC PAIN SYNDROME: ICD-10-CM

## 2020-06-19 DIAGNOSIS — Z98.890 S/P LUMBAR MICRODISCECTOMY: ICD-10-CM

## 2020-06-19 DIAGNOSIS — R80.9 TYPE 2 DIABETES MELLITUS WITH MICROALBUMINURIA, WITH LONG-TERM CURRENT USE OF INSULIN (H): ICD-10-CM

## 2020-06-19 DIAGNOSIS — E11.29 TYPE 2 DIABETES MELLITUS WITH MICROALBUMINURIA, WITH LONG-TERM CURRENT USE OF INSULIN (H): ICD-10-CM

## 2020-06-19 DIAGNOSIS — Z79.4 TYPE 2 DIABETES MELLITUS WITH MICROALBUMINURIA, WITH LONG-TERM CURRENT USE OF INSULIN (H): ICD-10-CM

## 2020-06-19 DIAGNOSIS — N17.9 ACUTE KIDNEY FAILURE, UNSPECIFIED (H): ICD-10-CM

## 2020-06-19 LAB
ALBUMIN UR-MCNC: NEGATIVE MG/DL
ANION GAP SERPL CALCULATED.3IONS-SCNC: 6 MMOL/L (ref 3–14)
APPEARANCE UR: CLEAR
BILIRUB UR QL STRIP: NEGATIVE
BUN SERPL-MCNC: 28 MG/DL (ref 7–30)
CALCIUM SERPL-MCNC: 8.5 MG/DL (ref 8.5–10.1)
CHLORIDE SERPL-SCNC: 105 MMOL/L (ref 94–109)
CO2 SERPL-SCNC: 28 MMOL/L (ref 20–32)
COLOR UR AUTO: YELLOW
CREAT SERPL-MCNC: 1.68 MG/DL (ref 0.66–1.25)
CREAT UR-MCNC: 82 MG/DL
GFR SERPL CREATININE-BSD FRML MDRD: 42 ML/MIN/{1.73_M2}
GLUCOSE SERPL-MCNC: 101 MG/DL (ref 70–99)
GLUCOSE UR STRIP-MCNC: NEGATIVE MG/DL
HBA1C MFR BLD: 7 % (ref 0–5.6)
HGB UR QL STRIP: NEGATIVE
KETONES UR STRIP-MCNC: NEGATIVE MG/DL
LEUKOCYTE ESTERASE UR QL STRIP: NEGATIVE
NITRATE UR QL: NEGATIVE
PH UR STRIP: 5 PH (ref 5–7)
POTASSIUM SERPL-SCNC: 3.6 MMOL/L (ref 3.4–5.3)
PROT UR-MCNC: 0.2 G/L
PROT/CREAT 24H UR: 0.24 G/G CR (ref 0–0.2)
SODIUM SERPL-SCNC: 139 MMOL/L (ref 133–144)
SOURCE: NORMAL
SP GR UR STRIP: 1.02 (ref 1–1.03)
UROBILINOGEN UR STRIP-ACNC: 0.2 EU/DL (ref 0.2–1)

## 2020-06-19 PROCEDURE — 36415 COLL VENOUS BLD VENIPUNCTURE: CPT | Performed by: INTERNAL MEDICINE

## 2020-06-19 PROCEDURE — 81003 URINALYSIS AUTO W/O SCOPE: CPT | Performed by: INTERNAL MEDICINE

## 2020-06-19 PROCEDURE — 80048 BASIC METABOLIC PNL TOTAL CA: CPT | Performed by: INTERNAL MEDICINE

## 2020-06-19 PROCEDURE — 83036 HEMOGLOBIN GLYCOSYLATED A1C: CPT | Performed by: FAMILY MEDICINE

## 2020-06-19 PROCEDURE — 84156 ASSAY OF PROTEIN URINE: CPT | Performed by: INTERNAL MEDICINE

## 2020-06-19 RX ORDER — OXYCODONE HCL 10 MG/1
10 TABLET, FILM COATED, EXTENDED RELEASE ORAL EVERY 12 HOURS
Qty: 60 TABLET | Refills: 0 | Status: SHIPPED | OUTPATIENT
Start: 2020-06-19 | End: 2020-07-14

## 2020-06-19 RX ORDER — OXYCODONE HYDROCHLORIDE 10 MG/1
TABLET, FILM COATED, EXTENDED RELEASE ORAL
Qty: 60 TABLET | Refills: 0 | OUTPATIENT
Start: 2020-06-19

## 2020-06-19 RX ORDER — OXYCODONE HYDROCHLORIDE 5 MG/1
TABLET ORAL
Qty: 60 TABLET | Refills: 0 | OUTPATIENT
Start: 2020-06-19

## 2020-06-19 RX ORDER — OXYCODONE HYDROCHLORIDE 5 MG/1
5 TABLET ORAL 2 TIMES DAILY
Qty: 60 TABLET | Refills: 0 | OUTPATIENT
Start: 2020-06-19

## 2020-06-19 RX ORDER — OXYCODONE HYDROCHLORIDE 5 MG/1
5 TABLET ORAL 2 TIMES DAILY
Qty: 60 TABLET | Refills: 0 | Status: SHIPPED | OUTPATIENT
Start: 2020-06-19 | End: 2020-07-14

## 2020-06-19 NOTE — TELEPHONE ENCOUNTER
Routing refill request to provider for review/approval because:  Drug not on the [facility] refill protocol. Last written 5/19/20

## 2020-06-19 NOTE — TELEPHONE ENCOUNTER
Reason for Call:  Medication or medication refill:    Do you use a [facility]?  Name of the pharmacy and phone number for the current request:  [facility] [phone]    Name of the medication requested: pain medication     Other request: na  Can we leave a detailed message on this number? YES    Phone number patient can be reached at: Home number on file [phone] (home)    Best Time: any      Call taken on 6/19/2020 at 9:50 AM by Adrianna Richardson

## 2020-07-01 DIAGNOSIS — Z79.4 TYPE 2 DIABETES MELLITUS WITH MICROALBUMINURIA, WITH LONG-TERM CURRENT USE OF INSULIN (H): ICD-10-CM

## 2020-07-01 DIAGNOSIS — R80.9 TYPE 2 DIABETES MELLITUS WITH MICROALBUMINURIA, WITH LONG-TERM CURRENT USE OF INSULIN (H): ICD-10-CM

## 2020-07-01 DIAGNOSIS — E11.29 TYPE 2 DIABETES MELLITUS WITH MICROALBUMINURIA, WITH LONG-TERM CURRENT USE OF INSULIN (H): ICD-10-CM

## 2020-07-06 DIAGNOSIS — R80.9 TYPE 2 DIABETES MELLITUS WITH MICROALBUMINURIA, WITH LONG-TERM CURRENT USE OF INSULIN (H): ICD-10-CM

## 2020-07-06 DIAGNOSIS — Z79.4 TYPE 2 DIABETES MELLITUS WITH MICROALBUMINURIA, WITH LONG-TERM CURRENT USE OF INSULIN (H): ICD-10-CM

## 2020-07-06 DIAGNOSIS — E11.29 TYPE 2 DIABETES MELLITUS WITH MICROALBUMINURIA, WITH LONG-TERM CURRENT USE OF INSULIN (H): ICD-10-CM

## 2020-07-06 NOTE — TELEPHONE ENCOUNTER
Prescription approved per [facility] Refill Protocol.    Loren Morrell RN BSN

## 2020-07-13 ENCOUNTER — MYC MEDICAL ADVICE (OUTPATIENT)
Dept: FAMILY MEDICINE | Facility: CLINIC | Age: 65
End: 2020-07-13

## 2020-07-13 DIAGNOSIS — G89.4 CHRONIC PAIN SYNDROME: ICD-10-CM

## 2020-07-13 DIAGNOSIS — Z98.890 S/P LUMBAR MICRODISCECTOMY: ICD-10-CM

## 2020-07-13 NOTE — TELEPHONE ENCOUNTER
Routing refill request to provider for review/approval because:  Drug not on the [facility] refill protocol     My Chart message states patient needs to  by 7/17/20.     Last Written Prescription Date:  Both filled 6/19/20  Last Fill Quantity: 60,  # refills: 0     Last office visit: 3/3/2020 with prescribing provider:  Dr. Leiva     Future Office Visit:  None    Loren Morrell RN BSN

## 2020-07-14 RX ORDER — OXYCODONE HYDROCHLORIDE 5 MG/1
5 TABLET ORAL 2 TIMES DAILY
Qty: 60 TABLET | Refills: 0 | Status: SHIPPED | OUTPATIENT
Start: 2020-07-14 | End: 2020-08-10

## 2020-07-14 RX ORDER — OXYCODONE HCL 10 MG/1
10 TABLET, FILM COATED, EXTENDED RELEASE ORAL EVERY 12 HOURS
Qty: 60 TABLET | Refills: 0 | Status: SHIPPED | OUTPATIENT
Start: 2020-07-14 | End: 2020-08-10

## 2020-08-06 DIAGNOSIS — Z98.890 S/P LUMBAR MICRODISCECTOMY: ICD-10-CM

## 2020-08-06 RX ORDER — CYCLOBENZAPRINE HCL 10 MG
TABLET ORAL
Qty: 90 TABLET | Refills: 1 | Status: SHIPPED | OUTPATIENT
Start: 2020-08-06 | End: 2020-09-11

## 2020-08-10 DIAGNOSIS — Z98.890 S/P LUMBAR MICRODISCECTOMY: ICD-10-CM

## 2020-08-10 RX ORDER — CYCLOBENZAPRINE HCL 10 MG
TABLET ORAL
Qty: 90 TABLET | Refills: 1 | OUTPATIENT
Start: 2020-08-10

## 2020-08-25 DIAGNOSIS — F33.0 MILD RECURRENT MAJOR DEPRESSION (H): ICD-10-CM

## 2020-08-28 RX ORDER — ESCITALOPRAM OXALATE 20 MG/1
TABLET ORAL
Qty: 30 TABLET | Refills: 0 | Status: SHIPPED | OUTPATIENT
Start: 2020-08-28 | End: 2020-09-28

## 2020-08-28 NOTE — TELEPHONE ENCOUNTER
"Prescription approved per [facility] Refill Protocol.        Requested Prescriptions   Pending Prescriptions Disp Refills     escitalopram (LEXAPRO) 20 MG tablet [Pharmacy Med Name: ESCITALOPRAM OXALATE 20MG TABS] 90 tablet 1     Sig: TAKE ONE TABLET BY MOUTH ONCE DAILY       SSRIs Protocol Passed - 8/25/2020  7:05 AM        Passed - PHQ-9 score less than 5 in past 6 months     Please review last PHQ-9 score.           Passed - Medication is active on med list        Passed - Patient is age 18 or older        Passed - Recent (6 mo) or future (30 days) visit within the authorizing provider's specialty     Patient had office visit in the last 6 months or has a visit in the next 30 days with authorizing provider or within the authorizing provider's specialty.  See \"Patient Info\" tab in inbasket, or \"Choose Columns\" in Meds & Orders section of the refill encounter.                 "

## 2020-09-06 DIAGNOSIS — G25.81 RESTLESS LEG SYNDROME: ICD-10-CM

## 2020-09-06 RX ORDER — PRAMIPEXOLE DIHYDROCHLORIDE 0.25 MG/1
TABLET ORAL
Qty: 120 TABLET | Refills: 3 | Status: SHIPPED | OUTPATIENT
Start: 2020-09-06 | End: 2020-12-01

## 2020-09-06 NOTE — TELEPHONE ENCOUNTER
"  Prescription approved per [facility] Refill Protocol.      Requested Prescriptions   Pending Prescriptions Disp Refills     pramipexole (MIRAPEX) 0.25 MG tablet [Pharmacy Med Name: PRAMIPEXOLE 0.25 MG] 120 tablet 3     Sig: TAKE ONE TABLET BY MOUTH EVERY MORNING, THEN TAKE ONE TABLET EVERY AFTERNOON, THEN TAKE TWO TABLETS BEFORE BED.       Antiparkinson's Agents Protocol Passed - 9/6/2020  5:01 AM        Passed - Blood pressure under 140/90 in past 12 months     BP Readings from Last 3 Encounters:   03/03/20 124/78   01/27/20 124/82   10/04/19 134/84                 Passed - CBC on record in past 12 months     Recent Labs   Lab Test 03/03/20  1901   WBC 7.3   RBC 4.22*   HGB 12.9*   HCT 38.8*                    Passed - ALT on record in past 12 months         Recent Labs   Lab Test 01/27/20  1407   ALT 33             Passed - Serum Creatinine on file in past 12 months     Recent Labs   Lab Test 06/19/20  0714   CR 1.68*       Ok to refill medication if creatinine is low          Passed - Medication is active on med list        Passed - Patient is age 18 or older        Passed - Recent (6 mo) or future (30 days) visit within the authorizing provider's specialty     Patient had office visit in the last 6 months or has a visit in the next 30 days with authorizing provider or within the authorizing provider's specialty.  See \"Patient Info\" tab in inbasket, or \"Choose Columns\" in Meds & Orders section of the refill encounter.                 "

## 2020-09-09 ENCOUNTER — ANCILLARY PROCEDURE (OUTPATIENT)
Dept: GENERAL RADIOLOGY | Facility: CLINIC | Age: 65
End: 2020-09-09
Attending: FAMILY MEDICINE
Payer: COMMERCIAL

## 2020-09-09 ENCOUNTER — OFFICE VISIT (OUTPATIENT)
Dept: FAMILY MEDICINE | Facility: CLINIC | Age: 65
End: 2020-09-09
Payer: COMMERCIAL

## 2020-09-09 VITALS
DIASTOLIC BLOOD PRESSURE: 69 MMHG | TEMPERATURE: 99.3 F | BODY MASS INDEX: 32.15 KG/M2 | SYSTOLIC BLOOD PRESSURE: 110 MMHG | HEIGHT: 75 IN | WEIGHT: 258.6 LBS | OXYGEN SATURATION: 96 % | HEART RATE: 102 BPM

## 2020-09-09 DIAGNOSIS — E11.22 TYPE 2 DIABETES MELLITUS WITH STAGE 3 CHRONIC KIDNEY DISEASE, WITH LONG-TERM CURRENT USE OF INSULIN (H): ICD-10-CM

## 2020-09-09 DIAGNOSIS — Z98.890 S/P LUMBAR MICRODISCECTOMY: ICD-10-CM

## 2020-09-09 DIAGNOSIS — G89.4 CHRONIC PAIN SYNDROME: ICD-10-CM

## 2020-09-09 DIAGNOSIS — R30.0 DYSURIA: ICD-10-CM

## 2020-09-09 DIAGNOSIS — N18.30 CKD (CHRONIC KIDNEY DISEASE) STAGE 3, GFR 30-59 ML/MIN (H): ICD-10-CM

## 2020-09-09 DIAGNOSIS — R42 DIZZINESS: ICD-10-CM

## 2020-09-09 DIAGNOSIS — R06.09 DYSPNEA ON EXERTION: Primary | ICD-10-CM

## 2020-09-09 DIAGNOSIS — N18.30 TYPE 2 DIABETES MELLITUS WITH STAGE 3 CHRONIC KIDNEY DISEASE, WITH LONG-TERM CURRENT USE OF INSULIN (H): ICD-10-CM

## 2020-09-09 DIAGNOSIS — Z79.4 TYPE 2 DIABETES MELLITUS WITH STAGE 3 CHRONIC KIDNEY DISEASE, WITH LONG-TERM CURRENT USE OF INSULIN (H): ICD-10-CM

## 2020-09-09 DIAGNOSIS — R53.83 FATIGUE, UNSPECIFIED TYPE: ICD-10-CM

## 2020-09-09 LAB
ALBUMIN SERPL-MCNC: 4 G/DL (ref 3.4–5)
ALBUMIN UR-MCNC: NEGATIVE MG/DL
ALP SERPL-CCNC: 138 U/L (ref 40–150)
ALT SERPL W P-5'-P-CCNC: 29 U/L (ref 0–70)
ANION GAP SERPL CALCULATED.3IONS-SCNC: 7 MMOL/L (ref 3–14)
APPEARANCE UR: CLEAR
AST SERPL W P-5'-P-CCNC: 20 U/L (ref 0–45)
BILIRUB SERPL-MCNC: 0.4 MG/DL (ref 0.2–1.3)
BILIRUB UR QL STRIP: NEGATIVE
BUN SERPL-MCNC: 41 MG/DL (ref 7–30)
CALCIUM SERPL-MCNC: 8.2 MG/DL (ref 8.5–10.1)
CHLORIDE SERPL-SCNC: 106 MMOL/L (ref 94–109)
CO2 SERPL-SCNC: 23 MMOL/L (ref 20–32)
COLOR UR AUTO: YELLOW
CREAT SERPL-MCNC: 2.02 MG/DL (ref 0.66–1.25)
D DIMER PPP FEU-MCNC: 0.3 UG/ML FEU (ref 0–0.5)
ERYTHROCYTE [DISTWIDTH] IN BLOOD BY AUTOMATED COUNT: 12.7 % (ref 10–15)
GFR SERPL CREATININE-BSD FRML MDRD: 34 ML/MIN/{1.73_M2}
GLUCOSE SERPL-MCNC: 166 MG/DL (ref 70–99)
GLUCOSE UR STRIP-MCNC: NEGATIVE MG/DL
HBA1C MFR BLD: 7.3 % (ref 0–5.6)
HCT VFR BLD AUTO: 35.5 % (ref 40–53)
HGB BLD-MCNC: 11.9 G/DL (ref 13.3–17.7)
HGB UR QL STRIP: NEGATIVE
KETONES UR STRIP-MCNC: NEGATIVE MG/DL
LEUKOCYTE ESTERASE UR QL STRIP: NEGATIVE
MCH RBC QN AUTO: 30.3 PG (ref 26.5–33)
MCHC RBC AUTO-ENTMCNC: 33.5 G/DL (ref 31.5–36.5)
MCV RBC AUTO: 90 FL (ref 78–100)
NITRATE UR QL: NEGATIVE
NT-PROBNP SERPL-MCNC: 60 PG/ML (ref 0–125)
PH UR STRIP: 5 PH (ref 5–7)
PLATELET # BLD AUTO: 210 10E9/L (ref 150–450)
POTASSIUM SERPL-SCNC: 3.1 MMOL/L (ref 3.4–5.3)
PROT SERPL-MCNC: 7.7 G/DL (ref 6.8–8.8)
RBC # BLD AUTO: 3.93 10E12/L (ref 4.4–5.9)
SODIUM SERPL-SCNC: 136 MMOL/L (ref 133–144)
SOURCE: NORMAL
SP GR UR STRIP: 1.01 (ref 1–1.03)
TSH SERPL DL<=0.005 MIU/L-ACNC: 2.52 MU/L (ref 0.4–4)
UROBILINOGEN UR STRIP-ACNC: 0.2 EU/DL (ref 0.2–1)
WBC # BLD AUTO: 7 10E9/L (ref 4–11)

## 2020-09-09 PROCEDURE — 83036 HEMOGLOBIN GLYCOSYLATED A1C: CPT | Performed by: FAMILY MEDICINE

## 2020-09-09 PROCEDURE — 81003 URINALYSIS AUTO W/O SCOPE: CPT | Performed by: FAMILY MEDICINE

## 2020-09-09 PROCEDURE — 85027 COMPLETE CBC AUTOMATED: CPT | Performed by: FAMILY MEDICINE

## 2020-09-09 PROCEDURE — 71046 X-RAY EXAM CHEST 2 VIEWS: CPT

## 2020-09-09 PROCEDURE — 36415 COLL VENOUS BLD VENIPUNCTURE: CPT | Performed by: FAMILY MEDICINE

## 2020-09-09 PROCEDURE — 93000 ELECTROCARDIOGRAM COMPLETE: CPT | Performed by: FAMILY MEDICINE

## 2020-09-09 PROCEDURE — 80053 COMPREHEN METABOLIC PANEL: CPT | Performed by: FAMILY MEDICINE

## 2020-09-09 PROCEDURE — 99214 OFFICE O/P EST MOD 30 MIN: CPT | Performed by: FAMILY MEDICINE

## 2020-09-09 PROCEDURE — 84443 ASSAY THYROID STIM HORMONE: CPT | Performed by: FAMILY MEDICINE

## 2020-09-09 PROCEDURE — 83880 ASSAY OF NATRIURETIC PEPTIDE: CPT | Performed by: FAMILY MEDICINE

## 2020-09-09 PROCEDURE — 85379 FIBRIN DEGRADATION QUANT: CPT | Performed by: FAMILY MEDICINE

## 2020-09-09 RX ORDER — OXYCODONE HCL 10 MG/1
10 TABLET, FILM COATED, EXTENDED RELEASE ORAL EVERY 12 HOURS
Qty: 60 TABLET | Refills: 0 | Status: SHIPPED | OUTPATIENT
Start: 2020-09-09 | End: 2020-10-07

## 2020-09-09 RX ORDER — OXYCODONE HYDROCHLORIDE 5 MG/1
5 TABLET ORAL 2 TIMES DAILY
Qty: 60 TABLET | Refills: 0 | Status: SHIPPED | OUTPATIENT
Start: 2020-09-09 | End: 2020-10-07

## 2020-09-09 ASSESSMENT — ANXIETY QUESTIONNAIRES
1. FEELING NERVOUS, ANXIOUS, OR ON EDGE: NOT AT ALL
2. NOT BEING ABLE TO STOP OR CONTROL WORRYING: SEVERAL DAYS
GAD7 TOTAL SCORE: 3
6. BECOMING EASILY ANNOYED OR IRRITABLE: SEVERAL DAYS
IF YOU CHECKED OFF ANY PROBLEMS ON THIS QUESTIONNAIRE, HOW DIFFICULT HAVE THESE PROBLEMS MADE IT FOR YOU TO DO YOUR WORK, TAKE CARE OF THINGS AT HOME, OR GET ALONG WITH OTHER PEOPLE: SOMEWHAT DIFFICULT
5. BEING SO RESTLESS THAT IT IS HARD TO SIT STILL: NOT AT ALL
7. FEELING AFRAID AS IF SOMETHING AWFUL MIGHT HAPPEN: NOT AT ALL
3. WORRYING TOO MUCH ABOUT DIFFERENT THINGS: SEVERAL DAYS

## 2020-09-09 ASSESSMENT — PATIENT HEALTH QUESTIONNAIRE - PHQ9
5. POOR APPETITE OR OVEREATING: NOT AT ALL
SUM OF ALL RESPONSES TO PHQ QUESTIONS 1-9: 10

## 2020-09-09 ASSESSMENT — MIFFLIN-ST. JEOR: SCORE: 2041.75

## 2020-09-09 NOTE — PROGRESS NOTES
Subjective     Cedric Figueroa is a 64 year old male who presents to clinic today for the following health issues:    HPI       Diabetes Follow-up  Currently on Trulicity 0.75 mg subcu/week + Lantus 45 units qhs    How often are you checking your blood sugar? Not at all    What concerns do you have today about your diabetes? Other: Needs a new machine to test blood sugars     Do you have any of these symptoms? (Select all that apply)  Weight loss 5lbs in the past 2 weeks.    Have you had a diabetic eye exam in the last 12 months? Yes-this Friday.      Hyperlipidemia Follow-Up      Are you regularly taking any medication or supplement to lower your cholesterol?   Yes- simvastatin    Are you having muscle aches or other side effects that you think could be caused by your cholesterol lowering medication?  Yes- happeing every day/every other night. Started a few years ago.     Last lipid panel-  Recent Labs   Lab Test 01/27/20  1407 11/28/18  0915  03/24/15  0747 12/31/14  0715   CHOL 149 157   < > 127 117   HDL 40 56   < > 41 37*   LDL 61 79   < > 62 53   TRIG 239* 108   < > 121 133   CHOLHDLRATIO  --   --   --  3.1 3.2    < > = values in this interval not displayed.         Hypertension Follow-up      Do you check your blood pressure regularly outside of the clinic? No     Are you following a low salt diet? Yes    Are your blood pressures ever more than 140 on the top number (systolic) OR more   than 90 on the bottom number (diastolic), for example 140/90? n/a    BP Readings from Last 2 Encounters:   09/09/20 110/69   03/03/20 124/78     Hemoglobin A1C (%)   Date Value   09/09/2020 7.3 (H)   06/19/2020 7.0 (H)     LDL Cholesterol Calculated (mg/dL)   Date Value   01/27/2020 61   11/28/2018 79       Depression and Anxiety Follow-Up  On Lexapro 20 mg/day    How are you doing with your depression since your last visit? Up and down    How are you doing with your anxiety since your last visit?  No change    Are you having  "other symptoms that might be associated with depression or anxiety? No    Have you had a significant life event? No     Do you have any concerns with your use of alcohol or other drugs? No 2-3 cans of beer a day    States that he's been increasingly tired, dizzy and feeling short of breath especially on exertion worse over the past 3 months.   Denies having chest pain. Denies having any fever or chills. No recent known COVID exposure.   Reports ongoing dysuria but no gross hematuria.   Currently seeing a Nephrologist for his Chronic Kidney disease.     Social History     Tobacco Use     Smoking status: Former Smoker     Last attempt to quit: 2018     Years since quittin.1     Smokeless tobacco: Never Used   Substance Use Topics     Alcohol use: Yes     Comment: occasionally     Drug use: No     PHQ 2020 3/3/2020 2020   PHQ-9 Total Score 9 4 10   Q9: Thoughts of better off dead/self-harm past 2 weeks Not at all Not at all Not at all     CHARITY-7 SCORE 2020 3/3/2020 2020   Total Score - - -   Total Score 6 1 3         Suicide Assessment Five-step Evaluation and Treatment (SAFE-T)      How many servings of fruits and vegetables do you eat daily?  0-1    On average, how many sweetened beverages do you drink each day (Examples: soda, juice, sweet tea, etc.  Do NOT count diet or artificially sweetened beverages)?   0    How many days per week do you exercise enough to make your heart beat faster? 3 or less    How many minutes a day do you exercise enough to make your heart beat faster? 9 or less    How many days per week do you miss taking your medication? 0        Review of Systems   Constitutional, HEENT, cardiovascular, pulmonary, gi and gu systems are negative, except as otherwise noted.      Objective    /69   Pulse 102   Temp 99.3  F (37.4  C) (Tympanic)   Ht 1.894 m (6' 2.57\")   Wt 117.3 kg (258 lb 9.6 oz)   SpO2 96%   BMI 32.70 kg/m    Body mass index is 32.7 kg/m .  Physical " Exam   GENERAL: healthy, alert and no distress  RESP: lungs clear to auscultation - no rales, rhonchi or wheezes  CV: regular rate and rhythm, normal S1 S2, no S3 or S4, no murmur, click or rub, no peripheral edema and peripheral pulses strong  MS: no gross musculoskeletal defects noted, no edema    DATA  Reviewed and discussed with patient prior to discharge.  Results for orders placed or performed in visit on 09/09/20   XR Chest 2 Views     Status: None    Narrative    XR CHEST 2 VW 9/9/2020 5:14 PM    HISTORY: Dyspnea on exertion    COMPARISON: CT chest 3/12/2020    FINDINGS:  No acute findings. The lungs are clear and there are no  pleural effusions. Normal heart size.    KARINE BARONE MD   Hemoglobin A1c     Status: Abnormal   Result Value Ref Range    Hemoglobin A1C 7.3 (H) 0 - 5.6 %   CBC with platelets     Status: Abnormal   Result Value Ref Range    WBC 7.0 4.0 - 11.0 10e9/L    RBC Count 3.93 (L) 4.4 - 5.9 10e12/L    Hemoglobin 11.9 (L) 13.3 - 17.7 g/dL    Hematocrit 35.5 (L) 40.0 - 53.0 %    MCV 90 78 - 100 fl    MCH 30.3 26.5 - 33.0 pg    MCHC 33.5 31.5 - 36.5 g/dL    RDW 12.7 10.0 - 15.0 %    Platelet Count 210 150 - 450 10e9/L   UA reflex to Microscopic and Culture     Status: None    Specimen: Midstream Urine   Result Value Ref Range    Color Urine Yellow     Appearance Urine Clear     Glucose Urine Negative NEG^Negative mg/dL    Bilirubin Urine Negative NEG^Negative    Ketones Urine Negative NEG^Negative mg/dL    Specific Gravity Urine 1.015 1.003 - 1.035    Blood Urine Negative NEG^Negative    pH Urine 5.0 5.0 - 7.0 pH    Protein Albumin Urine Negative NEG^Negative mg/dL    Urobilinogen Urine 0.2 0.2 - 1.0 EU/dL    Nitrite Urine Negative NEG^Negative    Leukocyte Esterase Urine Negative NEG^Negative    Source Midstream Urine    BNP-N terminal pro     Status: None   Result Value Ref Range    N-Terminal Pro Bnp 60 0 - 125 pg/mL   D dimer, quantitative     Status: None   Result Value Ref Range    D  Dimer 0.3 0.0 - 0.50 ug/ml FEU   TSH with free T4 reflex     Status: None   Result Value Ref Range    TSH 2.52 0.40 - 4.00 mU/L   Comprehensive metabolic panel     Status: Abnormal   Result Value Ref Range    Sodium 136 133 - 144 mmol/L    Potassium 3.1 (L) 3.4 - 5.3 mmol/L    Chloride 106 94 - 109 mmol/L    Carbon Dioxide 23 20 - 32 mmol/L    Anion Gap 7 3 - 14 mmol/L    Glucose 166 (H) 70 - 99 mg/dL    Urea Nitrogen 41 (H) 7 - 30 mg/dL    Creatinine 2.02 (H) 0.66 - 1.25 mg/dL    GFR Estimate 34 (L) >60 mL/min/[1.73_m2]    GFR Estimate If Black 39 (L) >60 mL/min/[1.73_m2]    Calcium 8.2 (L) 8.5 - 10.1 mg/dL    Bilirubin Total 0.4 0.2 - 1.3 mg/dL    Albumin 4.0 3.4 - 5.0 g/dL    Protein Total 7.7 6.8 - 8.8 g/dL    Alkaline Phosphatase 138 40 - 150 U/L    ALT 29 0 - 70 U/L    AST 20 0 - 45 U/L             Assessment & Plan     Cedric was seen today for recheck medication.    Diagnoses and all orders for this visit:    Dyspnea on exertion- multiple differentials to include- CAD, CHF, thrombosis  -     EKG 12-lead complete w/read - Clinics  -     XR Chest 2 Views  -     BNP-N terminal pro  -     D dimer, quantitative  -     CT Angiogram coronary artery; Future    Dizziness  -     CBC with platelets  -     EKG 12-lead complete w/read - Clinics    Fatigue, unspecified type  -     CBC with platelets  -     TSH with free T4 reflex  -     Comprehensive metabolic panel  -     CT Angiogram coronary artery; Future    Type 2 diabetes mellitus with stage 3 chronic kidney disease, with long-term current use of insulin (H)  -     Hemoglobin A1c  -     Continue current management.    Chronic pain syndrome S/P lumbar microdiscectomy  -     oxyCODONE (OXYCONTIN) 10 MG 12 hr tablet; Take 1 tablet (10 mg) by mouth every 12 hours maximum 2 tablet(s) per dayu!  -     oxyCODONE (ROXICODONE) 5 MG tablet; Take 1 tablet (5 mg) by mouth 2 times daily      Chronic Dysuria  -     UA reflex to Microscopic and Culture  -     UROLOGY ADULT REFERRAL;  "Future    CKD (chronic kidney disease) stage 3, GFR 30-59 ml/min (H)      -      Following with Nephrology         BMI:   Estimated body mass index is 32.7 kg/m  as calculated from the following:    Height as of this encounter: 1.894 m (6' 2.57\").    Weight as of this encounter: 117.3 kg (258 lb 9.6 oz).   Weight management plan: Discussed healthy diet and exercise guidelines        Return in about 2 weeks (around 9/23/2020), or if symptoms worsen or fail to improve.    Molly Leiva MD  [facility]    "

## 2020-09-10 ASSESSMENT — ANXIETY QUESTIONNAIRES: GAD7 TOTAL SCORE: 3

## 2020-09-11 ENCOUNTER — TRANSFERRED RECORDS (OUTPATIENT)
Dept: HEALTH INFORMATION MANAGEMENT | Facility: CLINIC | Age: 65
End: 2020-09-11

## 2020-09-11 LAB — RETINOPATHY: NEGATIVE

## 2020-09-14 DIAGNOSIS — N18.30 CHRONIC KIDNEY DISEASE, STAGE III (MODERATE) (H): Primary | ICD-10-CM

## 2020-09-18 DIAGNOSIS — N18.30 CHRONIC KIDNEY DISEASE, STAGE III (MODERATE) (H): ICD-10-CM

## 2020-09-18 LAB
CREAT SERPL-MCNC: 1.52 MG/DL (ref 0.66–1.25)
GFR SERPL CREATININE-BSD FRML MDRD: 47 ML/MIN/{1.73_M2}

## 2020-09-18 PROCEDURE — 36415 COLL VENOUS BLD VENIPUNCTURE: CPT | Performed by: FAMILY MEDICINE

## 2020-09-18 PROCEDURE — 82565 ASSAY OF CREATININE: CPT | Performed by: FAMILY MEDICINE

## 2020-09-22 ENCOUNTER — HOSPITAL ENCOUNTER (OUTPATIENT)
Dept: CT IMAGING | Facility: CLINIC | Age: 65
Discharge: HOME | End: 2020-09-22
Attending: FAMILY MEDICINE | Admitting: FAMILY MEDICINE
Payer: COMMERCIAL

## 2020-09-22 VITALS — SYSTOLIC BLOOD PRESSURE: 99 MMHG | OXYGEN SATURATION: 97 % | HEART RATE: 60 BPM | DIASTOLIC BLOOD PRESSURE: 68 MMHG

## 2020-09-22 DIAGNOSIS — R53.83 FATIGUE, UNSPECIFIED TYPE: ICD-10-CM

## 2020-09-22 DIAGNOSIS — Z79.4 TYPE 2 DIABETES MELLITUS WITH STAGE 3 CHRONIC KIDNEY DISEASE, WITH LONG-TERM CURRENT USE OF INSULIN (H): ICD-10-CM

## 2020-09-22 DIAGNOSIS — N18.30 TYPE 2 DIABETES MELLITUS WITH STAGE 3 CHRONIC KIDNEY DISEASE, WITH LONG-TERM CURRENT USE OF INSULIN (H): ICD-10-CM

## 2020-09-22 DIAGNOSIS — R06.09 DYSPNEA ON EXERTION: ICD-10-CM

## 2020-09-22 DIAGNOSIS — E11.22 TYPE 2 DIABETES MELLITUS WITH STAGE 3 CHRONIC KIDNEY DISEASE, WITH LONG-TERM CURRENT USE OF INSULIN (H): ICD-10-CM

## 2020-09-22 PROCEDURE — 25000128 H RX IP 250 OP 636: Performed by: STUDENT IN AN ORGANIZED HEALTH CARE EDUCATION/TRAINING PROGRAM

## 2020-09-22 PROCEDURE — 25000132 ZZH RX MED GY IP 250 OP 250 PS 637: Performed by: STUDENT IN AN ORGANIZED HEALTH CARE EDUCATION/TRAINING PROGRAM

## 2020-09-22 PROCEDURE — 75574 CT ANGIO HRT W/3D IMAGE: CPT | Mod: 26 | Performed by: STUDENT IN AN ORGANIZED HEALTH CARE EDUCATION/TRAINING PROGRAM

## 2020-09-22 PROCEDURE — 75574 CT ANGIO HRT W/3D IMAGE: CPT

## 2020-09-22 RX ORDER — IOPAMIDOL 755 MG/ML
120 INJECTION, SOLUTION INTRAVASCULAR ONCE
Status: COMPLETED | OUTPATIENT
Start: 2020-09-22 | End: 2020-09-22

## 2020-09-22 RX ORDER — ACYCLOVIR 200 MG/1
0-1 CAPSULE ORAL
Status: DISCONTINUED | OUTPATIENT
Start: 2020-09-22 | End: 2020-09-23 | Stop reason: HOSPADM

## 2020-09-22 RX ORDER — METHYLPREDNISOLONE SODIUM SUCCINATE 125 MG/2ML
125 INJECTION, POWDER, LYOPHILIZED, FOR SOLUTION INTRAMUSCULAR; INTRAVENOUS
Status: DISCONTINUED | OUTPATIENT
Start: 2020-09-22 | End: 2020-09-23 | Stop reason: HOSPADM

## 2020-09-22 RX ORDER — ONDANSETRON 2 MG/ML
4 INJECTION INTRAMUSCULAR; INTRAVENOUS
Status: DISCONTINUED | OUTPATIENT
Start: 2020-09-22 | End: 2020-09-23 | Stop reason: HOSPADM

## 2020-09-22 RX ORDER — METOPROLOL TARTRATE 1 MG/ML
5-15 INJECTION, SOLUTION INTRAVENOUS
Status: DISCONTINUED | OUTPATIENT
Start: 2020-09-22 | End: 2020-09-23 | Stop reason: HOSPADM

## 2020-09-22 RX ORDER — DIPHENHYDRAMINE HYDROCHLORIDE 50 MG/ML
25-50 INJECTION INTRAMUSCULAR; INTRAVENOUS
Status: DISCONTINUED | OUTPATIENT
Start: 2020-09-22 | End: 2020-09-23 | Stop reason: HOSPADM

## 2020-09-22 RX ORDER — METOPROLOL TARTRATE 25 MG/1
25-100 TABLET, FILM COATED ORAL
Status: COMPLETED | OUTPATIENT
Start: 2020-09-22 | End: 2020-09-22

## 2020-09-22 RX ORDER — DIPHENHYDRAMINE HCL 25 MG
25 CAPSULE ORAL
Status: DISCONTINUED | OUTPATIENT
Start: 2020-09-22 | End: 2020-09-23 | Stop reason: HOSPADM

## 2020-09-22 RX ORDER — NITROGLYCERIN 0.4 MG/1
.4-.8 TABLET SUBLINGUAL
Status: DISCONTINUED | OUTPATIENT
Start: 2020-09-22 | End: 2020-09-23 | Stop reason: HOSPADM

## 2020-09-22 RX ADMIN — IOPAMIDOL 120 ML: 755 INJECTION, SOLUTION INTRAVENOUS at 09:11

## 2020-09-22 RX ADMIN — METOPROLOL TARTRATE 100 MG: 100 TABLET, FILM COATED ORAL at 08:19

## 2020-09-22 RX ADMIN — NITROGLYCERIN 0.8 MG: 0.4 TABLET SUBLINGUAL at 09:10

## 2020-09-22 NOTE — PROGRESS NOTES
Pt arrived for Coronary CT angiogram. Test, meds and side effects reviewed with pt.  Resting HR  84 bpm. Given 100 mg PO Metoprolol per verbal order. Administered 0.8 mg SL nitro on CTA table per order. CTA completed.  Patient tolerated procedure well and denies symptoms of allergic reaction.  Post monitoring completed and VSS.  D/C instructions reviewed with pt whom verbalized understanding of need to increase PO fluids today. D/C to gold waiting room accompanied by staff.

## 2020-09-24 DIAGNOSIS — F33.0 MILD RECURRENT MAJOR DEPRESSION (H): ICD-10-CM

## 2020-09-25 NOTE — TELEPHONE ENCOUNTER
Routing refill request to provider for review/approval because:  Azeb given x1 and patient did not follow up, please advise  Radha Garcia BSN, RN

## 2020-09-28 RX ORDER — ESCITALOPRAM OXALATE 20 MG/1
TABLET ORAL
Qty: 30 TABLET | Refills: 0 | Status: SHIPPED | OUTPATIENT
Start: 2020-09-28 | End: 2020-10-05

## 2020-10-09 ENCOUNTER — TELEPHONE (OUTPATIENT)
Dept: FAMILY MEDICINE | Facility: CLINIC | Age: 65
End: 2020-10-09

## 2020-10-09 DIAGNOSIS — Z98.890 S/P LUMBAR MICRODISCECTOMY: ICD-10-CM

## 2020-10-09 DIAGNOSIS — G89.4 CHRONIC PAIN SYNDROME: Primary | ICD-10-CM

## 2020-10-09 NOTE — TELEPHONE ENCOUNTER
Central Prior Authorization Team   Phone: [phone]      PA Initiation    Medication: Prior Authorization Oxycontin 10mg - INITIATED  Insurance Company: [employer] - Phone [phone] Fax [phone]  Pharmacy Filling the Rx: [facility] [place] [id number] [facility] [place] Pharmacy Phone: [phone]  Filling Pharmacy Fax: [phone]  Start Date: 10/9/2020

## 2020-10-09 NOTE — TELEPHONE ENCOUNTER
Prior Authorization Retail Medication Request    Medication/Dose: Prior Authorization Oxycontin 10mg  ICD code (if different than what is on RX):  G894, [id number]  Previously Tried and Failed:    Rationale:      Insurance Name:  Commercial  Insurance ID:  [id number]       Pharmacy Information (if different than what is on RX)  Name:  [facility]  Phone:  [phone]

## 2020-10-09 NOTE — TELEPHONE ENCOUNTER
Cedric decided to pay out of pocket for a 5 day supply while the PA is pending (he picked up 10 tablets on 10/9/20).  We had to void the remaining 50 tablets on that prescription.    Could you please send in a new prescription for the remaining 50 tablets to be filled once his PA is approved?    Thanks.  [facility]

## 2020-10-12 NOTE — TELEPHONE ENCOUNTER
Central Prior Authorization Team   Phone: [phone]      Pt paid out of pocket for a 5 days supply - please send new rx for remainder.      Prior Authorization Approval    Authorization Effective Date: 9/10/2020  Authorization Expiration Date: 10/10/2021  Medication: Prior Authorization Oxycontin 10mg - APPROVED  Approved Dose/Quantity: 60 FOR 30  Reference #:     Insurance Company: [employer] - Phone [phone] Fax [phone]  Expected CoPay:       CoPay Card Available:      Foundation Assistance Needed:    Which Pharmacy is filling the prescription (Not needed for infusion/clinic administered): [facility] - [id number] [facility] [place]  Pharmacy Notified: Yes  Patient Notified: Yes (**Instructed pharmacy to notify patient when script is ready to /ship.**')

## 2020-10-13 DIAGNOSIS — E11.9 TYPE 2 DIABETES, HBA1C GOAL < 7% (H): ICD-10-CM

## 2020-10-13 RX ORDER — OXYCODONE HCL 10 MG/1
10 TABLET, FILM COATED, EXTENDED RELEASE ORAL EVERY 12 HOURS
Qty: 50 TABLET | Refills: 0 | Status: SHIPPED | OUTPATIENT
Start: 2020-10-13 | End: 2020-11-03

## 2020-10-14 RX ORDER — PEN NEEDLE, DIABETIC 29 G X1/2"
NEEDLE, DISPOSABLE MISCELLANEOUS
Qty: 100 EACH | Refills: 1 | Status: SHIPPED | OUTPATIENT
Start: 2020-10-14 | End: 2021-04-16

## 2020-10-15 ENCOUNTER — VIRTUAL VISIT (OUTPATIENT)
Dept: UROLOGY | Facility: CLINIC | Age: 65
End: 2020-10-15
Payer: COMMERCIAL

## 2020-10-15 DIAGNOSIS — N40.1 BENIGN PROSTATIC HYPERPLASIA WITH LOWER URINARY TRACT SYMPTOMS, SYMPTOM DETAILS UNSPECIFIED: Primary | ICD-10-CM

## 2020-10-15 PROCEDURE — 99203 OFFICE O/P NEW LOW 30 MIN: CPT | Mod: GT | Performed by: UROLOGY

## 2020-10-15 RX ORDER — TAMSULOSIN HYDROCHLORIDE 0.4 MG/1
0.4 CAPSULE ORAL AT BEDTIME
Qty: 30 CAPSULE | Refills: 1 | Status: SHIPPED | OUTPATIENT
Start: 2020-10-15 | End: 2020-11-16

## 2020-10-15 NOTE — PROGRESS NOTES
"Cedric Figueroa is a 64 year old male who is being evaluated via a billable video visit.      The patient has been notified of following:     \"This video visit will be conducted via a call between you and your physician/provider. We have found that certain health care needs can be provided without the need for an in-person physical exam.  This service lets us provide the care you need with a video conversation.  If a prescription is necessary we can send it directly to your pharmacy.  If lab work is needed we can place an order for that and you can then stop by our lab to have the test done at a later time.    Video visits are billed at different rates depending on your insurance coverage.  Please reach out to your insurance provider with any questions.    If during the course of the call the physician/provider feels a video visit is not appropriate, you will not be charged for this service.\"    Patient has given verbal consent for Video visit? Yes  How would you like to obtain your AVS? MyChart  If you are dropped from the video visit, the video invite should be resent to: [phone]  Will anyone else be joining your video visit? No        Video-Visit Details    Type of service:  Video Visit    Video Start Time: 820  Video End Time: 8:33 AM    Originating Location (pt. Location): Home    Distant Location (provider location):  [facility]     Platform used for Video Visit: Ross Rob MD        "

## 2020-10-15 NOTE — PROGRESS NOTES
S: Cedric Figueroa is a pleasant  64 year old male who was requested to be seen by  Molly Leiva for a consult with regard to patient's urinary complaints.  Patient complains of Hesitancy in starting urinary stream, Strain to Urinate, Nocturia x 3, Dysuria and slow urinary stream.  He has no history of elevated PSA.  Symptoms have been on going for   several years(s).  Seems to be worsened over time.  Recent UA was normal.  He has stage 3 chronic kidney disease.    Current Outpatient Medications   Medication Sig Dispense Refill     aspirin 81 MG EC tablet Take 81 mg by mouth daily       cyanocobalamin (CYANOCOBALAMIN) 1000 MCG/ML injection INJECT 1ML INTO THE MUSCLE EVERY 30 DAYS. 3 mL 3     dulaglutide (TRULICITY) 0.75 MG/0.5ML pen Inject 0.75 mg Subcutaneous every 7 days 6 mL 3     escitalopram (LEXAPRO) 20 MG tablet TAKE ONE TABLET BY MOUTH ONCE DAILY 90 tablet 1     insulin pen needle (BD ULTRA-FINE) 29G X 12.7MM miscellaneous USE ONCE DAILY OR AS DIRECTED 100 each 1     LANTUS SOLOSTAR 100 UNIT/ML soln INJECT 45 UNITS UNDER THE SKIN AT BEDTIME 30 mL 0     losartan-hydrochlorothiazide (HYZAAR) 100-25 MG tablet Take 1 tablet by mouth daily 90 tablet 3     MELATONIN PO Take 10 mg by mouth At Bedtime        oxyCODONE (OXYCONTIN) 10 MG 12 hr tablet Take 1 tablet (10 mg) by mouth every 12 hours maximum 2 tablet(s) per day 50 tablet 0     oxyCODONE (OXYCONTIN) 10 MG 12 hr tablet Take 1 tablet (10 mg) by mouth every 12 hours maximum 2 tablet(s) per dayu! 60 tablet 0     oxyCODONE (ROXICODONE) 5 MG tablet Take 1 tablet (5 mg) by mouth 2 times daily 60 tablet 0     pramipexole (MIRAPEX) 0.25 MG tablet TAKE ONE TABLET BY MOUTH EVERY MORNING, THEN TAKE ONE TABLET EVERY AFTERNOON, THEN TAKE TWO TABLETS BEFORE BED. (Patient taking differently: 0.25 mg 3 times daily TAKE ONE TABLET BY MOUTH EVERY MORNING, THEN TAKE ONE TABLET EVERY AFTERNOON, THEN TAKE TWO TABLETS BEFORE BED.) 120 tablet 3     simvastatin (ZOCOR) 40 MG  "tablet Take 1 tablet (40 mg) by mouth At Bedtime 90 tablet 3     Syringe/Needle, Disp, (SYRINGE LUER LOCK) 25G X 1\" 3 ML MISC 1 each every 30 days 12 each 0     tamsulosin (FLOMAX) 0.4 MG capsule Take 1 capsule (0.4 mg) by mouth At Bedtime 30 capsule 1     blood glucose (ACCU-CHEK PELON PLUS) test strip USE TO TEST BLOOD SUGAR THREE TIMES A DAY OR AS DIRECTED (Patient not taking: Reported on 9/9/2020) 100 each 11     blood glucose monitoring (ACCU-CHEK PELON PLUS) meter device kit Use to test blood sugar 3 times daily or as directed. (Patient not taking: Reported on 9/9/2020) 1 kit 0     blood glucose monitoring (SOFTCLIX) lancets USE TO TEST BLOOD SUGAR THREE TIMES A DAY OR AS DIRECTED (Patient not taking: Reported on 9/9/2020) 100 each 3     No Known Allergies  Past Medical History:   Diagnosis Date     Diverticulosis of colon      Hyperlipidemia LDL goal <100 2/28/2012     Hypertension goal BP (blood pressure) < 130/80 2/28/2012     Mild major depression (H) 2/28/2012     Renal disease 2017    acute kidney failure 2 years ago     Restless leg syndrome     controlled on Mirapex     Sleep apnea     CPAP     Tobacco use     cigar use- started at age 16     Type 2 diabetes, HbA1c goal < 7% (H) 2/28/2012     Past Surgical History:   Procedure Laterality Date     ABDOMEN SURGERY  2015    peritonitis, bowel perforation, bowel resection     AS TOTAL KNEE ARTHROPLASTY      Right and Left knee x3( 2 partial knee replacement and 1 total Left knee replacement     BARIATRIC SURGERY      at age 45     COLONOSCOPY  2/1/2006    [facility]     COLONOSCOPY WITH CO2 INSUFFLATION N/A 6/7/2016    Procedure: COLONOSCOPY WITH CO2 INSUFFLATION;  Surgeon: Cedric Schneider MD;  Location: [facility] OR     DISCECTOMY LUMBAR POSTERIOR MICROSCOPIC ONE LEVEL Left 4/4/2019    Procedure: Left L4-5 hemilaminectomy, medial facetectomy, foraminotomy with microdiscectomy and use of X-ray and intraoperative microscope;  Surgeon: Aaron Ness " MD Greg;  Location: [facility] OR     INCISION AND DRAINAGE BUTTOCKS Left 3/28/2018    Procedure: INCISION AND DRAINAGE BUTTOCKS;  Incision and drainage of left buttock abscess, and fistulotomy;  Surgeon: Bala Layton MD;  Location: [facility] OR      Family History   Problem Relation Age of Onset     Diabetes Mother      Diabetes Paternal Grandmother      Coronary Artery Disease Paternal Grandmother      Cerebrovascular Disease Paternal Grandmother      Bleeding Disorder No family hx of      Anesthesia Reaction No family hx of      He does not have a family history of prostate cancer.  Social History     Socioeconomic History     Marital status:      Spouse name: None     Number of children: None     Years of education: None     Highest education level: None   Occupational History     None   Social Needs     Financial resource strain: None     Food insecurity     Worry: None     Inability: None     Transportation needs     Medical: None     Non-medical: None   Tobacco Use     Smoking status: Former Smoker     Quit date: 2018     Years since quittin.2     Smokeless tobacco: Never Used   Substance and Sexual Activity     Alcohol use: Yes     Comment: occasionally     Drug use: No     Sexual activity: Never   Lifestyle     Physical activity     Days per week: None     Minutes per session: None     Stress: None   Relationships     Social connections     Talks on phone: None     Gets together: None     Attends Judaism service: None     Active member of club or organization: None     Attends meetings of clubs or organizations: None     Relationship status: None     Intimate partner violence     Fear of current or ex partner: None     Emotionally abused: None     Physically abused: None     Forced sexual activity: None   Other Topics Concern     Parent/sibling w/ CABG, MI or angioplasty before 65F 55M? Not Asked   Social History Narrative     None        REVIEW OF SYSTEMS  =================  C: NEGATIVE  for fever, chills, change in weight  I: NEGATIVE for worrisome rashes, moles or lesions  E/M: NEGATIVE for ear, mouth and throat problems  R: NEGATIVE for significant cough or SHORTNESS OF BREATH  CV:  NEGATIVE for chest pain, palpitations or peripheral edema  GI: NEGATIVE for nausea, abdominal pain, heartburn, or change in bowel habits  NEURO: NEGATIVE numbness/weakness  : see HPI  PSYCH: NEGATIVE depression/anxiety  LYmph: no new enlarged lymph nodes  Ortho: no new trauma/movements           O: Exam:There were no vitals taken for this visit.   GENERAL: Healthy, alert and no distress  EYES: Eyes grossly normal to inspection.  No discharge or erythema, or obvious scleral/conjunctival abnormalities.  RESP: No audible wheeze, cough, or visible cyanosis.  No visible retractions or increased work of breathing.    SKIN: Visible skin clear. No significant rash, abnormal pigmentation or lesions.  NEURO: Cranial nerves grossly intact.  Mentation and speech appropriate for age.  PSYCH: Mentation appears normal, affect normal/bright, judgement and insight intact, normal speech and appearance well-groomed.    Assessment/Plan:   (N40.1) Benign prostatic hyperplasia with lower urinary tract symptoms, symptom details unspecified  (primary encounter diagnosis)  Comment:    Plan: tamsulosin (FLOMAX) 0.4 MG capsule          Side effects discussed           RTC in one month for bladder scan/prostate exam

## 2020-10-16 DIAGNOSIS — N18.32 STAGE 3B CHRONIC KIDNEY DISEASE (H): ICD-10-CM

## 2020-10-16 LAB
CREAT SERPL-MCNC: 1.63 MG/DL (ref 0.66–1.25)
GFR SERPL CREATININE-BSD FRML MDRD: 44 ML/MIN/{1.73_M2}

## 2020-10-16 PROCEDURE — 82565 ASSAY OF CREATININE: CPT | Performed by: FAMILY MEDICINE

## 2020-11-07 DIAGNOSIS — R80.9 TYPE 2 DIABETES MELLITUS WITH MICROALBUMINURIA, WITH LONG-TERM CURRENT USE OF INSULIN (H): ICD-10-CM

## 2020-11-07 DIAGNOSIS — E11.29 TYPE 2 DIABETES MELLITUS WITH MICROALBUMINURIA, WITH LONG-TERM CURRENT USE OF INSULIN (H): ICD-10-CM

## 2020-11-07 DIAGNOSIS — Z79.4 TYPE 2 DIABETES MELLITUS WITH MICROALBUMINURIA, WITH LONG-TERM CURRENT USE OF INSULIN (H): ICD-10-CM

## 2020-11-09 RX ORDER — INSULIN GLARGINE 100 [IU]/ML
INJECTION, SOLUTION SUBCUTANEOUS
Refills: 0 | OUTPATIENT
Start: 2020-11-09

## 2020-11-16 ENCOUNTER — OFFICE VISIT (OUTPATIENT)
Dept: UROLOGY | Facility: CLINIC | Age: 65
End: 2020-11-16
Payer: COMMERCIAL

## 2020-11-16 VITALS — RESPIRATION RATE: 16 BRPM | HEART RATE: 78 BPM | OXYGEN SATURATION: 99 %

## 2020-11-16 DIAGNOSIS — N40.1 BENIGN PROSTATIC HYPERPLASIA WITH LOWER URINARY TRACT SYMPTOMS, SYMPTOM DETAILS UNSPECIFIED: Primary | ICD-10-CM

## 2020-11-16 LAB — PSA SERPL-MCNC: 0.88 UG/L (ref 0–4)

## 2020-11-16 PROCEDURE — 51798 US URINE CAPACITY MEASURE: CPT | Performed by: UROLOGY

## 2020-11-16 PROCEDURE — 36415 COLL VENOUS BLD VENIPUNCTURE: CPT | Performed by: UROLOGY

## 2020-11-16 PROCEDURE — 84153 ASSAY OF PSA TOTAL: CPT | Performed by: UROLOGY

## 2020-11-16 PROCEDURE — 99213 OFFICE O/P EST LOW 20 MIN: CPT | Mod: 25 | Performed by: UROLOGY

## 2020-11-16 RX ORDER — TAMSULOSIN HYDROCHLORIDE 0.4 MG/1
0.4 CAPSULE ORAL AT BEDTIME
Qty: 90 CAPSULE | Refills: 3 | Status: SHIPPED | OUTPATIENT
Start: 2020-11-16

## 2020-11-16 ASSESSMENT — PAIN SCALES - GENERAL: PAINLEVEL: NO PAIN (0)

## 2020-11-16 NOTE — PROGRESS NOTES
Bladder Scan performed. 33ml maximum residual urine detected after 3 scans. MD informed     Iza Cordon, RN Specialty Triage 11/16/2020 8:50 AM

## 2020-11-16 NOTE — PROGRESS NOTES
Chief Complaint   Patient presents with     Follow Up     trail flomax       Cedric Figueroa is a 64 year old male who presents today for follow up of   Chief Complaint   Patient presents with     Follow Up     trail flomax    f/u for benign prostatic hyperplasia.  He is doing better on flomax with decrease LUTS.  He has no side effects from medication.    Current Outpatient Medications   Medication Sig Dispense Refill     aspirin 81 MG EC tablet Take 81 mg by mouth daily       cyanocobalamin (CYANOCOBALAMIN) 1000 MCG/ML injection INJECT 1ML INTO THE MUSCLE EVERY 30 DAYS. 3 mL 3     dulaglutide (TRULICITY) 0.75 MG/0.5ML pen Inject 0.75 mg Subcutaneous every 7 days 6 mL 3     escitalopram (LEXAPRO) 20 MG tablet TAKE ONE TABLET BY MOUTH ONCE DAILY 90 tablet 1     insulin pen needle (BD ULTRA-FINE) 29G X 12.7MM miscellaneous USE ONCE DAILY OR AS DIRECTED 100 each 1     LANTUS SOLOSTAR 100 UNIT/ML soln INJECT 45 UNITS UNDER THE SKIN AT BEDTIME 30 mL 1     losartan-hydrochlorothiazide (HYZAAR) 100-25 MG tablet Take 1 tablet by mouth daily 90 tablet 3     MELATONIN PO Take 10 mg by mouth At Bedtime        oxyCODONE (OXYCONTIN) 10 MG 12 hr tablet Take 1 tablet (10 mg) by mouth every 12 hours maximum 2 tablet(s) per day to last 1 month. 60 tablet 0     oxyCODONE (OXYCONTIN) 10 MG 12 hr tablet Take 1 tablet (10 mg) by mouth every 12 hours maximum 2 tablet(s) per dayu! 60 tablet 0     oxyCODONE (ROXICODONE) 5 MG tablet Take 1 tablet (5 mg) by mouth 2 times daily To last 1 month 60 tablet 0     pramipexole (MIRAPEX) 0.25 MG tablet TAKE ONE TABLET BY MOUTH EVERY MORNING, THEN TAKE ONE TABLET EVERY AFTERNOON, THEN TAKE TWO TABLETS BEFORE BED. (Patient taking differently: 0.25 mg 3 times daily TAKE ONE TABLET BY MOUTH EVERY MORNING, THEN TAKE ONE TABLET EVERY AFTERNOON, THEN TAKE TWO TABLETS BEFORE BED.) 120 tablet 3     simvastatin (ZOCOR) 40 MG tablet Take 1 tablet (40 mg) by mouth At Bedtime 90 tablet 3     Syringe/Needle,  "Disp, (SYRINGE LUER LOCK) 25G X 1\" 3 ML MISC 1 each every 30 days 12 each 0     tamsulosin (FLOMAX) 0.4 MG capsule Take 1 capsule (0.4 mg) by mouth At Bedtime 90 capsule 3     blood glucose (ACCU-CHEK PELON PLUS) test strip USE TO TEST BLOOD SUGAR THREE TIMES A DAY OR AS DIRECTED (Patient not taking: Reported on 9/9/2020) 100 each 11     blood glucose monitoring (ACCU-CHEK PELON PLUS) meter device kit Use to test blood sugar 3 times daily or as directed. (Patient not taking: Reported on 9/9/2020) 1 kit 0     blood glucose monitoring (SOFTCLIX) lancets USE TO TEST BLOOD SUGAR THREE TIMES A DAY OR AS DIRECTED (Patient not taking: Reported on 9/9/2020) 100 each 3     No Known Allergies   Past Medical History:   Diagnosis Date     Diverticulosis of colon      Hyperlipidemia LDL goal <100 2/28/2012     Hypertension goal BP (blood pressure) < 130/80 2/28/2012     Mild major depression (H) 2/28/2012     Renal disease 2017    acute kidney failure 2 years ago     Restless leg syndrome     controlled on Mirapex     Sleep apnea     CPAP     Tobacco use     cigar use- started at age 16     Type 2 diabetes, HbA1c goal < 7% (H) 2/28/2012     Past Surgical History:   Procedure Laterality Date     ABDOMEN SURGERY  2015    peritonitis, bowel perforation, bowel resection     AS TOTAL KNEE ARTHROPLASTY      Right and Left knee x3( 2 partial knee replacement and 1 total Left knee replacement     BARIATRIC SURGERY      at age 45     COLONOSCOPY  2/1/2006    [facility]     COLONOSCOPY WITH CO2 INSUFFLATION N/A 6/7/2016    Procedure: COLONOSCOPY WITH CO2 INSUFFLATION;  Surgeon: Cedric Schneider MD;  Location: [facility]     DISCECTOMY LUMBAR POSTERIOR MICROSCOPIC ONE LEVEL Left 4/4/2019    Procedure: Left L4-5 hemilaminectomy, medial facetectomy, foraminotomy with microdiscectomy and use of X-ray and intraoperative microscope;  Surgeon: Aaron Ness MD;  Location: [facility] OR     INCISION AND DRAINAGE BUTTOCKS Left 3/28/2018 "    Procedure: INCISION AND DRAINAGE BUTTOCKS;  Incision and drainage of left buttock abscess, and fistulotomy;  Surgeon: Bala Layton MD;  Location: [facility] OR     Family History   Problem Relation Age of Onset     Diabetes Mother      Diabetes Paternal Grandmother      Coronary Artery Disease Paternal Grandmother      Cerebrovascular Disease Paternal Grandmother      Bleeding Disorder No family hx of      Anesthesia Reaction No family hx of      Social History     Socioeconomic History     Marital status:      Spouse name: None     Number of children: None     Years of education: None     Highest education level: None   Occupational History     None   Social Needs     Financial resource strain: None     Food insecurity     Worry: None     Inability: None     Transportation needs     Medical: None     Non-medical: None   Tobacco Use     Smoking status: Former Smoker     Quit date: 2018     Years since quittin.3     Smokeless tobacco: Never Used   Substance and Sexual Activity     Alcohol use: Yes     Comment: occasionally     Drug use: No     Sexual activity: Never   Lifestyle     Physical activity     Days per week: None     Minutes per session: None     Stress: None   Relationships     Social connections     Talks on phone: None     Gets together: None     Attends Holiness service: None     Active member of club or organization: None     Attends meetings of clubs or organizations: None     Relationship status: None     Intimate partner violence     Fear of current or ex partner: None     Emotionally abused: None     Physically abused: None     Forced sexual activity: None   Other Topics Concern     Parent/sibling w/ CABG, MI or angioplasty before 65F 55M? Not Asked   Social History Narrative     None       REVIEW OF SYSTEMS  =================  C: NEGATIVE for fever, chills, change in weight  I: NEGATIVE for worrisome rashes, moles or lesions  E/M: NEGATIVE for ear, mouth and throat  problems  R: NEGATIVE for significant cough or SHORTNESS OF BREATH  CV:  NEGATIVE for chest pain, palpitations or peripheral edema  GI: NEGATIVE for nausea, abdominal pain, heartburn, or change in bowel habits  NEURO: NEGATIVE numbness/weakness  : see HPI  PSYCH: NEGATIVE depression/anxiety  LYmph: no new enlarged lymph nodes  Ortho: no new trauma/movements    Physical Exam:  Pulse 78   Resp 16   SpO2 99%    Patient is pleasant, in no acute distress, good general condition.  Lung: no evidence of respiratory distress    Abdomen: Soft, nondistended, non tender. No masses. No rebound or guarding.   Exam: penis no discharge. Testis no masses.  No scrotal skin lesion.  Prostate 40 gm smooth no nodule.  Skin: Warm and dry.  No redness.  Psych: normal mood and affect  Neuro: alert and oriented  Musculaskeletal: moving all extremities    Assessment/Plan:   (N40.1) Benign prostatic hyperplasia with lower urinary tract symptoms, symptom details unspecified  (primary encounter diagnosis)  Comment: bladder scan 33 ml.  Plan: cont with flomax           Check psa today           rtc in one year

## 2020-11-30 ENCOUNTER — MYC MEDICAL ADVICE (OUTPATIENT)
Dept: FAMILY MEDICINE | Facility: CLINIC | Age: 65
End: 2020-11-30

## 2020-11-30 DIAGNOSIS — G89.4 CHRONIC PAIN SYNDROME: ICD-10-CM

## 2020-11-30 DIAGNOSIS — Z98.890 S/P LUMBAR MICRODISCECTOMY: ICD-10-CM

## 2020-11-30 DIAGNOSIS — G25.81 RESTLESS LEG SYNDROME: ICD-10-CM

## 2020-12-01 NOTE — TELEPHONE ENCOUNTER
Routing refill request to provider for review/approval because:  Drug not on the [facility] refill protocol.  Last Written Prescription Date:  11/3/20    Last office visit: 9/9/2020 with prescribing provider:   Future Office Visit:

## 2020-12-02 RX ORDER — OXYCODONE HYDROCHLORIDE 5 MG/1
5 TABLET ORAL 2 TIMES DAILY
Qty: 60 TABLET | Refills: 0 | Status: SHIPPED | OUTPATIENT
Start: 2020-12-02 | End: 2020-12-29

## 2020-12-02 RX ORDER — PRAMIPEXOLE DIHYDROCHLORIDE 0.25 MG/1
0.25 TABLET ORAL 3 TIMES DAILY
Qty: 90 TABLET | Refills: 3 | Status: SHIPPED | OUTPATIENT
Start: 2020-12-02 | End: 2021-06-11

## 2020-12-02 RX ORDER — OXYCODONE HCL 10 MG/1
10 TABLET, FILM COATED, EXTENDED RELEASE ORAL EVERY 12 HOURS
Qty: 60 TABLET | Refills: 0 | Status: SHIPPED | OUTPATIENT
Start: 2020-12-02 | End: 2020-12-29

## 2020-12-14 ENCOUNTER — HEALTH MAINTENANCE LETTER (OUTPATIENT)
Age: 65
End: 2020-12-14

## 2021-01-22 ENCOUNTER — OFFICE VISIT (OUTPATIENT)
Dept: FAMILY MEDICINE | Facility: CLINIC | Age: 66
End: 2021-01-22
Payer: COMMERCIAL

## 2021-01-22 ENCOUNTER — ANCILLARY PROCEDURE (OUTPATIENT)
Dept: GENERAL RADIOLOGY | Facility: CLINIC | Age: 66
End: 2021-01-22
Attending: FAMILY MEDICINE
Payer: COMMERCIAL

## 2021-01-22 VITALS
BODY MASS INDEX: 30.78 KG/M2 | RESPIRATION RATE: 18 BRPM | OXYGEN SATURATION: 98 % | WEIGHT: 243.4 LBS | TEMPERATURE: 97.7 F | DIASTOLIC BLOOD PRESSURE: 66 MMHG | HEART RATE: 96 BPM | SYSTOLIC BLOOD PRESSURE: 124 MMHG

## 2021-01-22 DIAGNOSIS — R07.81 RIB PAIN ON RIGHT SIDE: ICD-10-CM

## 2021-01-22 DIAGNOSIS — E11.22 TYPE 2 DIABETES MELLITUS WITH STAGE 3B CHRONIC KIDNEY DISEASE, WITH LONG-TERM CURRENT USE OF INSULIN (H): Primary | ICD-10-CM

## 2021-01-22 DIAGNOSIS — Z79.4 TYPE 2 DIABETES MELLITUS WITH STAGE 3B CHRONIC KIDNEY DISEASE, WITH LONG-TERM CURRENT USE OF INSULIN (H): Primary | ICD-10-CM

## 2021-01-22 DIAGNOSIS — Z91.81 HISTORY OF RECENT FALL: ICD-10-CM

## 2021-01-22 DIAGNOSIS — E66.01 MORBID OBESITY (H): ICD-10-CM

## 2021-01-22 DIAGNOSIS — E78.5 HYPERLIPIDEMIA LDL GOAL <100: ICD-10-CM

## 2021-01-22 DIAGNOSIS — F33.0 MILD RECURRENT MAJOR DEPRESSION (H): ICD-10-CM

## 2021-01-22 DIAGNOSIS — N18.32 TYPE 2 DIABETES MELLITUS WITH STAGE 3B CHRONIC KIDNEY DISEASE, WITH LONG-TERM CURRENT USE OF INSULIN (H): Primary | ICD-10-CM

## 2021-01-22 DIAGNOSIS — G47.62 NOCTURNAL LEG CRAMPS: ICD-10-CM

## 2021-01-22 DIAGNOSIS — E53.8 VITAMIN B 12 DEFICIENCY: ICD-10-CM

## 2021-01-22 LAB
ALBUMIN SERPL-MCNC: 4 G/DL (ref 3.4–5)
ALP SERPL-CCNC: 138 U/L (ref 40–150)
ALT SERPL W P-5'-P-CCNC: 47 U/L (ref 0–70)
ANION GAP SERPL CALCULATED.3IONS-SCNC: 9 MMOL/L (ref 3–14)
AST SERPL W P-5'-P-CCNC: 20 U/L (ref 0–45)
BILIRUB SERPL-MCNC: 0.6 MG/DL (ref 0.2–1.3)
BUN SERPL-MCNC: 39 MG/DL (ref 7–30)
CALCIUM SERPL-MCNC: 8.6 MG/DL (ref 8.5–10.1)
CHLORIDE SERPL-SCNC: 109 MMOL/L (ref 94–109)
CO2 SERPL-SCNC: 21 MMOL/L (ref 20–32)
CREAT SERPL-MCNC: 1.56 MG/DL (ref 0.66–1.25)
FERRITIN SERPL-MCNC: 219 NG/ML (ref 26–388)
GFR SERPL CREATININE-BSD FRML MDRD: 46 ML/MIN/{1.73_M2}
GLUCOSE SERPL-MCNC: 197 MG/DL (ref 70–99)
HBA1C MFR BLD: 9.8 % (ref 0–5.6)
MAGNESIUM SERPL-MCNC: 1.7 MG/DL (ref 1.6–2.3)
POTASSIUM SERPL-SCNC: 3.8 MMOL/L (ref 3.4–5.3)
PROT SERPL-MCNC: 7.4 G/DL (ref 6.8–8.8)
SODIUM SERPL-SCNC: 139 MMOL/L (ref 133–144)
VIT B12 SERPL-MCNC: 1062 PG/ML (ref 193–986)

## 2021-01-22 PROCEDURE — 83036 HEMOGLOBIN GLYCOSYLATED A1C: CPT | Performed by: FAMILY MEDICINE

## 2021-01-22 PROCEDURE — 82607 VITAMIN B-12: CPT | Performed by: FAMILY MEDICINE

## 2021-01-22 PROCEDURE — 80053 COMPREHEN METABOLIC PANEL: CPT | Performed by: FAMILY MEDICINE

## 2021-01-22 PROCEDURE — 99214 OFFICE O/P EST MOD 30 MIN: CPT | Performed by: FAMILY MEDICINE

## 2021-01-22 PROCEDURE — 82728 ASSAY OF FERRITIN: CPT | Performed by: FAMILY MEDICINE

## 2021-01-22 PROCEDURE — 36415 COLL VENOUS BLD VENIPUNCTURE: CPT | Performed by: FAMILY MEDICINE

## 2021-01-22 PROCEDURE — 71101 X-RAY EXAM UNILAT RIBS/CHEST: CPT | Mod: RT | Performed by: RADIOLOGY

## 2021-01-22 PROCEDURE — 83735 ASSAY OF MAGNESIUM: CPT | Performed by: FAMILY MEDICINE

## 2021-01-22 RX ORDER — OXYCODONE HYDROCHLORIDE 5 MG/1
5 TABLET ORAL 2 TIMES DAILY
Qty: 60 TABLET | Refills: 0 | Status: CANCELLED | OUTPATIENT
Start: 2021-01-22

## 2021-01-22 RX ORDER — ESCITALOPRAM OXALATE 20 MG/1
TABLET ORAL
Qty: 90 TABLET | Refills: 1 | Status: SHIPPED | OUTPATIENT
Start: 2021-01-22 | End: 2021-12-01

## 2021-01-22 RX ORDER — DULAGLUTIDE 1.5 MG/.5ML
1.5 INJECTION, SOLUTION SUBCUTANEOUS
Qty: 6 ML | Refills: 3 | Status: SHIPPED | OUTPATIENT
Start: 2021-01-22 | End: 2021-06-10

## 2021-01-22 RX ORDER — DULAGLUTIDE 0.75 MG/.5ML
INJECTION, SOLUTION SUBCUTANEOUS
Qty: 6 ML | Refills: 0 | Status: CANCELLED | OUTPATIENT
Start: 2021-01-22

## 2021-01-22 RX ORDER — ROSUVASTATIN CALCIUM 20 MG/1
20 TABLET, COATED ORAL DAILY
Qty: 90 TABLET | Refills: 3 | Status: SHIPPED | OUTPATIENT
Start: 2021-01-22 | End: 2021-07-09

## 2021-01-22 RX ORDER — OXYCODONE HCL 10 MG/1
10 TABLET, FILM COATED, EXTENDED RELEASE ORAL EVERY 12 HOURS
Qty: 60 TABLET | Refills: 0 | Status: CANCELLED | OUTPATIENT
Start: 2021-01-22

## 2021-01-22 ASSESSMENT — ANXIETY QUESTIONNAIRES
GAD7 TOTAL SCORE: 3
2. NOT BEING ABLE TO STOP OR CONTROL WORRYING: NOT AT ALL
6. BECOMING EASILY ANNOYED OR IRRITABLE: SEVERAL DAYS
7. FEELING AFRAID AS IF SOMETHING AWFUL MIGHT HAPPEN: SEVERAL DAYS
3. WORRYING TOO MUCH ABOUT DIFFERENT THINGS: NOT AT ALL
1. FEELING NERVOUS, ANXIOUS, OR ON EDGE: SEVERAL DAYS
5. BEING SO RESTLESS THAT IT IS HARD TO SIT STILL: NOT AT ALL

## 2021-01-22 ASSESSMENT — PATIENT HEALTH QUESTIONNAIRE - PHQ9
5. POOR APPETITE OR OVEREATING: NOT AT ALL
SUM OF ALL RESPONSES TO PHQ QUESTIONS 1-9: 4

## 2021-01-22 NOTE — PATIENT INSTRUCTIONS
Stop Simvastatin and start Crestor. Monitor leg cramps.   Increase Trulicity. Rx sent.   Will notify you with results

## 2021-01-22 NOTE — PROGRESS NOTES
Subjective     Cedric is a 65 year old who presents to clinic today for the following health issues     HPI     Multiple Concerns-    Diabetes follow up  Nocturnal leg cramps  Recent fall with right rib pain    Diabetes Follow-up  Currently on Lantus 45 units at bedtime + weekly Trulicity 0.75 mg- tolerating well.   States that due to the COVID pandemic, he's been working from home more, no commute to work and admits to no exercise activity.   How often are you checking your blood sugar? A few times a week  What time of day are you checking your blood sugars (select all that apply)?  Before and after meals  Have you had any blood sugars above 200?  No  Have you had any blood sugars below 70?  No    What symptoms do you notice when your blood sugar is low?  None    What concerns do you have today about your diabetes? None     Do you have any of these symptoms? (Select all that apply)  No numbness or tingling in feet.  No redness, sores or blisters on feet.  No complaints of excessive thirst.  No reports of blurry vision.  No significant changes to weight.      BP Readings from Last 2 Encounters:   01/22/21 124/66   09/22/20 99/68     Hemoglobin A1C (%)   Date Value   01/22/2021 9.8 (H)   09/09/2020 7.3 (H)     LDL Cholesterol Calculated (mg/dL)   Date Value   01/27/2020 61   11/28/2018 79       Recent Fall   Reports while he was having a meal when he felt like he may have aspirated. Had a bad coughing spell to clear his airway that was followed with a period of LOC and a fall about a week ago.  Fall occurred 1/16/21.States that no once witnessed the fall  Landed on right side, bruising on right side of face from fall and pain in lower ribs- right side.     Now complaining of right rib pain. No recurrent falls since then        Concern - Leg cramps- worse at night  Onset: x 1 year.    Description:   Nocturnal leg cramps.    Intensity: moderate    Progression of Symptoms:  intermittent    Accompanying Signs &  Symptoms:  Cramps involve lower leg & ankle - bilateral    Previous history of similar problem:   Ongoing but has been getting worse and interrupting sleel    Precipitating factors:   Worsened by: unknown    Alleviating factors:  Improved by: unknown    Therapies Tried and outcome: tried leg stretching exercise.      Depression Followup    How are you doing with your depression since your last visit? No change    Are you having other symptoms that might be associated with depression? No    Have you had a significant life event?  No     Are you feeling anxious or having panic attacks?   No    Do you have any concerns with your use of alcohol or other drugs? No    Social History     Tobacco Use     Smoking status: Former Smoker     Quit date: 2018     Years since quittin.5     Smokeless tobacco: Never Used   Substance Use Topics     Alcohol use: Yes     Comment: occasionally     Drug use: No     PHQ 3/3/2020 2020 2021   PHQ-9 Total Score 4 10 4   Q9: Thoughts of better off dead/self-harm past 2 weeks Not at all Not at all Not at all     CHARITY-7 SCORE 3/3/2020 2020 2021   Total Score - - -   Total Score 1 3 3     Last PHQ-9 2021   1.  Little interest or pleasure in doing things 1   2.  Feeling down, depressed, or hopeless 0   3.  Trouble falling or staying asleep, or sleeping too much 1   4.  Feeling tired or having little energy 1   5.  Poor appetite or overeating 1   6.  Feeling bad about yourself 0   7.  Trouble concentrating 0   8.  Moving slowly or restless 0   Q9: Thoughts of better off dead/self-harm past 2 weeks 0   PHQ-9 Total Score 4   Difficulty at work, home, or with people -     CHARITY-7  2021   1. Feeling nervous, anxious, or on edge 1   2. Not being able to stop or control worrying 0   3. Worrying too much about different things 0   4. Trouble relaxing 0   5. Being so restless that it is hard to sit still 0   6. Becoming easily annoyed or irritable 1   7. Feeling afraid,  as if something awful might happen 1   CHARITY-7 Total Score 3   If you checked any problems, how difficult have they made it for you to do your work, take care of things at home, or get along with other people? -     In the past two weeks have you had thoughts of suicide or self-harm?  No.    Do you have concerns about your personal safety or the safety of others?   No    Suicide Assessment Five-step Evaluation and Treatment (SAFE-T)          Review of Systems   Constitutional, HEENT, cardiovascular, pulmonary, gi and gu systems are negative, except as otherwise noted.      Objective    /66   Pulse 96   Temp 97.7  F (36.5  C) (Tympanic)   Resp 18   Wt 110.4 kg (243 lb 6.4 oz)   SpO2 98%   BMI 30.78 kg/m    Body mass index is 30.78 kg/m .  Physical Exam   GENERAL: healthy, alert and no distress  RESP: lungs clear to auscultation - no rales, rhonchi or wheezes  CV: regular rate and rhythm, normal S1 S2, no S3 or S4, no murmur, click or rub, no peripheral edema and peripheral pulses strong  PSYCH: mentation appears normal, affect normal/bright    DATA  Reviewed and discussed with patient prior to discharge.  Results for orders placed or performed in visit on 01/22/21   XR Ribs & Chest Right G/E 3 Views     Status: None    Narrative    XR RIBS & CHEST RT 3VW 1/22/2021 12:14 PM     HISTORY: Rib pain on right side; History of recent fall      Impression    IMPRESSION: No apparent right rib displaced fracture. The lungs appear  clear. No apparent pneumothorax or pleural effusion.    PETER VÁSQUEZ MD   Hemoglobin A1c     Status: Abnormal   Result Value Ref Range    Hemoglobin A1C 9.8 (H) 0 - 5.6 %       Assessment & Plan     Type 2 diabetes mellitus with stage 3b chronic kidney disease, with long-term current use of insulin (H), uncontrolled  - Increase dose: dulaglutide (TRULICITY) 1.5 MG/0.5ML pen  Dispense: 6 mL; Refill: 3  - Dietary and lifestyle modification counseling.     Mild recurrent major depression  (H), stable  - PHQ-9/CHARITY 7 completed, see above/Epic for details    - Refill: escitalopram (LEXAPRO) 20 MG tablet  Dispense: 90 tablet; Refill: 1    Rib pain on right side with History of recent fall  - XR Ribs & Chest Right G/E 3 Views      Nocturnal leg cramps  - Magnesium  - Ferritin  - Comprehensive metabolic panel  - Will trial a different statin- change from Simvastatin to Crestor.   - Continue lower extremity stretching exercise before bed. .       Vitamin B 12 deficiency  - Vitamin B12    Hyperlipidemia LDL goal <100  - change from Simvastatin to high-intensity statin therapy-Crestor  - rosuvastatin (CRESTOR) 20 MG tablet  Dispense: 90 tablet; Refill: 3  - Lipid profile, FUTURE      Morbid obesity (H)  Weight management plan: Discussed healthy diet and exercise guidelines          Return in about 3 months (around 4/22/2021) for Diabetes Follow Up with a HgbA1C prior to visit.    Molly Leiva MD  [facility]

## 2021-01-23 ASSESSMENT — ANXIETY QUESTIONNAIRES: GAD7 TOTAL SCORE: 3

## 2021-01-25 ENCOUNTER — MYC MEDICAL ADVICE (OUTPATIENT)
Dept: FAMILY MEDICINE | Facility: CLINIC | Age: 66
End: 2021-01-25

## 2021-01-25 DIAGNOSIS — Z98.890 S/P LUMBAR MICRODISCECTOMY: ICD-10-CM

## 2021-01-25 DIAGNOSIS — G89.4 CHRONIC PAIN SYNDROME: ICD-10-CM

## 2021-01-26 NOTE — TELEPHONE ENCOUNTER
Routing refill request to provider for review/approval because:  Drug not on the [facility] refill protocol.  Last Written Prescription Date:  12/31/20  Last Fill Quantity: 60,  # refills: 0   Last office visit: 1/22/2021 with prescribing provider:   Future Office Visit:

## 2021-01-27 RX ORDER — OXYCODONE HYDROCHLORIDE 5 MG/1
5 TABLET ORAL 2 TIMES DAILY
Qty: 60 TABLET | Refills: 0 | Status: SHIPPED | OUTPATIENT
Start: 2021-01-27 | End: 2021-02-23

## 2021-01-27 RX ORDER — OXYCODONE HCL 10 MG/1
10 TABLET, FILM COATED, EXTENDED RELEASE ORAL EVERY 12 HOURS
Qty: 60 TABLET | Refills: 0 | Status: SHIPPED | OUTPATIENT
Start: 2021-01-27 | End: 2021-02-23

## 2021-02-22 ENCOUNTER — MYC MEDICAL ADVICE (OUTPATIENT)
Dept: FAMILY MEDICINE | Facility: CLINIC | Age: 66
End: 2021-02-22

## 2021-02-22 DIAGNOSIS — Z98.890 S/P LUMBAR MICRODISCECTOMY: ICD-10-CM

## 2021-02-22 DIAGNOSIS — G89.4 CHRONIC PAIN SYNDROME: ICD-10-CM

## 2021-02-22 NOTE — TELEPHONE ENCOUNTER
Requested Prescriptions   Pending Prescriptions Disp Refills     oxyCODONE (OXYCONTIN) 10 MG 12 hr tablet 60 tablet 0     Sig: Take 1 tablet (10 mg) by mouth every 12 hours maximum 2 tablet(s) per day to last 1 month.       There is no refill protocol information for this order        oxyCODONE (ROXICODONE) 5 MG tablet 60 tablet 0     Sig: Take 1 tablet (5 mg) by mouth 2 times daily To last 1 month       There is no refill protocol information for this order        Last Written Prescription Date:  1/27/21  Last Fill Quantity: 60,  # refills: 0   Last office visit: 1/22/2021 with prescribing provider.    Routing refill request to provider for review/approval because:  Drugs not on the [facility] refill protocol   Bree BUTLER-RN  Triage Nurse  [facility]: [facility]

## 2021-02-23 RX ORDER — OXYCODONE HCL 10 MG/1
10 TABLET, FILM COATED, EXTENDED RELEASE ORAL EVERY 12 HOURS
Qty: 60 TABLET | Refills: 0 | Status: SHIPPED | OUTPATIENT
Start: 2021-02-23 | End: 2021-03-23

## 2021-02-23 RX ORDER — OXYCODONE HYDROCHLORIDE 5 MG/1
5 TABLET ORAL 2 TIMES DAILY
Qty: 60 TABLET | Refills: 0 | Status: SHIPPED | OUTPATIENT
Start: 2021-02-23 | End: 2021-03-23

## 2021-02-25 DIAGNOSIS — E53.8 VITAMIN B 12 DEFICIENCY: ICD-10-CM

## 2021-02-26 RX ORDER — NEEDLES, SAFETY 18GX1 1/2"
NEEDLE, DISPOSABLE MISCELLANEOUS
Qty: 12 EACH | Refills: 0 | Status: SHIPPED | OUTPATIENT
Start: 2021-02-26

## 2021-02-26 NOTE — TELEPHONE ENCOUNTER
Prescription approved per [facility] Refill Protocol.    Loren Morrell RN BSN  [facility]

## 2021-03-06 ENCOUNTER — IMMUNIZATION (OUTPATIENT)
Dept: FAMILY MEDICINE | Facility: CLINIC | Age: 66
End: 2021-03-06
Payer: COMMERCIAL

## 2021-03-06 PROCEDURE — 0001A PR COVID VAC PFIZER DIL RECON 30 MCG/0.3 ML IM: CPT

## 2021-03-06 PROCEDURE — 91300 PR COVID VAC PFIZER DIL RECON 30 MCG/0.3 ML IM: CPT

## 2021-03-10 DIAGNOSIS — I10 HYPERTENSION GOAL BP (BLOOD PRESSURE) < 140/90: ICD-10-CM

## 2021-03-11 RX ORDER — LOSARTAN POTASSIUM AND HYDROCHLOROTHIAZIDE 25; 100 MG/1; MG/1
TABLET ORAL
Qty: 90 TABLET | Refills: 3 | Status: SHIPPED | OUTPATIENT
Start: 2021-03-11 | End: 2021-07-09

## 2021-03-11 NOTE — TELEPHONE ENCOUNTER
Routing refill request to provider for review/approval because:  Failed protocol needs provider approval.  Creatinine   Date Value Ref Range Status   01/22/2021 1.56 (H) 0.66 - 1.25 mg/dL Final

## 2021-03-22 ENCOUNTER — MYC MEDICAL ADVICE (OUTPATIENT)
Dept: FAMILY MEDICINE | Facility: CLINIC | Age: 66
End: 2021-03-22

## 2021-03-22 DIAGNOSIS — G89.4 CHRONIC PAIN SYNDROME: ICD-10-CM

## 2021-03-22 DIAGNOSIS — Z98.890 S/P LUMBAR MICRODISCECTOMY: ICD-10-CM

## 2021-03-22 NOTE — TELEPHONE ENCOUNTER
Requested Prescriptions   Pending Prescriptions Disp Refills     oxyCODONE (ROXICODONE) 5 MG tablet 60 tablet 0     Sig: Take 1 tablet (5 mg) by mouth 2 times daily To last 1 month       There is no refill protocol information for this order        oxyCODONE (OXYCONTIN) 10 MG 12 hr tablet 60 tablet 0     Sig: Take 1 tablet (10 mg) by mouth every 12 hours maximum 2 tablet(s) per day to last 1 month.       There is no refill protocol information for this order        Last Written Prescription Date: 2/23/21  Last Fill Quantity: 60,  # refills: 0   Last office visit: 1/22/2021 with prescribing provider.    Routing refill request to provider for review/approval because:  Drug not on the [facility] refill protocol     Please see [facility] message as patient would like to pick these up on Thursday.      Bree ESPINOSAN-RN  Triage Nurse  [facility]: [facility]

## 2021-03-23 RX ORDER — OXYCODONE HYDROCHLORIDE 5 MG/1
5 TABLET ORAL 2 TIMES DAILY
Qty: 60 TABLET | Refills: 0 | Status: SHIPPED | OUTPATIENT
Start: 2021-03-23 | End: 2021-04-22

## 2021-03-23 RX ORDER — OXYCODONE HCL 10 MG/1
10 TABLET, FILM COATED, EXTENDED RELEASE ORAL EVERY 12 HOURS
Qty: 60 TABLET | Refills: 0 | Status: SHIPPED | OUTPATIENT
Start: 2021-03-23 | End: 2021-04-22

## 2021-03-27 ENCOUNTER — IMMUNIZATION (OUTPATIENT)
Dept: FAMILY MEDICINE | Facility: CLINIC | Age: 66
End: 2021-03-27
Attending: FAMILY MEDICINE
Payer: COMMERCIAL

## 2021-03-27 PROCEDURE — 91300 PR COVID VAC PFIZER DIL RECON 30 MCG/0.3 ML IM: CPT

## 2021-03-27 PROCEDURE — 0002A PR COVID VAC PFIZER DIL RECON 30 MCG/0.3 ML IM: CPT

## 2021-03-31 DIAGNOSIS — Z79.4 TYPE 2 DIABETES MELLITUS WITH MICROALBUMINURIA, WITH LONG-TERM CURRENT USE OF INSULIN (H): ICD-10-CM

## 2021-03-31 DIAGNOSIS — E11.29 TYPE 2 DIABETES MELLITUS WITH MICROALBUMINURIA, WITH LONG-TERM CURRENT USE OF INSULIN (H): ICD-10-CM

## 2021-03-31 DIAGNOSIS — R80.9 TYPE 2 DIABETES MELLITUS WITH MICROALBUMINURIA, WITH LONG-TERM CURRENT USE OF INSULIN (H): ICD-10-CM

## 2021-04-01 NOTE — PROGRESS NOTES
Assessment & Plan     Chronic bilateral low back pain, unspecified whether sciatica present  Do not feel comfortable increasing patient chronic opiate prescription  - predniSONE (DELTASONE) 50 MG tablet; Take 1 tablet (50 mg) by mouth daily for 5 days  - PAIN MANAGEMENT REFERRAL; Future    Encounter for abdominal aortic aneurysm (AAA) screening  -  Aorta Medicare AAA Screening; Future    Personal history of tobacco use  - Prof Fee: Shared Decision Making Visit for Lung Cancer Screening  - CT Chest Lung Cancer Scrn Low Dose wo; Future     See Patient Instructions    Return in about 2 weeks (around 4/16/2021), or if symptoms worsen or fail to improve.    Jhon Villalobos DO  [facility] ELIZABETH Steele is a 65 year old who presents for the following health issues     HPI     Back Pain  Onset/Duration: chronic  Description:   Location of pain: low back bilateral  Character of pain: sharp  Pain radiation: hips and legs  New numbness or weakness in legs, not attributed to pain: YES  Intensity: Currently 7/10, moderate  Progression of Symptoms: worsening and same  History:   Specific cause: none  Pain interferes with job: no  History of back problems: previous spinal surgery - 3 years ago  Any previous MRI or X-rays: Yes- at [place].  [facility]  Sees a specialist for back pain: No  Alleviating factors:   Improved by: heat    Precipitating factors:  Worsened by: movement, walking  Therapies tried and outcome: heat, opioids, Physical Therapy and surgery    set at age <35, male, AND morning back stiffness  no         1. Chronic low back: History of back surgery approximately 3 years.  States that symptoms improved.  Noticed a sac in lower and developed pain 3-4 weeks ago.  The oxycotin and oxycodone is no longer helping as much anymore.  States of occasional numbness and weakness (more) in bilateral lower extremity weakness.  No bowel or urinary incontinence.  Patient was previously seen by pain  "management in the past.  No trauma.  Symptoms unchanged.  States of 6/10 pain.     Review of Systems   Constitutional: Negative for chills and fever.   HENT: Negative for congestion, ear pain, hearing loss and sore throat.    Respiratory: Negative for cough and shortness of breath.    Cardiovascular: Negative for chest pain, palpitations and peripheral edema.   Musculoskeletal: Positive for back pain. Negative for arthralgias, joint swelling and myalgias.   Skin: Negative for rash.   Neurological: Positive for weakness (bilateral lower extremity weakness and numbness). Negative for dizziness, headaches and paresthesias.   Psychiatric/Behavioral: Negative for mood changes. The patient is not nervous/anxious.          Objective    BP 94/61 (BP Location: Left arm, Cuff Size: Adult Large)   Pulse 78   Temp 97.6  F (36.4  C) (Tympanic)   Ht 1.905 m (6' 3\")   Wt 106.2 kg (234 lb 3.2 oz)   SpO2 99%   BMI 29.27 kg/m    Body mass index is 29.27 kg/m .  Physical Exam  Constitutional:       General: He is not in acute distress.     Appearance: He is well-developed.   HENT:      Head: Normocephalic and atraumatic.      Nose: Nose normal.   Eyes:      Conjunctiva/sclera: Conjunctivae normal.   Neck:      Musculoskeletal: Normal range of motion.      Trachea: No tracheal deviation.   Cardiovascular:      Rate and Rhythm: Normal rate and regular rhythm.      Heart sounds: Normal heart sounds.   Pulmonary:      Effort: Pulmonary effort is normal.      Breath sounds: No wheezing.   Musculoskeletal: Normal range of motion.      Comments: Back: normal symmetry, normal gait, limited ROM in regards to forward flexion and extension, normal sidebending and rotation, negative straight raise test     Skin:     Findings: No erythema or rash.   Neurological:      Mental Status: He is alert and oriented to person, place, and time.   Psychiatric:         Behavior: Behavior normal.        Lung Cancer Screening Shared Decision Making Visit "     Cedric Figueroa is eligible for lung cancer screening on the basis of the information provided in my signed lung cancer screening order.     I have discussed with patient the risks and benefits of screening for lung cancer with low-dose CT.     The risks include:  radiation exposure: one low dose chest CT has as much ionizing radiation as about 15 chest x-rays or 6 months of background radiation living in [place]    false positives: 96% of positive findings/nodules are NOT cancer, but some might still require additional diagnostic evaluation, including biopsy  over-diagnosis: some slow growing cancers that might never have been clinically significant will be detected and treated unnecessarily     The benefit of early detection of lung cancer is contingent upon adherence to annual screening or more frequent follow up if indicated.     Furthermore, reaping the benefits of screening requires Cedric Figueroa to be willing and physically able to undergo diagnostic procedures, if indicated. Although no specific guide is available for determining severity of comorbidities, it is reasonable to withhold screening in patients who have greater mortality risk from other diseases.     We did discuss that the only way to prevent lung cancer is to not smoke. Smoking cessation counseling was not given.      I did not offer risk estimation using a calculator such as this one:    ShouldIScreen

## 2021-04-02 ENCOUNTER — OFFICE VISIT (OUTPATIENT)
Dept: FAMILY MEDICINE | Facility: CLINIC | Age: 66
End: 2021-04-02
Payer: COMMERCIAL

## 2021-04-02 VITALS
DIASTOLIC BLOOD PRESSURE: 61 MMHG | BODY MASS INDEX: 29.12 KG/M2 | WEIGHT: 234.2 LBS | HEART RATE: 78 BPM | SYSTOLIC BLOOD PRESSURE: 94 MMHG | OXYGEN SATURATION: 99 % | HEIGHT: 75 IN | TEMPERATURE: 97.6 F

## 2021-04-02 DIAGNOSIS — Z13.6 ENCOUNTER FOR ABDOMINAL AORTIC ANEURYSM (AAA) SCREENING: ICD-10-CM

## 2021-04-02 DIAGNOSIS — M54.50 CHRONIC BILATERAL LOW BACK PAIN, UNSPECIFIED WHETHER SCIATICA PRESENT: Primary | ICD-10-CM

## 2021-04-02 DIAGNOSIS — G89.29 CHRONIC BILATERAL LOW BACK PAIN, UNSPECIFIED WHETHER SCIATICA PRESENT: Primary | ICD-10-CM

## 2021-04-02 DIAGNOSIS — Z87.891 PERSONAL HISTORY OF TOBACCO USE: ICD-10-CM

## 2021-04-02 PROCEDURE — G0296 VISIT TO DETERM LDCT ELIG: HCPCS | Performed by: FAMILY MEDICINE

## 2021-04-02 PROCEDURE — 99214 OFFICE O/P EST MOD 30 MIN: CPT | Performed by: FAMILY MEDICINE

## 2021-04-02 RX ORDER — INSULIN GLARGINE 100 [IU]/ML
INJECTION, SOLUTION SUBCUTANEOUS
Qty: 30 ML | Refills: 0 | Status: SHIPPED | OUTPATIENT
Start: 2021-04-02 | End: 2021-06-04

## 2021-04-02 RX ORDER — PREDNISONE 50 MG/1
50 TABLET ORAL DAILY
Qty: 5 TABLET | Refills: 0 | Status: SHIPPED | OUTPATIENT
Start: 2021-04-02 | End: 2021-04-07

## 2021-04-02 ASSESSMENT — ENCOUNTER SYMPTOMS
HEADACHES: 0
FEVER: 0
CHILLS: 0
MYALGIAS: 0
PALPITATIONS: 0
PARESTHESIAS: 0
DIZZINESS: 0
NERVOUS/ANXIOUS: 0
COUGH: 0
WEAKNESS: 1
SORE THROAT: 0
ARTHRALGIAS: 0
JOINT SWELLING: 0
BACK PAIN: 1
SHORTNESS OF BREATH: 0

## 2021-04-02 ASSESSMENT — MIFFLIN-ST. JEOR: SCORE: 1932.95

## 2021-04-02 NOTE — TELEPHONE ENCOUNTER
Prescription approved per [facility] Refill Protocol.  Naty Kumar RN  [facility]

## 2021-04-02 NOTE — PATIENT INSTRUCTIONS
Jovanna Steele,    Thank you for allowing [facility] to manage your care.    I sent your prescriptions to your pharmacy.    I ordered a abdominal ultrasound (aneursym screening) and low dose CT scan (lung cancer screening), please call diagnostic imaging [phone] to schedule your test.    I made a pain management referral, they will be calling in approximately 1 week to set up your appointment.  If you do not hear from them, please call the specialty number on your after visit.     For your convenience, test results are released as soon as they are available  Please allow 1-2 business days for me to send you a comment about your results.  If not done so, I encourage you to login into [employer] ([email or web address] to review your results in real time.     If you have any questions or concerns, please feel free to call us at [phone].    Sincerely,    Dr. Villalobos    Did you know?      You can schedule a video visit for follow-up appointments as well as future appointments for certain conditions.  Please see the below link.     [email or web address]    If you have not already done so,  I encourage you to sign up for [employer] ([email or web address]).  This will allow you to review your results, securely communicate with a provider, and schedule virtual visits as well.        Lung Cancer Screening   Frequently Asked Questions  If you are at high-risk for lung cancer, getting screened with low-dose computed tomography (LDCT) every year can help save your life. This handout offers answers to some of the most common questions about lung cancer screening. If you have other questions, please call [phone] ([phone]).     What is it?  Lung cancer screening uses special X-ray technology to create an image of your lung tissue. The exam is quick and easy and takes less than 10 seconds. We don t give you any medicine or use any needles.  You can eat before and after the exam. You don t need to change your clothes as long as the clothing on your chest doesn t contain metal. But, you do need to be able to hold your breath for at least 6 seconds during the exam.    What is the goal of lung cancer screening?  The goal of lung cancer screening is to save lives. Many times, lung cancer is not found until a person starts having physical symptoms. Lung cancer screening can help detect lung cancer in the earliest stages when it may be easier to treat.    Who should be screened for lung cancer?  We suggest lung cancer screening for anyone who is at high-risk for lung cancer. You are in the high-risk group if you:      are between the ages of 55 and 79, and    have smoked at least 1 pack of cigarettes a day for 30 or more years, and    still smoke or have quit within the past 15 years.    However, if you have a new cough or shortness of breath, you should talk to your doctor before being screened.    Some national lung health advocacy groups also recommend screening for people ages 50 to 79 who have smoked an average of 1 pack of cigarettes a day for 20 years. They must also have at least 1 other risk factor for lung cancer, not including exposure to secondhand smoke. Other risk factors are having had cancer in the past, emphysema, pulmonary fibrosis, COPD, a family history of lung cancer, or exposure to certain materials such as arsenic, asbestos, beryllium, cadmium, chromium, diesel fumes, nickel, radon or silica. Your care team can help you know if you have one of these risk factors.     Why does it matter if I have symptoms?  Certain symptoms can be a sign that you have a condition in your lungs that should be checked and treated by your doctor. These symptoms include fever, chest pain, a new or changing cough, shortness of breath that you have never felt before, coughing up blood or unexplained weight loss. Having any of these symptoms can greatly affect  the results of lung cancer screening.       Should all smokers get an LDCT lung cancer screening exam?  It depends. Lung cancer screening is for a very specific group of men and women who have a history of heavy smoking over a long period of time (see  Who should be screened for lung cancer  above).  I am in the high-risk group, but have been diagnosed with cancer in the past. Is LDCT lung cancer screening right for me?  In some cases, you should not have LDCT lung screening, such as when your doctor is already following your cancer with CT scan studies. Your doctor will help you decide if LDCT lung screening is right for you.  Do I need to have a screening exam every year?  Yes. If you are in the high-risk group described earlier, you should get an LDCT lung cancer screening exam every year until you are 79, or are no longer willing or able to undergo screening and possible procedures to diagnose and treat lung cancer.  How effective is LDCT at preventing death from lung cancer?  Studies have shown that LDCT lung cancer screening can lower the risk of death from lung cancer by 20 percent in people who are at high-risk.  What are the risks?  There are some risks and limitations of LDCT lung cancer screening. We want to make sure you understand the risks and benefits, so please let us know if you have any questions. Your doctor may want to talk with you more about these risks.    Radiation exposure: As with any exam that uses radiation, there is a very small increased risk of cancer. The amount of radiation in LDCT is small--about the same amount a person would get from a mammogram. Your doctor orders the exam when he or she feels the potential benefits outweigh the risks.    False negatives: No test is perfect, including LDCT. It is possible that you may have a medical condition, including lung cancer, that is not found during your exam. This is called a false negative result.    False positives and more testing:  LDCT very often finds something in the lung that could be cancer, but in fact is not. This is called a false positive result. False positive tests often cause anxiety. To make sure these findings are not cancer, you may need to have more tests. These tests will be done only if you give us permission. Sometimes patients need a treatment that can have side effects, such as a biopsy. For more information on false positives, see  What can I expect from the results?     Findings not related to lung cancer: Your LDCT exam also takes pictures of areas of your body next to your lungs. In a very small number of cases, the CT scan will show an abnormal finding in one of these areas, such as your kidneys, adrenal glands, liver or thyroid. This finding may not be serious, but you may need more tests. Your doctor can help you decide what other tests you may need, if any.  What can I expect from the results?  About 1 out of 4 LDCT exams will find something that may need more tests. Most of the time, these findings are lung nodules. Lung nodules are very small collections of tissue in the lung. These nodules are very common, and the vast majority--more than 97 percent--are not cancer (benign). Most are normal lymph nodes or small areas of scarring from past infections.  But, if a small lung nodule is found to be cancer, the cancer can be cured more than 90 percent of the time. To know if the nodule is cancer, we may need to get more images before your next yearly screening exam. If the nodule has suspicious features (for example, it is large, has an odd shape or grows over time), we will refer you to a specialist for further testing.  Will my doctor also get the results?  Yes. Your doctor will get a copy of your results.  Is it okay to keep smoking now that there s a cancer screening exam?  No. Tobacco is one of the strongest cancer-causing agents. It causes not only lung cancer, but other cancers and cardiovascular (heart)  diseases as well. The damage caused by smoking builds over time. This means that the longer you smoke, the higher your risk of disease. While it is never too late to quit, the sooner you quit, the better.  Where can I find help to quit smoking?  The best way to prevent lung cancer is to stop smoking. If you have already quit smoking, congratulations and keep it up! For help on quitting smoking, please call [employer] at 2-496-486-JEZB ([phone] or the American [employer] Society at 1-389.993.3073 to find local resources near you.  One-on-one health coaching:  If you d prefer to work individually with a health care provider on tobacco cessation, we offer:      Medication Therapy Management:  Our specially trained pharmacists work closely with you and your doctor to help you quit smoking.  Call [phone] or [phone] (toll free).     Can Do: Health coaching offered by [facility].  [email or web address]

## 2021-04-05 ENCOUNTER — ANCILLARY PROCEDURE (OUTPATIENT)
Dept: ULTRASOUND IMAGING | Facility: CLINIC | Age: 66
End: 2021-04-05
Attending: FAMILY MEDICINE
Payer: COMMERCIAL

## 2021-04-05 ENCOUNTER — ANCILLARY PROCEDURE (OUTPATIENT)
Dept: CT IMAGING | Facility: CLINIC | Age: 66
End: 2021-04-05
Attending: FAMILY MEDICINE
Payer: COMMERCIAL

## 2021-04-05 DIAGNOSIS — Z13.6 ENCOUNTER FOR ABDOMINAL AORTIC ANEURYSM (AAA) SCREENING: ICD-10-CM

## 2021-04-05 DIAGNOSIS — I71.40 ABDOMINAL AORTIC ANEURYSM (AAA) WITHOUT RUPTURE (H): Primary | ICD-10-CM

## 2021-04-05 DIAGNOSIS — Z87.891 PERSONAL HISTORY OF TOBACCO USE: ICD-10-CM

## 2021-04-05 PROCEDURE — 71271 CT THORAX LUNG CANCER SCR C-: CPT | Mod: TC | Performed by: RADIOLOGY

## 2021-04-05 PROCEDURE — 76706 US ABDL AORTA SCREEN AAA: CPT | Performed by: RADIOLOGY

## 2021-04-16 ENCOUNTER — MYC MEDICAL ADVICE (OUTPATIENT)
Dept: FAMILY MEDICINE | Facility: CLINIC | Age: 66
End: 2021-04-16

## 2021-04-18 ENCOUNTER — HEALTH MAINTENANCE LETTER (OUTPATIENT)
Age: 66
End: 2021-04-18

## 2021-04-19 ENCOUNTER — VIRTUAL VISIT (OUTPATIENT)
Dept: PALLIATIVE MEDICINE | Facility: CLINIC | Age: 66
End: 2021-04-19
Attending: FAMILY MEDICINE
Payer: COMMERCIAL

## 2021-04-19 DIAGNOSIS — M54.16 LUMBAR RADICULOPATHY: Primary | ICD-10-CM

## 2021-04-19 DIAGNOSIS — R80.9 TYPE 2 DIABETES MELLITUS WITH MICROALBUMINURIA, WITH LONG-TERM CURRENT USE OF INSULIN (H): Primary | ICD-10-CM

## 2021-04-19 DIAGNOSIS — M54.50 CHRONIC BILATERAL LOW BACK PAIN, UNSPECIFIED WHETHER SCIATICA PRESENT: ICD-10-CM

## 2021-04-19 DIAGNOSIS — M47.816 SPONDYLOSIS OF LUMBAR REGION WITHOUT MYELOPATHY OR RADICULOPATHY: ICD-10-CM

## 2021-04-19 DIAGNOSIS — M96.1 FAILED BACK SYNDROME: ICD-10-CM

## 2021-04-19 DIAGNOSIS — E11.29 TYPE 2 DIABETES MELLITUS WITH MICROALBUMINURIA, WITH LONG-TERM CURRENT USE OF INSULIN (H): Primary | ICD-10-CM

## 2021-04-19 DIAGNOSIS — Z79.4 TYPE 2 DIABETES MELLITUS WITH MICROALBUMINURIA, WITH LONG-TERM CURRENT USE OF INSULIN (H): Primary | ICD-10-CM

## 2021-04-19 DIAGNOSIS — G89.29 CHRONIC BILATERAL LOW BACK PAIN, UNSPECIFIED WHETHER SCIATICA PRESENT: ICD-10-CM

## 2021-04-19 DIAGNOSIS — Z79.891 CHRONIC USE OF OPIATE DRUG FOR THERAPEUTIC PURPOSE: ICD-10-CM

## 2021-04-19 PROCEDURE — 99215 OFFICE O/P EST HI 40 MIN: CPT | Mod: GT | Performed by: PAIN MEDICINE

## 2021-04-19 PROCEDURE — 99207 PR CONSULT E&M CHANGED TO SUBSEQUENT LEVEL: CPT | Performed by: PAIN MEDICINE

## 2021-04-19 RX ORDER — PREGABALIN 25 MG/1
25 CAPSULE ORAL 2 TIMES DAILY
Qty: 60 CAPSULE | Refills: 0 | Status: SHIPPED | OUTPATIENT
Start: 2021-04-19 | End: 2021-07-09

## 2021-04-19 ASSESSMENT — PAIN SCALES - GENERAL: PAINLEVEL: SEVERE PAIN (6)

## 2021-04-19 NOTE — TELEPHONE ENCOUNTER
Hi,    The patient is requesting new prescription orders for a diabetic machine, test strips and lancets (whatever brand is covered by his insurance plan). Please verify and send new orders to the [facility] pharmacy. Thanks!    Lulú Chilel [facility]

## 2021-04-19 NOTE — TELEPHONE ENCOUNTER
Routed to PCP to please advise.    Supplies cued up.    Bree BSN-RN  Triage Nurse  [facility]: [facility]

## 2021-04-19 NOTE — PROGRESS NOTES
Cedric is a 65 year old who is being evaluated via a billable video visit.      How would you like to obtain your AVS? MyChart  If the video visit is dropped, the invitation should be resent by: Text to cell phone: .[phone]    Will anyone else be joining your video visit? Kerrie Bautista CMA   [facility]   [facility] Management Center    Video Start Time: 10  Video-Visit Details    Type of service:  Video Visit    Video End Time:11    Originating Location (pt. Location): Home    Distant Location (provider location):  [facility]     Platform used for Video Visit: [facility] Consultation    Date of visit: 4/18/2021    Reason for consultation:    Primary Care Provider is Molly Leiva.  Pain medications are being prescribed by pcp.    Please see the [facility] health questionnaire which the patient completed and reviewed with me in detail.    Chief Complaint:    Chief Complaint   Patient presents with     Pain     MME prescribed prior to seeing patient:  Current MME:    Pain history: Acute on chronic  Hemaglobin A1c was 9.8   History of microdiscectomy 2019 L4-5  Seen at our clinic back in 2019  Knee replacement x2  Seen a long time ago by my colleague Laney Arellano  Cedric Figueroa is a 65 year old male who first started having problems with pain chronic    The pt reports prior to surgery he had weakness and numbness in his LLE.  The pt reports after surgery he had sig improvement  With time he gradually started to get pain  After a few months he started to develop pain  The pain was a new pain   Prior to surgery wass dull  Prior surgery was worse on the left  Currently pain is worse on the right   Currently the pain is intermittent  The pain is now sharp   The pain is in his lower back.   The pain radiates to around the scar  The pain intermittently radiates to his LE  The pain radiates to the buttocks and upper thigh  He denies any  numbness tingling or burning. He reports he does have peripheral neuropathy   He last hgb a1c was 9.8 currently increased dose of  Trulicity. Ledezma f/u  The pt reports pain is currently about a 6  The pain is worse with bending  The pain is worse with lifting   The pain is worse when going up and down stairs  The pain uncharged with sittin  Some benefit with ext   Some benefit with rest  Some benefit with movement   Some benefit with current regimen    Denies any recent falls  Denies any overt weaknees  Denies any incontinence    He does reports occasionally getting light headed    Of note the pt has also lost a lot of balance     The pain affects he ability to sleep   The pain affects quality of life  The pain affects his ADLS     Pt feels he shuffling more    He also reports some lat/leg pain   Of note the pt getting lightheaded  He feelsits 2/2 to his BP. He has not been checking  He has not been checking is BG level  He feels he gets dizzy      Of note the pt does not have a daily PHYSICAL THERAPY  Regimen       Pt does not want to increase opioids  Pt wants to get off opioids       THE 4 A's OF OPIOID MAINTENANCE ANALGESIA    Analgesia: y    Activity: y    Adverse effects: n    Adherence to Rx protocol: y    [place] Board of Pharmacy Data Base Reviewed:    YES;       Pain rating: intensity  Averages 6/10 on a 0-10 scale.      Current treatments include:  OxyContin 10 mg twice daily  Oxycodone 5 mg twice daily after noon  lexapro  mirapex  Previous medication treatments included:  Previous Pain Relevant Medications: (H--helped; HI--Helped initially; [facility]--Somewhat helpful; NH--No help; W--worse; SE--side effects; ?--Unsure if helpful)   NOTE: This medication information taken from patient's intake form, not medical records.               Opiates: Oxycodone: H, tramadol: NH              NSAIDS: Rare use of ibuprofen due to chronic kidney disease:SW              Muscle Relaxants: Cyclobenzaprine:?               Anti-migraine mediations: None noted              Anti-depressants: Paxil: Not for pain              Sleep aids: None noted              Anxiolytics: None noted              Neuropathics: None noted                    Topicals: None noted              Other medications not covered above: None noted    Other treatments have included:  Cedric Figueroa has been seen at a pain clinic in the past.    PT: y  Chiropractic: n  Acupuncture: n  TENs Unit: n  Injections: not recently     Past Medical History:  Past Medical History:   Diagnosis Date     Diverticulosis of colon      Hyperlipidemia LDL goal <100 2/28/2012     Hypertension goal BP (blood pressure) < 130/80 2/28/2012     Mild major depression (H) 2/28/2012     Renal disease 2017    acute kidney failure 2 years ago     Restless leg syndrome     controlled on Mirapex     Sleep apnea     CPAP     Tobacco use     cigar use- started at age 16     Type 2 diabetes, HbA1c goal < 7% (H) 2/28/2012     Past Surgical History:  Past Surgical History:   Procedure Laterality Date     ABDOMEN SURGERY  2015    peritonitis, bowel perforation, bowel resection     AS TOTAL KNEE ARTHROPLASTY      Right and Left knee x3( 2 partial knee replacement and 1 total Left knee replacement     BARIATRIC SURGERY      at age 45     COLONOSCOPY  2/1/2006    [facility]     COLONOSCOPY WITH CO2 INSUFFLATION N/A 6/7/2016    Procedure: COLONOSCOPY WITH CO2 INSUFFLATION;  Surgeon: Cedric Schneider MD;  Location: [facility] OR     DISCECTOMY LUMBAR POSTERIOR MICROSCOPIC ONE LEVEL Left 4/4/2019    Procedure: Left L4-5 hemilaminectomy, medial facetectomy, foraminotomy with microdiscectomy and use of X-ray and intraoperative microscope;  Surgeon: Aaron Ness MD;  Location:  OR     INCISION AND DRAINAGE BUTTOCKS Left 3/28/2018    Procedure: INCISION AND DRAINAGE BUTTOCKS;  Incision and drainage of left buttock abscess, and fistulotomy;  Surgeon: Bala Layton MD;  Location: [facility] OR  "    Medications:  Current Outpatient Medications   Medication Sig Dispense Refill     aspirin 81 MG EC tablet Take 81 mg by mouth daily       BD ECLIPSE SYRINGE 25G X 1\" 3 ML MISC USE TO INJECT B-12 INTRAMUSCULARLY EVERY 30 DAYS 12 each 0     BD ULTRA-FINE 29G X 12.7MM insulin pen needle USE ONCE DAILY OR AS DIRECTED 100 each 1     blood glucose (ACCU-CHEK PELON PLUS) test strip USE TO TEST BLOOD SUGAR THREE TIMES A DAY OR AS DIRECTED 100 each 11     blood glucose monitoring (ACCU-CHEK PELON PLUS) meter device kit Use to test blood sugar 3 times daily or as directed. 1 kit 0     blood glucose monitoring (SOFTCLIX) lancets USE TO TEST BLOOD SUGAR THREE TIMES A DAY OR AS DIRECTED 100 each 3     cyanocobalamin (CYANOCOBALAMIN) 1000 MCG/ML injection INJECT 1ML INTO THE MUSCLE EVERY 30 DAYS. 3 mL 3     dulaglutide (TRULICITY) 1.5 MG/0.5ML pen Inject 1.5 mg Subcutaneous every 7 days 6 mL 3     escitalopram (LEXAPRO) 20 MG tablet TAKE ONE TABLET BY MOUTH ONCE DAILY 90 tablet 1     LANTUS SOLOSTAR 100 UNIT/ML soln INJECT 45 UNITS UNDER THE SKIN AT BEDTIME 30 mL 0     losartan-hydrochlorothiazide (HYZAAR) 100-25 MG tablet TAKE ONE TABLET BY MOUTH ONCE DAILY 90 tablet 3     MELATONIN PO Take 10 mg by mouth At Bedtime        oxyCODONE (OXYCONTIN) 10 MG 12 hr tablet Take 1 tablet (10 mg) by mouth every 12 hours maximum 2 tablet(s) per day to last 1 month. 60 tablet 0     oxyCODONE (ROXICODONE) 5 MG tablet Take 1 tablet (5 mg) by mouth 2 times daily To last 1 month 60 tablet 0     pramipexole (MIRAPEX) 0.25 MG tablet Take 1 tablet (0.25 mg) by mouth 3 times daily 90 tablet 3     pregabalin (LYRICA) 25 MG capsule Take 1 capsule (25 mg) by mouth 2 times daily 60 capsule 0     rosuvastatin (CRESTOR) 20 MG tablet Take 1 tablet (20 mg) by mouth daily 90 tablet 3     tamsulosin (FLOMAX) 0.4 MG capsule Take 1 capsule (0.4 mg) by mouth At Bedtime 90 capsule 3     Allergies:   No Known Allergies  Social History:  Home situation: " fam  Occupation/Schooling: y  Tobacco use: no using   History of chemical dependency treatment: n    Family history:  Family History   Problem Relation Age of Onset     Diabetes Mother      Diabetes Paternal Grandmother      Coronary Artery Disease Paternal Grandmother      Cerebrovascular Disease Paternal Grandmother      Bleeding Disorder No family hx of      Anesthesia Reaction No family hx of      Family history of headaches: n    Review of Systems:  Skin: negative  Eyes: negative  Ears/Nose/Throat: negative  Respiratory: No shortness of breath, dyspnea on exertion, cough, or hemoptysis  Cardiovascular: negative  Gastrointestinal: negative  Genitourinary: negative  Musculoskeletal: negative  Neurologic: negative  Psychiatric: negative  Hematologic/Lymphatic/Immunologic: negative  Endocrine: negative    Physical Exam:  There were no vitals filed for this visit.  Exam:  Constitutional: healthy, alert and no distress  Head: normocephalic. Atraumatic.     Respiratory: Speaking in full sentences no accessory muscles use     Skin: no suspicious lesions or rashes  Psychiatric: mentation appears normal and affect normal/bright    Musculoskeletal exam:  Gait/Station/Posture: wnl  Cervical spine: ROMwnl       Lumbar spine:     ROM: decreased      Diana's: + reproduces                Straight leg exam: + only on the right    ISABELL/FADIR: neg     Greater trochanteric bursa: neg  Neurologic exam:  Able to easily overcome gravity       Diagnostic tests:    Segmental analysis:  T12-L1: Shallow disc bulge. No spinal or neural foraminal stenosis.     L1-L2: Shallow disc bulge. No spinal or neural foraminal stenosis.     L2-L3: Shallow symmetric disc bulge with mild facet arthropathy. No  spinal, lateral recess, or neural foraminal stenosis.     L3-L4: Shallow disc bulge with mild facet arthropathy and ligamentum  flavum thickening. No significant spinal, lateral recess, or neural  foraminal stenosis.     L4-L5: Degenerative  changes of left hemilaminectomy, with interval  debridement of the previously seen left lateral recess disc extrusion.  There is a persistent central disc herniation with herniated disc  material that extends inferiorly and slightly toward the left along  the lateral recess. There is mild to moderate left lateral recess  stenosis and mild right lateral recess stenosis, without central  spinal stenosis on the current study. There is contact and slight  residual posterior displacement of the traversing left L5 nerve root  (series 5 image 20). Moderate left and mild right neural foraminal  stenosis, similar to prior.     L5-S1: There is a disc bulge with moderate facet arthropathy. No  significant spinal, lateral recess, or neural foraminal stenosis.                                                                      IMPRESSION:  1. Interval posterior changes status post left L4-L5 hemilaminectomy  for debridement of a lateral recess disc extrusion at that level.  There is a residual central disc protrusion that extends slightly  inferiorly and to the leftward lateral recess, with mild to moderate  residual left lateral recess stenosis and slight displacement of the  traversing left L5 nerve root. No residual central spinal stenosis  L4-5.     2. Large loculated peripherally enhancing subcutaneous soft tissue  fluid collection without central restricted diffusion measuring 4.6 x  2.9 x 3.2 cm. A small portion of the collection appears to be  continuous with the left L4-5 facet joint, extending through the  hemilaminectomy defect and through the erector spinae musculature and  overlying fascia to be centered within the superficial subcutaneous  soft tissues. This may represent a loculated residual postsurgical  seroma. Sequela of CSF leak is also a consideration. Superimposed  infection is felt to be unlikely given the absence of diffusion  restriction, but not entirely excluded         D.I.R.E Score: Patient Selection  for Chronic Opioid Analgesia    For each factor, rate the patient's score from 1 - 3 based on the explanations on the right.       Diagnosis             2         1 = Benign chronic condition with minimal objective findings or no definite medical diagnosis.  Examples:  fibromyalgia, migraine, headaches, non-specific back pain.  2 = Slowly progressive condition concordant with moderate pain, or fixed condition with moderate objective findings.  Examples: failed back surgery syndrome, back pain with moderate degenerative changes, neuropathic pain.  3 = Advanced condition concordant with severe pain with objective findings.  Examples: severe ischemic vascular disease, advanced neuropathy, severe spinal stenosis.    Intractability             2         1 = Few therapies have been tried and the patient takes a passive role in his/her pain management process.   2 = Most costomary treatments have been tried but the patient is not fully engaged in the pain management process, or barriers prevent (insurance, transportation, medical illness)  3 = Patient fully engaged in a spectrum of appropriate treatments but with inadequate response.    Risk   (Risk = Total of P+C+R+S below)       Psychological             2         1 = Serious personality dysfunction or mental illness interfering with care.  Examples: personality disorder, severe affective disorder, significant personality issues.  2 = Personality or mental health interferes moderately.  Example: depression or anxiety disorder.  3 = Good communication with the clinic.  No significant personality dysfunction or mental illness.       Chemical      Health             2         1 = Active or very recent use of illicit drugs, excessive alcohol, or prescription drug abuse.  2 = Chemical coper (uses medications to cope with stress) or history of chemical dependency in remission.  3 = No CD history.  Not drug-focused or chemically reliant       Reliability             2         1  = History of numerous problems: medication misuse, missed appointments, rarely follows through.  2 = Occasional difficulties with compliance, but generally reliable.  3 = Highly reliable patient with medications, appointments and treatment.       Social      Support             2         1= Life in chaos.  Little family support and few close relationships.  Loss of most normal life roles.  2 = Reduction in some relationships and life roles.  3 = Supportive family/close relationships.  Involved in work or school and no social isolation.    Efficacy score             2         1 = Poor function or minimal pain relief despite moderate to high doses.  2 = Moderate benefit with function improved in a number of ways (or insufficient info - hasn't tried opioid yet or very low doses or too short a trial.  3 = Good improvement in pain and function and quality of life with stable doses over time.                                    14    Total score = D + I + R + E    Score 7-13: Not a suitable candidate for long-term opioid analgesia  Score 14-21: May be a good candidate      Assessment/Plan:  Cedric Figueroa is a 65 year old male who presents with the complaints of bilateral low back pain right greater than left with occasional radiation to his lower extremity  Cedric was seen today for pain.    Diagnoses and all orders for this visit:    Lumbar radiculopathy  -     pregabalin (LYRICA) 25 MG capsule; Take 1 capsule (25 mg) by mouth 2 times daily  -     [facility] REFERRAL; Future    Chronic bilateral low back pain, unspecified whether sciatica present  -     PAIN MANAGEMENT REFERRAL    Spondylosis of lumbar region without myelopathy or radiculopathy    Failed back syndrome  -     pregabalin (LYRICA) 25 MG capsule; Take 1 capsule (25 mg) by mouth 2 times daily  -     [facility] REFERRAL; Future    Chronic use of opiate drug for therapeutic purpose  -     Drug Confirmation Panel Urine with Creat         - Further  procedures recommended:    -  Consider Lumbar facet injection vs MEDIAL BRANCH BLOCK/rfa,    - Consider lumbar TRANSFORAMINAL EPIDURAL STEROID INJECTION    - Medication Management: Discussed the concept of a multimodal approach.  Patient would like to get off OxyContin.  Patient is currently on 45 MME   - will start Lyrica 25mg    -In terms of titration.  When patient is is ready to transition.  Would change change the prescription to 1 to 2 tablets of Percocet every 6 hours as needed max of 5 tablets a day.  May consider further titration as adjuvant therapy is added.  Will need U tox prior to considering taking over prescribing.  - Physical Therapy: order pain PHYSICAL THERAPY     - Clinical Health Psychologist to address issues of relaxation, behavioral change, coping style, and other factors important to improvement: strongly consider   - Diagnostic Studies: consider repeat MRI   - Urine toxicology screen today: order placed    - Follow up:    1-2 months    - Discussed the importance of keeping a journal of BG level and BP for 3 days and presenting this information to his PCP.       The total TIME spent on this patient on the day of the appointment was 60 minutes.   Time spent preparing to see the patient (reviewing records and tests)  Time spend face to face with the patient  Time spent ordering tests, medications, procedures and referrals  Time spent Referring and communicating with other healthcare professionals  Documenting clinical information in Epic    Kiet Umana MD  [facility]  This note was created with voice recognition software, and while reviewed for accuracy, typos may remain.

## 2021-04-19 NOTE — PATIENT INSTRUCTIONS
----------------------------------------------------------------  [facility] Number:  [phone]     Call with any questions about your care and for scheduling assistance.     Calls are returned Monday through Friday between 8 AM and 4:30 PM. We usually get back to you within 2 business days depending on the issue/request.    If we are prescribing your medications:    For opioid medication refills, call the clinic or send a Tugg message 7 days in advance.  Please include:    Name of requested medication    Name of the pharmacy.    For non-opioid medications, call your pharmacy directly to request a refill. Please allow 3-4 days to be processed.     Per [place] State Law:    All controlled substance prescriptions must be filled within 30 days of being written.      For those controlled substances allowing refills, pickup must occur within 30 days of last fill.      We believe regular attendance is key to your success in our program!      Any time you are unable to keep your appointment we ask that you call us at least 24 hours in advance to cancel.This will allow us to offer the appointment time to another patient.     Multiple missed appointments may lead to dismissal from the clinic.

## 2021-04-20 ENCOUNTER — MYC MEDICAL ADVICE (OUTPATIENT)
Dept: FAMILY MEDICINE | Facility: CLINIC | Age: 66
End: 2021-04-20

## 2021-04-20 DIAGNOSIS — G89.4 CHRONIC PAIN SYNDROME: ICD-10-CM

## 2021-04-20 DIAGNOSIS — Z98.890 S/P LUMBAR MICRODISCECTOMY: ICD-10-CM

## 2021-04-20 RX ORDER — LANCETS
EACH MISCELLANEOUS
Qty: 100 EACH | Refills: 6 | Status: SHIPPED | OUTPATIENT
Start: 2021-04-20 | End: 2021-07-09

## 2021-04-20 RX ORDER — GLUCOSAMINE HCL/CHONDROITIN SU 500-400 MG
CAPSULE ORAL
Qty: 100 EACH | Refills: 3 | Status: SHIPPED | OUTPATIENT
Start: 2021-04-20

## 2021-04-20 NOTE — TELEPHONE ENCOUNTER
I see refill of lyrical was sent to [facility] pharmacy by Dr. Umana yesterday, 4/19.    PCP sent blood glucose meter and supplies refill today.    Chatwala message routed to patient advising of this.    Divya Clarke RN  [facility]

## 2021-04-20 NOTE — TELEPHONE ENCOUNTER
See patient's mychart response, wants oxycodone/oxycontin refills sent to pharmacy.  Last Rx's were 3/23/21 per Dr. Leiva.    Routed to PCP to address.    Divya Clarke RN  [facility]       Surgeon/Pathologist Verbiage (Will Incorporate Name Of Surgeon From Intro If Not Blank): operated in two distinct and integrated capacities as the surgeon and pathologist.

## 2021-04-22 ENCOUNTER — VIRTUAL VISIT (OUTPATIENT)
Dept: FAMILY MEDICINE | Facility: CLINIC | Age: 66
End: 2021-04-22
Payer: COMMERCIAL

## 2021-04-22 DIAGNOSIS — G25.81 RESTLESS LEG SYNDROME: ICD-10-CM

## 2021-04-22 DIAGNOSIS — G47.62 NOCTURNAL LEG CRAMPS: ICD-10-CM

## 2021-04-22 DIAGNOSIS — Z79.4 TYPE 2 DIABETES MELLITUS WITH STAGE 3B CHRONIC KIDNEY DISEASE, WITH LONG-TERM CURRENT USE OF INSULIN (H): ICD-10-CM

## 2021-04-22 DIAGNOSIS — E11.22 TYPE 2 DIABETES MELLITUS WITH STAGE 3B CHRONIC KIDNEY DISEASE, WITH LONG-TERM CURRENT USE OF INSULIN (H): ICD-10-CM

## 2021-04-22 DIAGNOSIS — Z79.891 CHRONIC USE OF OPIATE DRUG FOR THERAPEUTIC PURPOSE: ICD-10-CM

## 2021-04-22 DIAGNOSIS — G47.62 NOCTURNAL LEG CRAMPS: Primary | ICD-10-CM

## 2021-04-22 DIAGNOSIS — N18.32 TYPE 2 DIABETES MELLITUS WITH STAGE 3B CHRONIC KIDNEY DISEASE, WITH LONG-TERM CURRENT USE OF INSULIN (H): ICD-10-CM

## 2021-04-22 LAB
ANION GAP SERPL CALCULATED.3IONS-SCNC: 10 MMOL/L (ref 3–14)
BUN SERPL-MCNC: 60 MG/DL (ref 7–30)
CALCIUM SERPL-MCNC: 8.2 MG/DL (ref 8.5–10.1)
CHLORIDE SERPL-SCNC: 108 MMOL/L (ref 94–109)
CO2 SERPL-SCNC: 21 MMOL/L (ref 20–32)
CREAT SERPL-MCNC: 2.27 MG/DL (ref 0.66–1.25)
FERRITIN SERPL-MCNC: 959 NG/ML (ref 26–388)
GFR SERPL CREATININE-BSD FRML MDRD: 29 ML/MIN/{1.73_M2}
GLUCOSE SERPL-MCNC: 135 MG/DL (ref 70–99)
HBA1C MFR BLD: 8.6 % (ref 0–5.6)
MAGNESIUM SERPL-MCNC: 1.7 MG/DL (ref 1.6–2.3)
POTASSIUM SERPL-SCNC: 3.4 MMOL/L (ref 3.4–5.3)
SODIUM SERPL-SCNC: 139 MMOL/L (ref 133–144)

## 2021-04-22 PROCEDURE — 83735 ASSAY OF MAGNESIUM: CPT | Performed by: FAMILY MEDICINE

## 2021-04-22 PROCEDURE — 82728 ASSAY OF FERRITIN: CPT | Performed by: FAMILY MEDICINE

## 2021-04-22 PROCEDURE — 36415 COLL VENOUS BLD VENIPUNCTURE: CPT | Performed by: FAMILY MEDICINE

## 2021-04-22 PROCEDURE — 99214 OFFICE O/P EST MOD 30 MIN: CPT | Mod: TEL | Performed by: FAMILY MEDICINE

## 2021-04-22 PROCEDURE — 83036 HEMOGLOBIN GLYCOSYLATED A1C: CPT | Performed by: FAMILY MEDICINE

## 2021-04-22 PROCEDURE — 80048 BASIC METABOLIC PNL TOTAL CA: CPT | Performed by: FAMILY MEDICINE

## 2021-04-22 RX ORDER — OXYCODONE HYDROCHLORIDE 5 MG/1
5 TABLET ORAL 2 TIMES DAILY
Qty: 60 TABLET | Refills: 0 | Status: SHIPPED | OUTPATIENT
Start: 2021-04-22 | End: 2021-06-08

## 2021-04-22 RX ORDER — OXYCODONE HCL 10 MG/1
10 TABLET, FILM COATED, EXTENDED RELEASE ORAL EVERY 12 HOURS
Qty: 60 TABLET | Refills: 0 | Status: SHIPPED | OUTPATIENT
Start: 2021-04-22 | End: 2021-06-10

## 2021-04-22 NOTE — PROGRESS NOTES
Cedric is a 65 year old who is being evaluated via a billable telephone visit.      What phone number would you like to be contacted at? [phone]  How would you like to obtain your AVS? MyChart    Assessment & Plan     Nocturnal leg cramps, worsening. Rule out electrolyte abnormalities  - Ferritin  - Magnesium  - Basic metabolic panel  - SLEEP EVALUATION & MANAGEMENT REFERRAL - [facility]  [phone] (Age 15 and up)  - Continue leg stretches.     Restless leg syndrome  - On Mirapex.   - Ferritin  - SLEEP EVALUATION & MANAGEMENT REFERRAL - [facility]  [phone] (Age 15 and up)    Type 2 diabetes mellitus with stage 3b chronic kidney disease, with long-term current use of insulin (H)  - Basic metabolic panel  - **A1C FUTURE anytime      Will notify patient with results.     Return in about 2 weeks (around 5/6/2021) for Follow up.    Molly Leiva MD  [facility] Steele is a 65 year old who presents for the following health issues     HPI       Concern - Nocturnal Leg cramps  Onset: ongoing    Description:   Feels like the muscles in the right lower leg and shin tighten like a rock and prevent him from sleeping in his bed at night.   Currently sleeping in a recliner. Feels like this has affected his quality of life and self management and Diabetes care.     Intensity: moderate    Progression of Symptoms:  worsening    Accompanying Signs & Symptoms:  Restless leg syndrome. Taking Mirapex. Also has DHAVAL and is due to have a CPAP machine check.     Previous history of similar problem:   Yes, now worsening and occurring every night.     Precipitating factors:   Worsened by: unsure    Alleviating factors:  Improved by: unsure    Therapies Tried and outcome: heat and massage- minimal relief. Position change, now sleeps in recliner        Diabetes Follow-up  Due for an A1C check.     How often are you checking  your blood sugar? Not at all    What concerns do you have today about your diabetes? None     Do you have any of these symptoms? (Select all that apply)  No numbness or tingling in feet.  No redness, sores or blisters on feet.  No complaints of excessive thirst.  No reports of blurry vision.  No significant changes to weight.      Has a known history of chronic kidney disease. Last renal function tests.   Component      Latest Ref Rng & Units 1/22/2021   Creatinine      0.66 - 1.25 mg/dL 1.56 (H)   GFR Estimate      >60 mL/min/1.73:m2 46 (L)   GFR Estimate If Black      >60 mL/min/1.73:m2 53 (L)     Recently seen by pain management on 4/19 and started on Lyrica with plan to discontinue OxyContin and increase Percocet to 5 x /day. See Epic for recent office visit for details.   Patient states that he is not ready to make to transition at this time. Requesting a refill.     Review of Systems   Constitutional, HEENT, cardiovascular, pulmonary, gi and gu systems are negative, except as otherwise noted.      Objective           Vitals:  No vitals were obtained today due to virtual visit.    Physical Exam     PSYCH: Alert and oriented times 3; coherent speech, normal   rate and volume, able to articulate logical thoughts, able   to abstract reason, no tangential thoughts, no hallucinations   or delusions  His affect is normal  RESP: No cough, no audible wheezing, able to talk in full sentences  Remainder of exam unable to be completed due to telephone visits    DATA    Previous labs reviewed.   Labs ordered.             Phone call duration: 25 minutes

## 2021-04-23 ENCOUNTER — TRANSFERRED RECORDS (OUTPATIENT)
Dept: HEALTH INFORMATION MANAGEMENT | Facility: CLINIC | Age: 66
End: 2021-04-23

## 2021-04-23 ENCOUNTER — MYC MEDICAL ADVICE (OUTPATIENT)
Dept: FAMILY MEDICINE | Facility: CLINIC | Age: 66
End: 2021-04-23

## 2021-04-23 LAB — INR PPP: 1.2

## 2021-04-24 LAB
ALT SERPL-CCNC: 343 IU/L (ref 8–45)
AST SERPL-CCNC: 316 IU/L (ref 2–40)
GLUCOSE SERPL-MCNC: 138 MG/DL (ref 65–100)

## 2021-04-25 LAB
CREAT SERPL-MCNC: 2.06 MG/DL (ref 0.72–1.25)
GFR SERPL CREATININE-BSD FRML MDRD: 33 ML/MIN/1.73M2
POTASSIUM SERPL-SCNC: 3.4 MMOL/L (ref 3.5–5)

## 2021-04-26 NOTE — TELEPHONE ENCOUNTER
Please see if Cedric is able to come in tomorrow at 3 pm for a Hosp follow up visit and schedule in my SD slot. Thanks.

## 2021-04-27 ENCOUNTER — OFFICE VISIT (OUTPATIENT)
Dept: FAMILY MEDICINE | Facility: CLINIC | Age: 66
End: 2021-04-27
Payer: COMMERCIAL

## 2021-04-27 VITALS
DIASTOLIC BLOOD PRESSURE: 64 MMHG | SYSTOLIC BLOOD PRESSURE: 94 MMHG | TEMPERATURE: 98.1 F | HEART RATE: 104 BPM | BODY MASS INDEX: 29.27 KG/M2 | WEIGHT: 234.2 LBS | OXYGEN SATURATION: 98 %

## 2021-04-27 DIAGNOSIS — N18.32 TYPE 2 DIABETES MELLITUS WITH STAGE 3B CHRONIC KIDNEY DISEASE, WITH LONG-TERM CURRENT USE OF INSULIN (H): ICD-10-CM

## 2021-04-27 DIAGNOSIS — R17 JAUNDICE: ICD-10-CM

## 2021-04-27 DIAGNOSIS — K86.89 PANCREATIC MASS: Primary | ICD-10-CM

## 2021-04-27 DIAGNOSIS — E11.22 TYPE 2 DIABETES MELLITUS WITH STAGE 3B CHRONIC KIDNEY DISEASE, WITH LONG-TERM CURRENT USE OF INSULIN (H): ICD-10-CM

## 2021-04-27 DIAGNOSIS — R10.13 EPIGASTRIC PAIN: ICD-10-CM

## 2021-04-27 DIAGNOSIS — Z79.4 TYPE 2 DIABETES MELLITUS WITH STAGE 3B CHRONIC KIDNEY DISEASE, WITH LONG-TERM CURRENT USE OF INSULIN (H): ICD-10-CM

## 2021-04-27 DIAGNOSIS — I95.9 HYPOTENSION, UNSPECIFIED HYPOTENSION TYPE: ICD-10-CM

## 2021-04-27 LAB
ALBUMIN SERPL-MCNC: 2.8 G/DL (ref 3.4–5)
ALP SERPL-CCNC: >2330 U/L (ref 40–150)
ALT SERPL W P-5'-P-CCNC: 344 U/L (ref 0–70)
ANION GAP SERPL CALCULATED.3IONS-SCNC: 9 MMOL/L (ref 3–14)
AST SERPL W P-5'-P-CCNC: 202 U/L (ref 0–45)
BILIRUB SERPL-MCNC: 9.1 MG/DL (ref 0.2–1.3)
BUN SERPL-MCNC: 53 MG/DL (ref 7–30)
CALCIUM SERPL-MCNC: 8.3 MG/DL (ref 8.5–10.1)
CHLORIDE SERPL-SCNC: 106 MMOL/L (ref 94–109)
CO2 SERPL-SCNC: 21 MMOL/L (ref 20–32)
CREAT SERPL-MCNC: 2.16 MG/DL (ref 0.66–1.25)
ERYTHROCYTE [DISTWIDTH] IN BLOOD BY AUTOMATED COUNT: 14.1 % (ref 10–15)
GFR SERPL CREATININE-BSD FRML MDRD: 31 ML/MIN/{1.73_M2}
GLUCOSE SERPL-MCNC: 176 MG/DL (ref 70–99)
HCT VFR BLD AUTO: 28 % (ref 40–53)
HGB BLD-MCNC: 9.4 G/DL (ref 13.3–17.7)
MCH RBC QN AUTO: 29.5 PG (ref 26.5–33)
MCHC RBC AUTO-ENTMCNC: 33.6 G/DL (ref 31.5–36.5)
MCV RBC AUTO: 88 FL (ref 78–100)
PLATELET # BLD AUTO: 245 10E9/L (ref 150–450)
POTASSIUM SERPL-SCNC: 3.6 MMOL/L (ref 3.4–5.3)
PROT SERPL-MCNC: 6.8 G/DL (ref 6.8–8.8)
RBC # BLD AUTO: 3.19 10E12/L (ref 4.4–5.9)
SODIUM SERPL-SCNC: 136 MMOL/L (ref 133–144)
WBC # BLD AUTO: 9.2 10E9/L (ref 4–11)

## 2021-04-27 PROCEDURE — 85027 COMPLETE CBC AUTOMATED: CPT | Performed by: FAMILY MEDICINE

## 2021-04-27 PROCEDURE — 36415 COLL VENOUS BLD VENIPUNCTURE: CPT | Performed by: FAMILY MEDICINE

## 2021-04-27 PROCEDURE — 80053 COMPREHEN METABOLIC PANEL: CPT | Performed by: FAMILY MEDICINE

## 2021-04-27 PROCEDURE — 99214 OFFICE O/P EST MOD 30 MIN: CPT | Performed by: FAMILY MEDICINE

## 2021-04-27 NOTE — PROGRESS NOTES
Assessment & Plan     Pancreatic mass, newly diagnosed. Suspicious for malignancy.   Scheduled for EUS with biopsy either laparoscopically or via open procedure in OR tomorrow  - Comprehensive metabolic panel  - CBC with platelets    Jaundice, secondary to biliary obstruction from # 1  - Comprehensive metabolic panel    Hypotension, unspecified hypotension type  - CBC with platelets  - Hold BP meds    Epigastric pain, secondary to # 1  - Comprehensive metabolic panel  - CBC with platelets  - On oral narcotics for pain control.    Type 2 diabetes mellitus with stage 3b chronic kidney disease, with long-term current use of insulin (H)  - Comprehensive metabolic panel        Return in about 1 week (around 5/4/2021) for Follow up.. Hospital Follow up.     Molly Leiva MD  [facility] [place] Steele is a 65 year old who presents for the following health issues     HPI         Hospital Follow-up Visit:    Hospital/Nursing Home/ Rehab Facility: [facility]  Date of Admission: 4/23/21  Date of Discharge: 4/25/21  Reason(s) for Admission: Pancreatic mass (Primary Dx); suspicious for malignancy.  Biliary obstruction;   Hypotension, unspecified hypotension type;   CONSTANTINE (acute kidney injury) (HC)      Was your hospitalization related to COVID-19? No   Problems taking medications regularly:  None  Medication changes since discharge: lantus changed to 38 units// needing to discuss pain medication schedule  Problems adhering to non-medication therapy:  None    Cedric Figueroa is a 65 y.o. male who presented with abdominal pain, wt loss jaundice found to have a mass in the pancreatic head causing biliary obstruction and obstruction of gastric remnant. He presented with hypotension, hypovolemia, CONSTANTINE and hypokalemia.    He was seen by gastroenterology and general surgery. Plan is for EUS with biopsy either laparoscopically or via open procedure in OR to be coordinated during the  week.      Summary of hospitalization:  CareEverywhere information obtained and reviewed  Diagnostic Tests/Treatments reviewed.  Follow up needed: none  Other Healthcare Providers Involved in Patient s Care:         Specialist appointment - GI and Gen Surg tomorrow.  Update since discharge: fluctuating course.  Post Discharge Medication Reconciliation: discharge medications reconciled, continue medications without change.  Plan of care communicated with patient              Review of Systems   Constitutional, HEENT, cardiovascular, pulmonary, gi and gu systems are negative, except as otherwise noted.      Objective    BP 94/64   Pulse 104   Temp 98.1  F (36.7  C) (Tympanic)   Wt 106.2 kg (234 lb 3.2 oz)   SpO2 98%   BMI 29.27 kg/m    Body mass index is 29.27 kg/m .  Physical Exam   GENERAL:Appears, ill, frail and jaundiced.   EYES: Scleral icterus.    DATA  Recent ER records reviewed.   Labs drawn and in process.

## 2021-05-25 ENCOUNTER — TRANSFERRED RECORDS (OUTPATIENT)
Dept: HEALTH INFORMATION MANAGEMENT | Facility: CLINIC | Age: 66
End: 2021-05-25

## 2021-05-27 ENCOUNTER — RECORDS - HEALTHEAST (OUTPATIENT)
Dept: ADMINISTRATIVE | Facility: CLINIC | Age: 66
End: 2021-05-27

## 2021-06-01 ENCOUNTER — RECORDS - HEALTHEAST (OUTPATIENT)
Dept: ADMINISTRATIVE | Facility: CLINIC | Age: 66
End: 2021-06-01

## 2021-06-02 ENCOUNTER — TRANSFERRED RECORDS (OUTPATIENT)
Dept: HEALTH INFORMATION MANAGEMENT | Facility: CLINIC | Age: 66
End: 2021-06-02

## 2021-06-03 DIAGNOSIS — E11.29 TYPE 2 DIABETES MELLITUS WITH MICROALBUMINURIA, WITH LONG-TERM CURRENT USE OF INSULIN (H): ICD-10-CM

## 2021-06-03 DIAGNOSIS — R80.9 TYPE 2 DIABETES MELLITUS WITH MICROALBUMINURIA, WITH LONG-TERM CURRENT USE OF INSULIN (H): ICD-10-CM

## 2021-06-03 DIAGNOSIS — Z79.4 TYPE 2 DIABETES MELLITUS WITH MICROALBUMINURIA, WITH LONG-TERM CURRENT USE OF INSULIN (H): ICD-10-CM

## 2021-06-04 NOTE — TELEPHONE ENCOUNTER
Routing refill request to provider for review/approval because:  Looks like lantus was changed to 38 units per last OV note on 4/27/21-    Was your hospitalization related to COVID-19? No   Problems taking medications regularly:  None  Medication changes since discharge: lantus changed to 38 units// needing to discuss pain medication schedule  Problems adhering to non-medication therapy:  None    Med pended for 38 units with reminder for follow up

## 2021-06-07 ENCOUNTER — TELEPHONE (OUTPATIENT)
Dept: FAMILY MEDICINE | Facility: CLINIC | Age: 66
End: 2021-06-07

## 2021-06-07 DIAGNOSIS — C25.9 MALIGNANT NEOPLASM OF PANCREAS, UNSPECIFIED LOCATION OF MALIGNANCY (H): Primary | ICD-10-CM

## 2021-06-07 DIAGNOSIS — Z98.890 S/P LUMBAR MICRODISCECTOMY: ICD-10-CM

## 2021-06-07 DIAGNOSIS — G89.4 CHRONIC PAIN SYNDROME: ICD-10-CM

## 2021-06-07 NOTE — TELEPHONE ENCOUNTER
Eloisa with [facility] coordination out reach calling in regards to the patient. He was recently diagnosed with pancreatic cancer and was discharged to a TCU. He is now at home. The patient is out of pain medication and had a wound vac change today as well.     Eloisa is requesting PCP review and consider sending in a refill for the patient. He is currently taking Oxycodone 5mg 2 tablets Q4H PRN.     Please call the patient back with any follow up.     Preferred pharmacy cued. Patient was taking this medication differently before his hospitalization.    Thank you,  Pamela Lawrence RN

## 2021-06-09 RX ORDER — OXYCODONE HYDROCHLORIDE 5 MG/1
5 TABLET ORAL EVERY 6 HOURS PRN
Qty: 100 TABLET | Refills: 0 | Status: SHIPPED | OUTPATIENT
Start: 2021-06-09 | End: 2021-06-21

## 2021-06-09 NOTE — TELEPHONE ENCOUNTER
Noted.     Rx sent.    Verified Results  MA FFDM SCREEN MARLENA W CHERIE W CAD 21Jan2017 10:19AM AVIVA BRANDT   Ordering Provider: AVIVA BRANDT.    Reason For Study: screening,screening.     Test Name Result Flag Reference   MA FFDM SCREEN MARLENA W CHERIE W CAD (Report)     Accession #    [id number]    #[id number] - MA FFDM SCREEN MARLENA W CHERIE W CAD  BILATERAL DIGITAL SCREENING MAMMOGRAM 3D/2D WITH CAD: 1/21/2017    CLINICAL HISTORY:Routine Screening.    COMPARISON:   Comparison is made to exams dated: 9/4/2015 mammogram, 4/15/2013 mammogram, 5/12/2010   mammogram, 1/5/2009 mammogram, and 11/5/2007 mammogram - [facility].    FINDINGS:  The tissue of both breasts is extremely dense, which lowers the sensitivity of mammography.    No significant masses, calcifications, or other findings are seen in either breast.  Current study was also evaluated with a Computer Aided Detection (CAD) system.  There has been no significant interval change.  Digital Breast Tomosynthesis (DBT) images were obtained and used to assist in the interpretation   of this examination.    IMPRESSION: NEGATIVE    There is no mammographic evidence of malignancy. A 1 year screening mammogram is recommended.    MAMMOGRAPHY BI-RADS: 1 NEGATIVE    Ismael Edgar M.D., md/penrad:1/22/2017 16:12:29    Imaging Technologist: Lavern Key RT(R)(M), [facility]  letter sent: Normal Single Exam     **** FINAL ****    Dictated By:   ISMAEL COFFMAN    Electronically Reviewed and Approved By:  ISMAEL COFFMAN

## 2021-06-09 NOTE — TELEPHONE ENCOUNTER
Called and informed the patient a refill has been sent in. He was appreciative.   Pamela Lawrecne RN

## 2021-06-10 ENCOUNTER — VIRTUAL VISIT (OUTPATIENT)
Dept: FAMILY MEDICINE | Facility: CLINIC | Age: 66
End: 2021-06-10
Payer: COMMERCIAL

## 2021-06-10 DIAGNOSIS — L98.492: ICD-10-CM

## 2021-06-10 DIAGNOSIS — C25.9 PANCREATIC ADENOCARCINOMA (H): Primary | ICD-10-CM

## 2021-06-10 DIAGNOSIS — Z98.890 S/P EXPLORATORY LAPAROTOMY: ICD-10-CM

## 2021-06-10 DIAGNOSIS — Z98.890 S/P ERCP: ICD-10-CM

## 2021-06-10 PROCEDURE — 99214 OFFICE O/P EST MOD 30 MIN: CPT | Mod: GT | Performed by: FAMILY MEDICINE

## 2021-06-10 RX ORDER — PANTOPRAZOLE SODIUM 40 MG/1
40 TABLET, DELAYED RELEASE ORAL
COMMUNITY
Start: 2021-05-25 | End: 2021-07-09

## 2021-06-10 RX ORDER — FENTANYL 50 UG/1
1 PATCH TRANSDERMAL
COMMUNITY
Start: 2021-06-06 | End: 2021-07-04

## 2021-06-10 RX ORDER — TRAZODONE HYDROCHLORIDE 50 MG/1
50 TABLET, FILM COATED ORAL
COMMUNITY
Start: 2021-06-04 | End: 2021-07-01

## 2021-06-10 RX ORDER — INSULIN LISPRO 100 [IU]/ML
INJECTION, SOLUTION INTRAVENOUS; SUBCUTANEOUS
COMMUNITY
Start: 2021-06-04

## 2021-06-10 RX ORDER — THIAMINE MONONITRATE (VIT B1) 100 MG
TABLET ORAL
COMMUNITY
End: 2021-07-09

## 2021-06-10 RX ORDER — ACETAMINOPHEN 500 MG
1000 TABLET ORAL 4 TIMES DAILY PRN
COMMUNITY
Start: 2021-05-25

## 2021-06-10 NOTE — PROGRESS NOTES
Cedric is a 65 year old who is being evaluated via a billable video visit.      How would you like to obtain your AVS? MyChart  If the video visit is dropped, the invitation should be resent by: Text to cell phone: [phone]  Will anyone else be joining your video visit? Yes-wife    Video Start Time: 9:30 am     Assessment & Plan     Pancreatic adenocarcinoma (H), newly diagnosed   - PALLIATIVE CARE REFERRAL    S/P ERCP and S/P exploratory laparotomy  - Following with General Surgery    Abdominal wall ulcer, with fat layer exposed (H) s/p Wound Vac placement.  - Following with Wound Care clinic weekly.       Return in about 2 weeks (around 6/24/2021) for Follow up.    Molly Leiva MD  [facility] ELIZABETH Steele is a 65 year old who presents for the following health issues  accompanied by his spouse:    \[facility]\""         Hospital Follow-up Visit:    Hospital/Nursing Home/IP Rehab Facility: [facility]  Date of Admission: 05/25/21  Date of Discharge: 06/04/21  Reason(s) for Admission: Transitional care for Rehab and Abdominal Wound care.     Hospital Follow-up Visit:    Hospital/Nursing Home/IP Rehab Facility: [facility]   Date of Admission: 04/28/21  Date of Discharge: 05/25/21  Reason(s) for Admission: Pancreatic mass     Patient was recently found to have a pancreatic mass on imaging and on 4/28/2021 he underwent laparoscopic-assisted ERCP with stent placement which was subsequently converted to an open assisted ERCP followed by gastrostomy tube placement.    Biopsy during that procedure came back positive for pancreatic adenocarcinoma.    Unfortunately, he had to be taken back to the operating room on 5/3 for viscus perforation and gangrenous cholecystitis.    He underwent abdominal washout, Jimi patch repair of perforated Neda limb, removal of gastrostomy tube and open cholecystostomy tube placement.    He also developed septic shock with  multisystem organ failure including respiratory failure and acute kidney injury.  Thankfully, he was extubated on 5/5 and has been weaned to room air.  Renal function has returned close to normal.    On 5/14 he had fluid aspiration and a drain placed by IR for perisplenic fluid collection.   [facility] wound care.  At this time his cholecystostomy tube has been capped and he continues to have his perisplenic drain in place. He is eating well and without difficulty.   He was discharged home on 6/4/21 with Home Health.   Has a postsurgical abdominal ulcer, had a Wound Vac placed on 6/7. Currently has a Wound care nurse and goes to the Wound care clinic once a week- on Mondays.     Was your hospitalization related to COVID-19? No   Problems taking medications regularly:  None  Medication changes since discharge: Yes  Problems adhering to non-medication therapy:  None    Summary of hospitalization:  CareEverywhere information obtained and reviewed  Diagnostic Tests/Treatments reviewed.  Follow up needed: Wound Care clinic- weekly. General Surgery appointment was recent on 6/8/21. Follow up in 3 weeks.   Other Healthcare Providers Involved in Patient s Care:         Homecare, Specialist appointment - [facility]- weekly on Mondays. and Surgical follow-up appointment - in 3 weeks with Cholangiogram by IR prior to office visit.   Update since discharge: stable.   Post Discharge Medication Reconciliation: discharge medications reconciled, continue medications without change.  Plan of care communicated with patient and family       Patient reports that he was receiving inpatient Palliative Care for Pain management. Currently has a Fentanyl patch and takes Oxycodone 10 mg every 4 hours scheduled for pain management.     Review of Systems   Constitutional, HEENT, cardiovascular, pulmonary, gi and gu systems are negative, except as otherwise noted.      Objective           Vitals:  No vitals were  obtained today due to virtual visit.    Physical Exam   GENERAL: Healthy, alert and no distress  EYES: Eyes grossly normal to inspection.  No discharge or erythema, or obvious scleral/conjunctival abnormalities.  RESP: No audible wheeze, cough, or visible cyanosis.  No visible retractions or increased work of breathing.    SKIN: Visible skin clear. No significant rash, abnormal pigmentation or lesions.  NEURO: Cranial nerves grossly intact.  Mentation and speech appropriate for age.  PSYCH: Mentation appears normal, affect normal/bright, judgement and insight intact, normal speech and appearance well-groomed.    DATA  Recent records from outside facility reviewed.             Video-Visit Details    Type of service:  Video Visit    Video End Time:10:10 am     Originating Location (pt. Location): Home    Distant Location (provider location):  [facility]     Platform used for Video Visit: Buyt.In

## 2021-06-12 ENCOUNTER — NURSE TRIAGE (OUTPATIENT)
Dept: NURSING | Facility: CLINIC | Age: 66
End: 2021-06-12

## 2021-06-12 NOTE — TELEPHONE ENCOUNTER
Triage Call:    Wound vac is leaking and homecare RN is calling for x3 prn visits.  Verbal approval given per [facility] orders policy.    Routed to PCP for RIAZ Barajas RN on 6/12/2021 at 2:21 PM        COVID 19 Nurse Triage Plan/Patient Instructions    Please be aware that novel coronavirus (COVID-19) may be circulating in the community. If you develop symptoms such as fever, cough, or SOB or if you have concerns about the presence of another infection including coronavirus (COVID-19), please contact your health care provider or visit [email or web address].     Disposition/Instructions    Home care recommended. Follow home care protocol based instructions.    Thank you for taking steps to prevent the spread of this virus.  o Limit your contact with others.  o Wear a simple mask to cover your cough.  o Wash your hands well and often.    Resources    [facility]: About COVID-19: [email or web address]    CDC: What to Do If You're Sick: www.cdc.gov/coronavirus/2019-ncov/about/steps-when-sick.html    CDC: Ending Home Isolation: www.cdc.gov/coronavirus/2019-ncov/hcp/disposition-in-home-patients.html     CDC: Caring for Someone: www.cdc.gov/coronavirus/2019-ncov/if-you-are-sick/care-for-someone.html     [facility]: Interim Guidance for Hospital Discharge to Home: www.health.[facility].[place]/diseases/coronavirus/hcp/hospdischarge.pdf    [facility] clinical trials (COVID-19 research studies): clinicalaffairs.[facility]-clinical-trials     Below are the COVID-19 hotlines at the [facility] of Health ([facility]). Interpreters are available.   o For health questions: Call 835-841-8107 or 1-893.596.7455 (7 a.m. to 7 p.m.)  o For questions about schools and childcare: Call 951-738-3844 or [phone] (7 a.m. to 7 p.m.)                   Additional Information    [1] Other NON-URGENT information for PCP AND [2] does not require PCP response    Negative: Caller requesting an appointment, triage  offered and declined    Negative: Caller requesting lab results    Negative: Lab or radiology calling with test results    Negative: [1] Follow-up call from patient regarding patient's clinical status AND [2] information NON-URGENT    Negative: [1] Caller requests to speak ONLY to PCP AND [2] NON-URGENT question    Negative: ED call to PCP    Negative: Physician call to PCP    Negative: Call about patient who is currently hospitalized    Negative: Lab or radiology calling with CRITICAL test results    Negative: [1] Prescription not at pharmacy AND [2] was prescribed today by PCP    Negative: [1] Follow-up call from patient regarding patient's clinical status AND [2] information urgent    Negative: [1] Caller requests to speak ONLY to PCP AND [2] urgent question    Negative: [1] Caller requests to speak to PCP now AND [2] won't tell us reason for call  (Exception: if 10 pm to 6 am, caller must first discuss reason for the call)    Negative: Notification of hospital admission    Negative: Notification of death    Protocols used: PCP CALL - NO TRIAGE-A-

## 2021-06-13 ENCOUNTER — MYC MEDICAL ADVICE (OUTPATIENT)
Dept: FAMILY MEDICINE | Facility: CLINIC | Age: 66
End: 2021-06-13

## 2021-06-14 DIAGNOSIS — E53.8 VITAMIN B 12 DEFICIENCY: ICD-10-CM

## 2021-06-15 RX ORDER — CYANOCOBALAMIN 1000 UG/ML
INJECTION, SOLUTION INTRAMUSCULAR; SUBCUTANEOUS
Qty: 1 ML | Refills: 0 | Status: SHIPPED | OUTPATIENT
Start: 2021-06-15

## 2021-06-15 NOTE — TELEPHONE ENCOUNTER
"Prescription approved per [facility] Refill Protocol.  Requested Prescriptions   Pending Prescriptions Disp Refills     cyanocobalamin (CYANOCOBALAMIN) 1000 MCG/ML injection [Pharmacy Med Name: CYANOCOBALAMIN 1000MCG/ML SOLN] 1 mL 0     Sig: INJECT 1ML INTRAMUSCULARLY EVERY 30 DAYS       Vitamin Supplements (Adult) Protocol Passed - 6/14/2021  8:26 AM        Passed - High dose Vitamin D not ordered        Passed - Recent (12 mo) or future (30 days) visit within the authorizing provider's specialty     Patient has had an office visit with the authorizing provider or a provider within the authorizing providers department within the previous 12 mos or has a future within next 30 days. See \"Patient Info\" tab in inbasket, or \"Choose Columns\" in Meds & Orders section of the refill encounter.              Passed - Medication is active on med list             "

## 2021-06-16 DIAGNOSIS — M17.9 OA (OSTEOARTHRITIS) OF KNEE: ICD-10-CM

## 2021-06-20 RX ORDER — HYDROXYZINE HYDROCHLORIDE 25 MG/1
25 TABLET, FILM COATED ORAL 3 TIMES DAILY PRN
Qty: 120 TABLET | Refills: 3 | OUTPATIENT
Start: 2021-06-20

## 2021-06-21 ENCOUNTER — MYC MEDICAL ADVICE (OUTPATIENT)
Dept: FAMILY MEDICINE | Facility: CLINIC | Age: 66
End: 2021-06-21
Payer: COMMERCIAL

## 2021-06-21 DIAGNOSIS — Z98.890 S/P LUMBAR MICRODISCECTOMY: ICD-10-CM

## 2021-06-21 DIAGNOSIS — C25.9 MALIGNANT NEOPLASM OF PANCREAS, UNSPECIFIED LOCATION OF MALIGNANCY (H): ICD-10-CM

## 2021-06-21 DIAGNOSIS — G89.4 CHRONIC PAIN SYNDROME: ICD-10-CM

## 2021-06-21 RX ORDER — OXYCODONE HYDROCHLORIDE 10 MG/1
10 TABLET ORAL EVERY 6 HOURS PRN
Qty: 120 TABLET | Refills: 0 | Status: SHIPPED | OUTPATIENT
Start: 2021-06-21 | End: 2021-07-09

## 2021-06-25 ENCOUNTER — TRANSFERRED RECORDS (OUTPATIENT)
Dept: HEALTH INFORMATION MANAGEMENT | Facility: CLINIC | Age: 66
End: 2021-06-25

## 2021-06-26 ENCOUNTER — TRANSFERRED RECORDS (OUTPATIENT)
Dept: HEALTH INFORMATION MANAGEMENT | Facility: CLINIC | Age: 66
End: 2021-06-26

## 2021-06-28 DIAGNOSIS — Z53.9 DIAGNOSIS NOT YET DEFINED: Primary | ICD-10-CM

## 2021-06-30 PROCEDURE — G0180 MD CERTIFICATION HHA PATIENT: HCPCS | Performed by: FAMILY MEDICINE

## 2021-07-01 DIAGNOSIS — Z91.81 HX OF FALLING: ICD-10-CM

## 2021-07-01 DIAGNOSIS — F33.0 MILD RECURRENT MAJOR DEPRESSION (H): ICD-10-CM

## 2021-07-01 DIAGNOSIS — E11.29 TYPE 2 DIABETES MELLITUS WITH MICROALBUMINURIA, WITH LONG-TERM CURRENT USE OF INSULIN (H): ICD-10-CM

## 2021-07-01 DIAGNOSIS — Z79.4 LONG TERM (CURRENT) USE OF INSULIN (H): ICD-10-CM

## 2021-07-01 DIAGNOSIS — D62 ACUTE POSTHEMORRHAGIC ANEMIA: ICD-10-CM

## 2021-07-01 DIAGNOSIS — G47.00 INSOMNIA, UNSPECIFIED TYPE: Primary | ICD-10-CM

## 2021-07-01 DIAGNOSIS — G47.00 INSOMNIA: ICD-10-CM

## 2021-07-01 DIAGNOSIS — E66.01 MORBID OBESITY (H): ICD-10-CM

## 2021-07-01 DIAGNOSIS — C25.9 MALIGNANT NEOPLASM OF PANCREAS, UNSPECIFIED LOCATION OF MALIGNANCY (H): ICD-10-CM

## 2021-07-01 DIAGNOSIS — Z79.4 TYPE 2 DIABETES MELLITUS WITH MICROALBUMINURIA, WITH LONG-TERM CURRENT USE OF INSULIN (H): ICD-10-CM

## 2021-07-01 DIAGNOSIS — N40.0 BENIGN NON-NODULAR PROSTATIC HYPERPLASIA WITHOUT LOWER URINARY TRACT SYMPTOMS: ICD-10-CM

## 2021-07-01 DIAGNOSIS — R80.9 TYPE 2 DIABETES MELLITUS WITH MICROALBUMINURIA, WITH LONG-TERM CURRENT USE OF INSULIN (H): ICD-10-CM

## 2021-07-01 DIAGNOSIS — F41.9 ANXIETY DISORDER, UNSPECIFIED: ICD-10-CM

## 2021-07-01 DIAGNOSIS — Z87.891 PERSONAL HISTORY OF NICOTINE DEPENDENCE: ICD-10-CM

## 2021-07-01 DIAGNOSIS — I10 HYPERTENSION GOAL BP (BLOOD PRESSURE) < 140/90: ICD-10-CM

## 2021-07-01 DIAGNOSIS — T81.89XD OTHER COMPLICATIONS OF PROCEDURES, NOT ELSEWHERE CLASSIFIED, SUBSEQUENT ENCOUNTER: Primary | ICD-10-CM

## 2021-07-01 DIAGNOSIS — G25.81 RESTLESS LEG SYNDROME: ICD-10-CM

## 2021-07-02 DIAGNOSIS — N18.30 CKD (CHRONIC KIDNEY DISEASE) STAGE 3, GFR 30-59 ML/MIN (H): Primary | ICD-10-CM

## 2021-07-02 RX ORDER — TRAZODONE HYDROCHLORIDE 50 MG/1
50 TABLET, FILM COATED ORAL AT BEDTIME
Qty: 90 TABLET | Refills: 3 | Status: SHIPPED | OUTPATIENT
Start: 2021-07-02

## 2021-07-09 ENCOUNTER — VIRTUAL VISIT (OUTPATIENT)
Dept: PHARMACY | Facility: CLINIC | Age: 66
End: 2021-07-09
Payer: COMMERCIAL

## 2021-07-09 ENCOUNTER — TELEPHONE (OUTPATIENT)
Dept: FAMILY MEDICINE | Facility: CLINIC | Age: 66
End: 2021-07-09
Payer: COMMERCIAL

## 2021-07-09 DIAGNOSIS — G89.4 CHRONIC PAIN SYNDROME: ICD-10-CM

## 2021-07-09 DIAGNOSIS — Z79.4 TYPE 2 DIABETES MELLITUS WITH MICROALBUMINURIA, WITH LONG-TERM CURRENT USE OF INSULIN (H): ICD-10-CM

## 2021-07-09 DIAGNOSIS — G25.81 RESTLESS LEG SYNDROME: ICD-10-CM

## 2021-07-09 DIAGNOSIS — R80.9 TYPE 2 DIABETES MELLITUS WITH MICROALBUMINURIA, WITH LONG-TERM CURRENT USE OF INSULIN (H): ICD-10-CM

## 2021-07-09 DIAGNOSIS — C25.9 MALIGNANT NEOPLASM OF PANCREAS, UNSPECIFIED LOCATION OF MALIGNANCY (H): ICD-10-CM

## 2021-07-09 DIAGNOSIS — C25.9 PANCREATIC CANCER (H): Primary | ICD-10-CM

## 2021-07-09 DIAGNOSIS — E11.29 TYPE 2 DIABETES MELLITUS WITH MICROALBUMINURIA, WITH LONG-TERM CURRENT USE OF INSULIN (H): ICD-10-CM

## 2021-07-09 DIAGNOSIS — I10 HYPERTENSION GOAL BP (BLOOD PRESSURE) < 130/80: ICD-10-CM

## 2021-07-09 DIAGNOSIS — Z98.890 S/P LUMBAR MICRODISCECTOMY: ICD-10-CM

## 2021-07-09 DIAGNOSIS — F32.0 MILD MAJOR DEPRESSION (H): ICD-10-CM

## 2021-07-09 DIAGNOSIS — G47.00 INSOMNIA, UNSPECIFIED TYPE: ICD-10-CM

## 2021-07-09 DIAGNOSIS — Z78.9 TAKES DIETARY SUPPLEMENTS: ICD-10-CM

## 2021-07-09 PROCEDURE — 99607 MTMS BY PHARM ADDL 15 MIN: CPT | Performed by: PHARMACIST

## 2021-07-09 PROCEDURE — 99605 MTMS BY PHARM NP 15 MIN: CPT | Performed by: PHARMACIST

## 2021-07-09 RX ORDER — MULTIVITAMIN WITH IRON
1 TABLET ORAL DAILY
COMMUNITY

## 2021-07-09 RX ORDER — POLYETHYLENE GLYCOL 3350 17 G/17G
1 POWDER, FOR SOLUTION ORAL DAILY PRN
COMMUNITY

## 2021-07-09 RX ORDER — OXYCODONE HYDROCHLORIDE 10 MG/1
10 TABLET ORAL EVERY 6 HOURS PRN
Qty: 120 TABLET | Refills: 0 | Status: CANCELLED | OUTPATIENT
Start: 2021-07-09

## 2021-07-09 RX ORDER — FENTANYL 50 UG/1
1 PATCH TRANSDERMAL
COMMUNITY

## 2021-07-09 RX ORDER — OXYCODONE HYDROCHLORIDE 10 MG/1
10 TABLET ORAL EVERY 6 HOURS PRN
Qty: 120 TABLET | Refills: 0 | OUTPATIENT
Start: 2021-07-09

## 2021-07-09 NOTE — TELEPHONE ENCOUNTER
Noted.   Called patient. Rx for Oxycodone changed to q 4 hrs with same qty of 120.   Regarding Fentanyl patch- I don't manage these. Patient will contact his Palliative Care Team for continued management/refill.

## 2021-07-09 NOTE — Clinical Note
RITU MELLO note, thanks!    Karishma Foy, PharmD  Medication Therapy Management Pharmacist  [phone]

## 2021-07-09 NOTE — TELEPHONE ENCOUNTER
Reason for call:  Medication   If this is a refill request, has the caller requested the refill from the pharmacy already? No  Will the patient be using a [facility]? No  Name of the pharmacy and phone number for the current request: [facility] [place] - [address] [address]      Name of the medication requested: oxyCODONE IR (ROXICODONE) 10 MG tablet    Other request: Pt calling asking for change in script from every 6hrs to every 4hrs due to recent hospitalization.  Pt also would like to know why naloxone (NARCAN) 4 MG/0.1ML nasal spray was added to medication list. Please contact pt when rx is sent to pharmacy. Pt states they do not have enough medication to last the rest of today. Routing high priority.    Phone number to reach patient:  Home number on file [phone] (home)    Best Time:  anytime    Can we leave a detailed message on this number?  YES    Travel screening: Not Applicable

## 2021-07-09 NOTE — TELEPHONE ENCOUNTER
Hi,    The patient is requesting a refill on Fentanyl 50mcg/hr patch. This was originally presribed by Paula Dixon but she asked us to forward the refill request to his pcp.     Fentanyl 50mcg/hr patch  Last script filled 06/07 quantity #10 for a 30 day supply   Directions: Remove old patch and apply one patch topically every 72 hours.Discard old patch prior to applying new.    Please verify and send new order if appropriate.     Thanks!    Lulú Chilel, [facility]

## 2021-07-09 NOTE — PROGRESS NOTES
Medication Therapy Management (MTM) Encounter    ASSESSMENT:                            Medication Adherence/Access: No issues identified    Pancreatic cancer/Ostomy:  Plan in place    Chronic pain/pancreatic cancer pain:  Plan in place to establish with palliative care.     Type 2 Diabetes: Patient is not meeting A1c goal of < 8%, however anticipate lower reading next time.     Hypertension: Stable.     Supplements: Stable.     Depression: Stable.     Insomnia: Stable.     RLS: Stable.     PLAN:                            1. Continue current medications.    Follow-up: No follow-ups on file.      SUBJECTIVE/OBJECTIVE:                          Cedric Figueroa is a 65 year old male called for an initial visit. He was referred to me from  insurance plan. Patient was accompanied by wife, Marixa.     Reason for visit: trouble getting his oxycodone.    Allergies/ADRs: Reviewed in chart  Tobacco: He reports that he quit smoking about 2 years ago. He has a 30.00 pack-year smoking history. He has never used smokeless tobacco.  Alcohol: none  Caffeine: 0-1 cups/day of coffee, 1 sodas/day  Activity: walking  Past Medical History: Reviewed in chart    Medication Adherence/Access: no issues reported  Patient takes medications directly from bottles.  Patient takes medications 3-6 time(s) per day at bedtime  Misses oxycodone on occasion, takes later    Pancreatic cancer/Ostomy:    Miralax 17 g with liquid daily PRN (only needed one time)    Diagnosed spring 2021. Will be seeing palliative care at end of July.  Gets a lot of bloating but tends to work itself through.     Chronic pain/pancreatic cancer pain:    Fentanyl 50 mcg patch daily   Oxycodone 10 mg six times daily PRN  Acetaminophen 1000 mg four times daily PRN  Hydroxyzine 50 mg three times daily PRN itching    He's having difficulty getting timely refills on pain medications, he's hoping appt with palliative care at end of July will help with this.   Oxycodone makes him  "itch, but the hydroxyzine takes care of it. Fentanyl is new, prescribed after discharge:    Overall regimen is effective, the short acting oxycodone is effective but doesn't last very long. Denies side effects.   History of Knee and back pain, hx of 3 knee replacements, back surgery (ruptered disc).     Type 2 Diabetes:   Lantus 5 units at bedtime  Humalog sliding scale three times daily with meals  : none  151-200: 2 units  201-250: 4 units  251-300: 6 units  301-350: 8 units  351-400: 10 units  400+: 12 units    (usually giving 0-4 units) Novolog.  1-2 meals, the rest is grazing.  SMBG Ranges (patient reported): 3x/day,  Fasting usually in 100-140.  Accu Check Guide.   Readings: 100-200s.  Symptoms of low blood sugar? none.   Symptoms of high blood sugar? none  Diet/Exercise: \"I know what I can eat and what I can't eat\"  Aspirin: taken off aspirin after diagnosis of pancreatic cancer  Statin: Yes: was taken off statins after diagnosis of pancreatic cancer  ACEi/ARB: Yes: losartan/HCTZ 100/25mg at bedtime.Urine albumin is   Lab Results   Component Value Date    UMALCR 114.81 (H) 10/01/2019     Lab Results   Component Value Date    A1C 8.6 04/22/2021    A1C 9.8 01/22/2021    A1C 7.3 09/09/2020    A1C 7.0 06/19/2020    A1C 6.7 01/27/2020     Recent Labs   Lab Test 01/27/20  1407 11/28/18  0915 03/24/15  0747 03/24/15  0747 12/31/14  0715   CHOL 149 157   < > 127 117   HDL 40 56   < > 41 37*   LDL 61 79   < > 62 53   TRIG 239* 108   < > 121 133   CHOLHDLRATIO  --   --   --  3.1 3.2    < > = values in this interval not displayed.     GFR Estimate   Date Value Ref Range Status   04/27/2021 31 (L) >60 mL/min/[1.73_m2] Final     Comment:     Non  GFR Calc  Starting 12/18/2018, serum creatinine based estimated GFR (eGFR) will be   calculated using the Chronic Kidney Disease Epidemiology Collaboration   (CKD-EPI) equation.     04/25/2021 33 (L) >60 ml/min/1.73m2 Final   04/22/2021 29 (L) >60 " "mL/min/[1.73_m2] Final     Comment:     Non  GFR Calc  Starting 12/18/2018, serum creatinine based estimated GFR (eGFR) will be   calculated using the Chronic Kidney Disease Epidemiology Collaboration   (CKD-EPI) equation.       Hypertension:   Was taken off losartan- hydrochlorothiazide due to hypotension.     Home healthcare comes MWF.  BP Readings from Last 3 Encounters:   04/27/21 94/64   04/02/21 94/61   01/22/21 124/66     Supplements:   B complex daily   Vitamin B12 injection once monthly    Denies issues at this time.  Hx of Neda-en-Y gastric bypass about 18 years ago.       Depression:  Escitalopram 20mg once daily    Pt reports that depression symptoms are \"pretty good\". Doesn't have any real high highs, but certainly no really bad lows that he could be having.  No side effects noted.  PHQ 3/3/2020 9/9/2020 1/22/2021   PHQ-9 Total Score 4 10 4   Q9: Thoughts of better off dead/self-harm past 2 weeks Not at all Not at all Not at all     Insomnia:   melatonin 10mg at bedtime     Pt reports works well. No side effects noted.    RLS:   pramipexole 0.25mg twice daily     Very effective. Denies side effects.   Ferritin   Date Value Ref Range Status   04/22/2021 959 (H) 26 - 388 ng/mL Final     Today's Vitals: There were no vitals taken for this visit.  ----------------      I spent 36 minutes with this patient today. All changes were made via collaborative practice agreement with Molly Leiva. A copy of the visit note was provided to the patient's primary care provider.    The patient was sent via [employer] a summary of these recommendations.     Karishma Foy, PharmD  Medication Therapy Management Pharmacist  [phone]    Telemedicine Visit Details  Type of service:  Telephone visit  Start Time: 10:08 AM  End Time: 10:44 AM  Originating Location (patient location): Home  Distant Location (provider location):  [facility]      Medication Therapy Recommendations  No " medication therapy recommendations to display

## 2021-07-09 NOTE — PATIENT INSTRUCTIONS
Recommendations from today's MTM visit:                                                    MTM (medication therapy management) is a service provided by a clinical pharmacist designed to help you get the most of out of your medicines.   Today we reviewed what your medicines are for, how to know if they are working, that your medicines are safe and how to make your medicine regimen as easy as possible.      1. Continue current medications.    Follow-up: Return in about 3 months (around 10/9/2021) for Medication Therapy Management.    It was great to speak with you today.  I value your experience and would be very thankful for your time with providing feedback on our clinic survey. You may receive a survey via email or text message in the next few days.     To schedule another MTM appointment, please call the clinic directly or you may call the MTM scheduling line at [phone] or toll-free at [phone].     My Clinical Pharmacist's contact information:                                                      Please feel free to contact me with any questions or concerns you have.      Karishma Foy, PharmD  Medication Therapy Management Pharmacist  [phone]

## 2021-07-14 ENCOUNTER — TRANSFERRED RECORDS (OUTPATIENT)
Dept: HEALTH INFORMATION MANAGEMENT | Facility: CLINIC | Age: 66
End: 2021-07-14

## 2021-07-16 ENCOUNTER — TRANSFERRED RECORDS (OUTPATIENT)
Dept: HEALTH INFORMATION MANAGEMENT | Facility: CLINIC | Age: 66
End: 2021-07-16

## 2021-07-20 ENCOUNTER — TRANSFERRED RECORDS (OUTPATIENT)
Dept: HEALTH INFORMATION MANAGEMENT | Facility: CLINIC | Age: 66
End: 2021-07-20

## 2021-07-24 ENCOUNTER — TRANSFERRED RECORDS (OUTPATIENT)
Dept: HEALTH INFORMATION MANAGEMENT | Facility: CLINIC | Age: 66
End: 2021-07-24

## 2021-07-24 LAB
ALT SERPL-CCNC: 112 IU/L (ref 8–45)
AST SERPL-CCNC: 163 IU/L (ref 2–40)
CREATININE (EXTERNAL): 1.07 MG/DL (ref 0.72–1.25)
GFR ESTIMATED (EXTERNAL): >60 ML/MIN/1.73M2
GFR ESTIMATED (IF AFRICAN AMERICAN) (EXTERNAL): >60 ML/MIN/1.73M2
GLUCOSE (EXTERNAL): 154 MG/DL (ref 65–100)
INR (EXTERNAL): 1.3
POTASSIUM (EXTERNAL): 3.6 MMOL/L (ref 3.5–5)

## 2021-07-29 ENCOUNTER — MEDICAL CORRESPONDENCE (OUTPATIENT)
Dept: HEALTH INFORMATION MANAGEMENT | Facility: CLINIC | Age: 66
End: 2021-07-29

## 2021-07-30 ENCOUNTER — TELEPHONE (OUTPATIENT)
Dept: FAMILY MEDICINE | Facility: CLINIC | Age: 66
End: 2021-07-30

## 2021-07-30 ENCOUNTER — MEDICAL CORRESPONDENCE (OUTPATIENT)
Dept: HEALTH INFORMATION MANAGEMENT | Facility: CLINIC | Age: 66
End: 2021-07-30

## 2021-07-30 NOTE — TELEPHONE ENCOUNTER
Signature needed on order to hold homecare services effective 7/29/2021. Due to hospitalization at [facility]. To Dr. Leiva's in box.

## 2021-08-03 ENCOUNTER — MEDICAL CORRESPONDENCE (OUTPATIENT)
Dept: HEALTH INFORMATION MANAGEMENT | Facility: CLINIC | Age: 66
End: 2021-08-03

## 2021-08-07 ENCOUNTER — HEALTH MAINTENANCE LETTER (OUTPATIENT)
Age: 66
End: 2021-08-07

## 2021-08-09 ENCOUNTER — TELEPHONE (OUTPATIENT)
Dept: FAMILY MEDICINE | Facility: CLINIC | Age: 66
End: 2021-08-09

## 2021-08-09 NOTE — TELEPHONE ENCOUNTER
Signature needed on order to discharge patient from agency due to still remains in hospital/TCU at end of episode. To Dr. Leiva's in box.

## 2021-08-17 ENCOUNTER — LAB REQUISITION (OUTPATIENT)
Dept: LAB | Facility: CLINIC | Age: 66
End: 2021-08-17
Payer: COMMERCIAL

## 2021-08-17 DIAGNOSIS — R60.9 EDEMA, UNSPECIFIED: ICD-10-CM

## 2021-08-17 LAB
ANION GAP SERPL CALCULATED.3IONS-SCNC: 6 MMOL/L (ref 5–18)
BUN SERPL-MCNC: 13 MG/DL (ref 8–22)
CALCIUM SERPL-MCNC: 6.9 MG/DL (ref 8.5–10.5)
CHLORIDE BLD-SCNC: 108 MMOL/L (ref 98–107)
CO2 SERPL-SCNC: 23 MMOL/L (ref 22–31)
CREAT SERPL-MCNC: 1.16 MG/DL (ref 0.7–1.3)
ERYTHROCYTE [DISTWIDTH] IN BLOOD BY AUTOMATED COUNT: 22.4 % (ref 10–15)
GFR SERPL CREATININE-BSD FRML MDRD: 66 ML/MIN/1.73M2
GLUCOSE BLD-MCNC: 81 MG/DL (ref 70–125)
HCT VFR BLD AUTO: 23.6 % (ref 40–53)
HGB BLD-MCNC: 7.7 G/DL (ref 13.3–17.7)
MCH RBC QN AUTO: 31.4 PG (ref 26.5–33)
MCHC RBC AUTO-ENTMCNC: 32.6 G/DL (ref 31.5–36.5)
MCV RBC AUTO: 96 FL (ref 78–100)
PLATELET # BLD AUTO: 150 10E3/UL (ref 150–450)
POTASSIUM BLD-SCNC: 3.4 MMOL/L (ref 3.5–5)
RBC # BLD AUTO: 2.45 10E6/UL (ref 4.4–5.9)
SODIUM SERPL-SCNC: 137 MMOL/L (ref 136–145)
WBC # BLD AUTO: 12.8 10E3/UL (ref 4–11)

## 2021-08-17 PROCEDURE — 85027 COMPLETE CBC AUTOMATED: CPT | Mod: ORL | Performed by: NURSE PRACTITIONER

## 2021-08-17 PROCEDURE — 80048 BASIC METABOLIC PNL TOTAL CA: CPT | Mod: ORL | Performed by: NURSE PRACTITIONER

## 2021-08-20 ENCOUNTER — LAB REQUISITION (OUTPATIENT)
Dept: LAB | Facility: CLINIC | Age: 66
End: 2021-08-20
Payer: COMMERCIAL

## 2021-08-20 DIAGNOSIS — R60.9 EDEMA, UNSPECIFIED: ICD-10-CM

## 2021-08-20 LAB
ALBUMIN SERPL-MCNC: 1.1 G/DL (ref 3.5–5)
ALP SERPL-CCNC: 1043 U/L (ref 45–120)
ALT SERPL W P-5'-P-CCNC: 47 U/L (ref 0–45)
ANION GAP SERPL CALCULATED.3IONS-SCNC: 5 MMOL/L (ref 5–18)
AST SERPL W P-5'-P-CCNC: 67 U/L (ref 0–40)
BILIRUB DIRECT SERPL-MCNC: 2.3 MG/DL
BILIRUB SERPL-MCNC: 3.5 MG/DL (ref 0–1)
BUN SERPL-MCNC: 18 MG/DL (ref 8–22)
CALCIUM SERPL-MCNC: 7.1 MG/DL (ref 8.5–10.5)
CHLORIDE BLD-SCNC: 109 MMOL/L (ref 98–107)
CO2 SERPL-SCNC: 24 MMOL/L (ref 22–31)
CREAT SERPL-MCNC: 1.54 MG/DL (ref 0.7–1.3)
ERYTHROCYTE [DISTWIDTH] IN BLOOD BY AUTOMATED COUNT: 22.1 % (ref 10–15)
GFR SERPL CREATININE-BSD FRML MDRD: 47 ML/MIN/1.73M2
GLUCOSE BLD-MCNC: 95 MG/DL (ref 70–125)
HCT VFR BLD AUTO: 23.6 % (ref 40–53)
HGB BLD-MCNC: 7.5 G/DL (ref 13.3–17.7)
MCH RBC QN AUTO: 31.4 PG (ref 26.5–33)
MCHC RBC AUTO-ENTMCNC: 31.8 G/DL (ref 31.5–36.5)
MCV RBC AUTO: 99 FL (ref 78–100)
PLATELET # BLD AUTO: 176 10E3/UL (ref 150–450)
POTASSIUM BLD-SCNC: 3.9 MMOL/L (ref 3.5–5)
PROT SERPL-MCNC: 3.8 G/DL (ref 6–8)
RBC # BLD AUTO: 2.39 10E6/UL (ref 4.4–5.9)
SODIUM SERPL-SCNC: 138 MMOL/L (ref 136–145)
TSH SERPL DL<=0.005 MIU/L-ACNC: 0.82 UIU/ML (ref 0.3–5)
WBC # BLD AUTO: 12.2 10E3/UL (ref 4–11)

## 2021-08-20 PROCEDURE — 84450 TRANSFERASE (AST) (SGOT): CPT | Mod: ORL | Performed by: NURSE PRACTITIONER

## 2021-08-20 PROCEDURE — 36415 COLL VENOUS BLD VENIPUNCTURE: CPT | Mod: ORL | Performed by: NURSE PRACTITIONER

## 2021-08-20 PROCEDURE — 80048 BASIC METABOLIC PNL TOTAL CA: CPT | Mod: ORL | Performed by: NURSE PRACTITIONER

## 2021-08-20 PROCEDURE — 82248 BILIRUBIN DIRECT: CPT | Mod: ORL | Performed by: NURSE PRACTITIONER

## 2021-08-20 PROCEDURE — 84443 ASSAY THYROID STIM HORMONE: CPT | Mod: ORL | Performed by: NURSE PRACTITIONER

## 2021-08-20 PROCEDURE — 82040 ASSAY OF SERUM ALBUMIN: CPT | Mod: ORL | Performed by: NURSE PRACTITIONER

## 2021-08-20 PROCEDURE — 85027 COMPLETE CBC AUTOMATED: CPT | Mod: ORL | Performed by: NURSE PRACTITIONER

## 2021-08-23 ENCOUNTER — LAB REQUISITION (OUTPATIENT)
Dept: LAB | Facility: CLINIC | Age: 66
End: 2021-08-23
Payer: COMMERCIAL

## 2021-08-23 DIAGNOSIS — N18.30 CHRONIC KIDNEY DISEASE, STAGE 3 UNSPECIFIED (H): ICD-10-CM

## 2021-08-23 LAB
BASOPHILS # BLD AUTO: 0 10E3/UL (ref 0–0.2)
BASOPHILS NFR BLD AUTO: 0 %
EOSINOPHIL # BLD AUTO: 0.2 10E3/UL (ref 0–0.7)
EOSINOPHIL NFR BLD AUTO: 1 %
ERYTHROCYTE [DISTWIDTH] IN BLOOD BY AUTOMATED COUNT: 21.2 % (ref 10–15)
HCT VFR BLD AUTO: 26.3 % (ref 40–53)
HGB BLD-MCNC: 8.3 G/DL (ref 13.3–17.7)
IMM GRANULOCYTES # BLD: 0.4 10E3/UL
IMM GRANULOCYTES NFR BLD: 2 %
LYMPHOCYTES # BLD AUTO: 1 10E3/UL (ref 0.8–5.3)
LYMPHOCYTES NFR BLD AUTO: 6 %
MCH RBC QN AUTO: 31.4 PG (ref 26.5–33)
MCHC RBC AUTO-ENTMCNC: 31.6 G/DL (ref 31.5–36.5)
MCV RBC AUTO: 100 FL (ref 78–100)
MONOCYTES # BLD AUTO: 1.1 10E3/UL (ref 0–1.3)
MONOCYTES NFR BLD AUTO: 7 %
NEUTROPHILS # BLD AUTO: 13.8 10E3/UL (ref 1.6–8.3)
NEUTROPHILS NFR BLD AUTO: 84 %
NRBC # BLD AUTO: 0 10E3/UL
NRBC BLD AUTO-RTO: 0 /100
PLATELET # BLD AUTO: 154 10E3/UL (ref 150–450)
RBC # BLD AUTO: 2.64 10E6/UL (ref 4.4–5.9)
WBC # BLD AUTO: 16.5 10E3/UL (ref 4–11)

## 2021-08-23 PROCEDURE — 85025 COMPLETE CBC W/AUTO DIFF WBC: CPT | Mod: ORL | Performed by: NURSE PRACTITIONER

## 2021-08-24 ENCOUNTER — LAB REQUISITION (OUTPATIENT)
Dept: LAB | Facility: CLINIC | Age: 66
End: 2021-08-24
Payer: COMMERCIAL

## 2021-08-24 DIAGNOSIS — N18.30 CHRONIC KIDNEY DISEASE, STAGE 3 UNSPECIFIED (H): ICD-10-CM

## 2021-08-24 LAB
ANION GAP SERPL CALCULATED.3IONS-SCNC: 11 MMOL/L (ref 5–18)
BUN SERPL-MCNC: 23 MG/DL (ref 8–22)
CALCIUM SERPL-MCNC: 7.2 MG/DL (ref 8.5–10.5)
CHLORIDE BLD-SCNC: 107 MMOL/L (ref 98–107)
CO2 SERPL-SCNC: 22 MMOL/L (ref 22–31)
CREAT SERPL-MCNC: 1.6 MG/DL (ref 0.7–1.3)
GFR SERPL CREATININE-BSD FRML MDRD: 45 ML/MIN/1.73M2
GLUCOSE BLD-MCNC: 71 MG/DL (ref 70–125)
POTASSIUM BLD-SCNC: 5 MMOL/L (ref 3.5–5)
SODIUM SERPL-SCNC: 140 MMOL/L (ref 136–145)

## 2021-08-24 PROCEDURE — 80048 BASIC METABOLIC PNL TOTAL CA: CPT | Mod: ORL | Performed by: NURSE PRACTITIONER

## 2021-10-02 ENCOUNTER — HEALTH MAINTENANCE LETTER (OUTPATIENT)
Age: 66
End: 2021-10-02

## 2021-10-27 ENCOUNTER — TELEPHONE (OUTPATIENT)
Dept: PHARMACY | Facility: CLINIC | Age: 66
End: 2021-10-27

## 2021-10-27 NOTE — TELEPHONE ENCOUNTER
We have been unable to reach this patient for MTM follow-up after several attempts. We will stop reaching out to the patient at this time. Please let us know if we can assist in this patient's care in the future.     Routing to PCP as JAC Foy, PharmD  Medication Therapy Management Pharmacist  [phone]

## 2021-11-23 DIAGNOSIS — F33.0 MILD RECURRENT MAJOR DEPRESSION (H): ICD-10-CM

## 2021-11-24 NOTE — TELEPHONE ENCOUNTER
Please disregard this refill request.  I am not sure how this was sent, but this patient has passed away.

## 2021-11-27 ENCOUNTER — HEALTH MAINTENANCE LETTER (OUTPATIENT)
Age: 66
End: 2021-11-27

## 2021-11-27 NOTE — TELEPHONE ENCOUNTER
Routing refill request to provider for review/approval because:  PHQ-9    PHQ 3/3/2020 9/9/2020 1/22/2021   PHQ-9 Total Score 4 10 4   Q9: Thoughts of better off dead/self-harm past 2 weeks Not at all Not at all Not at all

## 2021-12-01 RX ORDER — ESCITALOPRAM OXALATE 20 MG/1
TABLET ORAL
Qty: 90 TABLET | Refills: 1 | Status: SHIPPED | OUTPATIENT
Start: 2021-12-01

## 2022-02-17 PROBLEM — G89.29 CHRONIC PAIN: Status: RESOLVED | Noted: 2017-02-20 | Resolved: 2018-09-26

## 2022-03-19 ENCOUNTER — HEALTH MAINTENANCE LETTER (OUTPATIENT)
Age: 67
End: 2022-03-19

## 2022-05-14 ENCOUNTER — HEALTH MAINTENANCE LETTER (OUTPATIENT)
Age: 67
End: 2022-05-14

## 2022-07-09 ENCOUNTER — HEALTH MAINTENANCE LETTER (OUTPATIENT)
Age: 67
End: 2022-07-09

## 2022-09-03 ENCOUNTER — HEALTH MAINTENANCE LETTER (OUTPATIENT)
Age: 67
End: 2022-09-03

## 2023-01-15 ENCOUNTER — HEALTH MAINTENANCE LETTER (OUTPATIENT)
Age: 68
End: 2023-01-15

## 2023-04-29 ENCOUNTER — HEALTH MAINTENANCE LETTER (OUTPATIENT)
Age: 68
End: 2023-04-29

## 2023-06-03 ENCOUNTER — HEALTH MAINTENANCE LETTER (OUTPATIENT)
Age: 68
End: 2023-06-03

## 2023-09-30 ENCOUNTER — HEALTH MAINTENANCE LETTER (OUTPATIENT)
Age: 68
End: 2023-09-30

## 2024-02-17 ENCOUNTER — HEALTH MAINTENANCE LETTER (OUTPATIENT)
Age: 69
End: 2024-02-17

## 2024-07-06 ENCOUNTER — HEALTH MAINTENANCE LETTER (OUTPATIENT)
Age: 69
End: 2024-07-06

## (undated) DEVICE — GLOVE PROTEXIS BLUE W/NEU-THERA 8.5  2D73EB85

## (undated) DEVICE — PACK MINOR SBA15MIFSE

## (undated) DEVICE — SPONGE SURGIFOAM 100 1974

## (undated) DEVICE — GLOVE PROTEXIS W/NEU-THERA 7.5  2D73TE75

## (undated) DEVICE — DRSG KERLIX FLUFFS X5

## (undated) DEVICE — LINEN ORTHO ACL PACK 5447

## (undated) DEVICE — ESU GROUND PAD ADULT W/CORD E7507

## (undated) DEVICE — PREP SKIN SCRUB TRAY 4461A

## (undated) DEVICE — SU VICRYL 2-0 CT-2 CR 8X18" J726D

## (undated) DEVICE — LINEN POUCH DBL 5427

## (undated) DEVICE — SU DERMABOND ADVANCED .7ML DNX12

## (undated) DEVICE — DRAPE LAP W/ARMBOARD 29410

## (undated) DEVICE — PACKING IODOFORM STRIP 1/2" 7832

## (undated) DEVICE — SUCTION FRAZIER 12FR W/OBTURATOR 33120

## (undated) DEVICE — GOWN XXL 9575

## (undated) DEVICE — PACK SMALL SPINE RIDGES

## (undated) DEVICE — DRAPE MICROSCOPE OPMI ZEISS 48X118" 306071-0000-000

## (undated) DEVICE — SUCTION CANISTER MEDIVAC LINER 1500ML W/LID 65651-515

## (undated) DEVICE — GLOVE PROTEXIS W/NEU-THERA 8.5  2D73TE85

## (undated) DEVICE — SUCTION TIP YANKAUER W/O VENT K86

## (undated) DEVICE — PEN MARKING SKIN W/LABELS 31145884

## (undated) DEVICE — POSITIONER PT PRONESAFE HEAD REST W/DERMAPROX INSERT 40599

## (undated) DEVICE — SOL WATER IRRIG 1000ML BOTTLE 07139-09

## (undated) DEVICE — NDL ANGIOCATH 18GA 1 1/4" PROTECTIV 306501

## (undated) DEVICE — ESU ELEC BLADE 4" COATED

## (undated) DEVICE — DRSG GAUZE 4X4" TRAY 6939

## (undated) DEVICE — SU VICRYL 0 CT-2 CR 8X18" J727D

## (undated) DEVICE — DRSG ABDOMINAL 07 1/2X8" 7197D

## (undated) DEVICE — SU WND CLOSURE VLOC 180 ABS 3-0 12" P-14 VLOCL0114

## (undated) DEVICE — SPONGE COTTONOID 1/2X1/2" 80-1400

## (undated) DEVICE — TUBING SUCTION MEDI-VAC SOFT 3/16"X20' N520A

## (undated) DEVICE — MIDAS REX DISSECTING TOOL  14MH30

## (undated) DEVICE — ESU CLEANER TIP 31142717

## (undated) DEVICE — RX SURGIFLO HEMOSTATIC MATRIX 8ML 2991

## (undated) DEVICE — DECANTER TRANSFER DEVICE 2008S

## (undated) DEVICE — DRSG TELFA ISLAND 4X10"

## (undated) DEVICE — GLOVE PROTEXIS MICRO 7.5  2D73PM75

## (undated) RX ORDER — OXYCODONE AND ACETAMINOPHEN 5; 325 MG/1; MG/1
TABLET ORAL
Status: DISPENSED
Start: 2019-04-04

## (undated) RX ORDER — DEXAMETHASONE SODIUM PHOSPHATE 4 MG/ML
INJECTION, SOLUTION INTRA-ARTICULAR; INTRALESIONAL; INTRAMUSCULAR; INTRAVENOUS; SOFT TISSUE
Status: DISPENSED
Start: 2019-04-04

## (undated) RX ORDER — GLYCOPYRROLATE 0.2 MG/ML
INJECTION INTRAMUSCULAR; INTRAVENOUS
Status: DISPENSED
Start: 2019-04-04

## (undated) RX ORDER — METOPROLOL TARTRATE 100 MG
TABLET ORAL
Status: DISPENSED
Start: 2020-09-22

## (undated) RX ORDER — ONDANSETRON 2 MG/ML
INJECTION INTRAMUSCULAR; INTRAVENOUS
Status: DISPENSED
Start: 2018-03-28

## (undated) RX ORDER — LIDOCAINE HYDROCHLORIDE 10 MG/ML
INJECTION, SOLUTION EPIDURAL; INFILTRATION; INTRACAUDAL; PERINEURAL
Status: DISPENSED
Start: 2019-04-04

## (undated) RX ORDER — FENTANYL CITRATE 50 UG/ML
INJECTION, SOLUTION INTRAMUSCULAR; INTRAVENOUS
Status: DISPENSED
Start: 2019-04-04

## (undated) RX ORDER — TAMSULOSIN HYDROCHLORIDE 0.4 MG/1
CAPSULE ORAL
Status: DISPENSED
Start: 2019-04-04

## (undated) RX ORDER — GABAPENTIN 300 MG/1
CAPSULE ORAL
Status: DISPENSED
Start: 2018-03-28

## (undated) RX ORDER — CEFAZOLIN SODIUM 2 G/100ML
INJECTION, SOLUTION INTRAVENOUS
Status: DISPENSED
Start: 2018-03-28

## (undated) RX ORDER — ACETAMINOPHEN 325 MG/1
TABLET ORAL
Status: DISPENSED
Start: 2018-03-28

## (undated) RX ORDER — HYDROMORPHONE HYDROCHLORIDE 1 MG/ML
INJECTION, SOLUTION INTRAMUSCULAR; INTRAVENOUS; SUBCUTANEOUS
Status: DISPENSED
Start: 2019-04-04

## (undated) RX ORDER — ONDANSETRON 2 MG/ML
INJECTION INTRAMUSCULAR; INTRAVENOUS
Status: DISPENSED
Start: 2019-04-04

## (undated) RX ORDER — DEXAMETHASONE SODIUM PHOSPHATE 4 MG/ML
INJECTION, SOLUTION INTRA-ARTICULAR; INTRALESIONAL; INTRAMUSCULAR; INTRAVENOUS; SOFT TISSUE
Status: DISPENSED
Start: 2018-03-28

## (undated) RX ORDER — ALBUTEROL SULFATE 90 UG/1
AEROSOL, METERED RESPIRATORY (INHALATION)
Status: DISPENSED
Start: 2018-03-28

## (undated) RX ORDER — NITROGLYCERIN 0.4 MG/1
TABLET SUBLINGUAL
Status: DISPENSED
Start: 2020-09-22

## (undated) RX ORDER — KETAMINE HCL IN 0.9 % NACL 50 MG/5 ML
SYRINGE (ML) INTRAVENOUS
Status: DISPENSED
Start: 2019-04-04

## (undated) RX ORDER — LIDOCAINE HYDROCHLORIDE 20 MG/ML
INJECTION, SOLUTION EPIDURAL; INFILTRATION; INTRACAUDAL; PERINEURAL
Status: DISPENSED
Start: 2018-03-28

## (undated) RX ORDER — CEFAZOLIN SODIUM 2 G/100ML
INJECTION, SOLUTION INTRAVENOUS
Status: DISPENSED
Start: 2019-04-04

## (undated) RX ORDER — NEOSTIGMINE METHYLSULFATE 1 MG/ML
VIAL (ML) INJECTION
Status: DISPENSED
Start: 2019-04-04

## (undated) RX ORDER — FENTANYL CITRATE 50 UG/ML
INJECTION, SOLUTION INTRAMUSCULAR; INTRAVENOUS
Status: DISPENSED
Start: 2018-03-28

## (undated) RX ORDER — OXYCODONE HYDROCHLORIDE 5 MG/1
TABLET ORAL
Status: DISPENSED
Start: 2018-03-28

## (undated) RX ORDER — PROPOFOL 10 MG/ML
INJECTION, EMULSION INTRAVENOUS
Status: DISPENSED
Start: 2018-03-28

## (undated) RX ORDER — GINSENG 100 MG
CAPSULE ORAL
Status: DISPENSED
Start: 2018-03-28

## (undated) RX ORDER — VANCOMYCIN HYDROCHLORIDE 1 G/20ML
INJECTION, POWDER, LYOPHILIZED, FOR SOLUTION INTRAVENOUS
Status: DISPENSED
Start: 2019-04-04

## (undated) RX ORDER — BUPIVACAINE HYDROCHLORIDE AND EPINEPHRINE 5; 5 MG/ML; UG/ML
INJECTION, SOLUTION EPIDURAL; INTRACAUDAL; PERINEURAL
Status: DISPENSED
Start: 2019-04-04

## (undated) RX ORDER — PROPOFOL 10 MG/ML
INJECTION, EMULSION INTRAVENOUS
Status: DISPENSED
Start: 2019-04-04